# Patient Record
Sex: FEMALE | Race: WHITE | Employment: OTHER | URBAN - METROPOLITAN AREA
[De-identification: names, ages, dates, MRNs, and addresses within clinical notes are randomized per-mention and may not be internally consistent; named-entity substitution may affect disease eponyms.]

---

## 2017-03-05 ENCOUNTER — HOSPITAL ENCOUNTER (INPATIENT)
Age: 67
LOS: 4 days | Discharge: NURSING, REHAB OR LONG-TERM CARE | DRG: 065 | End: 2017-03-09
Attending: EMERGENCY MEDICINE | Admitting: INTERNAL MEDICINE
Payer: MEDICARE

## 2017-03-05 ENCOUNTER — APPOINTMENT (OUTPATIENT)
Dept: GENERAL RADIOLOGY | Age: 67
DRG: 065 | End: 2017-03-05
Payer: MEDICARE

## 2017-03-05 ENCOUNTER — APPOINTMENT (OUTPATIENT)
Dept: CT IMAGING | Age: 67
DRG: 065 | End: 2017-03-05
Payer: MEDICARE

## 2017-03-05 ENCOUNTER — APPOINTMENT (OUTPATIENT)
Dept: MRI IMAGING | Age: 67
DRG: 065 | End: 2017-03-05
Payer: MEDICARE

## 2017-03-05 DIAGNOSIS — R00.0 TACHYCARDIA: ICD-10-CM

## 2017-03-05 DIAGNOSIS — I63.9 ACUTE ISCHEMIC STROKE (HCC): ICD-10-CM

## 2017-03-05 DIAGNOSIS — R73.9 HYPERGLYCEMIA: ICD-10-CM

## 2017-03-05 DIAGNOSIS — I63.9 CEREBROVASCULAR ACCIDENT (CVA), UNSPECIFIED MECHANISM (HCC): Primary | ICD-10-CM

## 2017-03-05 PROBLEM — R29.90 STROKE-LIKE SYMPTOMS: Status: ACTIVE | Noted: 2017-03-05

## 2017-03-05 LAB
BETA-HYDROXYBUTYRATE: 0.85 MMOL/L (ref 0.02–0.27)
CHOLESTEROL/HDL RATIO: 5.5
CHOLESTEROL: 364 MG/DL
CHP ED QC CHECK: NORMAL
GLUCOSE BLD-MCNC: 178 MG/DL (ref 65–105)
GLUCOSE BLD-MCNC: 232 MG/DL
GLUCOSE BLD-MCNC: 232 MG/DL (ref 65–105)
HDLC SERPL-MCNC: 66 MG/DL
INR BLD: 1
LDL CHOLESTEROL: 277 MG/DL (ref 0–130)
PARTIAL THROMBOPLASTIN TIME: 70.8 SEC (ref 21.3–31.3)
PROTHROMBIN TIME: 11.1 SEC (ref 9.4–12.6)
TRIGL SERPL-MCNC: 107 MG/DL
TROPONIN INTERP: NORMAL
TROPONIN T: <0.03 NG/ML
VLDLC SERPL CALC-MCNC: ABNORMAL MG/DL (ref 1–30)

## 2017-03-05 PROCEDURE — 6360000004 HC RX CONTRAST MEDICATION: Performed by: EMERGENCY MEDICINE

## 2017-03-05 PROCEDURE — 84484 ASSAY OF TROPONIN QUANT: CPT

## 2017-03-05 PROCEDURE — 6370000000 HC RX 637 (ALT 250 FOR IP): Performed by: EMERGENCY MEDICINE

## 2017-03-05 PROCEDURE — 70498 CT ANGIOGRAPHY NECK: CPT

## 2017-03-05 PROCEDURE — 6360000002 HC RX W HCPCS: Performed by: EMERGENCY MEDICINE

## 2017-03-05 PROCEDURE — 2580000003 HC RX 258: Performed by: EMERGENCY MEDICINE

## 2017-03-05 PROCEDURE — 6360000004 HC RX CONTRAST MEDICATION: Performed by: INTERNAL MEDICINE

## 2017-03-05 PROCEDURE — 82947 ASSAY GLUCOSE BLOOD QUANT: CPT | Performed by: INTERNAL MEDICINE

## 2017-03-05 PROCEDURE — 80061 LIPID PANEL: CPT

## 2017-03-05 PROCEDURE — 82947 ASSAY GLUCOSE BLOOD QUANT: CPT

## 2017-03-05 PROCEDURE — 71010 XR CHEST PORTABLE: CPT

## 2017-03-05 PROCEDURE — 93005 ELECTROCARDIOGRAM TRACING: CPT

## 2017-03-05 PROCEDURE — 70496 CT ANGIOGRAPHY HEAD: CPT

## 2017-03-05 PROCEDURE — 2500000003 HC RX 250 WO HCPCS: Performed by: STUDENT IN AN ORGANIZED HEALTH CARE EDUCATION/TRAINING PROGRAM

## 2017-03-05 PROCEDURE — A9579 GAD-BASE MR CONTRAST NOS,1ML: HCPCS | Performed by: INTERNAL MEDICINE

## 2017-03-05 PROCEDURE — 85610 PROTHROMBIN TIME: CPT

## 2017-03-05 PROCEDURE — 2580000003 HC RX 258: Performed by: STUDENT IN AN ORGANIZED HEALTH CARE EDUCATION/TRAINING PROGRAM

## 2017-03-05 PROCEDURE — 99245 OFF/OP CONSLTJ NEW/EST HI 55: CPT | Performed by: PSYCHIATRY & NEUROLOGY

## 2017-03-05 PROCEDURE — 70553 MRI BRAIN STEM W/O & W/DYE: CPT

## 2017-03-05 PROCEDURE — 81001 URINALYSIS AUTO W/SCOPE: CPT

## 2017-03-05 PROCEDURE — 2060000000 HC ICU INTERMEDIATE R&B

## 2017-03-05 PROCEDURE — 99285 EMERGENCY DEPT VISIT HI MDM: CPT

## 2017-03-05 PROCEDURE — 87641 MR-STAPH DNA AMP PROBE: CPT

## 2017-03-05 PROCEDURE — 2580000003 HC RX 258: Performed by: INTERNAL MEDICINE

## 2017-03-05 PROCEDURE — 96365 THER/PROPH/DIAG IV INF INIT: CPT

## 2017-03-05 PROCEDURE — 82010 KETONE BODYS QUAN: CPT

## 2017-03-05 PROCEDURE — 6370000000 HC RX 637 (ALT 250 FOR IP): Performed by: STUDENT IN AN ORGANIZED HEALTH CARE EDUCATION/TRAINING PROGRAM

## 2017-03-05 PROCEDURE — 85730 THROMBOPLASTIN TIME PARTIAL: CPT

## 2017-03-05 RX ORDER — ASPIRIN 81 MG/1
81 TABLET ORAL DAILY
Status: DISCONTINUED | OUTPATIENT
Start: 2017-03-06 | End: 2017-03-09 | Stop reason: HOSPADM

## 2017-03-05 RX ORDER — CLOPIDOGREL BISULFATE 75 MG/1
75 TABLET ORAL DAILY
Status: DISCONTINUED | OUTPATIENT
Start: 2017-03-06 | End: 2017-03-09 | Stop reason: HOSPADM

## 2017-03-05 RX ORDER — AMLODIPINE BESYLATE 5 MG/1
5 TABLET ORAL DAILY
Status: DISCONTINUED | OUTPATIENT
Start: 2017-03-05 | End: 2017-03-08

## 2017-03-05 RX ORDER — SODIUM CHLORIDE 0.9 % (FLUSH) 0.9 %
10 SYRINGE (ML) INJECTION 2 TIMES DAILY
Status: DISCONTINUED | OUTPATIENT
Start: 2017-03-06 | End: 2017-03-09 | Stop reason: HOSPADM

## 2017-03-05 RX ORDER — 0.9 % SODIUM CHLORIDE 0.9 %
1000 INTRAVENOUS SOLUTION INTRAVENOUS ONCE
Status: COMPLETED | OUTPATIENT
Start: 2017-03-05 | End: 2017-03-05

## 2017-03-05 RX ORDER — SODIUM CHLORIDE 0.9 % (FLUSH) 0.9 %
10 SYRINGE (ML) INJECTION EVERY 12 HOURS SCHEDULED
Status: CANCELLED | OUTPATIENT
Start: 2017-03-05

## 2017-03-05 RX ORDER — ONDANSETRON 2 MG/ML
4 INJECTION INTRAMUSCULAR; INTRAVENOUS EVERY 6 HOURS PRN
Status: DISCONTINUED | OUTPATIENT
Start: 2017-03-05 | End: 2017-03-09 | Stop reason: HOSPADM

## 2017-03-05 RX ORDER — ATORVASTATIN CALCIUM 40 MG/1
40 TABLET, FILM COATED ORAL NIGHTLY
Status: DISCONTINUED | OUTPATIENT
Start: 2017-03-05 | End: 2017-03-06

## 2017-03-05 RX ORDER — SODIUM CHLORIDE 0.9 % (FLUSH) 0.9 %
10 SYRINGE (ML) INJECTION PRN
Status: DISCONTINUED | OUTPATIENT
Start: 2017-03-05 | End: 2017-03-09 | Stop reason: HOSPADM

## 2017-03-05 RX ORDER — LABETALOL HYDROCHLORIDE 5 MG/ML
10 INJECTION, SOLUTION INTRAVENOUS EVERY 6 HOURS PRN
Status: DISCONTINUED | OUTPATIENT
Start: 2017-03-05 | End: 2017-03-09 | Stop reason: HOSPADM

## 2017-03-05 RX ORDER — SODIUM CHLORIDE 0.9 % (FLUSH) 0.9 %
10 SYRINGE (ML) INJECTION PRN
Status: CANCELLED | OUTPATIENT
Start: 2017-03-05

## 2017-03-05 RX ORDER — CLOPIDOGREL 300 MG/1
300 TABLET, FILM COATED ORAL ONCE
Status: COMPLETED | OUTPATIENT
Start: 2017-03-05 | End: 2017-03-05

## 2017-03-05 RX ORDER — SIMVASTATIN 40 MG
40 TABLET ORAL NIGHTLY
Status: DISCONTINUED | OUTPATIENT
Start: 2017-03-05 | End: 2017-03-05

## 2017-03-05 RX ORDER — SODIUM CHLORIDE 9 MG/ML
INJECTION, SOLUTION INTRAVENOUS CONTINUOUS
Status: DISCONTINUED | OUTPATIENT
Start: 2017-03-05 | End: 2017-03-08

## 2017-03-05 RX ORDER — MORPHINE SULFATE 2 MG/ML
2 INJECTION, SOLUTION INTRAMUSCULAR; INTRAVENOUS
Status: CANCELLED | OUTPATIENT
Start: 2017-03-05

## 2017-03-05 RX ORDER — ASPIRIN 81 MG/1
324 TABLET, CHEWABLE ORAL ONCE
Status: COMPLETED | OUTPATIENT
Start: 2017-03-05 | End: 2017-03-05

## 2017-03-05 RX ORDER — SODIUM CHLORIDE 0.9 % (FLUSH) 0.9 %
10 SYRINGE (ML) INJECTION EVERY 12 HOURS SCHEDULED
Status: DISCONTINUED | OUTPATIENT
Start: 2017-03-05 | End: 2017-03-09 | Stop reason: HOSPADM

## 2017-03-05 RX ORDER — ACETAMINOPHEN 325 MG/1
650 TABLET ORAL EVERY 4 HOURS PRN
Status: DISCONTINUED | OUTPATIENT
Start: 2017-03-05 | End: 2017-03-09 | Stop reason: HOSPADM

## 2017-03-05 RX ORDER — MORPHINE SULFATE 4 MG/ML
4 INJECTION, SOLUTION INTRAMUSCULAR; INTRAVENOUS
Status: CANCELLED | OUTPATIENT
Start: 2017-03-05

## 2017-03-05 RX ORDER — ACETAMINOPHEN 325 MG/1
650 TABLET ORAL EVERY 4 HOURS PRN
Status: CANCELLED | OUTPATIENT
Start: 2017-03-05

## 2017-03-05 RX ORDER — HEPARIN SODIUM 10000 [USP'U]/100ML
15 INJECTION, SOLUTION INTRAVENOUS CONTINUOUS
Status: DISCONTINUED | OUTPATIENT
Start: 2017-03-05 | End: 2017-03-05

## 2017-03-05 RX ADMIN — ASPIRIN 81 MG 324 MG: 81 TABLET ORAL at 23:52

## 2017-03-05 RX ADMIN — Medication 10 ML: at 21:27

## 2017-03-05 RX ADMIN — SODIUM CHLORIDE 1000 ML: 9 INJECTION, SOLUTION INTRAVENOUS at 15:49

## 2017-03-05 RX ADMIN — GADOPENTETATE DIMEGLUMINE 11 ML: 469.01 INJECTION INTRAVENOUS at 23:37

## 2017-03-05 RX ADMIN — IOHEXOL 90 ML: 350 INJECTION, SOLUTION INTRAVENOUS at 15:20

## 2017-03-05 RX ADMIN — AMLODIPINE BESYLATE 5 MG: 5 TABLET ORAL at 23:52

## 2017-03-05 RX ADMIN — LABETALOL HYDROCHLORIDE 10 MG: 5 INJECTION, SOLUTION INTRAVENOUS at 22:27

## 2017-03-05 RX ADMIN — ATORVASTATIN CALCIUM 40 MG: 40 TABLET, FILM COATED ORAL at 23:52

## 2017-03-05 RX ADMIN — HEPARIN SODIUM AND DEXTROSE 15 UNITS/KG/HR: 10000; 5 INJECTION INTRAVENOUS at 15:33

## 2017-03-05 RX ADMIN — SODIUM CHLORIDE: 9 INJECTION, SOLUTION INTRAVENOUS at 21:26

## 2017-03-05 RX ADMIN — CLOPIDOGREL BISULFATE 300 MG: 300 TABLET, FILM COATED ORAL at 23:51

## 2017-03-05 RX ADMIN — Medication 10 ML: at 23:39

## 2017-03-05 ASSESSMENT — ENCOUNTER SYMPTOMS
PHOTOPHOBIA: 0
TROUBLE SWALLOWING: 0
SHORTNESS OF BREATH: 0
RHINORRHEA: 0
ABDOMINAL PAIN: 0
BACK PAIN: 0

## 2017-03-06 PROBLEM — I63.9 ACUTE ISCHEMIC STROKE (HCC): Status: ACTIVE | Noted: 2017-03-06

## 2017-03-06 LAB
-: NORMAL
ABSOLUTE EOS #: 0.1 K/UL (ref 0–0.4)
ABSOLUTE LYMPH #: 1.5 K/UL (ref 1–4.8)
ABSOLUTE MONO #: 0.6 K/UL (ref 0.1–1.2)
AMORPHOUS: NORMAL
BACTERIA: NORMAL
BASOPHILS # BLD: 0 % (ref 0–2)
BASOPHILS ABSOLUTE: 0 K/UL (ref 0–0.2)
BILIRUBIN URINE: NEGATIVE
CASTS UA: NORMAL /LPF (ref 0–8)
CHOLESTEROL/HDL RATIO: 6.4
CHOLESTEROL: 333 MG/DL
COLOR: YELLOW
COMMENT UA: ABNORMAL
CRYSTALS, UA: NORMAL /HPF
DIFFERENTIAL TYPE: ABNORMAL
DIRECT EXAM: NORMAL
EOSINOPHILS RELATIVE PERCENT: 2 % (ref 1–4)
EPITHELIAL CELLS UA: NORMAL /HPF (ref 0–5)
GLUCOSE BLD-MCNC: 199 MG/DL (ref 65–105)
GLUCOSE BLD-MCNC: 218 MG/DL (ref 65–105)
GLUCOSE BLD-MCNC: 266 MG/DL (ref 65–105)
GLUCOSE BLD-MCNC: 315 MG/DL (ref 65–105)
GLUCOSE URINE: ABNORMAL
HCT VFR BLD CALC: 41 % (ref 36–46)
HDLC SERPL-MCNC: 52 MG/DL
HEMOGLOBIN: 14.1 G/DL (ref 12–16)
INR BLD: 1
KETONES, URINE: ABNORMAL
LDL CHOLESTEROL: 235 MG/DL (ref 0–130)
LEUKOCYTE ESTERASE, URINE: ABNORMAL
LV EF: 55 %
LVEF MODALITY: NORMAL
LYMPHOCYTES # BLD: 22 % (ref 24–44)
Lab: NORMAL
MCH RBC QN AUTO: 28.9 PG (ref 26–34)
MCHC RBC AUTO-ENTMCNC: 34.3 G/DL (ref 31–37)
MCV RBC AUTO: 84.4 FL (ref 80–100)
MONOCYTES # BLD: 8 % (ref 2–11)
MUCUS: NORMAL
NITRITE, URINE: NEGATIVE
OTHER OBSERVATIONS UA: NORMAL
PDW BLD-RTO: 13.7 % (ref 12.5–15.4)
PH UA: 5.5 (ref 5–8)
PLATELET # BLD: 218 K/UL (ref 140–450)
PLATELET ESTIMATE: ABNORMAL
PMV BLD AUTO: 8.8 FL (ref 6–12)
PROTEIN UA: NEGATIVE
PROTHROMBIN TIME: 11.1 SEC (ref 9.4–12.6)
RBC # BLD: 4.86 M/UL (ref 4–5.2)
RBC # BLD: ABNORMAL 10*6/UL
RBC UA: NORMAL /HPF (ref 0–4)
RENAL EPITHELIAL, UA: NORMAL /HPF
SEG NEUTROPHILS: 68 % (ref 36–66)
SEGMENTED NEUTROPHILS ABSOLUTE COUNT: 4.7 K/UL (ref 1.8–7.7)
SPECIFIC GRAVITY UA: 1.04 (ref 1–1.03)
SPECIMEN DESCRIPTION: NORMAL
STATUS: NORMAL
TRICHOMONAS: NORMAL
TRIGL SERPL-MCNC: 230 MG/DL
TURBIDITY: CLEAR
URINE HGB: NEGATIVE
UROBILINOGEN, URINE: NORMAL
VLDLC SERPL CALC-MCNC: ABNORMAL MG/DL (ref 1–30)
WBC # BLD: 6.8 K/UL (ref 3.5–11)
WBC # BLD: ABNORMAL 10*3/UL
WBC UA: NORMAL /HPF (ref 0–5)
YEAST: NORMAL

## 2017-03-06 PROCEDURE — G8999 MOTOR SPEECH CURRENT STATUS: HCPCS

## 2017-03-06 PROCEDURE — G8979 MOBILITY GOAL STATUS: HCPCS

## 2017-03-06 PROCEDURE — G8996 SWALLOW CURRENT STATUS: HCPCS

## 2017-03-06 PROCEDURE — 92610 EVALUATE SWALLOWING FUNCTION: CPT

## 2017-03-06 PROCEDURE — 6370000000 HC RX 637 (ALT 250 FOR IP): Performed by: STUDENT IN AN ORGANIZED HEALTH CARE EDUCATION/TRAINING PROGRAM

## 2017-03-06 PROCEDURE — G9186 MOTOR SPEECH GOAL STATUS: HCPCS

## 2017-03-06 PROCEDURE — 97535 SELF CARE MNGMENT TRAINING: CPT | Performed by: OCCUPATIONAL THERAPIST

## 2017-03-06 PROCEDURE — 85610 PROTHROMBIN TIME: CPT

## 2017-03-06 PROCEDURE — 6360000002 HC RX W HCPCS: Performed by: INTERNAL MEDICINE

## 2017-03-06 PROCEDURE — G8988 SELF CARE GOAL STATUS: HCPCS | Performed by: OCCUPATIONAL THERAPIST

## 2017-03-06 PROCEDURE — 93306 TTE W/DOPPLER COMPLETE: CPT

## 2017-03-06 PROCEDURE — 94762 N-INVAS EAR/PLS OXIMTRY CONT: CPT

## 2017-03-06 PROCEDURE — G8997 SWALLOW GOAL STATUS: HCPCS

## 2017-03-06 PROCEDURE — G9158 MOTOR SPEECH D/C STATUS: HCPCS

## 2017-03-06 PROCEDURE — 92523 SPEECH SOUND LANG COMPREHEN: CPT

## 2017-03-06 PROCEDURE — 85025 COMPLETE CBC W/AUTO DIFF WBC: CPT

## 2017-03-06 PROCEDURE — 6370000000 HC RX 637 (ALT 250 FOR IP): Performed by: INTERNAL MEDICINE

## 2017-03-06 PROCEDURE — 99254 IP/OBS CNSLTJ NEW/EST MOD 60: CPT | Performed by: PSYCHIATRY & NEUROLOGY

## 2017-03-06 PROCEDURE — 80061 LIPID PANEL: CPT

## 2017-03-06 PROCEDURE — G8998 SWALLOW D/C STATUS: HCPCS

## 2017-03-06 PROCEDURE — 36415 COLL VENOUS BLD VENIPUNCTURE: CPT

## 2017-03-06 PROCEDURE — 2580000003 HC RX 258: Performed by: INTERNAL MEDICINE

## 2017-03-06 PROCEDURE — 83036 HEMOGLOBIN GLYCOSYLATED A1C: CPT

## 2017-03-06 PROCEDURE — 97530 THERAPEUTIC ACTIVITIES: CPT

## 2017-03-06 PROCEDURE — G8978 MOBILITY CURRENT STATUS: HCPCS

## 2017-03-06 PROCEDURE — 2580000003 HC RX 258: Performed by: EMERGENCY MEDICINE

## 2017-03-06 PROCEDURE — 82947 ASSAY GLUCOSE BLOOD QUANT: CPT

## 2017-03-06 PROCEDURE — 82947 ASSAY GLUCOSE BLOOD QUANT: CPT | Performed by: INTERNAL MEDICINE

## 2017-03-06 PROCEDURE — 97166 OT EVAL MOD COMPLEX 45 MIN: CPT | Performed by: OCCUPATIONAL THERAPIST

## 2017-03-06 PROCEDURE — 99253 IP/OBS CNSLTJ NEW/EST LOW 45: CPT | Performed by: PHYSICAL MEDICINE & REHABILITATION

## 2017-03-06 PROCEDURE — G8987 SELF CARE CURRENT STATUS: HCPCS | Performed by: OCCUPATIONAL THERAPIST

## 2017-03-06 PROCEDURE — 99291 CRITICAL CARE FIRST HOUR: CPT | Performed by: INTERNAL MEDICINE

## 2017-03-06 PROCEDURE — 97162 PT EVAL MOD COMPLEX 30 MIN: CPT

## 2017-03-06 PROCEDURE — 2060000000 HC ICU INTERMEDIATE R&B

## 2017-03-06 RX ORDER — DEXTROSE MONOHYDRATE 50 MG/ML
100 INJECTION, SOLUTION INTRAVENOUS PRN
Status: DISCONTINUED | OUTPATIENT
Start: 2017-03-06 | End: 2017-03-09 | Stop reason: HOSPADM

## 2017-03-06 RX ORDER — INSULIN GLARGINE 100 [IU]/ML
10 INJECTION, SOLUTION SUBCUTANEOUS DAILY
Status: DISCONTINUED | OUTPATIENT
Start: 2017-03-06 | End: 2017-03-07

## 2017-03-06 RX ORDER — ATORVASTATIN CALCIUM 80 MG/1
80 TABLET, FILM COATED ORAL NIGHTLY
Status: DISCONTINUED | OUTPATIENT
Start: 2017-03-06 | End: 2017-03-09 | Stop reason: HOSPADM

## 2017-03-06 RX ORDER — NICOTINE POLACRILEX 4 MG
15 LOZENGE BUCCAL PRN
Status: DISCONTINUED | OUTPATIENT
Start: 2017-03-06 | End: 2017-03-09 | Stop reason: HOSPADM

## 2017-03-06 RX ORDER — DEXTROSE MONOHYDRATE 25 G/50ML
12.5 INJECTION, SOLUTION INTRAVENOUS PRN
Status: DISCONTINUED | OUTPATIENT
Start: 2017-03-06 | End: 2017-03-09 | Stop reason: HOSPADM

## 2017-03-06 RX ORDER — 0.9 % SODIUM CHLORIDE 0.9 %
1000 INTRAVENOUS SOLUTION INTRAVENOUS ONCE
Status: COMPLETED | OUTPATIENT
Start: 2017-03-06 | End: 2017-03-06

## 2017-03-06 RX ADMIN — ATORVASTATIN CALCIUM 80 MG: 80 TABLET, FILM COATED ORAL at 20:52

## 2017-03-06 RX ADMIN — INSULIN LISPRO 12 UNITS: 100 INJECTION, SOLUTION INTRAVENOUS; SUBCUTANEOUS at 17:34

## 2017-03-06 RX ADMIN — AMLODIPINE BESYLATE 5 MG: 5 TABLET ORAL at 09:22

## 2017-03-06 RX ADMIN — INSULIN LISPRO 6 UNITS: 100 INJECTION, SOLUTION INTRAVENOUS; SUBCUTANEOUS at 12:33

## 2017-03-06 RX ADMIN — CLOPIDOGREL 75 MG: 75 TABLET, FILM COATED ORAL at 09:22

## 2017-03-06 RX ADMIN — SODIUM CHLORIDE 1000 ML: 9 INJECTION, SOLUTION INTRAVENOUS at 20:52

## 2017-03-06 RX ADMIN — Medication 10 ML: at 10:01

## 2017-03-06 RX ADMIN — INSULIN LISPRO 6 UNITS: 100 INJECTION, SOLUTION INTRAVENOUS; SUBCUTANEOUS at 09:22

## 2017-03-06 RX ADMIN — ASPIRIN 81 MG: 81 TABLET, COATED ORAL at 09:22

## 2017-03-06 RX ADMIN — Medication 10 UNITS: at 12:32

## 2017-03-06 RX ADMIN — INSULIN LISPRO 3 UNITS: 100 INJECTION, SOLUTION INTRAVENOUS; SUBCUTANEOUS at 20:52

## 2017-03-06 RX ADMIN — INSULIN LISPRO 1 UNITS: 100 INJECTION, SOLUTION INTRAVENOUS; SUBCUTANEOUS at 00:03

## 2017-03-06 RX ADMIN — ENOXAPARIN SODIUM 40 MG: 40 INJECTION SUBCUTANEOUS at 12:32

## 2017-03-07 LAB
ESTIMATED AVERAGE GLUCOSE: 306 MG/DL
GLUCOSE BLD-MCNC: 146 MG/DL (ref 65–105)
GLUCOSE BLD-MCNC: 152 MG/DL (ref 65–105)
GLUCOSE BLD-MCNC: 241 MG/DL (ref 65–105)
GLUCOSE BLD-MCNC: 247 MG/DL (ref 65–105)
GLUCOSE BLD-MCNC: 285 MG/DL (ref 65–105)
HBA1C MFR BLD: 12.3 % (ref 4–6)

## 2017-03-07 PROCEDURE — 2580000003 HC RX 258: Performed by: INTERNAL MEDICINE

## 2017-03-07 PROCEDURE — 6360000002 HC RX W HCPCS: Performed by: INTERNAL MEDICINE

## 2017-03-07 PROCEDURE — 6370000000 HC RX 637 (ALT 250 FOR IP): Performed by: STUDENT IN AN ORGANIZED HEALTH CARE EDUCATION/TRAINING PROGRAM

## 2017-03-07 PROCEDURE — 94762 N-INVAS EAR/PLS OXIMTRY CONT: CPT

## 2017-03-07 PROCEDURE — 97530 THERAPEUTIC ACTIVITIES: CPT

## 2017-03-07 PROCEDURE — 2060000000 HC ICU INTERMEDIATE R&B

## 2017-03-07 PROCEDURE — 97110 THERAPEUTIC EXERCISES: CPT

## 2017-03-07 PROCEDURE — 90670 PCV13 VACCINE IM: CPT

## 2017-03-07 PROCEDURE — 97116 GAIT TRAINING THERAPY: CPT

## 2017-03-07 PROCEDURE — 82947 ASSAY GLUCOSE BLOOD QUANT: CPT

## 2017-03-07 PROCEDURE — 6370000000 HC RX 637 (ALT 250 FOR IP): Performed by: INTERNAL MEDICINE

## 2017-03-07 PROCEDURE — G0009 ADMIN PNEUMOCOCCAL VACCINE: HCPCS

## 2017-03-07 PROCEDURE — 2580000003 HC RX 258: Performed by: EMERGENCY MEDICINE

## 2017-03-07 PROCEDURE — 99231 SBSQ HOSP IP/OBS SF/LOW 25: CPT

## 2017-03-07 PROCEDURE — 6360000002 HC RX W HCPCS

## 2017-03-07 PROCEDURE — 99233 SBSQ HOSP IP/OBS HIGH 50: CPT | Performed by: INTERNAL MEDICINE

## 2017-03-07 RX ORDER — INSULIN GLARGINE 100 [IU]/ML
20 INJECTION, SOLUTION SUBCUTANEOUS DAILY
Status: DISCONTINUED | OUTPATIENT
Start: 2017-03-08 | End: 2017-03-09

## 2017-03-07 RX ADMIN — SODIUM CHLORIDE: 9 INJECTION, SOLUTION INTRAVENOUS at 11:11

## 2017-03-07 RX ADMIN — PNEUMOCOCCAL 13-VALENT CONJUGATE VACCINE 0.5 ML: 2.2; 2.2; 2.2; 2.2; 2.2; 4.4; 2.2; 2.2; 2.2; 2.2; 2.2; 2.2; 2.2 INJECTION, SUSPENSION INTRAMUSCULAR at 12:47

## 2017-03-07 RX ADMIN — ATORVASTATIN CALCIUM 80 MG: 80 TABLET, FILM COATED ORAL at 21:19

## 2017-03-07 RX ADMIN — INSULIN LISPRO 7 UNITS: 100 INJECTION, SOLUTION INTRAVENOUS; SUBCUTANEOUS at 15:37

## 2017-03-07 RX ADMIN — CLOPIDOGREL 75 MG: 75 TABLET, FILM COATED ORAL at 09:08

## 2017-03-07 RX ADMIN — ASPIRIN 81 MG: 81 TABLET, COATED ORAL at 08:58

## 2017-03-07 RX ADMIN — Medication 10 UNITS: at 09:09

## 2017-03-07 RX ADMIN — SODIUM CHLORIDE: 9 INJECTION, SOLUTION INTRAVENOUS at 19:02

## 2017-03-07 RX ADMIN — ENOXAPARIN SODIUM 40 MG: 40 INJECTION SUBCUTANEOUS at 09:09

## 2017-03-07 RX ADMIN — INSULIN LISPRO 7 UNITS: 100 INJECTION, SOLUTION INTRAVENOUS; SUBCUTANEOUS at 19:05

## 2017-03-07 RX ADMIN — INSULIN LISPRO 3 UNITS: 100 INJECTION, SOLUTION INTRAVENOUS; SUBCUTANEOUS at 09:14

## 2017-03-07 RX ADMIN — SODIUM CHLORIDE, PRESERVATIVE FREE 10 ML: 5 INJECTION INTRAVENOUS at 08:59

## 2017-03-08 LAB
GLUCOSE BLD-MCNC: 113 MG/DL (ref 65–105)
GLUCOSE BLD-MCNC: 167 MG/DL (ref 65–105)
GLUCOSE BLD-MCNC: 175 MG/DL (ref 65–105)
GLUCOSE BLD-MCNC: 233 MG/DL (ref 65–105)

## 2017-03-08 PROCEDURE — 97116 GAIT TRAINING THERAPY: CPT

## 2017-03-08 PROCEDURE — 97112 NEUROMUSCULAR REEDUCATION: CPT

## 2017-03-08 PROCEDURE — 97110 THERAPEUTIC EXERCISES: CPT

## 2017-03-08 PROCEDURE — 82947 ASSAY GLUCOSE BLOOD QUANT: CPT

## 2017-03-08 PROCEDURE — 92507 TX SP LANG VOICE COMM INDIV: CPT

## 2017-03-08 PROCEDURE — 99233 SBSQ HOSP IP/OBS HIGH 50: CPT | Performed by: INTERNAL MEDICINE

## 2017-03-08 PROCEDURE — 6370000000 HC RX 637 (ALT 250 FOR IP): Performed by: INTERNAL MEDICINE

## 2017-03-08 PROCEDURE — 94762 N-INVAS EAR/PLS OXIMTRY CONT: CPT

## 2017-03-08 PROCEDURE — 97535 SELF CARE MNGMENT TRAINING: CPT

## 2017-03-08 PROCEDURE — 2580000003 HC RX 258: Performed by: INTERNAL MEDICINE

## 2017-03-08 PROCEDURE — 97530 THERAPEUTIC ACTIVITIES: CPT

## 2017-03-08 PROCEDURE — G0108 DIAB MANAGE TRN  PER INDIV: HCPCS

## 2017-03-08 PROCEDURE — 99231 SBSQ HOSP IP/OBS SF/LOW 25: CPT

## 2017-03-08 PROCEDURE — 6370000000 HC RX 637 (ALT 250 FOR IP): Performed by: STUDENT IN AN ORGANIZED HEALTH CARE EDUCATION/TRAINING PROGRAM

## 2017-03-08 PROCEDURE — 6360000002 HC RX W HCPCS: Performed by: INTERNAL MEDICINE

## 2017-03-08 PROCEDURE — 2060000000 HC ICU INTERMEDIATE R&B

## 2017-03-08 RX ORDER — ATORVASTATIN CALCIUM 80 MG/1
80 TABLET, FILM COATED ORAL NIGHTLY
Qty: 30 TABLET | Refills: 3 | Status: SHIPPED | OUTPATIENT
Start: 2017-03-08 | End: 2017-05-12 | Stop reason: SDUPTHER

## 2017-03-08 RX ORDER — INSULIN GLARGINE 100 [IU]/ML
20 INJECTION, SOLUTION SUBCUTANEOUS DAILY
Qty: 1 VIAL | Refills: 3 | Status: SHIPPED | OUTPATIENT
Start: 2017-03-08 | End: 2017-03-08 | Stop reason: HOSPADM

## 2017-03-08 RX ORDER — AMLODIPINE BESYLATE 10 MG/1
10 TABLET ORAL DAILY
Qty: 30 TABLET | Refills: 3 | Status: SHIPPED | OUTPATIENT
Start: 2017-03-09 | End: 2017-05-12 | Stop reason: SDUPTHER

## 2017-03-08 RX ORDER — BLOOD-GLUCOSE METER
1 KIT MISCELLANEOUS 3 TIMES DAILY
Qty: 1 KIT | Refills: 0 | Status: SHIPPED | OUTPATIENT
Start: 2017-03-08

## 2017-03-08 RX ORDER — BLOOD SUGAR DIAGNOSTIC
1 STRIP MISCELLANEOUS DAILY
Qty: 100 EACH | Refills: 3 | Status: SHIPPED | OUTPATIENT
Start: 2017-03-08 | End: 2017-03-08 | Stop reason: HOSPADM

## 2017-03-08 RX ORDER — CLOPIDOGREL BISULFATE 75 MG/1
75 TABLET ORAL DAILY
Qty: 18 TABLET | Refills: 0 | Status: SHIPPED | OUTPATIENT
Start: 2017-03-08 | End: 2017-04-19 | Stop reason: ALTCHOICE

## 2017-03-08 RX ORDER — AMLODIPINE BESYLATE 10 MG/1
10 TABLET ORAL DAILY
Status: DISCONTINUED | OUTPATIENT
Start: 2017-03-09 | End: 2017-03-09 | Stop reason: HOSPADM

## 2017-03-08 RX ORDER — ASPIRIN 81 MG/1
81 TABLET ORAL DAILY
Qty: 30 TABLET | Refills: 3 | Status: SHIPPED | OUTPATIENT
Start: 2017-03-08

## 2017-03-08 RX ORDER — GLUCOSAMINE HCL/CHONDROITIN SU 500-400 MG
1 CAPSULE ORAL
Qty: 100 EACH | Refills: 3 | Status: SHIPPED | OUTPATIENT
Start: 2017-03-08

## 2017-03-08 RX ADMIN — ENOXAPARIN SODIUM 40 MG: 40 INJECTION SUBCUTANEOUS at 08:00

## 2017-03-08 RX ADMIN — AMLODIPINE BESYLATE 5 MG: 5 TABLET ORAL at 08:00

## 2017-03-08 RX ADMIN — INSULIN LISPRO 7 UNITS: 100 INJECTION, SOLUTION INTRAVENOUS; SUBCUTANEOUS at 11:51

## 2017-03-08 RX ADMIN — INSULIN LISPRO 7 UNITS: 100 INJECTION, SOLUTION INTRAVENOUS; SUBCUTANEOUS at 16:48

## 2017-03-08 RX ADMIN — ASPIRIN 81 MG: 81 TABLET, COATED ORAL at 08:00

## 2017-03-08 RX ADMIN — Medication 20 UNITS: at 08:00

## 2017-03-08 RX ADMIN — ATORVASTATIN CALCIUM 80 MG: 80 TABLET, FILM COATED ORAL at 20:52

## 2017-03-08 RX ADMIN — CLOPIDOGREL 75 MG: 75 TABLET, FILM COATED ORAL at 08:00

## 2017-03-08 RX ADMIN — Medication 10 ML: at 20:55

## 2017-03-08 RX ADMIN — INSULIN LISPRO 7 UNITS: 100 INJECTION, SOLUTION INTRAVENOUS; SUBCUTANEOUS at 07:59

## 2017-03-09 VITALS
SYSTOLIC BLOOD PRESSURE: 144 MMHG | DIASTOLIC BLOOD PRESSURE: 98 MMHG | BODY MASS INDEX: 24.53 KG/M2 | TEMPERATURE: 97.2 F | WEIGHT: 121.69 LBS | HEART RATE: 102 BPM | OXYGEN SATURATION: 96 % | HEIGHT: 59 IN | RESPIRATION RATE: 24 BRPM

## 2017-03-09 LAB
GLUCOSE BLD-MCNC: 171 MG/DL (ref 65–105)
GLUCOSE BLD-MCNC: 211 MG/DL (ref 65–105)

## 2017-03-09 PROCEDURE — 94762 N-INVAS EAR/PLS OXIMTRY CONT: CPT

## 2017-03-09 PROCEDURE — 99233 SBSQ HOSP IP/OBS HIGH 50: CPT | Performed by: INTERNAL MEDICINE

## 2017-03-09 PROCEDURE — 97116 GAIT TRAINING THERAPY: CPT

## 2017-03-09 PROCEDURE — 6370000000 HC RX 637 (ALT 250 FOR IP): Performed by: STUDENT IN AN ORGANIZED HEALTH CARE EDUCATION/TRAINING PROGRAM

## 2017-03-09 PROCEDURE — 97112 NEUROMUSCULAR REEDUCATION: CPT

## 2017-03-09 PROCEDURE — 92507 TX SP LANG VOICE COMM INDIV: CPT

## 2017-03-09 PROCEDURE — 82947 ASSAY GLUCOSE BLOOD QUANT: CPT

## 2017-03-09 PROCEDURE — 2580000003 HC RX 258: Performed by: INTERNAL MEDICINE

## 2017-03-09 PROCEDURE — 6370000000 HC RX 637 (ALT 250 FOR IP): Performed by: INTERNAL MEDICINE

## 2017-03-09 PROCEDURE — 6360000002 HC RX W HCPCS: Performed by: INTERNAL MEDICINE

## 2017-03-09 PROCEDURE — 97110 THERAPEUTIC EXERCISES: CPT

## 2017-03-09 RX ORDER — INSULIN GLARGINE 100 [IU]/ML
15 INJECTION, SOLUTION SUBCUTANEOUS DAILY
Status: DISCONTINUED | OUTPATIENT
Start: 2017-03-09 | End: 2017-03-09 | Stop reason: HOSPADM

## 2017-03-09 RX ADMIN — ASPIRIN 81 MG: 81 TABLET, COATED ORAL at 09:09

## 2017-03-09 RX ADMIN — AMLODIPINE BESYLATE 10 MG: 10 TABLET ORAL at 09:09

## 2017-03-09 RX ADMIN — INSULIN GLARGINE 15 UNITS: 100 INJECTION, SOLUTION SUBCUTANEOUS at 09:25

## 2017-03-09 RX ADMIN — ENOXAPARIN SODIUM 40 MG: 40 INJECTION SUBCUTANEOUS at 09:09

## 2017-03-09 RX ADMIN — CLOPIDOGREL 75 MG: 75 TABLET, FILM COATED ORAL at 09:09

## 2017-03-09 RX ADMIN — METFORMIN HYDROCHLORIDE 500 MG: 500 TABLET ORAL at 09:09

## 2017-03-09 RX ADMIN — INSULIN LISPRO 7 UNITS: 100 INJECTION, SOLUTION INTRAVENOUS; SUBCUTANEOUS at 09:09

## 2017-03-09 RX ADMIN — SODIUM CHLORIDE, PRESERVATIVE FREE 10 ML: 5 INJECTION INTRAVENOUS at 09:00

## 2017-03-20 LAB
EKG ATRIAL RATE: 114 BPM
EKG P AXIS: 59 DEGREES
EKG P-R INTERVAL: 172 MS
EKG Q-T INTERVAL: 328 MS
EKG QRS DURATION: 84 MS
EKG QTC CALCULATION (BAZETT): 452 MS
EKG R AXIS: 12 DEGREES
EKG T AXIS: 43 DEGREES
EKG VENTRICULAR RATE: 114 BPM

## 2017-03-22 ENCOUNTER — HOSPITAL ENCOUNTER (OUTPATIENT)
Age: 67
Setting detail: SPECIMEN
Discharge: HOME | End: 2017-03-22

## 2017-03-22 LAB
ESTIMATED AVERAGE GLUCOSE: 278 MG/DL
HBA1C MFR BLD: 11.3 % (ref 4.8–5.9)

## 2017-03-22 PROCEDURE — P9604 ONE-WAY ALLOW PRORATED TRIP: HCPCS

## 2017-03-22 PROCEDURE — 36415 COLL VENOUS BLD VENIPUNCTURE: CPT

## 2017-03-22 PROCEDURE — 83036 HEMOGLOBIN GLYCOSYLATED A1C: CPT

## 2017-03-24 ENCOUNTER — HOSPITAL ENCOUNTER (OUTPATIENT)
Dept: DIABETES SERVICES | Age: 67
Setting detail: THERAPIES SERIES
Discharge: HOME | End: 2017-03-24
Payer: COMMERCIAL

## 2017-03-24 VITALS — HEIGHT: 59 IN | WEIGHT: 120 LBS | BODY MASS INDEX: 24.19 KG/M2

## 2017-03-24 PROCEDURE — G0108 DIAB MANAGE TRN  PER INDIV: HCPCS | Performed by: COUNSELOR

## 2017-03-24 ASSESSMENT — PATIENT HEALTH QUESTIONNAIRE - PHQ9
8. MOVING OR SPEAKING SO SLOWLY THAT OTHER PEOPLE COULD HAVE NOTICED. OR THE OPPOSITE, BEING SO FIGETY OR RESTLESS THAT YOU HAVE BEEN MOVING AROUND A LOT MORE THAN USUAL: 1
SUM OF ALL RESPONSES TO PHQ QUESTIONS 1-9: 5
6. FEELING BAD ABOUT YOURSELF - OR THAT YOU ARE A FAILURE OR HAVE LET YOURSELF OR YOUR FAMILY DOWN: 0
SUM OF ALL RESPONSES TO PHQ9 QUESTIONS 1 & 2: 0
7. TROUBLE CONCENTRATING ON THINGS, SUCH AS READING THE NEWSPAPER OR WATCHING TELEVISION: 1
3. TROUBLE FALLING OR STAYING ASLEEP: 1
9. THOUGHTS THAT YOU WOULD BE BETTER OFF DEAD, OR OF HURTING YOURSELF: 0
5. POOR APPETITE OR OVEREATING: 1
4. FEELING TIRED OR HAVING LITTLE ENERGY: 1
2. FEELING DOWN, DEPRESSED OR HOPELESS: 0
1. LITTLE INTEREST OR PLEASURE IN DOING THINGS: 0
10. IF YOU CHECKED OFF ANY PROBLEMS, HOW DIFFICULT HAVE THESE PROBLEMS MADE IT FOR YOU TO DO YOUR WORK, TAKE CARE OF THINGS AT HOME, OR GET ALONG WITH OTHER PEOPLE: 1

## 2017-03-30 ENCOUNTER — HOSPITAL ENCOUNTER (OUTPATIENT)
Dept: NUTRITION | Age: 67
Discharge: HOME | End: 2017-03-30
Payer: COMMERCIAL

## 2017-03-30 PROCEDURE — 97802 MEDICAL NUTRITION INDIV IN: CPT

## 2017-04-25 ENCOUNTER — OFFICE VISIT (OUTPATIENT)
Dept: NEUROLOGY | Age: 67
End: 2017-04-25
Payer: COMMERCIAL

## 2017-04-25 VITALS
HEART RATE: 125 BPM | DIASTOLIC BLOOD PRESSURE: 82 MMHG | SYSTOLIC BLOOD PRESSURE: 120 MMHG | BODY MASS INDEX: 21.99 KG/M2 | HEIGHT: 60 IN | WEIGHT: 112 LBS

## 2017-04-25 DIAGNOSIS — I63.9 BRAINSTEM STROKE (HCC): Primary | ICD-10-CM

## 2017-04-25 PROCEDURE — 99214 OFFICE O/P EST MOD 30 MIN: CPT | Performed by: PSYCHIATRY & NEUROLOGY

## 2017-05-16 PROBLEM — E11.9 TYPE 2 DIABETES MELLITUS WITHOUT COMPLICATION, WITH LONG-TERM CURRENT USE OF INSULIN (HCC): Status: ACTIVE | Noted: 2017-05-16

## 2017-05-16 PROBLEM — Z79.4 TYPE 2 DIABETES MELLITUS WITHOUT COMPLICATION, WITH LONG-TERM CURRENT USE OF INSULIN (HCC): Status: ACTIVE | Noted: 2017-05-16

## 2017-05-18 ENCOUNTER — HOSPITAL ENCOUNTER (OUTPATIENT)
Dept: PHYSICAL THERAPY | Age: 67
Setting detail: THERAPIES SERIES
Discharge: HOME | End: 2017-05-18
Payer: COMMERCIAL

## 2017-05-18 ENCOUNTER — HOSPITAL ENCOUNTER (OUTPATIENT)
Dept: SPEECH THERAPY | Age: 67
Setting detail: THERAPIES SERIES
Discharge: HOME | End: 2017-05-18
Payer: COMMERCIAL

## 2017-05-18 PROCEDURE — G8999 MOTOR SPEECH CURRENT STATUS: HCPCS

## 2017-05-18 PROCEDURE — 97163 PT EVAL HIGH COMPLEX 45 MIN: CPT

## 2017-05-18 PROCEDURE — G9186 MOTOR SPEECH GOAL STATUS: HCPCS

## 2017-05-18 PROCEDURE — 97116 GAIT TRAINING THERAPY: CPT

## 2017-05-18 PROCEDURE — 97110 THERAPEUTIC EXERCISES: CPT

## 2017-05-18 PROCEDURE — 97112 NEUROMUSCULAR REEDUCATION: CPT

## 2017-05-18 PROCEDURE — 92523 SPEECH SOUND LANG COMPREHEN: CPT

## 2017-05-19 PROCEDURE — G8978 MOBILITY CURRENT STATUS: HCPCS

## 2017-05-19 PROCEDURE — G8979 MOBILITY GOAL STATUS: HCPCS

## 2017-05-23 ENCOUNTER — HOSPITAL ENCOUNTER (OUTPATIENT)
Dept: SPEECH THERAPY | Age: 67
Setting detail: THERAPIES SERIES
Discharge: HOME | End: 2017-05-23
Payer: COMMERCIAL

## 2017-05-23 ENCOUNTER — HOSPITAL ENCOUNTER (OUTPATIENT)
Dept: PHYSICAL THERAPY | Age: 67
Setting detail: THERAPIES SERIES
Discharge: HOME | End: 2017-05-23
Payer: COMMERCIAL

## 2017-05-23 PROCEDURE — 97112 NEUROMUSCULAR REEDUCATION: CPT

## 2017-05-23 PROCEDURE — 97110 THERAPEUTIC EXERCISES: CPT

## 2017-05-23 PROCEDURE — 92507 TX SP LANG VOICE COMM INDIV: CPT

## 2017-05-26 ENCOUNTER — HOSPITAL ENCOUNTER (OUTPATIENT)
Dept: SPEECH THERAPY | Age: 67
Setting detail: THERAPIES SERIES
Discharge: HOME | End: 2017-05-26
Payer: COMMERCIAL

## 2017-05-26 ENCOUNTER — HOSPITAL ENCOUNTER (OUTPATIENT)
Dept: PHYSICAL THERAPY | Age: 67
Setting detail: THERAPIES SERIES
Discharge: HOME | End: 2017-05-26
Payer: COMMERCIAL

## 2017-05-26 PROCEDURE — 97110 THERAPEUTIC EXERCISES: CPT

## 2017-05-26 PROCEDURE — 97112 NEUROMUSCULAR REEDUCATION: CPT

## 2017-05-26 PROCEDURE — 92507 TX SP LANG VOICE COMM INDIV: CPT

## 2017-05-30 ENCOUNTER — HOSPITAL ENCOUNTER (OUTPATIENT)
Dept: PHYSICAL THERAPY | Age: 67
Setting detail: THERAPIES SERIES
Discharge: HOME | End: 2017-05-30
Payer: COMMERCIAL

## 2017-05-30 ENCOUNTER — HOSPITAL ENCOUNTER (OUTPATIENT)
Dept: SPEECH THERAPY | Age: 67
Setting detail: THERAPIES SERIES
Discharge: HOME | End: 2017-05-30
Payer: COMMERCIAL

## 2017-05-30 PROCEDURE — 92507 TX SP LANG VOICE COMM INDIV: CPT

## 2017-05-30 PROCEDURE — 97112 NEUROMUSCULAR REEDUCATION: CPT

## 2017-05-30 PROCEDURE — 97116 GAIT TRAINING THERAPY: CPT

## 2017-05-30 PROCEDURE — 97110 THERAPEUTIC EXERCISES: CPT

## 2017-06-01 ENCOUNTER — HOSPITAL ENCOUNTER (OUTPATIENT)
Dept: PHYSICAL THERAPY | Age: 67
Setting detail: THERAPIES SERIES
Discharge: HOME | End: 2017-06-01
Payer: COMMERCIAL

## 2017-06-01 ENCOUNTER — HOSPITAL ENCOUNTER (OUTPATIENT)
Dept: SPEECH THERAPY | Age: 67
Setting detail: THERAPIES SERIES
Discharge: HOME | End: 2017-06-01
Payer: COMMERCIAL

## 2017-06-01 PROCEDURE — 97116 GAIT TRAINING THERAPY: CPT

## 2017-06-01 PROCEDURE — 97110 THERAPEUTIC EXERCISES: CPT

## 2017-06-01 PROCEDURE — 97112 NEUROMUSCULAR REEDUCATION: CPT

## 2017-06-01 PROCEDURE — 92507 TX SP LANG VOICE COMM INDIV: CPT

## 2017-06-06 ENCOUNTER — HOSPITAL ENCOUNTER (OUTPATIENT)
Dept: PHYSICAL THERAPY | Age: 67
Setting detail: THERAPIES SERIES
Discharge: HOME | End: 2017-06-06
Payer: COMMERCIAL

## 2017-06-06 ENCOUNTER — HOSPITAL ENCOUNTER (OUTPATIENT)
Dept: SPEECH THERAPY | Age: 67
Setting detail: THERAPIES SERIES
Discharge: HOME | End: 2017-06-06
Payer: COMMERCIAL

## 2017-06-06 PROCEDURE — 97116 GAIT TRAINING THERAPY: CPT

## 2017-06-06 PROCEDURE — 92507 TX SP LANG VOICE COMM INDIV: CPT

## 2017-06-06 PROCEDURE — 97110 THERAPEUTIC EXERCISES: CPT

## 2017-06-08 ENCOUNTER — HOSPITAL ENCOUNTER (OUTPATIENT)
Dept: SPEECH THERAPY | Age: 67
Setting detail: THERAPIES SERIES
Discharge: HOME | End: 2017-06-08
Payer: COMMERCIAL

## 2017-06-08 ENCOUNTER — HOSPITAL ENCOUNTER (OUTPATIENT)
Dept: PHYSICAL THERAPY | Age: 67
Setting detail: THERAPIES SERIES
Discharge: HOME | End: 2017-06-08
Payer: COMMERCIAL

## 2017-06-08 PROCEDURE — 97110 THERAPEUTIC EXERCISES: CPT

## 2017-06-08 PROCEDURE — 92507 TX SP LANG VOICE COMM INDIV: CPT

## 2017-06-08 PROCEDURE — 97116 GAIT TRAINING THERAPY: CPT

## 2017-06-13 ENCOUNTER — HOSPITAL ENCOUNTER (OUTPATIENT)
Dept: PHYSICAL THERAPY | Age: 67
Setting detail: THERAPIES SERIES
Discharge: HOME | End: 2017-06-13
Payer: COMMERCIAL

## 2017-06-13 ENCOUNTER — HOSPITAL ENCOUNTER (OUTPATIENT)
Dept: SPEECH THERAPY | Age: 67
Setting detail: THERAPIES SERIES
Discharge: HOME | End: 2017-06-13
Payer: COMMERCIAL

## 2017-06-13 PROCEDURE — 97116 GAIT TRAINING THERAPY: CPT

## 2017-06-13 PROCEDURE — 97110 THERAPEUTIC EXERCISES: CPT

## 2017-06-13 PROCEDURE — 92507 TX SP LANG VOICE COMM INDIV: CPT

## 2017-06-13 PROCEDURE — 97112 NEUROMUSCULAR REEDUCATION: CPT

## 2017-06-15 ENCOUNTER — HOSPITAL ENCOUNTER (OUTPATIENT)
Dept: PHYSICAL THERAPY | Age: 67
Setting detail: THERAPIES SERIES
Discharge: HOME | End: 2017-06-15
Payer: COMMERCIAL

## 2017-06-15 ENCOUNTER — HOSPITAL ENCOUNTER (OUTPATIENT)
Dept: SPEECH THERAPY | Age: 67
Setting detail: THERAPIES SERIES
Discharge: HOME | End: 2017-06-15
Payer: COMMERCIAL

## 2017-06-15 PROCEDURE — G8978 MOBILITY CURRENT STATUS: HCPCS

## 2017-06-15 PROCEDURE — 97110 THERAPEUTIC EXERCISES: CPT

## 2017-06-15 PROCEDURE — 92507 TX SP LANG VOICE COMM INDIV: CPT

## 2017-06-15 PROCEDURE — G8979 MOBILITY GOAL STATUS: HCPCS

## 2017-06-20 ENCOUNTER — HOSPITAL ENCOUNTER (OUTPATIENT)
Dept: SPEECH THERAPY | Age: 67
Setting detail: THERAPIES SERIES
Discharge: HOME | End: 2017-06-20
Payer: COMMERCIAL

## 2017-06-20 ENCOUNTER — HOSPITAL ENCOUNTER (OUTPATIENT)
Dept: PHYSICAL THERAPY | Age: 67
Setting detail: THERAPIES SERIES
Discharge: HOME | End: 2017-06-20
Payer: COMMERCIAL

## 2017-06-20 PROCEDURE — 97110 THERAPEUTIC EXERCISES: CPT

## 2017-06-20 PROCEDURE — 92507 TX SP LANG VOICE COMM INDIV: CPT

## 2017-06-20 PROCEDURE — 97116 GAIT TRAINING THERAPY: CPT

## 2017-06-22 ENCOUNTER — HOSPITAL ENCOUNTER (OUTPATIENT)
Dept: SPEECH THERAPY | Age: 67
Setting detail: THERAPIES SERIES
Discharge: HOME | End: 2017-06-22
Payer: COMMERCIAL

## 2017-06-22 ENCOUNTER — HOSPITAL ENCOUNTER (OUTPATIENT)
Dept: PHYSICAL THERAPY | Age: 67
Setting detail: THERAPIES SERIES
Discharge: HOME | End: 2017-06-22
Payer: COMMERCIAL

## 2017-06-22 PROCEDURE — 97112 NEUROMUSCULAR REEDUCATION: CPT

## 2017-06-22 PROCEDURE — 97110 THERAPEUTIC EXERCISES: CPT

## 2017-06-22 PROCEDURE — 92507 TX SP LANG VOICE COMM INDIV: CPT

## 2017-06-27 ENCOUNTER — HOSPITAL ENCOUNTER (OUTPATIENT)
Dept: PHYSICAL THERAPY | Age: 67
Setting detail: THERAPIES SERIES
Discharge: HOME | End: 2017-06-27
Payer: COMMERCIAL

## 2017-06-27 ENCOUNTER — HOSPITAL ENCOUNTER (OUTPATIENT)
Dept: SPEECH THERAPY | Age: 67
Setting detail: THERAPIES SERIES
Discharge: HOME | End: 2017-06-27
Payer: COMMERCIAL

## 2017-06-27 PROCEDURE — 92507 TX SP LANG VOICE COMM INDIV: CPT

## 2017-06-27 PROCEDURE — 97110 THERAPEUTIC EXERCISES: CPT

## 2017-06-29 ENCOUNTER — HOSPITAL ENCOUNTER (OUTPATIENT)
Dept: PHYSICAL THERAPY | Age: 67
Setting detail: THERAPIES SERIES
Discharge: HOME | End: 2017-06-29
Payer: COMMERCIAL

## 2017-06-29 ENCOUNTER — HOSPITAL ENCOUNTER (OUTPATIENT)
Dept: SPEECH THERAPY | Age: 67
Setting detail: THERAPIES SERIES
Discharge: HOME | End: 2017-06-29
Payer: COMMERCIAL

## 2017-06-29 PROCEDURE — 92507 TX SP LANG VOICE COMM INDIV: CPT

## 2017-06-29 PROCEDURE — 97112 NEUROMUSCULAR REEDUCATION: CPT

## 2017-06-29 PROCEDURE — 97110 THERAPEUTIC EXERCISES: CPT

## 2017-06-29 PROCEDURE — 97116 GAIT TRAINING THERAPY: CPT

## 2017-07-06 ENCOUNTER — HOSPITAL ENCOUNTER (OUTPATIENT)
Dept: PHYSICAL THERAPY | Age: 67
Setting detail: THERAPIES SERIES
Discharge: HOME | End: 2017-07-06
Payer: COMMERCIAL

## 2017-07-06 ENCOUNTER — HOSPITAL ENCOUNTER (OUTPATIENT)
Dept: SPEECH THERAPY | Age: 67
Setting detail: THERAPIES SERIES
Discharge: HOME | End: 2017-07-06
Payer: COMMERCIAL

## 2017-07-06 PROCEDURE — 92507 TX SP LANG VOICE COMM INDIV: CPT

## 2017-07-06 PROCEDURE — 97110 THERAPEUTIC EXERCISES: CPT

## 2017-07-11 ENCOUNTER — HOSPITAL ENCOUNTER (OUTPATIENT)
Dept: PHYSICAL THERAPY | Age: 67
Setting detail: THERAPIES SERIES
Discharge: HOME | End: 2017-07-11
Payer: COMMERCIAL

## 2017-07-11 ENCOUNTER — HOSPITAL ENCOUNTER (OUTPATIENT)
Dept: SPEECH THERAPY | Age: 67
Setting detail: THERAPIES SERIES
Discharge: HOME | End: 2017-07-11
Payer: COMMERCIAL

## 2017-07-11 PROCEDURE — 97110 THERAPEUTIC EXERCISES: CPT

## 2017-07-11 PROCEDURE — 97112 NEUROMUSCULAR REEDUCATION: CPT

## 2017-07-11 PROCEDURE — 92507 TX SP LANG VOICE COMM INDIV: CPT

## 2017-07-13 ENCOUNTER — HOSPITAL ENCOUNTER (OUTPATIENT)
Dept: PHYSICAL THERAPY | Age: 67
Setting detail: THERAPIES SERIES
Discharge: HOME | End: 2017-07-13
Payer: COMMERCIAL

## 2017-07-13 ENCOUNTER — HOSPITAL ENCOUNTER (OUTPATIENT)
Dept: SPEECH THERAPY | Age: 67
Setting detail: THERAPIES SERIES
Discharge: HOME | End: 2017-07-13
Payer: COMMERCIAL

## 2017-07-13 PROCEDURE — G8999 MOTOR SPEECH CURRENT STATUS: HCPCS

## 2017-07-13 PROCEDURE — 97110 THERAPEUTIC EXERCISES: CPT

## 2017-07-13 PROCEDURE — G9186 MOTOR SPEECH GOAL STATUS: HCPCS

## 2017-07-13 PROCEDURE — 97112 NEUROMUSCULAR REEDUCATION: CPT

## 2017-07-13 PROCEDURE — 92507 TX SP LANG VOICE COMM INDIV: CPT

## 2017-07-18 ENCOUNTER — HOSPITAL ENCOUNTER (OUTPATIENT)
Dept: SPEECH THERAPY | Age: 67
Setting detail: THERAPIES SERIES
Discharge: HOME | End: 2017-07-18
Payer: COMMERCIAL

## 2017-07-18 ENCOUNTER — HOSPITAL ENCOUNTER (OUTPATIENT)
Dept: PHYSICAL THERAPY | Age: 67
Setting detail: THERAPIES SERIES
Discharge: HOME | End: 2017-07-18
Payer: COMMERCIAL

## 2017-07-18 PROCEDURE — 97110 THERAPEUTIC EXERCISES: CPT

## 2017-07-18 PROCEDURE — 92507 TX SP LANG VOICE COMM INDIV: CPT

## 2017-07-18 PROCEDURE — 97112 NEUROMUSCULAR REEDUCATION: CPT

## 2017-07-20 ENCOUNTER — HOSPITAL ENCOUNTER (OUTPATIENT)
Dept: SPEECH THERAPY | Age: 67
Setting detail: THERAPIES SERIES
Discharge: HOME | End: 2017-07-20
Payer: COMMERCIAL

## 2017-07-20 ENCOUNTER — HOSPITAL ENCOUNTER (OUTPATIENT)
Dept: PHYSICAL THERAPY | Age: 67
Setting detail: THERAPIES SERIES
Discharge: HOME | End: 2017-07-20
Payer: COMMERCIAL

## 2017-07-20 PROCEDURE — 97112 NEUROMUSCULAR REEDUCATION: CPT

## 2017-07-20 PROCEDURE — 92507 TX SP LANG VOICE COMM INDIV: CPT

## 2017-07-20 PROCEDURE — 97110 THERAPEUTIC EXERCISES: CPT

## 2017-07-25 ENCOUNTER — HOSPITAL ENCOUNTER (OUTPATIENT)
Dept: PHYSICAL THERAPY | Age: 67
Setting detail: THERAPIES SERIES
Discharge: HOME | End: 2017-07-25
Payer: COMMERCIAL

## 2017-07-25 ENCOUNTER — HOSPITAL ENCOUNTER (OUTPATIENT)
Dept: SPEECH THERAPY | Age: 67
Setting detail: THERAPIES SERIES
Discharge: HOME | End: 2017-07-25
Payer: COMMERCIAL

## 2017-07-25 PROCEDURE — G8979 MOBILITY GOAL STATUS: HCPCS

## 2017-07-25 PROCEDURE — G8978 MOBILITY CURRENT STATUS: HCPCS

## 2017-07-25 PROCEDURE — 92507 TX SP LANG VOICE COMM INDIV: CPT

## 2017-07-25 PROCEDURE — 97110 THERAPEUTIC EXERCISES: CPT

## 2017-08-01 ENCOUNTER — HOSPITAL ENCOUNTER (OUTPATIENT)
Dept: SPEECH THERAPY | Age: 67
Setting detail: THERAPIES SERIES
Discharge: HOME | End: 2017-08-01
Payer: COMMERCIAL

## 2017-08-01 ENCOUNTER — HOSPITAL ENCOUNTER (OUTPATIENT)
Dept: PHYSICAL THERAPY | Age: 67
Setting detail: THERAPIES SERIES
Discharge: HOME | End: 2017-08-01
Payer: COMMERCIAL

## 2017-08-01 PROCEDURE — 92507 TX SP LANG VOICE COMM INDIV: CPT

## 2017-08-01 PROCEDURE — 97112 NEUROMUSCULAR REEDUCATION: CPT

## 2017-08-01 PROCEDURE — 97110 THERAPEUTIC EXERCISES: CPT

## 2017-08-03 ENCOUNTER — HOSPITAL ENCOUNTER (OUTPATIENT)
Dept: PHYSICAL THERAPY | Age: 67
Setting detail: THERAPIES SERIES
Discharge: HOME | End: 2017-08-03
Payer: COMMERCIAL

## 2017-08-03 ENCOUNTER — HOSPITAL ENCOUNTER (OUTPATIENT)
Dept: SPEECH THERAPY | Age: 67
Setting detail: THERAPIES SERIES
Discharge: HOME | End: 2017-08-03
Payer: COMMERCIAL

## 2017-08-03 PROCEDURE — 97112 NEUROMUSCULAR REEDUCATION: CPT

## 2017-08-03 PROCEDURE — 92507 TX SP LANG VOICE COMM INDIV: CPT

## 2017-08-03 PROCEDURE — 97110 THERAPEUTIC EXERCISES: CPT

## 2017-08-08 ENCOUNTER — HOSPITAL ENCOUNTER (OUTPATIENT)
Dept: PHYSICAL THERAPY | Age: 67
Setting detail: THERAPIES SERIES
Discharge: HOME | End: 2017-08-08
Payer: COMMERCIAL

## 2017-08-08 ENCOUNTER — HOSPITAL ENCOUNTER (OUTPATIENT)
Dept: SPEECH THERAPY | Age: 67
Setting detail: THERAPIES SERIES
Discharge: HOME | End: 2017-08-08
Payer: COMMERCIAL

## 2017-08-08 PROCEDURE — 92507 TX SP LANG VOICE COMM INDIV: CPT

## 2017-08-08 PROCEDURE — 97112 NEUROMUSCULAR REEDUCATION: CPT

## 2017-08-08 PROCEDURE — 97110 THERAPEUTIC EXERCISES: CPT

## 2017-08-10 ENCOUNTER — HOSPITAL ENCOUNTER (OUTPATIENT)
Dept: PHYSICAL THERAPY | Age: 67
Setting detail: THERAPIES SERIES
Discharge: HOME | End: 2017-08-10
Payer: COMMERCIAL

## 2017-08-10 ENCOUNTER — HOSPITAL ENCOUNTER (OUTPATIENT)
Dept: SPEECH THERAPY | Age: 67
Setting detail: THERAPIES SERIES
Discharge: HOME | End: 2017-08-10
Payer: COMMERCIAL

## 2017-08-10 PROCEDURE — 97112 NEUROMUSCULAR REEDUCATION: CPT

## 2017-08-10 PROCEDURE — 97110 THERAPEUTIC EXERCISES: CPT

## 2017-08-10 PROCEDURE — 92507 TX SP LANG VOICE COMM INDIV: CPT

## 2017-08-15 ENCOUNTER — HOSPITAL ENCOUNTER (OUTPATIENT)
Dept: PHYSICAL THERAPY | Age: 67
Setting detail: THERAPIES SERIES
Discharge: HOME | End: 2017-08-15
Payer: COMMERCIAL

## 2017-08-15 ENCOUNTER — HOSPITAL ENCOUNTER (OUTPATIENT)
Dept: SPEECH THERAPY | Age: 67
Setting detail: THERAPIES SERIES
Discharge: HOME | End: 2017-08-15
Payer: COMMERCIAL

## 2017-08-15 PROCEDURE — 92507 TX SP LANG VOICE COMM INDIV: CPT

## 2017-08-15 PROCEDURE — 97112 NEUROMUSCULAR REEDUCATION: CPT

## 2017-08-15 PROCEDURE — 97110 THERAPEUTIC EXERCISES: CPT

## 2017-08-17 ENCOUNTER — HOSPITAL ENCOUNTER (OUTPATIENT)
Dept: PHYSICAL THERAPY | Age: 67
Setting detail: THERAPIES SERIES
Discharge: HOME | End: 2017-08-17
Payer: COMMERCIAL

## 2017-08-17 ENCOUNTER — HOSPITAL ENCOUNTER (OUTPATIENT)
Dept: SPEECH THERAPY | Age: 67
Setting detail: THERAPIES SERIES
Discharge: HOME | End: 2017-08-17
Payer: COMMERCIAL

## 2017-08-17 PROCEDURE — 92507 TX SP LANG VOICE COMM INDIV: CPT

## 2017-08-17 PROCEDURE — 97110 THERAPEUTIC EXERCISES: CPT

## 2017-08-17 PROCEDURE — 97112 NEUROMUSCULAR REEDUCATION: CPT

## 2017-08-22 ENCOUNTER — HOSPITAL ENCOUNTER (OUTPATIENT)
Dept: SPEECH THERAPY | Age: 67
Setting detail: THERAPIES SERIES
Discharge: HOME | End: 2017-08-22
Payer: COMMERCIAL

## 2017-08-22 ENCOUNTER — HOSPITAL ENCOUNTER (OUTPATIENT)
Dept: PHYSICAL THERAPY | Age: 67
Setting detail: THERAPIES SERIES
Discharge: HOME | End: 2017-08-22
Payer: COMMERCIAL

## 2017-08-22 PROCEDURE — 97112 NEUROMUSCULAR REEDUCATION: CPT

## 2017-08-22 PROCEDURE — 92507 TX SP LANG VOICE COMM INDIV: CPT

## 2017-08-22 PROCEDURE — 97110 THERAPEUTIC EXERCISES: CPT

## 2017-08-24 ENCOUNTER — HOSPITAL ENCOUNTER (OUTPATIENT)
Dept: SPEECH THERAPY | Age: 67
Setting detail: THERAPIES SERIES
Discharge: HOME | End: 2017-08-24
Payer: COMMERCIAL

## 2017-08-24 ENCOUNTER — HOSPITAL ENCOUNTER (OUTPATIENT)
Dept: PHYSICAL THERAPY | Age: 67
Setting detail: THERAPIES SERIES
Discharge: HOME | End: 2017-08-24
Payer: COMMERCIAL

## 2017-08-24 PROCEDURE — G9186 MOTOR SPEECH GOAL STATUS: HCPCS

## 2017-08-24 PROCEDURE — 97112 NEUROMUSCULAR REEDUCATION: CPT

## 2017-08-24 PROCEDURE — G8999 MOTOR SPEECH CURRENT STATUS: HCPCS

## 2017-08-24 PROCEDURE — 92507 TX SP LANG VOICE COMM INDIV: CPT

## 2017-08-24 PROCEDURE — 97110 THERAPEUTIC EXERCISES: CPT

## 2017-08-29 ENCOUNTER — HOSPITAL ENCOUNTER (OUTPATIENT)
Dept: PHYSICAL THERAPY | Age: 67
Setting detail: THERAPIES SERIES
Discharge: HOME | End: 2017-08-29
Payer: COMMERCIAL

## 2017-08-29 ENCOUNTER — HOSPITAL ENCOUNTER (OUTPATIENT)
Dept: SPEECH THERAPY | Age: 67
Setting detail: THERAPIES SERIES
Discharge: HOME | End: 2017-08-29
Payer: COMMERCIAL

## 2017-08-29 PROCEDURE — 92507 TX SP LANG VOICE COMM INDIV: CPT

## 2017-08-29 PROCEDURE — 97110 THERAPEUTIC EXERCISES: CPT

## 2017-08-29 PROCEDURE — 97112 NEUROMUSCULAR REEDUCATION: CPT

## 2017-08-31 ENCOUNTER — HOSPITAL ENCOUNTER (OUTPATIENT)
Dept: SPEECH THERAPY | Age: 67
Setting detail: THERAPIES SERIES
Discharge: HOME | End: 2017-08-31
Payer: COMMERCIAL

## 2017-08-31 ENCOUNTER — HOSPITAL ENCOUNTER (OUTPATIENT)
Dept: PHYSICAL THERAPY | Age: 67
Setting detail: THERAPIES SERIES
Discharge: HOME | End: 2017-08-31
Payer: COMMERCIAL

## 2017-08-31 PROCEDURE — 92507 TX SP LANG VOICE COMM INDIV: CPT

## 2017-08-31 PROCEDURE — 97112 NEUROMUSCULAR REEDUCATION: CPT

## 2017-08-31 PROCEDURE — 97110 THERAPEUTIC EXERCISES: CPT

## 2017-09-06 ENCOUNTER — HOSPITAL ENCOUNTER (OUTPATIENT)
Dept: PHYSICAL THERAPY | Age: 67
Setting detail: THERAPIES SERIES
Discharge: HOME | End: 2017-09-06
Payer: COMMERCIAL

## 2017-09-06 ENCOUNTER — HOSPITAL ENCOUNTER (OUTPATIENT)
Dept: SPEECH THERAPY | Age: 67
Setting detail: THERAPIES SERIES
Discharge: HOME | End: 2017-09-06
Payer: COMMERCIAL

## 2017-09-06 PROCEDURE — 92507 TX SP LANG VOICE COMM INDIV: CPT

## 2017-09-06 PROCEDURE — 97110 THERAPEUTIC EXERCISES: CPT

## 2017-09-06 PROCEDURE — 97112 NEUROMUSCULAR REEDUCATION: CPT

## 2017-09-11 ENCOUNTER — HOSPITAL ENCOUNTER (OUTPATIENT)
Dept: PHYSICAL THERAPY | Age: 67
Setting detail: THERAPIES SERIES
Discharge: HOME | End: 2017-09-11
Payer: COMMERCIAL

## 2017-09-11 ENCOUNTER — HOSPITAL ENCOUNTER (OUTPATIENT)
Dept: SPEECH THERAPY | Age: 67
Setting detail: THERAPIES SERIES
Discharge: HOME | End: 2017-09-11
Payer: COMMERCIAL

## 2017-09-11 PROCEDURE — 92507 TX SP LANG VOICE COMM INDIV: CPT

## 2017-09-11 PROCEDURE — 97112 NEUROMUSCULAR REEDUCATION: CPT

## 2017-09-11 PROCEDURE — 97110 THERAPEUTIC EXERCISES: CPT

## 2017-09-13 ENCOUNTER — HOSPITAL ENCOUNTER (OUTPATIENT)
Dept: SPEECH THERAPY | Age: 67
Setting detail: THERAPIES SERIES
Discharge: HOME | End: 2017-09-13
Payer: COMMERCIAL

## 2017-09-13 ENCOUNTER — HOSPITAL ENCOUNTER (OUTPATIENT)
Dept: PHYSICAL THERAPY | Age: 67
Setting detail: THERAPIES SERIES
Discharge: HOME | End: 2017-09-13
Payer: COMMERCIAL

## 2017-09-13 DIAGNOSIS — I63.9 LEFT PONTINE CEREBROVASCULAR ACCIDENT (HCC): ICD-10-CM

## 2017-09-13 PROCEDURE — 97110 THERAPEUTIC EXERCISES: CPT

## 2017-09-13 PROCEDURE — 97112 NEUROMUSCULAR REEDUCATION: CPT

## 2017-09-13 PROCEDURE — 92507 TX SP LANG VOICE COMM INDIV: CPT

## 2017-09-18 ENCOUNTER — HOSPITAL ENCOUNTER (OUTPATIENT)
Dept: SPEECH THERAPY | Age: 67
Setting detail: THERAPIES SERIES
Discharge: HOME | End: 2017-09-18
Payer: COMMERCIAL

## 2017-09-18 ENCOUNTER — HOSPITAL ENCOUNTER (OUTPATIENT)
Dept: PHYSICAL THERAPY | Age: 67
Setting detail: THERAPIES SERIES
Discharge: HOME | End: 2017-09-18
Payer: COMMERCIAL

## 2017-09-18 PROCEDURE — 92507 TX SP LANG VOICE COMM INDIV: CPT

## 2017-09-18 PROCEDURE — 97112 NEUROMUSCULAR REEDUCATION: CPT

## 2017-09-18 PROCEDURE — 97110 THERAPEUTIC EXERCISES: CPT

## 2017-09-20 ENCOUNTER — HOSPITAL ENCOUNTER (OUTPATIENT)
Dept: PHYSICAL THERAPY | Age: 67
Setting detail: THERAPIES SERIES
Discharge: HOME | End: 2017-09-20
Payer: COMMERCIAL

## 2017-09-20 ENCOUNTER — HOSPITAL ENCOUNTER (OUTPATIENT)
Dept: SPEECH THERAPY | Age: 67
Setting detail: THERAPIES SERIES
Discharge: HOME | End: 2017-09-20
Payer: COMMERCIAL

## 2017-09-20 PROCEDURE — 92507 TX SP LANG VOICE COMM INDIV: CPT

## 2017-09-20 PROCEDURE — 97112 NEUROMUSCULAR REEDUCATION: CPT

## 2017-09-20 PROCEDURE — 97110 THERAPEUTIC EXERCISES: CPT

## 2017-09-25 ENCOUNTER — HOSPITAL ENCOUNTER (OUTPATIENT)
Dept: PHYSICAL THERAPY | Age: 67
Setting detail: THERAPIES SERIES
Discharge: HOME | End: 2017-09-25
Payer: COMMERCIAL

## 2017-09-25 ENCOUNTER — HOSPITAL ENCOUNTER (OUTPATIENT)
Dept: SPEECH THERAPY | Age: 67
Setting detail: THERAPIES SERIES
Discharge: HOME | End: 2017-09-25
Payer: COMMERCIAL

## 2017-09-25 ENCOUNTER — OFFICE VISIT (OUTPATIENT)
Dept: NEUROLOGY | Age: 67
End: 2017-09-25
Payer: COMMERCIAL

## 2017-09-25 VITALS
SYSTOLIC BLOOD PRESSURE: 117 MMHG | HEART RATE: 109 BPM | HEIGHT: 60 IN | BODY MASS INDEX: 20.34 KG/M2 | DIASTOLIC BLOOD PRESSURE: 82 MMHG | WEIGHT: 103.6 LBS

## 2017-09-25 DIAGNOSIS — I63.9 LEFT PONTINE CEREBROVASCULAR ACCIDENT (HCC): Primary | ICD-10-CM

## 2017-09-25 PROCEDURE — 99215 OFFICE O/P EST HI 40 MIN: CPT | Performed by: PSYCHIATRY & NEUROLOGY

## 2017-09-25 PROCEDURE — 97112 NEUROMUSCULAR REEDUCATION: CPT

## 2017-09-25 PROCEDURE — 92507 TX SP LANG VOICE COMM INDIV: CPT

## 2017-09-25 PROCEDURE — 97110 THERAPEUTIC EXERCISES: CPT

## 2017-09-27 ENCOUNTER — HOSPITAL ENCOUNTER (OUTPATIENT)
Dept: PHYSICAL THERAPY | Age: 67
Setting detail: THERAPIES SERIES
Discharge: HOME | End: 2017-09-27
Payer: COMMERCIAL

## 2017-09-27 ENCOUNTER — HOSPITAL ENCOUNTER (OUTPATIENT)
Dept: SPEECH THERAPY | Age: 67
Setting detail: THERAPIES SERIES
Discharge: HOME | End: 2017-09-27
Payer: COMMERCIAL

## 2017-09-27 PROCEDURE — 92507 TX SP LANG VOICE COMM INDIV: CPT

## 2017-09-27 PROCEDURE — 97110 THERAPEUTIC EXERCISES: CPT

## 2017-09-27 PROCEDURE — 97112 NEUROMUSCULAR REEDUCATION: CPT

## 2017-10-02 ENCOUNTER — HOSPITAL ENCOUNTER (OUTPATIENT)
Dept: SPEECH THERAPY | Age: 67
Setting detail: THERAPIES SERIES
Discharge: HOME | End: 2017-10-02
Payer: COMMERCIAL

## 2017-10-02 ENCOUNTER — HOSPITAL ENCOUNTER (OUTPATIENT)
Dept: PHYSICAL THERAPY | Age: 67
Setting detail: THERAPIES SERIES
Discharge: HOME | End: 2017-10-02
Payer: COMMERCIAL

## 2017-10-02 PROCEDURE — 92507 TX SP LANG VOICE COMM INDIV: CPT

## 2017-10-02 PROCEDURE — 97110 THERAPEUTIC EXERCISES: CPT

## 2017-10-02 PROCEDURE — 97112 NEUROMUSCULAR REEDUCATION: CPT

## 2017-10-02 NOTE — PROGRESS NOTES
progress []Met  [x]Partially met  []Not met       EDUCATION/HOME EXERCISE PROGRAM (HEP)  New Education/HEP provided to patient/family/caregiver:    []Yes:     [x]No (Continued review of prior education)   If yes Education Provided:     Method of Education:     [x]Discussion     []Demonstration    [] Written     []Other  Evaluation of Patients Response to Education:         [x]Patient and or caregiver verbalized understanding  []Patient and or Caregiver Demonstrated without assistance   []Patient and or Caregiver Demonstrated with assistance  []Needs additional instruction to demonstrate understanding of education    ASSESSMENT  Patient tolerated todays treatment session:    [x] Good   []  Fair   []  Poor  Limitations/difficulties with treatment session due to:   []Pain     []Fatigue     []Other medical complications     []Other    Comments:    PLAN  [x]Continue with [facility] of [facility]  [facility]  []IHold per patient request  [] Change Treatment plan:  [] Insurance hold  __ Other     TIME   Time Treatment session was INITIATED 1115   Time Treatment session was STOPPED 1145    30     Charges: 1  Electronically signed by:    Alissa [address] Shaina Heart              Date:10/2/2017

## 2017-10-02 NOTE — PROGRESS NOTES
clarification.     Post Treatment Pain:  0/10      Plan  Times per week: 2x/wk  Plan weeks: 6 weeks       Goals  (Total # of Visits to Date: 45)   Short Term Goals - Time Frame for Short term goals: 3 weeks     Short term goal 1: Pt will be educated on her POC - met                                        []Met   []Partially met  []Not met   Short term goal 2: pt will be able to sit unsupported for 1 min without any loss of balance -Met, 1min 15 sec before LOB--progressing   []Met   []Partially met  []Not met      []Met   []Partially met  []Not met      []Met   []Partially met  []Not met     Long Term Goals - Time Frame for Long term goals : 6 weeks  Long term goal 1: Pt will be safe and independent with her HEP []Met  []Partially met  []Not met   Long term goal 2: Pt will be able to ambulate 15 feet with decreased ataxia and scissoring with Min A +1  []Met  []Partially met  []Not met   Long term goal 3: Pt will be able to sit unsupported for 5 minutes and be able to weight shift independently without any LOB -Part met 5 min but min wt shift  []Met  []Partially met  []Not met   Long term goal 4: Pt will be able to stand with bilateral UE and weight shift independently for >3mins []Met  []Partially met  []Not met   Long term goal 5: Pt will improve eye hand coordination and will be able to grab 10 comes on first attempt  []Met  []Partially met  []Not met       Minutes Tracking:  Time In: 1240  Time Out: [address] [facility]  Minutes: [address] PTA 5864 Date: 10/2/2017

## 2017-10-04 ENCOUNTER — HOSPITAL ENCOUNTER (OUTPATIENT)
Dept: PHYSICAL THERAPY | Age: 67
Setting detail: THERAPIES SERIES
Discharge: HOME | End: 2017-10-04
Payer: COMMERCIAL

## 2017-10-04 ENCOUNTER — HOSPITAL ENCOUNTER (OUTPATIENT)
Dept: SPEECH THERAPY | Age: 67
Setting detail: THERAPIES SERIES
Discharge: HOME | End: 2017-10-04
Payer: COMMERCIAL

## 2017-10-04 PROCEDURE — 97112 NEUROMUSCULAR REEDUCATION: CPT

## 2017-10-04 PROCEDURE — 97110 THERAPEUTIC EXERCISES: CPT

## 2017-10-04 PROCEDURE — 92507 TX SP LANG VOICE COMM INDIV: CPT

## 2017-10-04 PROCEDURE — 97116 GAIT TRAINING THERAPY: CPT

## 2017-10-04 NOTE — PROGRESS NOTES
week: 2x/wk  Plan weeks: 6 weeks       Goals  (Total # of Visits to Date: 44)   Short Term Goals - Time Frame for Short term goals: 3 weeks     Short term goal 1: Pt will be educated on her POC - met                                        []Met   []Partially met  []Not met   Short term goal 2: pt will be able to sit unsupported for 1 min without any loss of balance -Met== Sat over 2 min. []Met   []Partially met  []Not met      []Met   []Partially met  []Not met      []Met   []Partially met  []Not met     Long Term Goals - Time Frame for Long term goals : 6 weeks  Long term goal 1: Pt will be safe and independent with her HEP []Met  []Partially met  []Not met   Long term goal 2: Pt will be able to ambulate 15 feet with decreased ataxia and scissoring with Min A +1  []Met  []Partially met  []Not met   Long term goal 3: Pt will be able to sit unsupported for 5 minutes and be able to weight shift independently without any LOB -Part met 5 min but min wt shift  []Met  []Partially met  []Not met   Long term goal 4: Pt will be able to stand with bilateral UE and weight shift independently for >3mins--progressing stood for 2 min.  []Met  []Partially met  []Not met   Long term goal 5: Pt will improve eye hand coordination and will be able to grab 10 comes on first attempt  []Met  []Partially met  []Not met       Minutes Tracking:  Time In: 1223  Time Out: [address]  Minutes: 4321 [facility] PTA 7632 Date: 10/4/2017

## 2017-10-04 NOTE — PROGRESS NOTES
Phone: [address]    Fax: [phone]                                 Outpatient Speech Therapy                               DAILY TREATMENT NOTE    Date: 10/4/2017  Patients Name:  Shilpa Watkins  YOB: 1950 (74 y.o.)  Gender:  female  MRN:  [id number]  [facility] #: [id number]  Referring Taran Swift          Total # of Visits to Date: 40           Current Authorization  Comments: share cap with PT     PAIN  [x]No     []Yes      Pain Rating (0-10 pain scale): 0  Location:  N/A  Pain Description:  NA    SUBJECTIVE  Patient presents to clinic with      SHORT TERM GOALS/ TREATMENT SESSION:  Subjective report:           No new concerns       Goal 1: Pt will coordinate breath control and phonation of prolonged vowel sound for 8 seconds x10     5.48 secs, 6.18, 3.95, 4.85, 5.48 secs  5.63 secs, 5.06 secs, 7.68, 12.34 secs, 4.18, 5.5 secs    Improved onset with exhalation this date, intermittently strained []Met  [x]Partially met  []Not met   Goal 2: Pt will point to target words on AAC 10/10 attempts given 1 prompt. 10/10    [x]Met  []Partially met  []Not met   Goal 3: Pt will articulatory movements when prompted by therapist x10       CV with bilabials: x40 with max cues   CVC with model and max cues: x10   []Met  [x]Partially met  []Not met   Goal 4: Pt will complete OME to increase oral airflow x10 Blowing folded piece of paper for sustained air: x10  Across table x3 (indicating larger breath) []Met  [x]Partially met  []Not met   Goal 5: Pt will utilize AAC spontaneously 2x during a session in order to share information/express needs. Not addressed this date     []Met  [x]Partially met  []Not met     LONG TERM GOALS/ TREATMENT SESSION:  Goal 1: Pt will improve ability to communicate basic wants/needs.  In progress []Met  [x]Partially met  []Not met     EDUCATION/HOME EXERCISE PROGRAM (HEP)  New Education/HEP provided to patient/family/caregiver:    []Yes:     [x]No (Continued review of prior education)   If yes Education Provided:   Method of Education:     [x]Discussion     []Demonstration    [] Written     []Other  Evaluation of Patients Response to Education:         [x]Patient and or caregiver verbalized understanding  []Patient and or Caregiver Demonstrated without assistance   []Patient and or Caregiver Demonstrated with assistance  []Needs additional instruction to demonstrate understanding of education    ASSESSMENT  Patient tolerated todays treatment session:    [x] Good   []  Fair   []  Poor  Limitations/difficulties with treatment session due to:   []Pain     []Fatigue     []Other medical complications     []Other    Comments:    PLAN  [x]Continue with current plan of care  [facility]  []IHold per patient request  [] Change Treatment plan:  [] Insurance hold  __ Other     TIME   Time Treatment session was INITIATED 1115   Time Treatment session was STOPPED 1145    30     Charges: 1  Electronically signed by:    Nirav Busby  [address] [address] [facility], [place]              Date:10/4/2017

## 2017-10-09 ENCOUNTER — HOSPITAL ENCOUNTER (OUTPATIENT)
Dept: PHYSICAL THERAPY | Age: 67
Setting detail: THERAPIES SERIES
Discharge: HOME | End: 2017-10-09
Payer: COMMERCIAL

## 2017-10-09 ENCOUNTER — HOSPITAL ENCOUNTER (OUTPATIENT)
Dept: SPEECH THERAPY | Age: 67
Setting detail: THERAPIES SERIES
Discharge: HOME | End: 2017-10-09
Payer: COMMERCIAL

## 2017-10-09 PROCEDURE — 92507 TX SP LANG VOICE COMM INDIV: CPT

## 2017-10-09 PROCEDURE — 97112 NEUROMUSCULAR REEDUCATION: CPT

## 2017-10-09 PROCEDURE — G8979 MOBILITY GOAL STATUS: HCPCS

## 2017-10-09 PROCEDURE — 97110 THERAPEUTIC EXERCISES: CPT

## 2017-10-09 PROCEDURE — G8978 MOBILITY CURRENT STATUS: HCPCS

## 2017-10-09 NOTE — PROGRESS NOTES
Phone: [address]    Fax: [phone]                                 Outpatient Speech Therapy                               DAILY TREATMENT NOTE    Date: 10/9/2017  Patients Name:  Wei Benavidez  YOB: 1950 (74 y.o.)  Gender:  female  MRN:  [id number]  [facility] #: [id number]  Referring Artie Ruth          Total # of Visits to Date: 45           Current Authorization  Comments: share cap with PT     PAIN  []No     []Yes      Pain Rating (0-10 pain scale): 0  Location:  N/A  Pain Description:  NA    SUBJECTIVE  Patient presents to clinic with      SHORT TERM GOALS/ TREATMENT SESSION:  Subjective report:           No new concerns    Goal 1: Pt will coordinate breath control and phonation of prolonged vowel sound for 8 seconds x10     3.54 secs, 6.46 secs, 4.48 secs, 4.31 secs, 5.03 secs, 5.82 secs, 4.78 secs, 7.1 secs,     4.56 secs, 4.06 secs, 4.45 secs, 4.03 secs, 7.11 secs,    []Met  [x]Partially met  []Not met   Goal 2: Pt will point to target words on AAC 10/10 attempts given 1 prompt. 10/10     [x]Met  []Partially met  []Not met   Goal 3: Pt will articulatory movements when prompted by therapist x10       CV with a model x12     CVC with a model: push, move, more, put, bread, yell, cash, stop, top, up []Met  [x]Partially met  []Not met   Goal 4: Pt will complete OME to increase oral airflow x10 Blowing folded piece of paper x12  x2 made it across table  []Met  []Partially met  []Not met   Goal 5: Pt will utilize AAC spontaneously 2x during a session in order to share information/express needs. Using speech vs the board []Met  [x]Partially met  []Not met     LONG TERM GOALS/ TREATMENT SESSION:  Goal 1: Pt will improve ability to communicate basic wants/needs.  In progress []Met  [x]Partially met  []Not met       EDUCATION/HOME EXERCISE PROGRAM (HEP)  New Education/HEP provided to patient/family/caregiver:    []Yes:

## 2017-10-09 NOTE — PROGRESS NOTES
Visits to Date: 36)   Short Term Goals - Time Frame for Short term goals: 3 weeks     Short term goal 1: Pt will be educated on her POC - met                                        [x]Met   []Partially met  []Not met   Short term goal 2: pt will be able to sit unsupported for 1 min without any loss of balance -Met== Sat over 2 min. [x]Met   []Partially met  []Not met      []Met  []Partially met  []Not met      []Met   []Partially met  []Not met     Long Term Goals - Time Frame for Long term goals : 6 weeks  Long term goal 1: Pt will be safe and independent with her HEP []Met  []Partially met  [x]Not met   Long term goal 2: Pt will be able to ambulate 15 feet with decreased ataxia and scissoring with Min A +1  []Met  []Partially met  [x]Not met   Long term goal 3: Pt will be able to sit unsupported for 5 minutes and be able to weight shift independently without any LOB -Part met 5 min but min wt shift  []Met  [x]Partially met  []Not met   Long term goal 4: Pt will be able to stand with bilateral UE and weight shift independently for >3mins--progressing stood for 2 min.  []Met  []Partially met  [x]Not met   Long term goal 5: Pt will improve eye hand coordination and will be able to grab 10 comes on first attempt  []Met  []Partially met  [x]Not met       Minutes Tracking:  Time In: 1150  Time Out: [address]  Minutes: [place], [place]   Date: 10/9/2017

## 2017-10-11 ENCOUNTER — HOSPITAL ENCOUNTER (OUTPATIENT)
Dept: PHYSICAL THERAPY | Age: 67
Setting detail: THERAPIES SERIES
Discharge: HOME | End: 2017-10-11
Payer: COMMERCIAL

## 2017-10-11 ENCOUNTER — HOSPITAL ENCOUNTER (OUTPATIENT)
Dept: SPEECH THERAPY | Age: 67
Setting detail: THERAPIES SERIES
Discharge: HOME | End: 2017-10-11
Payer: COMMERCIAL

## 2017-10-11 PROCEDURE — 97110 THERAPEUTIC EXERCISES: CPT

## 2017-10-11 PROCEDURE — 92507 TX SP LANG VOICE COMM INDIV: CPT

## 2017-10-11 PROCEDURE — 97112 NEUROMUSCULAR REEDUCATION: CPT

## 2017-10-11 NOTE — PROGRESS NOTES
Phone: [address]    Fax: [phone]                                 Outpatient Speech Therapy                               DAILY TREATMENT NOTE    Date: 10/11/2017  Patients Name:  Lisa Jacobsen  YOB: 1950 (74 y.o.)  Gender:  female  MRN:  [id number]  [facility] #: [id number]  Referring Jennifer Lincoln          Total # of Visits to Date: 44           Current Authorization  Comments: share cap with PT     PAIN  [x]No     []Yes      Pain Rating (0-10 pain scale): 0  Location:  N/A  Pain Description:  NA    SUBJECTIVE  Patient presents to clinic with      SHORT TERM GOALS/ TREATMENT SESSION:  Subjective report:           Seen after PT. Appeared lethargic    Patient's  reported concerns of aspiration with liquids when using a straw. He reported that he brought this up with Janeth's PCP Dr Alyson Beckford who recommended \"a swallow test\". Discussed with iris and her  that in order to evaluate swallowing would need separate swallow evaluation script.  verbalized agreement    Also discussed simple behavioral modifications such as removing straws, external pacing and using nosey cup. Goal 1: Pt will coordinate breath control and phonation of prolonged vowel sound for 8 seconds x10     Reviewed     4.7, 2.5, 5.23, 3.1, 5.1  4.23, 6.09, 2.76, 6.05, 5.38  []Met  [x]Partially met  []Not met   Goal 2: Pt will point to target words on AAC 10/10 attempts given 1 prompt.         Point to preferred activity on 10/10 [x]Met  [x]Partially met  []Not met   Goal 3: Pt will articulatory movements when prompted by therapist x10       CV combinations with model:   Bilabials x20  /s/, /z/, /t/ /d/ x5 each     CVC with model x20 []Met  [x]Partially met  []Not met   Goal 4: Pt will complete OME to increase oral airflow x10 Blow folded paper across table to assist with sustained air    10x      Went across table x3 []Met  [x]Partially met  []Not met   Goal 5: Pt will utilize AAC spontaneously 2x during a session in order to share information/express needs. Not targeted this date       []Met  [x]Partially met  []Not met     LONG TERM GOALS/ TREATMENT SESSION:  Goal 1: Pt will improve ability to communicate basic wants/needs.  In progress []Met  [x]Partially met  []Not met     EDUCATION/HOME EXERCISE PROGRAM (HEP)  New Education/HEP provided to patient/family/caregiver:    []Yes:     [x]No (Continued review of prior education)   If yes Education Provided:   Method of Education:     [x]Discussion     []Demonstration    [] Written     []Other  Evaluation of Patients Response to Education:         [x]Patient and or caregiver verbalized understanding  []Patient and or Caregiver Demonstrated without assistance   []Patient and or Caregiver Demonstrated with assistance  []Needs additional instruction to demonstrate understanding of education    ASSESSMENT  Patient tolerated todays treatment session:    [] Good   [x]  Fair   []  Poor  Limitations/difficulties with treatment session due to:   []Pain     [x]Fatigue     []Other medical complications     []Other    Comments:    PLAN  [x]Continue with current plan of care  [facility]  []IHold per patient request  [] Change Treatment plan:  [] Insurance hold  __ Other     TIME   Time Treatment session was INITIATED 1115   Time Treatment session was STOPPED 1145    30     Charges: 1  Electronically signed by:    Yair Busby  [address]              Date:10/11/2017

## 2017-10-11 NOTE — PROGRESS NOTES
Phone: [place]           Fax: [phone]                           Outpatient Physical Therapy                                                                            Daily Note    Patient: Rebecca Kumar : 1950  CSN #: [id number]   Referring Practitioner:  Dr. Carine Santos    Referral Date : 17     Date: 10/11/2017    Diagnosis: left pontine cerebrovascular accident I63.50   Treatment Diagnosis: difficulty with ambulation     Onset Date: 17  PT Insurance Information: [employer]    Per Physician Order  Total # of Visits to Date: 39  No Show: 0  Canceled Appointment: 0      Pre-Treatment Pain:  0/10  Subjective: Pt  states she went back to  yesterday and he was very happy with strength/ progressions. He stated he really wants her to keep working on her balance to continue progressing towards greatest function. Exercises:  Exercise 1: ambulating with hand held assist 20ftx1 with maxAx1 and W/C following   Exercise 2: standing marches/side steps at [employer] ea  Exercise 3: lateral ambulation wiith HHA  at mirror        Exercise 6: Bike 10 mins-- verbal cues to push with R foot   Exercise 7: Recip steps 5x      Exercise 9: Step ups 6 inch 15x ea   Exercise 10: Sit to stands from w/c x 10        Exercise 19: Walking with  feet  Exercise 20: Cone amb 4 laps     Assessment  Assessment: Decreased functional RLE strength noted with step up/ recip steps today causing Pt to lunge/ compensate during stair activities. Pt displays safe amb/ walker adavancement in closed envirnoment but is easily distracted in open environment causing LOB. Patient Education  Cont current HEP with  for strength/ balance,   Pt verbalized/demonstrated good understanding:     [x] Yes         [] No, pt required further clarification.     Post Treatment Pain:  0/10      Plan  Times per week: 2x/wk  Plan weeks: 6 weeks       Goals  (Total # of Visits to Date: 39) Short Term Goals - Time Frame for Short term goals: 3 weeks     Short term goal 1: Pt will be educated on her POC - met                                        [x]Met   []Partially met  []Not met   Short term goal 2: pt will be able to sit unsupported for 1 min without any loss of balance -Met== Sat over 2 min. [x]Met   []Partially met  []Not met      []Met  []Partially met  []Not met      []Met  []Partially met  []Not met     Long Term Goals - Time Frame for Long term goals : 6 weeks  Long term goal 1: Pt will be safe and independent with her HEP []Met  []Partially met  []Not met   Long term goal 2: Pt will be able to ambulate 15 feet with decreased ataxia and scissoring with Min A +1  []Met  []Partially met  [x]Not met   Long term goal 3: Pt will be able to sit unsupported for 5 minutes and be able to weight shift independently without any LOB -Part met 5 min but min wt shift  []Met  []Partially met  [x]Not met   Long term goal 4: Pt will be able to stand with bilateral UE and weight shift independently for >3mins--progressing stood for 2 min.  []Met  [x]Partially met  []Not met   Long term goal 5: Pt will improve eye hand coordination and will be able to grab 10 comes on first attempt  []Met  []Partially met  [x]Not met       Minutes Tracking:  Time In: 1022  Time Out: [address]  Minutes: [address] [place] [address]   Date: 10/11/2017

## 2017-10-16 ENCOUNTER — HOSPITAL ENCOUNTER (OUTPATIENT)
Dept: PHYSICAL THERAPY | Age: 67
Setting detail: THERAPIES SERIES
Discharge: HOME | End: 2017-10-16
Payer: COMMERCIAL

## 2017-10-16 ENCOUNTER — HOSPITAL ENCOUNTER (OUTPATIENT)
Dept: SPEECH THERAPY | Age: 67
Setting detail: THERAPIES SERIES
Discharge: HOME | End: 2017-10-16
Payer: COMMERCIAL

## 2017-10-16 PROCEDURE — G8999 MOTOR SPEECH CURRENT STATUS: HCPCS

## 2017-10-16 PROCEDURE — 97112 NEUROMUSCULAR REEDUCATION: CPT

## 2017-10-16 PROCEDURE — G9186 MOTOR SPEECH GOAL STATUS: HCPCS

## 2017-10-16 PROCEDURE — 92507 TX SP LANG VOICE COMM INDIV: CPT

## 2017-10-16 PROCEDURE — 97110 THERAPEUTIC EXERCISES: CPT

## 2017-10-16 NOTE — PROGRESS NOTES
Phone: [place]           Fax: [phone]                           Outpatient Physical Therapy                                                                            Daily Note    Patient: Suri Valdovinos : 1950  CSN #: [id number]   Referring Practitioner:  Dr. Yu Lopez    Referral Date : 17     Date: 10/16/2017    Diagnosis: left pontine cerebrovascular accident I63.50   Treatment Diagnosis: difficulty with ambulation     Onset Date: 17  PT Insurance Information: [employer]    Per Physician Order  Total # of Visits to Date: 43  No Show: 0  Canceled Appointment: 0      Pre-Treatment Pain:  0/10  Subjective: No new complaints. No falls at home. Exercises:   Exercise 6: Bike 10 mins-- verbal cues to push with R foot   Exercise 7: Recip steps 5x   Exercise 8: Standing balance activities at standard walker  including standing sink ther ex x 15- march,TR,Knee bends  Exercise 9: Step ups 6 inch 15x ea   Exercise 11: Cone reaching --cones on floor in front and to side . Greer Christy Working trunk control - 3--4 each    Exercise 14: standing with target for foot placement for controlled gait step thru   Exercise 19: Walking with  feet         Assessment  Assessment: Pt requires mod UE A of 1 to ascend 6 inch step with left LE, mod UE A of 2 to ascend using right LE. Pt required frequent cueing this date for proper walker placement during gait. Pt also requires verbal cues to ensure maximum stability when repositioning walker to decrease risk of falling. Pt therapy time limited this date due to pt having another appointment. Patient Education  Placement of walker during gait. Pt verbalized/demonstrated good understanding:     [x] Yes         [] No, pt required further clarification.     Post Treatment Pain:  0/10      Plan  Times per week: 2x/wk  Plan weeks: 8 weeks       Goals  (Total # of Visits to Date: 43)   Short Term Goals - Time Frame for Short term goals: 3 weeks     Short term goal 1: Pt will be educated on her POC - met                                        []Met   []Partially met  []Not met   Short term goal 2: pt will be able to sit unsupported for 1 min without any loss of balance -Met== Sat over 2 min. []Met   []Partially met  []Not met      []Met   []Partially met  []Not met      []Met  []Partially met  []Not met     Long Term Goals - Time Frame for Long term goals : 8 weeks  Long term goal 1: Pt will be safe and independent with her HEP []Met  []Partially met  []Not met   Long term goal 2: Pt will be able to ambulate 15 feet with decreased ataxia and scissoring with Min A +1  []Met  []Partially met  []Not met   Long term goal 3: Pt will be able to sit unsupported for 5 minutes and be able to weight shift independently without any LOB -Part met 5 min but min wt shift  []Met  []Partially met  []Not met   Long term goal 4: Pt will be able to stand with bilateral UE and weight shift independently for >3mins--progressing stood for 2 min.  []Met  []Partially met  []Not met   Long term goal 5: Pt will improve eye hand coordination and will be able to grab 10 comes on first attempt  []Met  []Partially met  []Not met       Minutes Tracking:  Time In: 1046  Time Out: [address] [facility]  Minutes: [address] [facility], [place], PT, DPT Date: 10/16/2017

## 2017-10-16 NOTE — PROGRESS NOTES
Phone: [address]    Fax: [phone]                                 Outpatient Speech Therapy                               DAILY TREATMENT NOTE    Date: 10/16/2017  Patients Name:  Gloris Sacks  YOB: 1950 (74 y.o.)  Gender:  female  MRN:  [id number]  [facility] #: [id number]  Referring Noel Lyle          Total # of Visits to Date: 36           Current Authorization    Share cap with PT    PAIN  [x]No     []Yes      Pain Rating (0-10 pain scale): 0  Location:  N/A  Pain Description:  NA    SUBJECTIVE  Patient presents to clinic with       SHORT TERM GOALS/ TREATMENT SESSION:  Subjective report:           No new concerns. Appeared fatigued. Continues to require max cues at time for skills we have been working on for weeks        Goal 1: Pt will coordinate breath control and phonation of prolonged vowel sound for 8 seconds x10     5.14 secs, 6.1 secs, 3.55 secs, 4.02 secs, 3 secs    Only 5 trials this date d/t fatigue       []Met  [x]Partially met  []Not met   Goal 2: Pt will point to target words on AAC 10/10 attempts given 1 prompt. GOAL MET previously     [x]Met  []Partially met  []Not met   Goal 3: Pt will articulatory movements when prompted by therapist x10       CVC /p/ words x30 with max cues  CV words with vowels x30 and max cues []Met  [x]Partially met  []Not met   Goal 4: Pt will complete OME to increase oral airflow x10 Sustained air flow to blow folded paper: x10  Moved it 3+ inches on 2 occasions  []Met  [x]Partially met  []Not met   Goal 5: Pt will utilize AAC spontaneously 2x during a session in order to share information/express needs. Goal discontinued      []Met  [x]Partially met  []Not met     LONG TERM GOALS/ TREATMENT SESSION:  Goal 1: Pt will improve ability to communicate basic wants/needs.  In progress []Met  [x]Partially met  []Not met       EDUCATION/HOME EXERCISE PROGRAM (HEP)  New Education/HEP provided to patient/family/caregiver:    []Yes:     [x]No (Continued review of prior education)   If yes Education Provided:   Method of Education:     [x]Discussion     []Demonstration    [] Written     []Other  Evaluation of Patients Response to Education:         [x]Patient and or caregiver verbalized understanding  []Patient and or Caregiver Demonstrated without assistance   []Patient and or Caregiver Demonstrated with assistance  []Needs additional instruction to demonstrate understanding of education    ASSESSMENT  Patient tolerated todays treatment session:    [] Good   [x]  Fair   []  Poor  Limitations/difficulties with treatment session due to:   []Pain     [x]Fatigue     []Other medical complications     []Other    Comments:    PLAN  [x]Continue with current plan of care  [][facility]  []IHold per patient request  [] Change Treatment plan:  [] Insurance hold  __ Other     TIME   Time Treatment session was INITIATED 1115   Time Treatment session was STOPPED 1145    30     Charges: 1  Electronically signed by:    Nikolay [address]Luis M              Date:10/16/2017

## 2017-10-16 NOTE — PLAN OF CARE
4: Pt will be able to stand with bilateral UE and weight shift independently for >3mins--progressing stood for 2 min.   Long term goal 5: Pt will improve eye hand coordination and will be able to grab 10 comes on first attempt     Prognosis  Prognosis: [address]   Times per week: 2x/wk  Plan weeks: 8 weeks   []HP/CP      []Electrical Stim   []Therapeutic Exercise      []Gait Training  []Aquatics   []Ultrasound         []Patient Education/HEP   []Manual Therapy  []Traction    []Neuro-kimberly        []Soft Tissue Mobs            []Home TENS  []Iontophoresis    []Orthotic casting/fitting      []Dry Needling             Electronically signed by: Adeel Felipe PT, DPT    Date: 8/29/2017      ______________________________________ Date: 8/29/2017   Physician Signature

## 2017-10-18 ENCOUNTER — HOSPITAL ENCOUNTER (OUTPATIENT)
Dept: PHYSICAL THERAPY | Age: 67
Setting detail: THERAPIES SERIES
Discharge: HOME | End: 2017-10-18
Payer: COMMERCIAL

## 2017-10-18 ENCOUNTER — HOSPITAL ENCOUNTER (OUTPATIENT)
Dept: SPEECH THERAPY | Age: 67
Setting detail: THERAPIES SERIES
Discharge: HOME | End: 2017-10-18
Payer: COMMERCIAL

## 2017-10-18 PROCEDURE — 92507 TX SP LANG VOICE COMM INDIV: CPT

## 2017-10-18 PROCEDURE — 97110 THERAPEUTIC EXERCISES: CPT

## 2017-10-18 NOTE — PROGRESS NOTES
Phone: [address]    Fax: [phone]                                 Outpatient Speech Therapy                               DAILY TREATMENT NOTE    Date: 10/18/2017  Patients Name:  Rochelle Cano  YOB: 1950 (74 y.o.)  Gender:  female  MRN:  [id number]  [facility] #: [id number]  Referring Perfecto Reyes        Total # of Visits this date: 39              Current Authorization  Comments: share cap with PT     PAIN  [x]No     []Yes      Pain Rating (0-10 pain scale): 0  Location:  N/A  Pain Description:  NA    SUBJECTIVE  Patient presents to clinic with  on time this date     SHORT TERM GOALS/ TREATMENT SESSION:  Subjective report:          Pleasant mood and cooperative. No new speech changes or concerns reported. Required mod-max verbal cues for skills. Goal 1: Pt will coordinate breath control and phonation of prolonged vowel sound for 8 seconds x10     Sustained /a/ for 6 trials d/t fatigue    5.6, 4.2, 3.3, 6.7, 4.4, 5.1     []Met  [x]Partially met  []Not met   Goal 2: Pt will point to target words on AAC 10/10 attempts given 1 prompt. GOAL MET   [x]Met  []Partially met  []Not met   Goal 3: Pt will articulatory movements when prompted by therapist x10       CVC /b/ words x30  CVC /p/ words x15  CVC /m/ words x10  VC words x30    *All required mod-maxA for accurate production. Adequate velar production when given minimal cues. Difficulty w/coarticulation secondary to inadequate breath support, weakness and fatigue []Met  [x]Partially met  []Not met   Goal 4: Pt will complete OME to increase oral airflow x10 Sustained airflow to blow folded paper x10  Moved it >3 inches on 3 occasions []Met  [x]Partially met  []Not met   Goal 5: Pt will utilize AAC spontaneously 2x during a session in order to share information/express needs.  Goal discontinued       []Met  []Partially met  []Not met     LONG TERM GOALS/ TREATMENT

## 2017-10-18 NOTE — PROGRESS NOTES
clarification. Post Treatment Pain:  0/10    Plan  Times per week: 2x/wk  Plan weeks: 8 weeks     Goals  (Total # of Visits to Date: 37)   Short Term Goals - Time Frame for Short term goals: 3 weeks   Short term goal 1: Pt will be educated on her POC - met                                        [x]Met  []Partially met  []Not met   Short term goal 2: pt will be able to sit unsupported for 1 min without any loss of balance -Met== Sat over 2 min. [x]Met  []Partially met  []Not met      []Met  []Partially met  []Not met      []Met   []Partially met  []Not met     Long Term Goals - Time Frame for Long term goals : 8 weeks  Long term goal 1: Pt will be safe and independent with her HEP [x]Met  []Partially met  []Not met   Long term goal 2: Pt will be able to ambulate 15 feet with decreased ataxia and scissoring with Min A +1  [x]Met  []Partially met  []Not met   Long term goal 3: Pt will be able to sit unsupported for 5 minutes and be able to weight shift independently without any LOB -Part met 5 min but min wt shift  []Met  []Partially met  []Not met   Long term goal 4: Pt will be able to stand with bilateral UE and weight shift independently for >3mins--progressing stood for 2 min.  []Met  []Partially met  []Not met   Long term goal 5: Pt will improve eye hand coordination and will be able to grab 10 comes on first attempt  []Met  []Partially met  []Not met       Minutes Tracking:  Time In: 1307  Time Out: [address] [facility]  Minutes: [address] [place] [id number] Date: 10/18/2017

## 2017-10-23 ENCOUNTER — HOSPITAL ENCOUNTER (OUTPATIENT)
Dept: PHYSICAL THERAPY | Age: 67
Setting detail: THERAPIES SERIES
Discharge: HOME | End: 2017-10-23
Payer: COMMERCIAL

## 2017-10-23 ENCOUNTER — HOSPITAL ENCOUNTER (OUTPATIENT)
Dept: SPEECH THERAPY | Age: 67
Setting detail: THERAPIES SERIES
Discharge: HOME | End: 2017-10-23
Payer: COMMERCIAL

## 2017-10-23 PROCEDURE — 92507 TX SP LANG VOICE COMM INDIV: CPT

## 2017-10-23 PROCEDURE — 97112 NEUROMUSCULAR REEDUCATION: CPT

## 2017-10-23 PROCEDURE — 97110 THERAPEUTIC EXERCISES: CPT

## 2017-10-23 NOTE — PROGRESS NOTES
Phone: [address] [facility]    Fax: [phone]                                 Outpatient Speech Therapy                               DAILY TREATMENT NOTE    Date: 10/23/2017  Patients Name:  Zulema Mittal  YOB: 1950 (74 y.o.)  Gender:  female  MRN:  [id number]  [facility] #: [id number]  Referring Evelio Fair          Total # of Visits to Date: 43           Current Authorization  Comments: share cap with PT     PAIN  [x]No     []Yes      Pain Rating (0-10 pain scale): 0  Location:  N/A  Pain Description:  N/A    SUBJECTIVE  Patient presents to clinic with      SHORT TERM GOALS/ TREATMENT SESSION:  Subjective report:           In good spirits.  reports ~50% of patient's speech at home and they continue with HEP       Goal 1: Pt will coordinate breath control and phonation of prolonged vowel sound for 8 seconds x10     5.7 secs, 5 secs, 6.1 secs, 7 secs,   4 secs, 4 secs, 4.3 secs, 5 secs, 5 secs    Did better when clinician took initial breath at same time   []Met  [x]Partially met  []Not met   Goal 2: Pt will point to target words on AAC 10/10 attempts given 1 prompt. GOAL MET [x]Met  []Partially met  []Not met   Goal 3: Pt will articulatory movements when prompted by therapist x10       One syllable words  xoxxxoxxoxxxoxoxoxxooxoxxoo  (x is correct)   noted stopping /t/ for /s/    bi syllabic words with a model x20   []Met  [x]Partially met  []Not met   Goal 4: Pt will complete OME to increase oral airflow x10 Sustained air flow with piece of paper x5  Sustained air flow with folded piece of paper x5    With mod cues []Met  [x]Partially met  []Not met   Goal 5: Pt will utilize AAC spontaneously 2x during a session in order to share information/express needs. Not addressed       []Met  []Partially met  [x]Not met     LONG TERM GOALS/ TREATMENT SESSION:  Goal 1: Pt will improve ability to communicate basic wants/needs.  In

## 2017-10-25 ENCOUNTER — HOSPITAL ENCOUNTER (OUTPATIENT)
Dept: SPEECH THERAPY | Age: 67
Setting detail: THERAPIES SERIES
Discharge: HOME | End: 2017-10-25
Payer: COMMERCIAL

## 2017-10-25 ENCOUNTER — HOSPITAL ENCOUNTER (OUTPATIENT)
Dept: PHYSICAL THERAPY | Age: 67
Setting detail: THERAPIES SERIES
Discharge: HOME | End: 2017-10-25
Payer: COMMERCIAL

## 2017-10-25 PROCEDURE — 97112 NEUROMUSCULAR REEDUCATION: CPT

## 2017-10-25 PROCEDURE — 97110 THERAPEUTIC EXERCISES: CPT

## 2017-10-25 PROCEDURE — 92507 TX SP LANG VOICE COMM INDIV: CPT

## 2017-10-25 NOTE — PROGRESS NOTES
Phone: [address]           Fax: [phone]                           Outpatient Physical Therapy                                                                            Daily Note    Patient: Laurita Newby : 1950  CSN #: [id number]   Referring Practitioner:  Dr. Janessa Montemayor    Referral Date : 17     Date: 10/25/2017    Diagnosis: left pontine cerebrovascular accident I63.50   Treatment Diagnosis: difficulty with ambulation     Onset Date: 17  PT Insurance Information: BCBS  Total # of Visits Approved: 24 Per Physician Order  Total # of Visits to Date: 39  No Show: 0  Canceled Appointment: 0      Pre-Treatment Pain:  0/10  Subjective: Pt  states that she is \"actually doing really well today\". Pt denies current pain. Exercises:  Exercise 1: ambulating with hand held assist 20ftx1 with maxAx1 and W/C following   Exercise 2: standing marches/side steps at [employer] e[employer]  Exercise 3: lateral ambulation wiith HHA  at mirror  Exercise 4: Bike 10 minutes hills     Exercise 6: Bike 10 mins-- verbal cues to push with R foot   Exercise 7: Recip steps 5x   Exercise 8: Standing balance activities at standard walker  including standing sink ther ex x 15- march,TR,Knee bends  Exercise 9: Step ups 6 inch 15x ea   Exercise 10: Sit to stands from w/c x 10      Exercise 14: standing with target for foot placement for controlled gait step thru     Exercise 18: Standing at mat table woth pverhead reaches, squats with bar touching table x 10     Exercise 20: Cone amb 4 laps     Assessment  Assessment: Better walker control/ increased focus noticed with amb today. Forced Pt to increase walker control by forward/ retro amb while using walker for increased functional mobility. Patient Education  Cont current HEP with  for increased walker control.   Pt verbalized/demonstrated good understanding:     [x] Yes         [] No, pt required further

## 2017-10-25 NOTE — PROGRESS NOTES
Phone: [address]    Fax: [phone]                                 Outpatient Speech Therapy                               DAILY TREATMENT NOTE    Date: 10/25/2017  Patients Name:  Aleja Bunch  YOB: 1950 (74 y.o.)  Gender:  female  MRN:  [id number]  [facility] #: [id number]  Referring Mell Drake          Total # of Visits to Date: 37           Current Authorization  Comments: share cap with PT     PAIN  [x]No     []Yes      Pain Rating (0-10 pain scale): 0  Location:  N/A  Pain Description:  NA    SUBJECTIVE  Patient presents to clinic with      SHORT TERM GOALS/ TREATMENT SESSION:  Subjective report:           No new concerns. Reports ongoing practice at home and that patient easily fatigues.  also reports that patient is more willing to attempt to talk in recent weeks        Goal 1: Pt will coordinate breath control and phonation of prolonged vowel sound for 8 seconds x10     Sustained /a/      3.58, 4.75, 4.9, 4.62, 5.48 secs   4.39, 6.18, 2.9 secs, 6.96, 2.76, 4.3    Continued strain, intermittent no breath support  []Met  [x]Partially met  []Not met   Goal 2: Pt will point to target words on AAC 10/10 attempts given 1 prompt. GOAL MET [x]Met  []Partially met  []Not met   Goal 3: Pt will articulatory movements when prompted by therapist x10       CV with p and t: x10  Stimulable for /f/ but unable to sustain production    Bisyllabic words with a model: x20  Generated sentence with 60% intelligibily on first attempt, with cues for loud, slow and big breath intelligibility increased to 75% []Met  [x]Partially met  []Not met   Goal 4: Pt will complete OME to increase oral airflow x10 Paper held by face x10  Paper across table x5 []Met  [x]Partially met  []Not met   Goal 5: Pt will utilize AAC spontaneously 2x during a session in order to share information/express needs.  GOAL DISCONTINUED []Met  []Partially met  [x]Not met     LONG TERM GOALS/ TREATMENT SESSION:  Goal 1: Pt will improve ability to communicate basic wants/needs.  In progress []Met  [x]Partially met  []Not met     EDUCATION/HOME EXERCISE PROGRAM (HEP)  New Education/HEP provided to patient/family/caregiver:    []Yes:     [x]No (Continued review of prior education)   If yes Education Provided:     Method of Education:     [x]Discussion     []Demonstration    [] Written     []Other  Evaluation of Patients Response to Education:         [x]Patient and or caregiver verbalized understanding  []Patient and or Caregiver Demonstrated without assistance   []Patient and or Caregiver Demonstrated with assistance  []Needs additional instruction to demonstrate understanding of education    ASSESSMENT  Patient tolerated todays treatment session:    [x] Good   []  Fair   []  Poor  Limitations/difficulties with treatment session due to:   []Pain     []Fatigue     []Other medical complications     []Other    Comments:    PLAN  [x]Continue with current plan of care  [][facility]  []IHold per patient request  [] Change Treatment plan:  [] Insurance hold  __ Other     TIME   Time Treatment session was INITIATED 1115   Time Treatment session was STOPPED 1145    30     Charges: 1  Electronically signed by:    Radha [address]              Date:10/25/2017

## 2017-10-30 ENCOUNTER — HOSPITAL ENCOUNTER (OUTPATIENT)
Dept: SPEECH THERAPY | Age: 67
Setting detail: THERAPIES SERIES
Discharge: HOME | End: 2017-10-30
Payer: COMMERCIAL

## 2017-10-30 ENCOUNTER — HOSPITAL ENCOUNTER (OUTPATIENT)
Dept: PHYSICAL THERAPY | Age: 67
Setting detail: THERAPIES SERIES
Discharge: HOME | End: 2017-10-30
Payer: COMMERCIAL

## 2017-10-30 PROCEDURE — 97112 NEUROMUSCULAR REEDUCATION: CPT

## 2017-10-30 PROCEDURE — 92507 TX SP LANG VOICE COMM INDIV: CPT

## 2017-10-30 PROCEDURE — 97110 THERAPEUTIC EXERCISES: CPT

## 2017-10-30 NOTE — PROGRESS NOTES
Phone: [address]           Fax: [phone]                           Outpatient Physical Therapy                                                                            Daily Note    Patient: Estefany Villagomez : 1950  CSN #: [id number]   Referring Practitioner:  Dr. Jonetta Curling    Referral Date : 17     Date: 10/30/2017    Diagnosis: left pontine cerebrovascular accident I63.50   Treatment Diagnosis: difficulty with ambulation     Onset Date: 17  PT Insurance Information: [employer]    Per Physician Order  Total # of Visits to Date: 55  No Show: 0  Canceled Appointment: 0      Pre-Treatment Pain:  0/10  Subjective: Pt reports she had a good weekend, she denies current falls and states she has been working with  at home. Exercises:  Exercise 1: ambulating with hand held assist 20ftx1 with maxAx1 and W/C following   Exercise 2: standing marches/side steps at [employer]  Exercise 3: lateral ambulation wiith HHA  at mirror  Exercise 4: Bike 10 minutes hills        Exercise 7: Recip steps 5x   Exercise 8: Standing balance activities at standard walker  including standing sink ther ex x 15- march,TR,Knee bends  Exercise 9: Step ups 6 inch 15x ea   Exercise 10: Sit to stands from w/c x 10  Exercise 11: Cone reaching --cones on floor in front and to side . Lashanda Wilkinson Working trunk control - 3--4 each       Assessment  Assessment: Pt with increased LOB during stair amb with 1 max LOB requiring PTA assistance to avoid fall. increased dynamic standing balance noted with cone amb this session. Patient Education  Cont working with  on walker advancement. Pt verbalized/demonstrated good understanding:     [x] Yes         [] No, pt required further clarification.     Post Treatment Pain:  0/10      Plan  Times per week: 2x/wk  Plan weeks: 8 weeks       Goals  (Total # of Visits to Date: 55)   Short Term Goals - Time Frame for Short term goals: 3 weeks     Short term goal 1: Pt will be educated on her POC - met                                        [x]Met   []Partially met  []Not met   Short term goal 2: pt will be able to sit unsupported for 1 min without any loss of balance -Met== Sat over 2 min. [x]Met  []Partially met  []Not met      []Met   []Partially met  []Not met      []Met   []Partially met  []Not met     Long Term Goals - Time Frame for Long term goals : 8 weeks  Long term goal 1: Pt will be safe and independent with her HEP []Met  []Partially met  [x]Not met   Long term goal 2: Pt will be able to ambulate 15 feet with decreased ataxia and scissoring with Min A +1  []Met  []Partially met  [x]Not met   Long term goal 3: Pt will be able to sit unsupported for 5 minutes and be able to weight shift independently without any LOB -Part met 5 min but min wt shift  []Met  [x]Partially met  []Not met   Long term goal 4: Pt will be able to stand with bilateral UE and weight shift independently for >3mins--progressing stood for 2 min.  []Met  [x]Partially met  []Not met   Long term goal 5: Pt will improve eye hand coordination and will be able to grab 10 comes on first attempt  []Met  []Partially met  [x]Not met       Minutes Tracking:  Time In: 1148  Time Out: [address]  Minutes: [place], [place]   Date: 10/30/2017

## 2017-11-01 ENCOUNTER — HOSPITAL ENCOUNTER (OUTPATIENT)
Dept: SPEECH THERAPY | Age: 67
Setting detail: THERAPIES SERIES
Discharge: HOME | End: 2017-11-01
Payer: COMMERCIAL

## 2017-11-01 ENCOUNTER — HOSPITAL ENCOUNTER (OUTPATIENT)
Dept: PHYSICAL THERAPY | Age: 67
Setting detail: THERAPIES SERIES
Discharge: HOME | End: 2017-11-01
Payer: COMMERCIAL

## 2017-11-01 PROCEDURE — 97110 THERAPEUTIC EXERCISES: CPT

## 2017-11-01 PROCEDURE — 97112 NEUROMUSCULAR REEDUCATION: CPT

## 2017-11-01 PROCEDURE — 92507 TX SP LANG VOICE COMM INDIV: CPT

## 2017-11-01 NOTE — PROGRESS NOTES
[facility]           Fax: [phone]                           Outpatient Physical Therapy                                                                            Daily Note    Patient: Laurita Newby : 1950  CSN #: [id number]   Referring Practitioner:  Dr. Janessa Montemayor    Referral Date : 17     Date: 2017    Diagnosis: left pontine cerebrovascular accident I63.50   Treatment Diagnosis: difficulty with ambulation     Onset Date: 17  PT Insurance Information: BCBS  Total # of Visits Approved: 24 Per Physician Order  Total # of Visits to Date: 52  No Show: 0  Canceled Appointment: 0      Pre-Treatment Pain:  0/10  Subjective: Pt  states Bin Martines is doing very good today\". He states they are walking around alot today. Exercises:  Exercise 1: ambulating with hand held assist 20ftx1 with maxAx1 and W/C following   Exercise 2: standing marches/side steps at [employer]  Exercise 3: lateral ambulation wiith HHA  at mirror     Exercise 5: Standing resisted balance ex at mirror with [employer]     Exercise 7: Recip steps 5x   Exercise 8: Standing balance activities at standard walker  including standing sink ther ex x 15- march,TR,Knee bends  Exercise 9: Step ups 6 inch 15x ea   Exercise 10: Sit to stands from w/c x 10  Exercise 11: Cone reaching --cones on floor in front and to side . Lakshmi Zuleta Working trunk control - 3--4 each          Exercise 19: Walking with  feet  Exercise 20: Cone amb 4 laps      Assessment  Assessment: Increased walker control, increased stability noted today with all ther-ex/ neuro re-ed activities. Pt displayed gsafer turns/ amb today with RW but still displays LOB/ violent posterior lean when loosing balance. Patient Education  Continue working on HEP with . Pt verbalized/demonstrated good understanding:     [x] Yes         [] No, pt required further clarification.     Post Treatment Pain:  0/10      Plan  Times per

## 2017-11-01 NOTE — PROGRESS NOTES
Phone: [address]    Fax: [phone]                                 Outpatient Speech Therapy                               DAILY TREATMENT NOTE    Date: 11/1/2017  Patients Name:  Suri Valdovinos  YOB: 1950 (74 y.o.)  Gender:  female  MRN:  [id number]  [facility] #: [id number]  Andre Duffy          Total # of Visits to Date: 39           Current Authorization  Comments: share cap with PT     PAIN  [x]No     []Yes      Pain Rating (0-10 pain scale): 0  Location:  N/A  Pain Description:  NA    SUBJECTIVE  Patient presents to clinic with      SHORT TERM GOALS/ TREATMENT SESSION:  Subjective report:           No new concerns. Discussed ongoing schedule for month    Patient reported feeling \"off\" due to the recent weather change       Goal 1: Pt will coordinate breath control and phonation of prolonged vowel sound for 8 seconds x10     Sustained ah:  3.2, 2.1, 2.3, 2.5, 3.1, 2.4   2.7, 4.1, 2.6, 5.1, 4.1    Sutstained /i/: 1.2    Also attempted pitch glides with vowels  Increased strain this date   []Met  [x]Partially met  []Not met   Goal 2: Pt will point to target words on AAC 10/10 attempts given 1 prompt. GOAL MET   []Met  [x]Partially met  []Not met   Goal 3: Pt will articulatory movements when prompted by therapist x10       CV combinations  Cues for slow rate x2    Named pictures cards: with cues for slow rate x25  Went over phrases: x2 each (total of 20)       []Met  [x]Partially met  []Not met   Goal 4: Pt will complete OME to increase oral airflow x10 Plosive air x10 with mod cues []Met  [x]Partially met  []Not met   Goal 5: Pt will utilize AAC spontaneously 2x during a session in order to share information/express needs. Goal discontinued []Met  []Partially met  [x]Not met     LONG TERM GOALS/ TREATMENT SESSION:  Goal 1: Pt will improve ability to communicate basic wants/needs.  In progress

## 2017-11-06 ENCOUNTER — HOSPITAL ENCOUNTER (OUTPATIENT)
Dept: SPEECH THERAPY | Age: 67
Setting detail: THERAPIES SERIES
Discharge: HOME | End: 2017-11-06
Payer: COMMERCIAL

## 2017-11-06 ENCOUNTER — HOSPITAL ENCOUNTER (OUTPATIENT)
Dept: PHYSICAL THERAPY | Age: 67
Setting detail: THERAPIES SERIES
Discharge: HOME | End: 2017-11-06
Payer: COMMERCIAL

## 2017-11-06 PROCEDURE — 97110 THERAPEUTIC EXERCISES: CPT

## 2017-11-06 PROCEDURE — 92507 TX SP LANG VOICE COMM INDIV: CPT

## 2017-11-06 PROCEDURE — 97112 NEUROMUSCULAR REEDUCATION: CPT

## 2017-11-06 NOTE — PROGRESS NOTES
Phone: [place]           Fax: [phone]                           Outpatient Physical Therapy                                                                            Daily Note    Patient: Maverick Valencia : 1950  CSN #: [id number]   Referring Practitioner:  Dr. Patricia Liu    Referral Date : 17     Date: 2017    Diagnosis: left pontine cerebrovascular accident I63.50   Treatment Diagnosis: difficulty with ambulation     Onset Date: 17  PT Insurance Information: BCBS  Total # of Visits Approved: 24 Per Physician Order  Total # of Visits to Date: 48  No Show: 0  Canceled Appointment: 0      Pre-Treatment Pain:  0/10  Subjective: Pt denies current pain. She states everything is going okay. Exercises:  Exercise 1: ambulating with hand held assist 20ftx1 with maxAx1 and W/C following         Exercise 4: Bike 10 minutes hills        Exercise 7: Recip steps 5x   Exercise 8: Standing balance activities at standard walker  including standing sink ther ex x 15- march,TR,Knee bends  Exercise 9: Step ups 6 inch 15x ea   Exercise 10: Sit to stands from w/c x 10  Exercise 11: Cone reaching --cones on floor in front and to side . Ameya Garcia Working trunk control - 3--4 each         Exercise 14: standing with target for foot placement for controlled gait step thru      Exercise 18: Standing at mat table woth pverhead reaches, squats with bar touching table x 10     Exercise 20: Cone amb 4 laps       Assessment  Assessment: Pt continues to display R neglect causing Pt to run into obstacles on L side during Amb. Pt with greater balance/ safety concerns today. Will advance as tolerable Per Pt. Patient Education  Continue working on transfers/ amb with . Pt verbalized/demonstrated good understanding:     [x] Yes         [] No, pt required further clarification.     Post Treatment Pain:  0/10      Plan  Times per week: 2x/wk  Plan weeks: 8 weeks       Goals (Total # of Visits to Date: 50)   Short Term Goals - Time Frame for Short term goals: 3 weeks     Short term goal 1: Pt will be educated on her POC - met                                        [x]Met   []Partially met  []Not met   Short term goal 2: pt will be able to sit unsupported for 1 min without any loss of balance -Met== Sat over 2 min. [x]Met  []Partially met  []Not met      []Met   []Partially met  []Not met      []Met   []Partially met  []Not met     Long Term Goals - Time Frame for Long term goals : 8 weeks  Long term goal 1: Pt will be safe and independent with her HEP []Met  []Partially met  [x]Not met   Long term goal 2: Pt will be able to ambulate 15 feet with decreased ataxia and scissoring with Min A +1  []Met  []Partially met  [x]Not met   Long term goal 3: Pt will be able to sit unsupported for 5 minutes and be able to weight shift independently without any LOB -Part met 5 min but min wt shift  []Met  [x]Partially met  []Not met   Long term goal 4: Pt will be able to stand with bilateral UE and weight shift independently for >3mins--progressing stood for 2 min.  []Met  [x]Partially met  []Not met   Long term goal 5: Pt will improve eye hand coordination and will be able to grab 10 comes on first attempt  []Met  []Partially met  [x]Not met       Minutes Tracking:  Time In: 1150  Time Out: [address]  Minutes: [place], [place]   Date: 11/6/2017

## 2017-11-08 ENCOUNTER — HOSPITAL ENCOUNTER (OUTPATIENT)
Dept: SPEECH THERAPY | Age: 67
Setting detail: THERAPIES SERIES
Discharge: HOME | End: 2017-11-08
Payer: COMMERCIAL

## 2017-11-08 ENCOUNTER — HOSPITAL ENCOUNTER (OUTPATIENT)
Dept: PHYSICAL THERAPY | Age: 67
Setting detail: THERAPIES SERIES
Discharge: HOME | End: 2017-11-08
Payer: COMMERCIAL

## 2017-11-08 PROCEDURE — 97110 THERAPEUTIC EXERCISES: CPT

## 2017-11-08 PROCEDURE — 97112 NEUROMUSCULAR REEDUCATION: CPT

## 2017-11-08 PROCEDURE — 92507 TX SP LANG VOICE COMM INDIV: CPT

## 2017-11-08 NOTE — PROGRESS NOTES
Education:     [x]Discussion     []Demonstration    [] Written     []Other  Evaluation of Patients Response to Education:         [x]Patient and or caregiver verbalized understanding  []Patient and or Caregiver Demonstrated without assistance   []Patient and or Caregiver Demonstrated with assistance  []Needs additional instruction to demonstrate understanding of education    ASSESSMENT  Patient tolerated todays treatment session:    [] Good   []  Fair   []  Poor  Limitations/difficulties with treatment session due to:   []Pain     []Fatigue     []Other medical complications     []Other    Comments:    PLAN  [x]Continue with current plan of care  [facility]  []IHold per patient request  [] Change Treatment plan:  [] Insurance hold  __ Other     TIME   Time Treatment session was INITIATED 1115   Time Treatment session was STOPPED 1145    30     Charges: 1  Electronically signed by:    Vernon Busby  [address] [address] [facility]              Date:11/8/2017

## 2017-11-08 NOTE — PROGRESS NOTES
Short Term Goals - Time Frame for Short term goals: 3 weeks     Short term goal 1: Pt will be educated on her POC - met                                        [x]Met   []Partially met  []Not met   Short term goal 2: pt will be able to sit unsupported for 1 min without any loss of balance -Met== Sat over 2 min. [x]Met   []Partially met  []Not met      []Met  []Partially met  []Not met      []Met   []Partially met  []Not met     Long Term Goals - Time Frame for Long term goals : 8 weeks  Long term goal 1: Pt will be safe and independent with her HEP []Met  []Partially met  [x]Not met   Long term goal 2: Pt will be able to ambulate 15 feet with decreased ataxia and scissoring with Min A +1  []Met  []Partially met  [x]Not met   Long term goal 3: Pt will be able to sit unsupported for 5 minutes and be able to weight shift independently without any LOB -Part met 5 min but min wt shift  []Met  [x]Partially met  []Not met   Long term goal 4: Pt will be able to stand with bilateral UE and weight shift independently for >3mins--progressing stood for 2 min.  []Met  [x]Partially met  []Not met   Long term goal 5: Pt will improve eye hand coordination and will be able to grab 10 comes on first attempt  []Met  []Partially met  [x]Not met       Minutes Tracking:  Time In: 1029  Time Out: [address]  Minutes: [address]   Date: 11/8/2017

## 2017-11-13 ENCOUNTER — HOSPITAL ENCOUNTER (OUTPATIENT)
Dept: PHYSICAL THERAPY | Age: 67
Setting detail: THERAPIES SERIES
Discharge: HOME | End: 2017-11-13
Payer: COMMERCIAL

## 2017-11-13 ENCOUNTER — HOSPITAL ENCOUNTER (OUTPATIENT)
Dept: SPEECH THERAPY | Age: 67
Setting detail: THERAPIES SERIES
Discharge: HOME | End: 2017-11-13
Payer: COMMERCIAL

## 2017-11-13 PROCEDURE — 97116 GAIT TRAINING THERAPY: CPT

## 2017-11-13 PROCEDURE — 92507 TX SP LANG VOICE COMM INDIV: CPT

## 2017-11-13 PROCEDURE — 97112 NEUROMUSCULAR REEDUCATION: CPT

## 2017-11-13 PROCEDURE — G9186 MOTOR SPEECH GOAL STATUS: HCPCS

## 2017-11-13 PROCEDURE — G8999 MOTOR SPEECH CURRENT STATUS: HCPCS

## 2017-11-13 PROCEDURE — 97110 THERAPEUTIC EXERCISES: CPT

## 2017-11-13 NOTE — PROGRESS NOTES
Physical Therapy  Phone: [place]           Fax: [phone]                           Outpatient Physical Therapy                                                                            Daily Note    Patient: Lakisha Najera : 1950  CSN #: [id number]   Referring Practitioner:  Dr. Tod Flores    Referral Date : 17     Date: 2017    Diagnosis: left pontine cerebrovascular accident I63.50   Treatment Diagnosis: difficulty with ambulation     Onset Date: 17  PT Insurance Information: BCBS  Total # of Visits Approved: 24 Per Physician Order  Total # of Visits to Date: 50  No Show: 0  Canceled Appointment: 0      Pre-Treatment Pain:  0/10  Subjective: Pt denies any pain. Pt is distracted easily this AM, seems tired.      Exercises:  Exercise 2: standing marches/side steps at [employer]  x15 ea  Exercise 3: lateral ambulation with WW  at [facility]  Exercise 4: Bike 10 minutes hills Lv 2.0  Exercise 6: Bike 10 mins-- verbal cues to push with R foot   Exercise 7: Recip steps 5x   Exercise 8: Standing balance activities at rolling walker  including standing sink ther ex x 15- march,TR,Knee bends  Exercise 9: Step ups 6 inch 15x ea   Exercise 10: Sit to stands from w/c x 10-VCs needed for hand placement   Exercise 14: standing with target for foot placement for controlled gait step thru and keeping RW close to body and on the floor with backing up towards chair target-maxA today   Exercise 19: Walking with  feet mod-maxA with max VCs and maxA with RW to avoid steering into walls and other objects  Exercise 20: Cone weave amb 2 laps of 3 cones-max VCs           Assessment  Body structures, Functions, Activity limitations: Decreased functional mobility , Decreased high-level IADLs, Decreased ADL status, Decreased ROM, Decreased strength, Decreased balance, Decreased safe awareness, Decreased coordination  Assessment: Pt more distracted in clinic today with difficulty keeping focused on the task at hand. She needs max verbal and tactile cues to complete most activities as above. Pt  states it may be because her appointment was earlier than normal, so maybe wasn't fully ready. Patient Education  Focus on activity being performed, RW safety, hand placement safety during transfers. Pt verbalized/demonstrated good understanding:     [] Yes         [x] No, pt required further clarification. Post Treatment Pain:  0/10      Plan   Continue progressing dynamic gait and balance as able. Goals  (Total # of Visits to Date: 48)   Short Term Goals - Time Frame for Short term goals: 3 weeks     Short term goal 1: Pt will be educated on her POC - met                                        []Met   []Partially met  []Not met      []Met   []Partially met  []Not met      []Met   []Partially met  []Not met      []Met   []Partially met  []Not met     Long Term Goals - Time Frame for Long term goals : 8 weeks  Long term goal 1: Pt will be safe and independent with her HEP []Met  []Partially met  []Not met   Long term goal 2: Pt will be able to ambulate 15 feet with decreased ataxia and scissoring with Min A +1  []Met  []Partially met  []Not met   Long term goal 3: Pt will be able to sit unsupported for 5 minutes and be able to weight shift independently without any LOB -Part met 5 min but min wt shift  []Met  []Partially met  []Not met   Long term goal 4: Pt will be able to stand with bilateral UE and weight shift independently for >3mins--progressing stood for 2 min.  []Met  []Partially met  []Not met   Long term goal 5: Pt will improve eye hand coordination and will be able to grab 10 comes on first attempt  []Met  []Partially met  []Not met       Minutes Tracking:  Time In: [address]  Time Out: [address] [facility]  Minutes: Marycarmen PTA Date: 11/13/2017

## 2017-11-13 NOTE — PROGRESS NOTES
Phone: [address]    Fax: [phone]                                 Outpatient Speech Therapy                               DAILY TREATMENT NOTE    Date: 11/13/2017  Patients Name:  Jayce Mcmullen  YOB: 1950 (74 y.o.)  Gender:  female  MRN:  [id number]  [facility] #: [id number]  Referring Shanta Zimmer          Total # of Visits to Date: 50           Current Authorization  Comments: share cap with PT     PAIN  [x]No     []Yes      Pain Rating (0-10 pain scale):   Location:  N/A  Pain Description:  NA    SUBJECTIVE  Patient presents to clinic with      SHORT TERM GOALS/ TREATMENT SESSION:  Subjective report:           Seen after PT.  reports was \"sluggish\" at PT appointment. Goal 1: Pt will coordinate breath control and phonation of prolonged vowel sound for 8 seconds x10     5.38, 4.99, 3.66, 7.05, 6.38,   4.97, 6.1, 2.1, 3.7, 2.7,     Continued intermittent strain     []Met  [x]Partially met  []Not met   Goal 2: Pt will point to target words on AAC 10/10 attempts given 1 prompt.         GOAL MET    [x]Met  []Partially met  []Not met   Goal 3: Pt will articulatory movements when prompted by therapist x10       CV combinations independently x8, intelligibility 60-70%, continues to benefit from cues for increased loudness     []Met  [x]Partially met  []Not met   Goal 4: Pt will complete OME to increase oral airflow x10 Structured air flow with \"sh\" sound x5 with max cues  Continued stopping of fricative sounds []Met  [x]Partially met  []Not met     EDUCATION/HOME EXERCISE PROGRAM (HEP)  New Education/HEP provided to patient/family/caregiver:    []Yes:     [x]No (Continued review of prior education)   If yes Education Provided:     Method of Education:     [x]Discussion     []Demonstration    [] Written     []Other  Evaluation of Patients Response to Education:         [x]Patient and or caregiver verbalized understanding  []Patient and or Caregiver Demonstrated without assistance   []Patient and or Caregiver Demonstrated with assistance  []Needs additional instruction to demonstrate understanding of education    ASSESSMENT  Patient tolerated todays treatment session:    [] Good   [x]  Fair   []  Poor  Limitations/difficulties with treatment session due to:   []Pain     []Fatigue     []Other medical complications     []Other    Comments: Continued intermittent progress   PLAN  [x][facility] with [facility] plan of care  [facility]  []IHold per patient request  [] Change Treatment plan:  [] Insurance hold  __ Other     TIME   Time Treatment session was INITIATED 1115   Time Treatment session was STOPPED 1145    30     Charges: 1  Electronically signed by:    Alissa [address] CCC-SLP              Date:11/13/2017

## 2017-11-15 ENCOUNTER — HOSPITAL ENCOUNTER (OUTPATIENT)
Dept: SPEECH THERAPY | Age: 67
Setting detail: THERAPIES SERIES
Discharge: HOME | End: 2017-11-15
Payer: COMMERCIAL

## 2017-11-15 ENCOUNTER — HOSPITAL ENCOUNTER (OUTPATIENT)
Dept: PHYSICAL THERAPY | Age: 67
Setting detail: THERAPIES SERIES
Discharge: HOME | End: 2017-11-15
Payer: COMMERCIAL

## 2017-11-15 PROCEDURE — 97112 NEUROMUSCULAR REEDUCATION: CPT

## 2017-11-15 PROCEDURE — 92507 TX SP LANG VOICE COMM INDIV: CPT

## 2017-11-15 PROCEDURE — 97110 THERAPEUTIC EXERCISES: CPT

## 2017-11-15 NOTE — PROGRESS NOTES
Phone: [address]    Fax: [phone]                                 Outpatient Speech Therapy                               DAILY TREATMENT NOTE    Date: 11/15/2017  Patients Name:  Sujatha Burton  YOB: 1950 (74 y.o.)  Gender:  female  MRN:  [id number]  [facility] #: [id number]  Referring Dex Nix    Total # of Visits to Date: 52     Current Authorization  Comments: share cap with PT     PAIN  [x]No     []Yes      Pain Rating (0-10 pain scale): 0  Location:  N/A  Pain Description:  NA    SUBJECTIVE  Patient presents to clinic with      SHORT TERM GOALS/ TREATMENT SESSION:  Subjective report:           No new concerns        Goal 1: Pt will coordinate breath control and phonation of prolonged vowel sound for 8 seconds x10     Increased loudness this date across all trials     Max phonation time was 6.2 []Met  [x]Partially met  []Not met   Goal 2: Pt will point to target words on AAC 10/10 attempts given 1 prompt.         GOAL MET PREVIOUSLY  [x]Met  []Partially met  []Not met   Goal 3: Pt will articulatory movements when prompted by therapist x10       CV combinations x10  Sentence completions x10   Generated sentences for verbal sequencing x10   []Met  [x]Partially met  []Not met   Goal 4: Pt will complete OME to increase oral airflow x10 x10 with moderate cues  []Met  [x]Partially met  []Not met       EDUCATION/HOME EXERCISE PROGRAM (HEP)  New Education/HEP provided to patient/family/caregiver:    []Yes:     [x]No (Continued review of prior education)   If yes Education Provided:     Method of Education:     [x]Discussion     []Demonstration    [] Written     []Other  Evaluation of Patients Response to Education:         [x]Patient and or caregiver verbalized understanding  []Patient and or Caregiver Demonstrated without assistance   []Patient and or Caregiver Demonstrated with assistance  []Needs additional instruction to

## 2017-11-20 ENCOUNTER — HOSPITAL ENCOUNTER (OUTPATIENT)
Dept: SPEECH THERAPY | Age: 67
Setting detail: THERAPIES SERIES
Discharge: HOME | End: 2017-11-20
Payer: COMMERCIAL

## 2017-11-20 ENCOUNTER — HOSPITAL ENCOUNTER (OUTPATIENT)
Dept: PHYSICAL THERAPY | Age: 67
Setting detail: THERAPIES SERIES
Discharge: HOME | End: 2017-11-20
Payer: COMMERCIAL

## 2017-11-20 PROCEDURE — 97112 NEUROMUSCULAR REEDUCATION: CPT

## 2017-11-20 PROCEDURE — 97116 GAIT TRAINING THERAPY: CPT

## 2017-11-20 PROCEDURE — 97110 THERAPEUTIC EXERCISES: CPT

## 2017-11-20 PROCEDURE — 92507 TX SP LANG VOICE COMM INDIV: CPT

## 2017-11-20 PROCEDURE — G8999 MOTOR SPEECH CURRENT STATUS: HCPCS

## 2017-11-20 PROCEDURE — G9186 MOTOR SPEECH GOAL STATUS: HCPCS

## 2017-11-20 NOTE — PROGRESS NOTES
Phone: [address]    Fax: [phone]                                 Outpatient Speech Therapy                               DAILY TREATMENT NOTE    Date: 11/20/2017  Patients Name:  Latisha Ahumada  YOB: 1950 (74 y.o.)  Gender:  female  MRN:  [id number]  [facility] #: [id number]  Referring Kori [place]          Total # of Visits to Date: 48           Current Authorization  Comments: share cap with PT     PAIN  [x]No     []Yes      Pain Rating (0-10 pain scale): 0  Location:  N/A  Pain Description:  NA    SUBJECTIVE  Patient presents to clinic with      SHORT TERM GOALS/ TREATMENT SESSION:  Subjective report:           No new concerns, reports multisyllabic words and colors at home     Goal 1: Pt will coordinate breath control and phonation of prolonged vowel sound for 8 seconds x10     X10, continues with strain    []Met  [x]Partially met  []Not met   Goal 2: Pt will point to target words on AAC 10/10 attempts given 1 prompt.         GOAL MET     []Met  [x]Partially met  []Not met   Goal 3: Pt will articulatory movements when prompted by therapist x10       multisyllabic words x20    Completed divergent naming task x20 with 3 words each and minimal cues      []Met  [x]Partially met  []Not met   Goal 4: Pt will complete OME to increase oral airflow x10 Used straw to sustain air flow to blow the ball around  []Met  [x]Partially met  []Not met         EDUCATION/HOME EXERCISE PROGRAM (HEP)  New Education/HEP provided to patient/family/caregiver:    []Yes:     [x]No (Continued review of prior education)   If yes Education Provided:     Method of Education:     [x]Discussion     []Demonstration    [] Written     []Other  Evaluation of Patients Response to Education:         [x]Patient and or caregiver verbalized understanding  []Patient and or Caregiver Demonstrated without assistance   []Patient and or Caregiver Demonstrated with assistance  []Needs additional instruction to demonstrate understanding of education    ASSESSMENT  Patient tolerated todays treatment session:    [x] Good   []  Fair   []  Poor  Limitations/difficulties with treatment session due to:   []Pain     []Fatigue     []Other medical complications     []Other    Comments:    PLAN  [x]Continue with current plan of care  [facility]  []IHold per patient request  [] Change Treatment plan:  [] Insurance hold  __ Other     TIME   Time Treatment session was INITIATED 1115   Time Treatment session was STOPPED 1145    30     Charges: 1  Electronically signed by:    Amy [address] [id number] [facility], [place]              Date:11/20/2017

## 2017-11-20 NOTE — PROGRESS NOTES
Physical Therapy  Phone: [place]           Fax: [phone]                           Outpatient Physical Therapy                                                                            Daily Note    Patient: Aleja Bunch : 1950  CSN #: [id number]   Referring Practitioner:  Dr. Sarah Pagan    Referral Date : 17     Date: 2017    Diagnosis: left pontine cerebrovascular accident I63.50   Treatment Diagnosis: difficulty with ambulation     Onset Date: 17  PT Insurance Information: BCBS  Total # of Visits Approved: 24 Per Physician Order  Total # of Visits to Date: 46  No Show: 0  Canceled Appointment: 0      Pre-Treatment Pain:  0/10  Subjective: Pt denies new complaints. Exercises:  Exercise 1: ambulating with hand held assist 20ftx1 with maxAx1 and W/C following   Exercise 3: lateral ambulation with WW  at [facility] x3 laps  Exercise 4: Bike 10 minutes hills Lv 2.5   Exercise 8: Standing balance activities at rolling walker  including standing sink ther ex x 15- march,TR, hip flexion  Exercise 9: Step ups 6 inch 15x ea   Exercise 10: Sit to stands from w/c x 10-VCs for RLE push off  Exercise 11: Cone reaching --cones on floor in front and to side . Annalise Light Working trunk control - 3--4 each    Exercise 19: Walking with RW [id number], x50ft, 2x25ft, in dept. Continues to need mod-maxA for safety with RW. Exercise 20: Cone weave amb 2 laps of 3 cones-mod-max VCs         Assessment  Body structures, Functions, Activity limitations: Decreased functional mobility , Decreased high-level IADLs, Decreased ADL status, Decreased ROM, Decreased strength, Decreased balance, Decreased safe awareness, Decreased coordination  Assessment: improved standing at  balance and lateral stepping at [facility] balance with CGA/Alessio needed. However, with dynamic gait with RW, pt continues to need mod-maxA for safety to steer RW and to [place] square focus on task.  Pt continues to keep RW either too far away or too close to body, occasionally crosses feet, and lifts walked off ground when turning. Patient Education  Pt and  on continued lack of safety with use of RW to perform independently. Pt verbalized/demonstrated good understanding:     [x] Yes         [] No, pt required further clarification. Post Treatment Pain:  0/10      Plan   Continue working on dynamic gait and balance for increased safety awareness. Goals  (Total # of Visits to Date: 46)   Short Term Goals - Time Frame for Short term goals: 3 weeks     Short term goal 1: Pt will be educated on her POC - met                                        []Met   []Partially met  []Not met   Short term goal 2: pt will be able to sit unsupported for 1 min without any loss of balance -Met== Sat over 2 min. []Met   []Partially met  []Not met      []Met   []Partially met  []Not met      []Met   []Partially met  []Not met     Long Term Goals - Time Frame for Long term goals : 8 weeks  Long term goal 1: Pt will be safe and independent with her HEP []Met  []Partially met  []Not met   Long term goal 2: Pt will be able to ambulate 15 feet with decreased ataxia and scissoring with Min A +1  []Met  []Partially met  []Not met   Long term goal 3: Pt will be able to sit unsupported for 5 minutes and be able to weight shift independently without any LOB -Part met 5 min but min wt shift  []Met  []Partially met  []Not met   Long term goal 4: Pt will be able to stand with bilateral UE and weight shift independently for >3mins--progressing stood for 2 min.  []Met  []Partially met  []Not met   Long term goal 5: Pt will improve eye hand coordination and will be able to grab 10 comes on first attempt  []Met  []Partially met  []Not met       Minutes Tracking:  Time In: [address]  Time Out: [employer]  Minutes: [address] PTA Date: 11/20/2017

## 2017-11-22 ENCOUNTER — HOSPITAL ENCOUNTER (OUTPATIENT)
Dept: SPEECH THERAPY | Age: 67
Setting detail: THERAPIES SERIES
Discharge: HOME | End: 2017-11-22
Payer: COMMERCIAL

## 2017-11-22 ENCOUNTER — HOSPITAL ENCOUNTER (OUTPATIENT)
Dept: PHYSICAL THERAPY | Age: 67
Setting detail: THERAPIES SERIES
Discharge: HOME | End: 2017-11-22
Payer: COMMERCIAL

## 2017-11-22 PROCEDURE — 97110 THERAPEUTIC EXERCISES: CPT

## 2017-11-22 PROCEDURE — 92507 TX SP LANG VOICE COMM INDIV: CPT

## 2017-11-22 PROCEDURE — 97112 NEUROMUSCULAR REEDUCATION: CPT

## 2017-11-22 NOTE — PROGRESS NOTES
Phone: [place]           Fax: [phone]                           Outpatient Physical Therapy                                                                            Daily Note    Patient: Jeny Luis : 1950  CSN #: [id number]   Referring Practitioner:  Dr. Rob Tolliver    Referral Date : 17     Date: 2017    Diagnosis: left pontine cerebrovascular accident I63.50   Treatment Diagnosis: difficulty with ambulation     Onset Date: 17  PT Insurance Information: BCBS  Total # of Visits Approved: 24 Per Physician Order  Total # of Visits to Date: 48  No Show: 0  Canceled Appointment: 0      Pre-Treatment Pain:  0/10  Subjective: Pt reports things are okay at home. When asked about HEP she stated its \"good\" and denies falls. Exercises:  Exercise 1: ambulating with hand held assist 20ftx1 with maxAx1 and W/C following   Exercise 2: standing marches/side steps at [employer]  x15 ea  Exercise 3: lateral ambulation with WW  at [facility] x3 laps  Exercise 4: Bike 10 minutes hills Lv 2.5        Exercise 7: Recip steps 5x   Exercise 8: Standing balance activities at rolling walker  including standing sink ther ex x 15- march,TR, hip flexion  Exercise 9: Step ups 6 inch 15x ea      Exercise 11: Cone reaching --cones on floor in front and to side . Bridget Jefferson Working trunk control - 3--4 each   Exercise 12: Forward reaching and side to side for trunk rotation in standing for increased balance and trunk strength x 10 each  with ball            Exercise 20: Cone weave amb 2 laps of 3 cones-mod-max VCs      Assessment  Assessment: Increased LOB noted today with Pedro/ walking program.  Pt displayed less ability of amb with walker and greater L veering with amb. Will advance next session. Patient Education  Cont working with  on transfers/ walker amb. Pt verbalized/demonstrated good understanding:     [x] Yes         [] No, pt required further clarification.     Post

## 2017-11-22 NOTE — PROGRESS NOTES
Phone: [address]    Fax: [phone]                                 Outpatient Speech Therapy                               DAILY TREATMENT NOTE    Date: 11/22/2017  Patients Name:  Laurita Newby  YOB: 1950 (74 y.o.)  Gender:  female  MRN:  [id number]  [facility] #: [id number]  Referring Nas Vaughn          Total # of Visits to Date: 46           Current Authorization  Comments: share cap with PT     PAIN  [x]No     []Yes      Pain Rating (0-10 pain scale): 0  Location:  N/A  Pain Description:  N/A    SUBJECTIVE  Patient presents to clinic with  and grandsons     SHORT TERM GOALS/ TREATMENT SESSION:  Subjective report:           No new concerns. Reported practicing sustained airflow with a Nerf pellet      Appeared fatigued and reported \"staying up late\". Benefited from ongoing cues for volume throughout session, more than in previous    Goal 1: Pt will coordinate breath control and phonation of prolonged vowel sound for 8 seconds x10     x10 with notable improved loudness, only 1 time with strained vocal quality     Sustained on 1 occasion 9 seconds     []Met  [x]Partially met  []Not met   Goal 2: Pt will point to target words on AAC 10/10 attempts given 1 prompt.         GOAL MET PREVIOUSLY    [x]Met  []Partially met  []Not met   Goal 3: Pt will articulatory movements when prompted by therapist x10       Generated sentences x10 with intelligibility of 60%  Figurative language x5 with mod cues  Divergent naming 3 items per category x8 with mod cues     []Met  [x]Partially met  []Not met   Goal 4: Pt will complete OME to increase oral airflow x10 DNT []Met  [x]Partially met  []Not met     EDUCATION/HOME EXERCISE PROGRAM (HEP)  New Education/HEP provided to patient/family/caregiver:    []Yes:     [x]No (Continued review of prior education)   If yes Education Provided:     Method of Education:     [x]Discussion []Demonstration    [] Written     []Other  Evaluation of Patients Response to Education:         [x]Patient and or caregiver verbalized understanding  []Patient and or Caregiver Demonstrated without assistance   []Patient and or Caregiver Demonstrated with assistance  []Needs additional instruction to demonstrate understanding of education    ASSESSMENT  Patient tolerated todays treatment session:    [x] Good   []  Fair   []  Poor  Limitations/difficulties with treatment session due to:   []Pain     []Fatigue     []Other medical complications     []Other    Comments:    PLAN  [x]Continue with current plan of care  [facility]  []IHold per patient request  [] Change Treatment plan:  [] Insurance hold  __ Other     TIME   Time Treatment session was INITIATED 1115   Time Treatment session was STOPPED 1145    30     Charges: 1  Electronically signed by:    Nadine [address] Robin Hutchison              Date:11/22/2017

## 2017-11-24 NOTE — PLAN OF CARE
Good   [] Fair   [] Poor    G-Codes  [] Swallowing        [] Motor Speech       [] Spoken Language Comprehension                   [] Spoken Language Expression     [] Attention        [] Memory    [] Voice  [] Other SLP Functional Limitations    Current:  [] [facility]: 0 percent limited   [] CI: 1% but < 20% impaired  [] CJ: 20% but < 40% impaired  [] CK: 40% but < 60% impaired  [] CL: 60% but < 80% impaired  [x] CM: 80% but < 100% impaired  [] CN: 100% impaired    Projected   CH: 0 percent limited   [] CI: 1% but < 20% impaired  [x] CJ: 20% but < 40% impaired  [] CK: 40% but < 60% impaired  [] CL: 60% but < 80% impaired  [] CM: 80% but < 100% impaired  [] CN: 100% impaired    Electronically signed by:  Chrissie [address] Hui Choi      Date:11/24/2017    Regulatory Requirements  I have reviewed this plan of care and certify a need for medically necessary rehabilitation services.     Physician Signature:_____________________________________     Date:11/24/2017  Please sign and fax to [phone]

## 2017-11-27 ENCOUNTER — HOSPITAL ENCOUNTER (OUTPATIENT)
Dept: SPEECH THERAPY | Age: 67
Setting detail: THERAPIES SERIES
Discharge: HOME | End: 2017-11-27
Payer: COMMERCIAL

## 2017-11-27 ENCOUNTER — APPOINTMENT (OUTPATIENT)
Dept: PHYSICAL THERAPY | Age: 67
End: 2017-11-27
Payer: COMMERCIAL

## 2017-11-27 ENCOUNTER — HOSPITAL ENCOUNTER (OUTPATIENT)
Dept: PHYSICAL THERAPY | Age: 67
Setting detail: THERAPIES SERIES
Discharge: HOME | End: 2017-11-27
Payer: COMMERCIAL

## 2017-11-27 PROCEDURE — 97110 THERAPEUTIC EXERCISES: CPT

## 2017-11-27 PROCEDURE — 97112 NEUROMUSCULAR REEDUCATION: CPT

## 2017-11-27 PROCEDURE — 92507 TX SP LANG VOICE COMM INDIV: CPT

## 2017-11-27 NOTE — PROGRESS NOTES
Phone: Nev[place]           Fax: [phone]                           Outpatient Physical Therapy                                                                            Daily Note    Patient: Mel Hernandez : 1950  CSN #: [id number]   Referring Practitioner:  Dr. Stephania Person    Referral Date : 17     Date: 2017    Diagnosis: left pontine cerebrovascular accident I63.50   Treatment Diagnosis: difficulty with ambulation     Onset Date: 17  PT Insurance Information: [address]    Per Physician Order  Total # of Visits to Date: 47  No Show: 0  Canceled Appointment: 0      Pre-Treatment Pain:  0/10  Subjective: Pt  states they have been walking around house using walker/ really focusing on hand placement with transfers unless she has to use restroom where they will walk using his armd/t being quicker. Pt states she is doing good with no current pain. Exercises:  Exercise 1: ambulating with hand held assist 20ftx1 with maxAx1 and W/C following   Exercise 2: standing marches/side steps at [employer]  x15 ea     Exercise 4: Bike 10 minutes hills Lv 2.5     Exercise 6: Cone kicks with RW to focus on multi-direction amb. Exercise 7: Recip steps 5x         Exercise 10: Sit to stands from w/c x 10-VCs for RLE push off        Exercise 13: sideways amb/ forward retro amb with max PTA support. Exercise 14: standing with target for foot placement for controlled gait step thru and keeping RW close to body and on the floor with backing up towards chair target-maxA today      Exercise 18: Standing at mat table woth pverhead reaches, squats with bar touching table x 10     Exercise 20: Cone weave amb 2 laps of 3 cones-mod-max VCs      Assessment  Assessment: Pt with 70% overall compliance with safety with hand placement during transfers. Pt did well with amb today with greater LOB noted with fatigue. Patient Education  Cont current HEP.    Pt verbalized/demonstrated good understanding:     [x] Yes         [] No, pt required further clarification. Post Treatment Pain:  0/10      Plan  Times per week: 2x/wk  Plan weeks: 8 weeks       Goals  (Total # of Visits to Date: 47)   Short Term Goals - Time Frame for Short term goals: 3 weeks     Short term goal 1: Pt will be educated on her POC - met                                        [x]Met   []Partially met  []Not met   Short term goal 2: pt will be able to sit unsupported for 1 min without any loss of balance -Met== Sat over 2 min. [x]Met   []Partially met  []Not met      []Met   []Partially met  []Not met      []Met   []Partially met  []Not met     Long Term Goals - Time Frame for Long term goals : 8 weeks  Long term goal 1: Pt will be safe and independent with her HEP []Met  []Partially met  [x]Not met   Long term goal 2: Pt will be able to ambulate 15 feet with decreased ataxia and scissoring with Min A +1  []Met  []Partially met  [x]Not met   Long term goal 3: Pt will be able to sit unsupported for 5 minutes and be able to weight shift independently without any LOB -Part met 5 min but min wt shift  []Met  [x]Partially met  []Not met   Long term goal 4: Pt will be able to stand with bilateral UE and weight shift independently for >3mins--progressing stood for 2 min.  []Met  [x]Partially met  []Not met   Long term goal 5: Pt will improve eye hand coordination and will be able to grab 10 comes on first attempt  []Met  []Partially met  [x]Not met       Minutes Tracking:  Time In: 1146  Time Out: Joseph Hein  Minutes: [place], [place]   Date: 11/27/2017

## 2017-11-27 NOTE — PROGRESS NOTES
Phone: [address]    Fax: [phone]                                 Outpatient Speech Therapy                               DAILY TREATMENT NOTE    Date: 11/27/2017  Patients Name:  Marcus Wilburn  YOB: 1950 (74 y.o.)  Gender:  female  MRN:  [id number]  [facility] #: [id number]  Referring Kerline Cook          Total # of Visits to Date: 46           Current Authorization  Comments: share cap with PT     PAIN  [x]No     []Yes      Pain Rating (0-10 pain scale): 0  Location:  N/A  Pain Description:  NA    SUBJECTIVE  Patient presents to clinic with      SHORT TERM GOALS/ TREATMENT SESSION:  Subjective report:           Reports feeling congested, which impacted intelligibility throughout        Goal 1: Pt will coordinate breath control and phonation of prolonged vowel sound for 8 seconds x10     x10 independently, approximately 3-4 seconds each with hypernasality quality appreciated throughout      []Met  [x]Partially met  []Not met   Goal 2: Pt will produce words for phrase or sentence completions with 60% accuracy       Phrase completions 90% intelligibility  Sequencing 3-step home 70% intelligibility  Sentence completions 80% intelligiblity   Same/different: 65%    Throughout noted diminished loudness unless cued   []Met  [x]Partially met  []Not met   Goal 3: Pt will articulatory movements when prompted by therapist x10       /t/ and /d/ x10 [x]Met  []Partially met  []Not met   Goal 4: Pt will complete OME to increase oral airflow x10 DNT []Met  [x]Partially met  []Not met       EDUCATION/HOME EXERCISE PROGRAM (HEP)  New Education/HEP provided to patient/family/caregiver:    []Yes:     [x]No (Continued review of prior education)   If yes Education Provided:     Method of Education:     [x]Discussion     []Demonstration    [] Written     []Other  Evaluation of Patients Response to Education:         [x]Patient and or caregiver

## 2017-11-29 ENCOUNTER — HOSPITAL ENCOUNTER (OUTPATIENT)
Dept: PHYSICAL THERAPY | Age: 67
Setting detail: THERAPIES SERIES
Discharge: HOME | End: 2017-11-29
Payer: COMMERCIAL

## 2017-11-29 ENCOUNTER — HOSPITAL ENCOUNTER (OUTPATIENT)
Dept: SPEECH THERAPY | Age: 67
Setting detail: THERAPIES SERIES
Discharge: HOME | End: 2017-11-29
Payer: COMMERCIAL

## 2017-11-29 PROCEDURE — 97110 THERAPEUTIC EXERCISES: CPT

## 2017-11-29 PROCEDURE — 92507 TX SP LANG VOICE COMM INDIV: CPT

## 2017-11-29 PROCEDURE — 97112 NEUROMUSCULAR REEDUCATION: CPT

## 2017-11-29 NOTE — PROGRESS NOTES
Phone: [place]           Fax: [phone]                           Outpatient Physical Therapy                                                                            Daily Note    Patient: Aleja Bunch : 1950  CSN #: [id number]   Referring Practitioner:  Dr. Sarah Pagan    Referral Date : 17     Date: 2017    Diagnosis: left pontine cerebrovascular accident I63.50   Treatment Diagnosis: difficulty with ambulation     Onset Date: 17  PT Insurance Information: [employer]  Total # of Visits Approved: 24 Per Physician Order  Total # of Visits to Date: 54  No Show: 0  Canceled Appointment: 0      Pre-Treatment Pain: 0/10  Subjective: Pt rpeorts she is doing good. She has been fighting a sinus infection so has been a little more tired. Exercises:  Exercise 1: ambulating with hand held assist 20ftx1 with maxAx1 and W/C following         Exercise 4: Bike 10 minutes hills Lv 2.5        Exercise 7: Recip steps 5x   Exercise 8: Standing balance activities at rolling walker  including standing sink ther ex x 15- march,TR, hip flexion  Exercise 9: Step ups 6 inch 15x ea   Exercise 10: Sit to stands from w/c x 10-VCs for RLE push off         Exercise 17: Cone amb with L and R turning laps 4 times ea way with max v/c for walker advancement and safety. Assessment  Assessment: Used max v/c and max tactile cues for proper walker advancement, proper step and stride length. Pt with increased awareness with max cueing but easily distracted in open environment. Patient Education  Cont working on walker advancement with . Pt verbalized/demonstrated good understanding:     [x] Yes         [] No, pt required further clarification.     Post Treatment Pain:  0/10      Plan  Times per week: 2x/wk  Plan weeks: 8 weeks       Goals  (Total # of Visits to Date: 54)   Short Term Goals - Time Frame for Short term goals: 3 weeks     Short term goal 1: Pt will be educated on her POC - met                                        [x]Met   []Partially met  []Not met   Short term goal 2: pt will be able to sit unsupported for 1 min without any loss of balance -Met== Sat over 2 min. [x]Met   []Partially met  []Not met      []Met  []Partially met  []Not met      []Met   []Partially met  []Not met     Long Term Goals - Time Frame for Long term goals : 8 weeks  Long term goal 1: Pt will be safe and independent with her HEP []Met  []Partially met  [x]Not met   Long term goal 2: Pt will be able to ambulate 15 feet with decreased ataxia and scissoring with Min A +1  []Met  []Partially met  [x]Not met   Long term goal 3: Pt will be able to sit unsupported for 5 minutes and be able to weight shift independently without any LOB -Part met 5 min but min wt shift  []Met  [x]Partially met  []Not met   Long term goal 4: Pt will be able to stand with bilateral UE and weight shift independently for >3mins--progressing stood for 2 min.  []Met  [x]Partially met  []Not met   Long term goal 5: Pt will improve eye hand coordination and will be able to grab 10 comes on first attempt  []Met  []Partially met  [x]Not met       Minutes Tracking:  Time In: 1145  Time Out: [address] [facility] [place]  Minutes: [address]   Date: 11/29/2017

## 2017-12-04 ENCOUNTER — HOSPITAL ENCOUNTER (OUTPATIENT)
Dept: SPEECH THERAPY | Age: 67
Setting detail: THERAPIES SERIES
Discharge: HOME | End: 2017-12-04
Payer: COMMERCIAL

## 2017-12-04 ENCOUNTER — HOSPITAL ENCOUNTER (OUTPATIENT)
Dept: PHYSICAL THERAPY | Age: 67
Setting detail: THERAPIES SERIES
Discharge: HOME | End: 2017-12-04
Payer: COMMERCIAL

## 2017-12-04 PROCEDURE — 92507 TX SP LANG VOICE COMM INDIV: CPT

## 2017-12-04 PROCEDURE — 97110 THERAPEUTIC EXERCISES: CPT

## 2017-12-04 PROCEDURE — 97112 NEUROMUSCULAR REEDUCATION: CPT

## 2017-12-04 NOTE — PROGRESS NOTES
[] Written     []Other  Evaluation of Patients Response to Education:         [x]Patient and or caregiver verbalized understanding  []Patient and or Caregiver Demonstrated without assistance   []Patient and or Caregiver Demonstrated with assistance  []Needs additional instruction to demonstrate understanding of education    ASSESSMENT  Patient tolerated todays treatment session:    [x] Good   []  Fair   []  Poor  Limitations/difficulties with treatment session due to:   []Pain     []Fatigue     []Other medical complications     []Other    Comments:    PLAN  [x]Continue with current plan of care  [facility]  []I[facility] per patient request  [] Change Treatment plan:  [] Insurance hold  __ Other     TIME   Time Treatment session was INITIATED 1115   Time Treatment session was STOPPED 1200    45     Charges: 1  Electronically signed by:    [address]              Date:12/4/2017

## 2017-12-04 NOTE — PROGRESS NOTES
weeks       Goals  (Total # of Visits to Date: 64)   Short Term Goals - Time Frame for Short term goals: 3 weeks     Short term goal 1: Pt will be educated on her POC - met                                        [x]Met   []Partially met  []Not met   Short term goal 2: pt will be able to sit unsupported for 1 min without any loss of balance -Met== Sat over 2 min. [x]Met  []Partially met  []Not met      []Met   []Partially met  []Not met      []Met   []Partially met  []Not met     Long Term Goals - Time Frame for Long term goals : 8 weeks  Long term goal 1: Pt will be safe and independent with her HEP []Met  []Partially met  [x]Not met   Long term goal 2: Pt will be able to ambulate 15 feet with decreased ataxia and scissoring with Min A +1  []Met  []Partially met  [x]Not met   Long term goal 3: Pt will be able to sit unsupported for 5 minutes and be able to weight shift independently without any LOB -Part met 5 min but min wt shift  []Met  [x]Partially met  []Not met   Long term goal 4: Pt will be able to stand with bilateral UE and weight shift independently for >3mins--progressing stood for 2 min.  []Met  [x]Partially met  []Not met   Long term goal 5: Pt will improve eye hand coordination and will be able to grab 10 comes on first attempt  []Met  []Partially met  [x]Not met       Minutes Tracking:  Time In: 1029  Time Out: [place]  Minutes: [place] , [place]   Date: 12/4/2017

## 2017-12-06 ENCOUNTER — HOSPITAL ENCOUNTER (OUTPATIENT)
Dept: PHYSICAL THERAPY | Age: 67
Setting detail: THERAPIES SERIES
Discharge: HOME | End: 2017-12-06
Payer: COMMERCIAL

## 2017-12-06 PROCEDURE — 97110 THERAPEUTIC EXERCISES: CPT

## 2017-12-06 PROCEDURE — 97112 NEUROMUSCULAR REEDUCATION: CPT

## 2017-12-11 ENCOUNTER — HOSPITAL ENCOUNTER (OUTPATIENT)
Dept: PHYSICAL THERAPY | Age: 67
Setting detail: THERAPIES SERIES
Discharge: HOME | End: 2017-12-11
Payer: COMMERCIAL

## 2017-12-11 ENCOUNTER — HOSPITAL ENCOUNTER (OUTPATIENT)
Dept: SPEECH THERAPY | Age: 67
Setting detail: THERAPIES SERIES
Discharge: HOME | End: 2017-12-11
Payer: COMMERCIAL

## 2017-12-11 PROCEDURE — 97110 THERAPEUTIC EXERCISES: CPT

## 2017-12-11 PROCEDURE — 97112 NEUROMUSCULAR REEDUCATION: CPT

## 2017-12-11 PROCEDURE — 92507 TX SP LANG VOICE COMM INDIV: CPT

## 2017-12-11 NOTE — PROGRESS NOTES
working on amb with RW with . Pt verbalized/demonstrated good understanding:     [x] Yes         [] No, pt required further clarification. Post Treatment Pain:  0/10      Plan  Times per week: 2x/wk  Plan weeks: 8 weeks       Goals  (Total # of Visits to Date: 62)   Short Term Goals - Time Frame for Short term goals: 3 weeks     Short term goal 1: Pt will be educated on her POC - met                                        [x]Met   []Partially met  []Not met   Short term goal 2: pt will be able to sit unsupported for 1 min without any loss of balance -Met== Sat over 2 min. [x]Met   []Partially met  []Not met      []Met  []Partially met  []Not met      []Met   []Partially met  []Not met     Long Term Goals - Time Frame for Long term goals : 8 weeks  Long term goal 1: Pt will be safe and independent with her HEP []Met  []Partially met  [x]Not met   Long term goal 2: Pt will be able to ambulate 15 feet with decreased ataxia and scissoring with Min A +1 -progressing []Met  [x]Partially met  []Not met   Long term goal 3: Pt will be able to sit unsupported for 5 minutes and be able to weight shift independently without any LOB -Part met 5 min but min wt shift  []Met  [x]Partially met  []Not met   Long term goal 4: Pt will be able to stand with bilateral UE and weight shift independently for >3mins--progressing stood for 2 min.  []Met  [x]Partially met  []Not met   Long term goal 5: Pt will improve eye hand coordination and will be able to grab 10 comes on first attempt -Progressing []Met  [x]Partially met  []Not met       Minutes Tracking:  Time In: 1033  Time Out: [address]  Minutes: [place], [place]   Date: 12/11/2017

## 2017-12-11 NOTE — PROGRESS NOTES
[address]: [address]    Fax: [phone]                                 Outpatient Speech Therapy                               DAILY TREATMENT NOTE    Date: 12/11/2017  Patients Name:  Aleja Bunch  YOB: 1950 (74 y.o.)  Gender:  female  MRN:  [id number]  [facility] #: [id number]  Referring Mell Drake          Total # of Visits to Date: 54           Current Authorization  Comments: share cap with PT     PAIN  [x]No     []Yes      Pain Rating (0-10 pain scale): 0  Location:  N/A  Pain Description:  NA    SUBJECTIVE  Patient presents to clinic with      SHORT TERM GOALS/ TREATMENT SESSION:  Subjective report:           Seen after PT, physical therapist reports waxing/waning performance in session as the session progressed    Patient reported \"being tired\"        Goal 1: Pt will coordinate breath control and phonation of prolonged vowel sound for 8 seconds x10     x10    Intermittent strain throughout, improved with verbal cues     []Met  [x]Partially met  []Not met   Goal 2: Pt will produce words for phrase or sentence completions with 60% accuracy       70% with max cues for volume and pacing   []Met  [x]Partially met  []Not met   Goal 3: Pt will articulatory movements when prompted by therapist x10       Generated novel sentences x10  []Met  [x]Partially met  []Not met   Goal 4: Pt will complete OME to increase oral airflow x10 DNT []Met  [x]Partially met  []Not met       EDUCATION/HOME EXERCISE PROGRAM (HEP)  New Education/HEP provided to patient/family/caregiver:    []Yes:     [x]No (Continued review of prior education)   If yes Education Provided:     Method of Education:     [x]Discussion     []Demonstration    [] Written     []Other  Evaluation of Patients Response to Education:         [x]Patient and or caregiver verbalized understanding  []Patient and or Caregiver Demonstrated without assistance   []Patient and or Caregiver Demonstrated with assistance  []Needs additional instruction to demonstrate understanding of education    ASSESSMENT  Patient tolerated todays treatment session:    [x] Good   []  Fair   []  Poor  Limitations/difficulties with treatment session due to:   []Pain     []Fatigue     []Other medical complications     []Other    Comments:    PLAN  [x]Continue with current plan of care  [][facility]  []IHold per patient request  [] Change Treatment plan:  [] Insurance hold  __ Other     TIME   Time Treatment session was INITIATED 1115   Time Treatment session was STOPPED 1200    45     Charges: 1  Electronically signed by:    [address] [facility]              Date:12/11/2017

## 2017-12-13 ENCOUNTER — HOSPITAL ENCOUNTER (OUTPATIENT)
Dept: PHYSICAL THERAPY | Age: 67
Setting detail: THERAPIES SERIES
Discharge: HOME | End: 2017-12-13
Payer: COMMERCIAL

## 2017-12-13 PROCEDURE — 97110 THERAPEUTIC EXERCISES: CPT

## 2017-12-13 PROCEDURE — 97112 NEUROMUSCULAR REEDUCATION: CPT

## 2017-12-13 NOTE — PROGRESS NOTES
on safety/ hand placement at home. Pt verbalized/demonstrated good understanding:     [x] Yes         [] No, pt required further clarification. Post Treatment Pain:  0/10      Plan  Times per week: 2x/wk  Plan weeks: 8 weeks       Goals  (Total # of Visits to Date: 61)   Short Term Goals - Time Frame for Short term goals: 3 weeks     Short term goal 1: Pt will be educated on her POC - met                                        [x]Met   []Partially met  []Not met   Short term goal 2: pt will be able to sit unsupported for 1 min without any loss of balance -Met== Sat over 2 min. [x]Met  []Partially met  []Not met      []Met   []Partially met  []Not met      []Met   []Partially met  []Not met     Long Term Goals - Time Frame for Long term goals : 8 weeks  Long term goal 1: Pt will be safe and independent with her HEP []Met  []Partially met  [x]Not met   Long term goal 2: Pt will be able to ambulate 15 feet with decreased ataxia and scissoring with Min A +1 -progressing []Met  [x]Partially met  []Not met   Long term goal 3: Pt will be able to sit unsupported for 5 minutes and be able to weight shift independently without any LOB -Part met 5 min but min wt shift  []Met  [x]Partially met  []Not met   Long term goal 4: Pt will be able to stand with bilateral UE and weight shift independently for >3mins--progressing stood for 2 min.  []Met  [x]Partially met  []Not met   Long term goal 5: Pt will improve eye hand coordination and will be able to grab 10 comes on first attempt -Progressing []Met  [x]Partially met  []Not met       Minutes Tracking:  Time In: 5512  Time Out: [address]  Minutes: [address] [place] [address]    Date: 12/13/2017

## 2017-12-18 ENCOUNTER — HOSPITAL ENCOUNTER (OUTPATIENT)
Dept: PHYSICAL THERAPY | Age: 67
Setting detail: THERAPIES SERIES
Discharge: HOME | End: 2017-12-18
Payer: COMMERCIAL

## 2017-12-18 ENCOUNTER — HOSPITAL ENCOUNTER (OUTPATIENT)
Dept: SPEECH THERAPY | Age: 67
Setting detail: THERAPIES SERIES
Discharge: HOME | End: 2017-12-18
Payer: COMMERCIAL

## 2017-12-18 PROCEDURE — 97112 NEUROMUSCULAR REEDUCATION: CPT

## 2017-12-18 PROCEDURE — 92507 TX SP LANG VOICE COMM INDIV: CPT

## 2017-12-18 PROCEDURE — 97110 THERAPEUTIC EXERCISES: CPT

## 2017-12-18 NOTE — PROGRESS NOTES
will be educated on her POC - met                                        [x]Met   []Partially met  []Not met   Short term goal 2: pt will be able to sit unsupported for 1 min without any loss of balance -Met== Sat over 2 min. [x]Met  []Partially met  []Not met      []Met   []Partially met  []Not met      []Met   []Partially met  []Not met     Long Term Goals - Time Frame for Long term goals : 8 weeks  Long term goal 1: Pt will be safe and independent with her HEP []Met  []Partially met  [x]Not met   Long term goal 2: Pt will be able to ambulate 15 feet with decreased ataxia and scissoring with Min A +1 -progressing []Met  [x]Partially met  []Not met   Long term goal 3: Pt will be able to sit unsupported for 5 minutes and be able to weight shift independently without any LOB -Part met 5 min but min wt shift  []Met  [x]Partially met  []Not met   Long term goal 4: Pt will be able to stand with bilateral UE and weight shift independently for >3mins--progressing stood for 2 min.  []Met  [x]Partially met  []Not met   Long term goal 5: Pt will improve eye hand coordination and will be able to grab 10 comes on first attempt -Progressing []Met  [x]Partially met  []Not met       Minutes Tracking:  Time In: 1031  Time Out: [address]  Minutes: [place], [place]   Date: 12/18/2017

## 2017-12-18 NOTE — PROGRESS NOTES
Phone: [address]    Fax: [phone]                                 Outpatient Speech Therapy                               DAILY TREATMENT NOTE    Date: 12/18/2017  Patients Name:  Rebecca Kumar  YOB: 1950 (74 y.o.)  Gender:  female  MRN:  [id number]  [facility] #: [id number]  Referring Meg Wright          Total # of Visits to Date: 64           Current Authorization  Comments: share cap with PT     PAIN  [x]No     []Yes      Pain Rating (0-10 pain scale): 0  Location:  N/A  Pain Description:  NA    SUBJECTIVE  Patient presents to clinic with      SHORT TERM GOALS/ TREATMENT SESSION:  Subjective report:           Fatigued but participated    Ongoing need for cues for slow, loud continues despite this being the focus of sessions for weeks. Goal 1: Pt will coordinate breath control and phonation of prolonged vowel sound for 8 seconds x10     x10 with max cues   None longer than 2.75  []Met  [x]Partially met  []Not met   Goal 2: Pt will produce words for phrase or sentence completions with 60% accuracy       70% accuracy  Intelligibility varied throughout 50-60% with max cues    Ongoing cues for slow and loud  []Met  [x]Partially met  []Not met   Goal 3: Pt will articulatory movements when prompted by therapist x10       x10 with mod cues   []Met  [x]Partially met  []Not met   Goal 4: Pt will complete OME to increase oral airflow x10 Patient and  purchased the [employer] Reviewed with patient. Attempted inhale/exhale at level 1 with 1-2 count.  Patient attempted x5, unable to exhale on 2 count []Met  [x]Partially met  []Not met     EDUCATION/HOME EXERCISE PROGRAM (HEP)  New Education/HEP provided to patient/family/caregiver:    []Yes:     [x]No (Continued review of prior education)   If yes Education Provided:    Method of Education:     [x]Discussion     []Demonstration    [] Written     []Other  Evaluation of Patients Response to Education:         [x]Patient and or caregiver verbalized understanding  []Patient and or Caregiver Demonstrated without assistance   []Patient and or Caregiver Demonstrated with assistance  []Needs additional instruction to demonstrate understanding of education    ASSESSMENT  Patient tolerated todays treatment session:    [x] Good   []  Fair   []  Poor  Limitations/difficulties with treatment session due to:   []Pain     []Fatigue     []Other medical complications     []Other    Comments:    PLAN  [x]Continue with current plan of care  [][facility]  []IHold per patient request  [] Change Treatment plan:  [] Insurance hold  __ Other     TIME   Time Treatment session was INITIATED 1115   Time Treatment session was STOPPED 1145    45     Charges: 1  Electronically signed by:    Kennth Hatchet [facility] [address] [id number] [facility]              Date:12/18/2017

## 2017-12-20 ENCOUNTER — HOSPITAL ENCOUNTER (OUTPATIENT)
Dept: PHYSICAL THERAPY | Age: 67
Setting detail: THERAPIES SERIES
Discharge: HOME | End: 2017-12-20
Payer: COMMERCIAL

## 2017-12-20 PROCEDURE — 97112 NEUROMUSCULAR REEDUCATION: CPT

## 2017-12-20 PROCEDURE — 97110 THERAPEUTIC EXERCISES: CPT

## 2017-12-27 ENCOUNTER — HOSPITAL ENCOUNTER (OUTPATIENT)
Dept: PHYSICAL THERAPY | Age: 67
Setting detail: THERAPIES SERIES
Discharge: HOME | End: 2017-12-27
Payer: COMMERCIAL

## 2017-12-27 ENCOUNTER — HOSPITAL ENCOUNTER (OUTPATIENT)
Dept: SPEECH THERAPY | Age: 67
Setting detail: THERAPIES SERIES
Discharge: HOME | End: 2017-12-27
Payer: COMMERCIAL

## 2017-12-27 PROCEDURE — G8979 MOBILITY GOAL STATUS: HCPCS

## 2017-12-27 PROCEDURE — G8980 MOBILITY D/C STATUS: HCPCS

## 2017-12-27 PROCEDURE — 97110 THERAPEUTIC EXERCISES: CPT

## 2017-12-27 PROCEDURE — 97112 NEUROMUSCULAR REEDUCATION: CPT

## 2017-12-27 PROCEDURE — 92507 TX SP LANG VOICE COMM INDIV: CPT

## 2017-12-27 NOTE — DISCHARGE SUMMARY
40-59% [] 60-79%    [] 80-99% [] 100%  G Code Functional Impairment determined by:  [x] Clinical Judgment   [] Outcome Measure:       Goals  Short term goals  Time Frame for Short term goals: 3 weeks  Short term goal 1: Pt will be educated on her POC - met  Short term goal 2: pt will be able to sit unsupported for 1 min without any loss of balance - Met    Long term goals  Time Frame for Long term goals : 8 weeks  Long term goal 1: Pt will be safe and independent with her HEP - met  Long term goal 2: Pt will be able to ambulate 15 feet with decreased ataxia and scissoring with Min A +1 - met/progressing  Long term goal 3: Pt will be able to sit unsupported for 5 minutes and be able to weight shift independently without any LOB - Progressing   Long term goal 4: Pt will be able to stand with bilateral UE and weight shift independently for >3mins - progressing.   Long term goal 5: Pt will improve eye hand coordination and will be able to grab 10 comes on first attempt - Progressing      Reason for Discharge  [] Goals Achieved                       [] Poor Follow Through/Attendance                  [x] Optimal Function Achieved     [] Patient Discharged Self    [] Hospitalization                         [] Physician discharge      Thank you for this referral      Ivet Anguiano, PT, DPT                    Date: 12/27/2017

## 2018-01-03 ENCOUNTER — HOSPITAL ENCOUNTER (OUTPATIENT)
Dept: SPEECH THERAPY | Age: 68
Setting detail: THERAPIES SERIES
Discharge: HOME | End: 2018-01-03
Payer: COMMERCIAL

## 2018-01-03 PROCEDURE — 92526 ORAL FUNCTION THERAPY: CPT

## 2018-01-10 ENCOUNTER — HOSPITAL ENCOUNTER (OUTPATIENT)
Dept: SPEECH THERAPY | Age: 68
Setting detail: THERAPIES SERIES
Discharge: HOME | End: 2018-01-10
Payer: COMMERCIAL

## 2018-01-10 PROCEDURE — 92507 TX SP LANG VOICE COMM INDIV: CPT

## 2018-01-10 NOTE — PROGRESS NOTES
Phone: [address]    Fax: [phone]                                 Outpatient Speech Therapy                               DAILY TREATMENT NOTE    Date: 1/10/2018  Patients Name:  Kirsty Kapadia  YOB: 1950 (74 y.o.)  Gender:  female  MRN:  [id number]  [facility] #: [id number]  Referring physician:Byron Castaneda 92  SLP Insurance Information: BCBS       Total # of Visits to Date: 61           Current Authorization  Comments: BMN       PAIN  [x]No     []Yes      Pain Rating (0-10 pain scale): 0  Location:  N/A  Pain Description:  N/A    SUBJECTIVE  Patient presents to clinic with      SHORT TERM GOALS/ TREATMENT SESSION:  Subjective report:          No new concerns     Goal 1: Pt will coordinate breath control and phonation of prolonged vowel sound for 8 seconds x10     x10 but largely <5 seconds  []Met  []Partially met  [x]Not met   Goal 2: Pt will produce words for phrase or sentence completions with 60% accuracy       80% accuracy with min cues  []Met  [x]Partially met  []Not met   Goal 3: Pt will produce 3-4 syllable words with less than 2 verbal cues on 8/10 opportunities        First attempt: 6/10 (required maximal 5+ cues)  Remaining attempts: 9/10 with >2 cues     []Met  [x]Partially met  []Not met   Goal 4: Pt will utlize pacing and breath support to produce 4-5 word sentences with 70% intelligibilty  \"warmed up\" with bisyllabic words: 80% intelligibility with mod-max cues, initially with fast rate and reduced volume      []Met  []Partially met  []Not met       EDUCATION/HOME EXERCISE PROGRAM (HEP)  New Education/HEP provided to patient/family/caregiver:    []Yes:     [x]No (Continued review of prior education)   If yes Education Provided:     Method of Education:     [x]Discussion     []Demonstration    [] Written     []Other  Evaluation of Patients Response to Education:         [x]Patient and or caregiver verbalized

## 2018-01-17 ENCOUNTER — HOSPITAL ENCOUNTER (OUTPATIENT)
Dept: SPEECH THERAPY | Age: 68
Setting detail: THERAPIES SERIES
Discharge: HOME | End: 2018-01-17
Payer: COMMERCIAL

## 2018-01-17 PROCEDURE — 92507 TX SP LANG VOICE COMM INDIV: CPT

## 2018-01-17 NOTE — PROGRESS NOTES
Phone: [place]    Fax: [phone]                                 Outpatient Speech Therapy                               DAILY TREATMENT NOTE    Date: 1/17/2018  Patients Name:  Ledy Acosta  YOB: 1950 (74 y.o.)  Gender:  female  MRN:  [id number]  [facility] #: [id number]  Referring physician:Byron Castaneda 92  SLP Insurance Information: BCBS       Total # of Visits to Date: 61           Current Authorization  Comments: BMN     PAIN  [x]No     []Yes      Pain Rating (0-10 pain scale): 0  Location:  N/A  Pain Description:  N/A    SUBJECTIVE  Patient presents to clinic with      SHORT TERM GOALS/ TREATMENT SESSION:  Subjective report:           No new concerns,  reports she's \"talking more and more\"    Goal 1: Pt will coordinate breath control and phonation of prolonged vowel sound for 8 seconds x10     DNT   []Met  [x]Partially met  []Not met   Goal 2: Pt will produce words for phrase or sentence completions with 60% accuracy       xooooooooxxoooxooo    80% []Met  [x]Partially met  []Not met   Goal 3: Pt will produce 3-4 syllable words with less than 2 verbal cues on 8/10 opportunities        oooooooxooo 9/10 (3 syllables)    xxoxoooxxx (4+ syllable words)    4/10 []Met  [x]Partially met  []Not met   Goal 4: Pt will utilize pacing and breath support to produce 4-5 word sentences with 70% intelligibilty  70%    In open ended questions: 65-70% []Met  [x]Partially met  []Not met         EDUCATION/HOME EXERCISE PROGRAM (HEP)  New Education/HEP provided to patient/family/caregiver:    []Yes:     [x]No (Continued review of prior education)   If yes Education Provided:     Method of Education:     [x]Discussion     []Demonstration    [] Written     []Other  Evaluation of Patients Response to Education:         [x]Patient and or caregiver verbalized understanding  []Patient and or Caregiver Demonstrated without assistance   []Patient and or Caregiver Demonstrated with assistance  []Needs additional instruction to demonstrate understanding of education    ASSESSMENT  Patient tolerated todays treatment session:    [x] Good   []  Fair   []  Poor  Limitations/difficulties with treatment session due to:   []Pain     []Fatigue     []Other medical complications     []Other    Comments:    PLAN  [x]Continue with current plan of care  [facility]  []IHold per patient request  [] Change Treatment plan:  [] Insurance hold  __ Other     TIME   Time Treatment session was INITIATED 1115   Time Treatment session was STOPPED 1200    45     Charges: 1  Electronically signed by:    Aurelio [address] [id number] [facility]              Date:1/17/2018

## 2018-01-24 ENCOUNTER — HOSPITAL ENCOUNTER (OUTPATIENT)
Dept: SPEECH THERAPY | Age: 68
Setting detail: THERAPIES SERIES
Discharge: HOME | End: 2018-01-24
Payer: COMMERCIAL

## 2018-01-24 PROCEDURE — 92507 TX SP LANG VOICE COMM INDIV: CPT

## 2018-01-24 NOTE — PROGRESS NOTES
Phone: [address]    Fax: [phone]                                 Outpatient Speech Therapy                               DAILY TREATMENT NOTE    Date: 1/24/2018  Patients Name:  Eron Bagley  YOB: 1950 (74 y.o.)  Gender:  female  MRN:  [id number]  [facility] #: [id number]  Referring physician:Guille Castaneda Insurance Information: BCBS       Total # of Visits to Date: 64           Current Authorization  Comments: BMN     PAIN  [x]No     []Yes      Pain Rating (0-10 pain scale): 0  Location: N/A  Pain Description: N/A    SUBJECTIVE  Patient presents to clinic with     SHORT TERM GOALS/ TREATMENT SESSION:  Subjective report:           pt reports no new concerns this date    Goal 1: Pt will coordinate breath control and phonation of prolonged vowel sound for 8 seconds x10     ooooooxoooo  9/10       []Met  [x]Partially met  []Not met   Goal 2: Pt will produce words for phrase or sentence completions with 60% accuracy       ooooooooooooox 13/14     [x]Met  []Partially met  []Not met   Goal 3: Pt will produce 3-4 syllable words with less than 2 verbal cues on 8/10 opportunities        xoooxooooo 2 syllable    ooxoxooooox 3-4 syllable    Pt self corrected x5    []Met  [x]Partially met  []Not met   Goal 4: Pt will utilize pacing and breath support to produce 4-5 word sentences with 70% intelligibilty  ooooxooxoxoox     []Met  [x]Partially met  []Not met          LONG TERM GOALS/ TREATMENT SESSION:  Goal 1: Pt will improve ability to communicate basic wants/needs.  In progress []Met  []Partially met  []Not met            EDUCATION/HOME EXERCISE PROGRAM (HEP)  New Education/HEP provided to patient/family/caregiver:    [x]Yes:     []No (Continued review of prior education)   If yes Education Provided: breath support activity    Method of Education:     [x]Discussion     []Demonstration    [] Written     []Other  Evaluation of Patients

## 2018-01-31 ENCOUNTER — HOSPITAL ENCOUNTER (OUTPATIENT)
Dept: SPEECH THERAPY | Age: 68
Setting detail: THERAPIES SERIES
Discharge: HOME | End: 2018-01-31
Payer: COMMERCIAL

## 2018-01-31 PROCEDURE — 92507 TX SP LANG VOICE COMM INDIV: CPT

## 2018-01-31 NOTE — PROGRESS NOTES
Phone: [address]    Fax: [phone]                                 Outpatient Speech Therapy                               DAILY TREATMENT NOTE    Date: 1/31/2018  Patients Name:  Donalynn Closs  YOB: 1950 (79 y.o.)  Gender:  female  MRN:  [id number]  [facility] #: [id number]  Referring physician:Patricia Castaneda       INSURANCE  SLP Insurance Information: BCBS       Total # of Visits to Date: 58           Current Authorization  Comments: BMN     PAIN  [x]No     []Yes      Pain Rating (0-10 pain scale):0  Location:  N/A  Pain Description:  NA    SUBJECTIVE  Patient presents to clinic with       SHORT TERM GOALS/ TREATMENT SESSION:  Subjective report:           Reports practicing segmented sentences at home once a day.  Ongoing use of breather    Goal 1: Pt will coordinate breath control and phonation of prolonged vowel sound for 8 seconds x10     x10 with mod cues, average 4 seconds      []Met  [x]Partially met  []Not met   Goal 2: Pt will produce words for phrase or sentence completions with 60% accuracy       90%     []Met  [x]Partially met  []Not met   Goal 3: Pt will produce 3-4 syllable words with less than 2 verbal cues on 8/10 opportunities        13/20 with less than 2 verbal cues    []Met  [x]Partially met  []Not met   Goal 4: Pt will utilize pacing and breath support to produce 4-5 word sentences with 70% intelligibilty  <50% benefited from maximal cues for loudness and pacing this date     Increased to 70% intelligibility at sentence level with max cues []Met  [x]Partially met  []Not met       EDUCATION/HOME EXERCISE PROGRAM (HEP)  New Education/HEP provided to patient/family/caregiver:    []Yes:     [x]No (Continued review of prior education)   If yes Education Provided:     Method of Education:     [x]Discussion     []Demonstration    [] Written     []Other  Evaluation of Patients Response to Education:         [x]Patient and or caregiver

## 2018-02-06 PROBLEM — I63.9 ACUTE ISCHEMIC STROKE (HCC): Status: RESOLVED | Noted: 2017-03-06 | Resolved: 2018-02-06

## 2018-02-06 PROBLEM — R29.90 STROKE-LIKE SYMPTOMS: Status: RESOLVED | Noted: 2017-03-05 | Resolved: 2018-02-06

## 2018-02-07 ENCOUNTER — HOSPITAL ENCOUNTER (OUTPATIENT)
Dept: SPEECH THERAPY | Age: 68
Setting detail: THERAPIES SERIES
Discharge: HOME | End: 2018-02-07
Payer: COMMERCIAL

## 2018-02-07 PROCEDURE — 92507 TX SP LANG VOICE COMM INDIV: CPT

## 2018-02-07 NOTE — PROGRESS NOTES
to demonstrate understanding of education    ASSESSMENT  Patient tolerated todays treatment session:    [x] Good   []  Fair   []  Poor  Limitations/difficulties with treatment session due to:   []Pain     []Fatigue     []Other medical complications     []Other    Comments:    PLAN  [x]Continue with current plan of care  [][facility]  []IHold per patient request  [] Change Treatment plan:  [] Insurance hold  __ Other     TIME   Time Treatment session was INITIATED 1115   Time Treatment session was STOPPED 1200    45     Charges: 1  Electronically signed by:    [address] [id number] [facility] [place]              Date:2/7/2018

## 2018-02-09 PROBLEM — Z12.11 COLON CANCER SCREENING: Status: ACTIVE | Noted: 2018-02-09

## 2018-02-14 ENCOUNTER — HOSPITAL ENCOUNTER (OUTPATIENT)
Dept: SPEECH THERAPY | Age: 68
Setting detail: THERAPIES SERIES
Discharge: HOME | End: 2018-02-14
Payer: COMMERCIAL

## 2018-02-14 PROCEDURE — 92507 TX SP LANG VOICE COMM INDIV: CPT

## 2018-02-14 NOTE — PROGRESS NOTES
assistance  []Needs additional instruction to demonstrate understanding of education    ASSESSMENT  Patient tolerated todays treatment session:    [x] Good   []  Fair   []  Poor  Limitations/difficulties with treatment session due to:   []Pain     []Fatigue     []Other medical complications     []Other    Comments:    PLAN  [x][facility] with [facility]  [facility]  []IHold per patient request  [] Change Treatment plan:  [] Insurance hold  __ Other     TIME   Time Treatment session was INITIATED 1115   Time Treatment session was STOPPED 1200    45     Charges: 1  Electronically signed by:    [address] [address]              Date:2/14/2018

## 2018-02-21 ENCOUNTER — HOSPITAL ENCOUNTER (OUTPATIENT)
Age: 68
Setting detail: SPECIMEN
Discharge: HOME | End: 2018-02-21
Payer: COMMERCIAL

## 2018-02-21 ENCOUNTER — HOSPITAL ENCOUNTER (OUTPATIENT)
Dept: OCCUPATIONAL THERAPY | Age: 68
Setting detail: THERAPIES SERIES
Discharge: HOME | End: 2018-02-21
Payer: COMMERCIAL

## 2018-02-21 ENCOUNTER — HOSPITAL ENCOUNTER (OUTPATIENT)
Dept: SPEECH THERAPY | Age: 68
Setting detail: THERAPIES SERIES
Discharge: HOME | End: 2018-02-21
Payer: COMMERCIAL

## 2018-02-21 PROCEDURE — G8984 CARRY CURRENT STATUS: HCPCS

## 2018-02-21 PROCEDURE — 97110 THERAPEUTIC EXERCISES: CPT

## 2018-02-21 PROCEDURE — 92507 TX SP LANG VOICE COMM INDIV: CPT

## 2018-02-21 PROCEDURE — 97167 OT EVAL HIGH COMPLEX 60 MIN: CPT

## 2018-02-21 PROCEDURE — 88305 TISSUE EXAM BY PATHOLOGIST: CPT

## 2018-02-21 PROCEDURE — 97112 NEUROMUSCULAR REEDUCATION: CPT

## 2018-02-21 PROCEDURE — G8985 CARRY GOAL STATUS: HCPCS

## 2018-02-21 NOTE — PROGRESS NOTES
Phone: [facility]           Fax: [phone]                           OutpatientOccupational Therapy                                                                            Initial Evaluation      Name:  Ashley Samuels  Date: 2/21/2018  Referring Physician: Kurt Gu MD  Medical Diagnosis/ICD-10#s:  Diagnosis: Left Pontine cerebrovascular accident, I63.50  Rehab Diagnosis/ICD-10#s: Lack of coordination, R27.8  Insurance: OT Insurance Information: BCBS  Total # of Visits to Date: 1  Next  Appointment: 6 months PCP, Neurologist in March  Chief Complaint: decreased coordination  CSN #: [id number]     Onset Date: 03/05/17    Mechanism of Injury: CVA     Precautions:   [x]None  [] Fall Risk  []WB Status  [] Pacemaker   []Other:            Involved Extremity:      [x] Left [x] Right  Dominant: [] Left []Right    Work Status:   [] Normal [] Restricted [] Off D/T Injury/Condition [x] Retired [] Unemployed [] Disabled []Other:  Critical Job/Daily Tasks:     Subjective: Patient presents today with  almost 1 year post CVA. Patient underwent rehab post CVA, including home health services and outpatient PT and SLP. Patient presents this date with OT outpatient eval and treat. Chief compliant with decreased strength and coordination B hands/UE.        Pain: Intensity:  0 /10 Location:     Pain Type: [] Constant [] Intermittent   [  ] with pain meds at rest   [] With movement/Resistive activity [] With Sedentary activity      Objective:  9 hole peg:   R: 2'0\"  L: TBD (attempted, patient became discouraged, stopped testing)    /PINCH STRENGTH RIGHT LEFT    25# 16#   2 pt pinch 2# 1#   3 pt pinch 2# 1#   Key/lateral pinch 2# 1#        G Codes:  [] Mobility:    [] Current [] Goal [] Discharge  [x] Carry: [x] Current [x] Goal [] Discharge  [] Body Position:  [] Current [] Goal [] Discharge  [] Self Care:    [] Current [] Goal [] Discharge  [] Other:    [] Current [] Goal [] Discharge    Functional Impairment Current: Functional Impairment Goal:   [] 0%(CH)    [] 0%(CH)  [] 1-19%(CI)     [] 1-19%(CI)  [] 20-39%(CJ)    [x] 20-39%(CJ)  [x] 40-59%(CK)    [] 40-59%(CK)   [] 60-79%(CL)     [] 60-79%(CL)    [] 80-99%(CM)    [] 80-99%(CM)    [] 100%(CN)    [] 100%(CN)  [] NA  [] NA          G Code Functional Impairment determined by:  [] Clinical Judgment   [x] Outcome Measure: DASH: 57.8%    Patient and caregiver Goals: improve functional use and strength of hands, improve handwriting    Problems:     [] Pain   [] Adherent Scar    [x] ROM  [] Edema  [x] Strength  [] Open Wound  [x] Function  [x] Coordination               [] Sensation           [] Falls: History/Risk of        Subjective report:     Time frame for ST weeks     STG 1: Patient/caregiver to demonstrate independence with HEP. ST: Patient to improve R  to 30#, 2 pt, 3 pt and lateral to 4#, L  to 21#, 2 pt, 3 pt and lateral to 3#. Time frame for LT weeks    LTG 1: Patient to demonstrate <40% impairment throughout daily tasks, as measured by the DASH. LTG 2: Patient to improve [facility] and dexterity B hands, as measured by completing 9 hole peg test R hand <1.5 minutes and L hand <2.5 minutes. LTG 3: Patient to improve upper limb coordination BUE, as demonstrated by ability to tap target with one finger with 75% accuracy 5/5 trials. LTG 4: Patient will write name with fair legibility 5/5 trials. ADDITIONAL COMMENTS      Treatment Potential: []Good [x]Fair []Poor    Comments/Assessment: Patient presents almost 1 year post CVA with B UE weakness and ataxic movements. Patient with decreased finger and UE control and coordination, decreased  and digit strength. Patient able to grasp items, however, not able to pick them up without demonstrating ataxic movements. Patient also with decreased hand/eye coordination. Patients spouse would also like to see patient write her name.  OT to

## 2018-02-22 NOTE — PROGRESS NOTES
PT/OT INITIAL EVALUATION REPORT   Diana Certain Date: 2018     Insurance Company:___________  Patient Name: Rony Hatfield                                          Patient ID #: __________________   Date of Birth / Age:  y.o.     Date Of Injury: 19     Date Of Surgery: N/A  ICD-10 Code(s):Left Pontine cerebrovascular accident, I56.55       Referring Physician ID #:_______________   Therapy Office: [address] [place] [address]           Discipline: []PT / [x]OT       Objective:  9 hole peg:   R: 2'0\"  L: TBD (attempted, patient became discouraged, stopped testing)     /PINCH STRENGTH RIGHT LEFT    25# 16#   2 pt pinch 2# 1#   3 pt pinch 2# 1#   Key/lateral pinch 2# 1#      Patient and caregiver Goals: improve functional use and strength of hands, improve handwriting      Problems:                                [] Pain                            [] Adherent Scar               [x] ROM               [] Edema  [x] Strength                     [] Open Wound  [x] Function                     [x] Coordination               [] Sensation                                       [x] Falls: History/Risk of                                           Subjective report:      Time frame for ST weeks      STG 1: Patient/caregiver to demonstrate independence with HEP.      ST: Patient to improve R  to 30#, 2 pt, 3 pt and lateral to 4#, L  to 21#, 2 pt, 3 pt and lateral to 3#.       Time frame for LT weeks     LTG 1: Patient to demonstrate <40% impairment throughout daily tasks, as measured by the DASH.     LTG 2: Patient to improve [address] and dexterity B hands, as measured by completing 9 hole peg test R hand <1.5 minutes and L hand <2.5 minutes.       LTG 3: Patient to improve upper limb coordination BUE, as demonstrated by ability to tap target with one finger with 75% accuracy 5/5 trials.      LTG 4: Patient will write name with fair legibility 5/5 trials.     ADDITIONAL COMMENTS

## 2018-02-23 LAB — DERMATOLOGY PATHOLOGY REPORT: NORMAL

## 2018-02-28 ENCOUNTER — HOSPITAL ENCOUNTER (OUTPATIENT)
Dept: SPEECH THERAPY | Age: 68
Setting detail: THERAPIES SERIES
Discharge: HOME | End: 2018-02-28
Payer: COMMERCIAL

## 2018-02-28 ENCOUNTER — HOSPITAL ENCOUNTER (OUTPATIENT)
Dept: OCCUPATIONAL THERAPY | Age: 68
Setting detail: THERAPIES SERIES
Discharge: HOME | End: 2018-02-28
Payer: COMMERCIAL

## 2018-02-28 PROCEDURE — 92507 TX SP LANG VOICE COMM INDIV: CPT

## 2018-02-28 PROCEDURE — 97112 NEUROMUSCULAR REEDUCATION: CPT

## 2018-02-28 PROCEDURE — G0283 ELEC STIM OTHER THAN WOUND: HCPCS

## 2018-03-01 ENCOUNTER — HOSPITAL ENCOUNTER (OUTPATIENT)
Dept: WOMENS IMAGING | Age: 68
Discharge: HOME | End: 2018-03-03
Payer: COMMERCIAL

## 2018-03-01 DIAGNOSIS — Z12.31 VISIT FOR SCREENING MAMMOGRAM: ICD-10-CM

## 2018-03-01 PROCEDURE — 77067 SCR MAMMO BI INCL CAD: CPT

## 2018-03-05 ENCOUNTER — HOSPITAL ENCOUNTER (OUTPATIENT)
Dept: OCCUPATIONAL THERAPY | Age: 68
Setting detail: THERAPIES SERIES
Discharge: HOME | End: 2018-03-05
Payer: COMMERCIAL

## 2018-03-05 PROCEDURE — 97112 NEUROMUSCULAR REEDUCATION: CPT

## 2018-03-05 PROCEDURE — G0283 ELEC STIM OTHER THAN WOUND: HCPCS

## 2018-03-05 NOTE — PROGRESS NOTES
Insurance hold  __ Other     TIME   Time Treatment session was INITIATED 1130   Time Treatment session was STOPPED 1215    45         Electronically signed by DEEPIKA Hernandez 3/5/2018 11:43 AM

## 2018-03-07 ENCOUNTER — HOSPITAL ENCOUNTER (OUTPATIENT)
Dept: SPEECH THERAPY | Age: 68
Setting detail: THERAPIES SERIES
Discharge: HOME | End: 2018-03-07
Payer: COMMERCIAL

## 2018-03-07 ENCOUNTER — HOSPITAL ENCOUNTER (OUTPATIENT)
Dept: OCCUPATIONAL THERAPY | Age: 68
Setting detail: THERAPIES SERIES
Discharge: HOME | End: 2018-03-07
Payer: COMMERCIAL

## 2018-03-07 PROCEDURE — 97032 APPL MODALITY 1+ESTIM EA 15: CPT

## 2018-03-07 PROCEDURE — 97112 NEUROMUSCULAR REEDUCATION: CPT

## 2018-03-07 PROCEDURE — 92507 TX SP LANG VOICE COMM INDIV: CPT

## 2018-03-07 NOTE — PROGRESS NOTES
Phone: [address]    Fax: [phone]                                 Outpatient Speech Therapy                               DAILY TREATMENT NOTE    Date: 3/7/2018  Patients Name:  Jose Nuñez  YOB: 1950 (79 y.o.)  Gender:  female  MRN:  [id number]  [facility] #: [id number]  Referring physician:Nolan Castaneda       INSURANCE  SLP Insurance Information: BCBS       Total # of Visits to Date: 79           Current Authorization  Comments: BMN     PAIN  [x]No     []Yes      Pain Rating (0-10 pain scale): 0  Location: N/A  Pain Description: NA    SUBJECTIVE  Patient presents to clinic with       SHORT TERM GOALS/ TREATMENT SESSION:  Subjective report:           Pt demonstrated difficulty slowing rate of speech, controlling breath support, and enunciating words. Required maximal cueing to use strategies to assist with increased intelligibility. Limited carryover was noted for these strategies this date. Goal 1: Pt will produce polysyllabic words with 70% intelligibility and less than 4 verbal cues across 3 consecutive sessions     50% accuracy  Difficulty with enunciating words and using a loud speaking voice []Met  [x]Partially met  []Not met   Goal 2: Pt will answer open ended questions with 70% intelligibility and less than 3 verbal cues for use compensatory strategies across 3 consecutive sessions        4/5 questions with minimal difficulty       []Met  [x]Partially met  []Not met   Goal 3: Pt will utilize pacing and breath support to produce 4-5 word sentences with 70% intelligibilty        Facts/Questions with Cranium game with 40% accuracy  Difficulty breaking longer sentences apart to remain intelligible   []Met  [x]Partially met  []Not met     LONG TERM GOALS/ TREATMENT SESSION:  Goal 1: Pt will improve ability to communicate basic wants/needs.  In progress []Met  [x]Partially met  []Not met       EDUCATION/HOME EXERCISE PROGRAM (HEP)  New

## 2018-03-12 ENCOUNTER — HOSPITAL ENCOUNTER (OUTPATIENT)
Dept: OCCUPATIONAL THERAPY | Age: 68
Setting detail: THERAPIES SERIES
Discharge: HOME | End: 2018-03-12
Payer: COMMERCIAL

## 2018-03-12 PROCEDURE — 97112 NEUROMUSCULAR REEDUCATION: CPT

## 2018-03-12 PROCEDURE — G0283 ELEC STIM OTHER THAN WOUND: HCPCS

## 2018-03-12 NOTE — PROGRESS NOTES
Phone: [place]    Fax: [phone]                       Outpatient Occupational Therapy                 DAILY TREATMENT NOTE    Date: 3/12/2018  Patients Name:  Kenisha Latif  YOB: 1950 (79 y.o.)  Gender:  female  MRN:  [id number]  [facility] #: [id number]  Diagnosis:  Left Pontine cerebrovascular accident, I63.50  Referring Provider: Hesham Aguilar MD    INSURANCE  OT Insurance Information: BCBS       Total # of Visits to Date: 5       PAIN  [x]No     []Yes      Location:   Pain Rating (0-10 pain scale):   Pain Description:     SUBJECTIVE    No issues reported. Patient states that she is getting better at playing ball with her grandkids! Flow Sheet  Exercise Weight/Level Reps/Time Comments    Joint compression x x10 c 5-10 sec hold B elbow, wrist, digits. ROM          [place] Dexterity task         FM and UE coordination x   Writing with dry erase on board ABC's and name. Utilized rubberband to stabilize marker. When given tactile input/hand held assist, patient wrote legibly. Unable to read letters without [facility] assist.    Putty orange 5' Pinch to improve strength and B coordination   Isolated finger taps in extension x x10 Required tactile input from OT to hold fingers not completing the isolation exercise                                                                                                                                                     Modality Flow Sheet:  START STOP Tx Modality     20 minutes Electrical Stim: B wrist/digit flexors/extensors to normalize movement patterns. Yemeni x10 min each, 2 second ramp, 10/10 cycle time at patients max tolerance for intensity.  Patient tolerated well.           Ultrasound: ___ W/cm2 x ___ mins  Duty factor: __100%  __50%  __20% __10%  Head size:   MHz: __1mHz __2 mHz  __3mHz  Location:       Hot Pack:       Paraffin:       Cold Pack:      GOALS/ TREATMENT SESSION:      Subjective report:       Time Frame for with current plan of care  [][facility]  []IHold per patient request  [] Change Treatment plan:  [] Insurance hold  __ Other     TIME   Time Treatment session was INITIATED 1017   Time Treatment session was STOPPED 1102    45         Electronically signed by DEEPIKA Paulino 3/12/2018 10:22 AM

## 2018-03-14 ENCOUNTER — APPOINTMENT (OUTPATIENT)
Dept: OCCUPATIONAL THERAPY | Age: 68
End: 2018-03-14
Payer: COMMERCIAL

## 2018-03-14 ENCOUNTER — HOSPITAL ENCOUNTER (OUTPATIENT)
Dept: SPEECH THERAPY | Age: 68
Setting detail: THERAPIES SERIES
Discharge: HOME | End: 2018-03-14
Payer: COMMERCIAL

## 2018-03-14 ENCOUNTER — HOSPITAL ENCOUNTER (OUTPATIENT)
Dept: OCCUPATIONAL THERAPY | Age: 68
Setting detail: THERAPIES SERIES
Discharge: HOME | End: 2018-03-14
Payer: COMMERCIAL

## 2018-03-14 PROCEDURE — 92507 TX SP LANG VOICE COMM INDIV: CPT

## 2018-03-14 PROCEDURE — 97112 NEUROMUSCULAR REEDUCATION: CPT

## 2018-03-14 PROCEDURE — G0283 ELEC STIM OTHER THAN WOUND: HCPCS

## 2018-03-14 NOTE — PROGRESS NOTES
Phone: [place]    Fax: [phone]                       Outpatient Occupational Therapy                 DAILY TREATMENT NOTE    Date: 3/14/2018  Patients Name:  Linsey Painter  YOB: 1950 (79 y.o.)  Gender:  female  MRN:  [id number]  [facility] #: [id number]  Diagnosis:  Left Pontine cerebrovascular accident, I63.50  Referring Provider: Isauro Brink MD    INSURANCE  OT Insurance Information: BCBS       Total # of Visits to Date: 6       PAIN  [x]No     []Yes      Location:   Pain Rating (0-10 pain scale):   Pain Description:     SUBJECTIVE    states that he got a digi flex and wrist weights for patient at home to improve strength and decrease ataxic movements. Both  and patient noticed improvement with OT. Flow Sheet  Exercise Weight/Level Reps/Time Comments    Joint compression x x10 c 5-10 sec hold B elbow, wrist, digits. ROM          [place] Dexterity task  x   Move 2\" discs down x1 row with B hands x 10. Increased accuracy. Patient then instructed to move + manipulate (flip) discs. Patient with non-fluid movements, cueing to use fingers. Max difficulty  noted  L hand, moderate R hand. Increased accuracy and movements with R hand noted. FM and UE coordination x   Use of large tweezers R hand to pinch and place items. Moderate difficulty noted. Placement of marbles into specific destination board. Moderate difficulty  Noted with item retrieval.     Point c index finger to numbered cards when given verbal prompts. Increased accuracy noted. Noted increased use of index isolation to complete task.     Putty orange 5' Pinch to improve strength and B coordination   Isolated finger taps in extension x x10 Required tactile input from lateral hand to hold fingers not completing the isolation exercise -- improvement noted

## 2018-03-14 NOTE — PROGRESS NOTES
Phone: [address]    Fax: [phone]                                 Outpatient Speech Therapy                               DAILY TREATMENT NOTE    Date: 3/14/2018  Patients Name:  Linsey Painter  YOB: 1950 (79 y.o.)  Gender:  female  MRN:  [id number]  [facility] #: [id number]  Referring physician:Rafy Castaneda       INSURANCE  SLP Insurance Information: BCBS       Total # of Visits to Date: 76           Current Authorization  Comments: BMN     PAIN  [x]No     []Yes      Pain Rating (0-10 pain scale): 0  Location:  N/A  Pain Description:  NA    SUBJECTIVE  Patient presents to clinic with      SHORT TERM GOALS/ TREATMENT SESSION:  Subjective report:           No new concerns.         Goal 1: Pt will produce polysyllabic words with 70% intelligibility and less than 4 verbal cues across 3 consecutive sessions     80% with 4 verbal cues  [x]Met  []Partially met  []Not met   Goal 2: Pt will answer open ended questions with 70% intelligibility and less than 3 verbal cues for use compensatory strategies across 3 consecutive sessions        70% with +5 cues for loudness and slow rate []Met  [x]Partially met  []Not met   Goal 3: Pt will utilize pacing and breath support to produce 4-5 word sentences with 70% intelligibilty        Read book titles: 60% indp, benefited from mod-max cues for volume, continues to rush words together despite cues and review of strategies     []Met  [x]Partially met  []Not met     EDUCATION/HOME EXERCISE PROGRAM (HEP)  New Education/HEP provided to patient/family/caregiver:    []Yes:     [x]No (Continued review of prior education)   If yes Education Provided:     Method of Education:     [x]Discussion     []Demonstration    [] Written     []Other  Evaluation of Patients Response to Education:         [x]Patient and or caregiver verbalized understanding  []Patient and or Caregiver Demonstrated without assistance   []Patient and or

## 2018-03-19 ENCOUNTER — HOSPITAL ENCOUNTER (OUTPATIENT)
Dept: OCCUPATIONAL THERAPY | Age: 68
Setting detail: THERAPIES SERIES
Discharge: HOME | End: 2018-03-19
Payer: COMMERCIAL

## 2018-03-19 PROCEDURE — 97112 NEUROMUSCULAR REEDUCATION: CPT

## 2018-03-19 PROCEDURE — G0283 ELEC STIM OTHER THAN WOUND: HCPCS

## 2018-03-19 PROCEDURE — 97110 THERAPEUTIC EXERCISES: CPT

## 2018-03-19 NOTE — PROGRESS NOTES
Phone: [place]    Fax: [phone]                       Outpatient Occupational Therapy                 DAILY TREATMENT NOTE    Date: 3/19/2018  Patients Name:  Darshan Robertson  YOB: 1950 (79 y.o.)  Gender:  female  MRN:  [id number]  [facility] #: [id number]  Diagnosis:  Left [place] cerebrovascular accident, I63.50  Referring Provider: Ila Chacon MD     INSURANCE  OT Insurance Information: BCBS       Total # of Visits to Date: 7       PAIN  [x]No     []Yes      Location:   Pain Rating (0-10 pain scale):   Pain Description:     SUBJECTIVE    states that feels she has improved a lot since starting OT. Flow Sheet  Exercise Weight/Level Reps/Time Comments    Joint compression x x10 c 5-10 sec hold B elbow, wrist, digits. ROM          [place] Dexterity task       FM and UE coordination x   9 hole peg test   Putty orange 8' Pinch,  and twist   Isolated finger taps in extension                                                                                                                                                        Modality Flow Sheet:  START STOP Tx Modality     20 minutes Electrical Stim: B wrist/digit flexors/extensors to normalize movement patterns. Salvadorean x10 min each, 2 second ramp, 10/10 cycle time at patients max tolerance for intensity. Patient tolerated well.           Ultrasound: ___ W/cm2 x ___ mins  Duty factor: __100%  __50%  __20% __10%  Head size:   MHz: __1mHz __2 mHz  __3mHz  Location:       Hot Pack:       Paraffin:       Cold Pack:      GOALS/ TREATMENT SESSION:      Subjective report:       Time Frame for Long term goals : 6 weeks       Long term goal 1: Patient to demonstrate <40% impairment throughout daily tasks, as measured by the DASH.  Continue []Met  [x]Partially met  []Not met   Long term goal 2: Patient to improve [facility] and dexterity B hands, as measured by completing 9 hole peg test R hand <1.5 minutes and L hand <2.5

## 2018-03-21 ENCOUNTER — HOSPITAL ENCOUNTER (OUTPATIENT)
Dept: SPEECH THERAPY | Age: 68
Setting detail: THERAPIES SERIES
Discharge: HOME | End: 2018-03-21
Payer: COMMERCIAL

## 2018-03-21 ENCOUNTER — HOSPITAL ENCOUNTER (OUTPATIENT)
Dept: OCCUPATIONAL THERAPY | Age: 68
Setting detail: THERAPIES SERIES
Discharge: HOME | End: 2018-03-21
Payer: COMMERCIAL

## 2018-03-21 PROCEDURE — 92507 TX SP LANG VOICE COMM INDIV: CPT

## 2018-03-21 PROCEDURE — 97112 NEUROMUSCULAR REEDUCATION: CPT

## 2018-03-21 PROCEDURE — G0283 ELEC STIM OTHER THAN WOUND: HCPCS

## 2018-03-21 NOTE — PROGRESS NOTES
Phone: [address]    Fax: [phone]                                 Outpatient Speech Therapy                               DAILY TREATMENT NOTE    Date: 3/21/2018  Patients Name:  Kenisha Latif  YOB: 1950 (79 y.o.)  Gender:  female  MRN:  [id number]  [facility] #: [id number]  Referring physician:Duran Castaneda       INSURANCE  SLP Insurance Information: BCBS       Total # of Visits to Date: 71           Current Authorization  Comments: BMN     PAIN  [x]No     []Yes      Pain Rating (0-10 pain scale): 0  Location:  N/A  Pain Description:  NA    SUBJECTIVE  Patient presents to clinic with  who did not remain in room for session     SHORT TERM GOALS/ TREATMENT SESSION:  Subjective report:           No new complaints. Seen after OT, mildly fatigued as compared to usual sessions    Discussed transitioning to every other week in April.      Continues to benefit from frequent verbal cueing for increased volume and pacing across tasks    Goal 1: Pt will produce polysyllabic words with 70% intelligibility and less than 4 verbal cues across 3 consecutive sessions     Identifying pictures: 80%  Generating sentence: 70%         []Met  [x]Partially met  []Not met   Goal 2: Pt will answer open ended questions with 70% intelligibility and less than 3 verbal cues for use compensatory strategies across 3 consecutive sessions        60% indp, increasing to 70% with cues for 2nd attempt with ongoing loudness and pacing []Met  [x]Partially met  []Not met   Goal 3: Pt will utilize pacing and breath support to produce 4-5 word sentences with 70% intelligibilty        Reading book titles with max cues: 70% in known context []Met  [x]Partially met  []Not met     EDUCATION/HOME EXERCISE PROGRAM (HEP)  New Education/HEP provided to patient/family/caregiver:    []Yes:     [x]No (Continued review of prior education)   If yes Education Provided:      Method of Education:

## 2018-03-26 ENCOUNTER — HOSPITAL ENCOUNTER (OUTPATIENT)
Dept: OCCUPATIONAL THERAPY | Age: 68
Setting detail: THERAPIES SERIES
Discharge: HOME | End: 2018-03-26
Payer: COMMERCIAL

## 2018-03-26 PROCEDURE — 97112 NEUROMUSCULAR REEDUCATION: CPT

## 2018-03-26 PROCEDURE — G0283 ELEC STIM OTHER THAN WOUND: HCPCS

## 2018-03-26 PROCEDURE — 97110 THERAPEUTIC EXERCISES: CPT

## 2018-03-26 NOTE — PROGRESS NOTES
term goal 1: Patient to demonstrate <40% impairment throughout daily tasks, as measured by the DASH. Continue []Met  [x]Partially met  []Not met   Long term goal 2: Patient to improve [facility] and dexterity B hands, as measured by completing 9 hole peg test R hand <1.5 minutes and L hand <2.5 minutes. Continue, Met L       []Met  [x]Partially met  []Not met   Long term goal 3: Patient to improve upper limb coordination BUE, as demonstrated by ability to tap target with one finger with 75% accuracy 5/5 trials. Continue []Met  [x]Partially met  []Not met   Long term goal 4: Patient will write name with fair legibility 5/5 trials. Continue  []Met  [x]Partially met  []Not met   Time Frame for Short term goals: 4 weeks       Short term goal 1: Patient/caregiver to demonstrate independence with HEP. Met [x]Met  []Partially met  []Not met   Short term goal 2: Patient to improve R  to 30#, 2 pt, 3 pt and lateral to 4#, L  to 21#, 2 pt, 3 pt and lateral to 3#.   Continue  Met R Lateral []Met  [x]Partially met  []Not met   ADDITIONAL COMMENTS       /PINCH STRENGTH RIGHT LEFT    28# 14#   2 pt pinch 2# 1.5#   3 pt pinch 2# 1#   Key/lateral pinch 4# 2#           EDUCATION  New Education provided to patient/family/caregiver:    []Yes:     [x]No (Continued review of prior education)   If yes Education Provided:     Method of Education:     [x]Discussion     []Demonstration    [] Written     []Other  Evaluation of Patients Response to Education:         [x]Patient and or caregiver verbalized understanding  []Patient and or Caregiver Demonstrated without assistance   []Patient and or Caregiver Demonstrated with assistance  []Needs additional instruction to demonstrate understanding of education    ASSESSMENT  Patient tolerated todays treatment session:    [x] Good   []  Fair   []  Poor  Limitations/difficulties with treatment session due to:   []Pain     []Fatigue     []Other medical complications     []Other  Goal

## 2018-03-27 ENCOUNTER — OFFICE VISIT (OUTPATIENT)
Dept: NEUROLOGY | Age: 68
End: 2018-03-27
Payer: COMMERCIAL

## 2018-03-27 VITALS
HEIGHT: 60 IN | WEIGHT: 108 LBS | SYSTOLIC BLOOD PRESSURE: 110 MMHG | DIASTOLIC BLOOD PRESSURE: 86 MMHG | HEART RATE: 103 BPM | BODY MASS INDEX: 21.2 KG/M2

## 2018-03-27 DIAGNOSIS — I69.393 ATAXIA FOLLOWING CEREBRAL INFARCTION: ICD-10-CM

## 2018-03-27 DIAGNOSIS — I63.9 LEFT PONTINE CEREBROVASCULAR ACCIDENT (HCC): Primary | ICD-10-CM

## 2018-03-27 PROCEDURE — 99214 OFFICE O/P EST MOD 30 MIN: CPT | Performed by: NURSE PRACTITIONER

## 2018-03-27 NOTE — PROGRESS NOTES
Position: Sitting)   Pulse 103   Ht 5' (1.524 m)   Wt 108 lb (49 kg) Comment: stated, pt in wheelchair  BMI 21.09 kg/m²                                             . General Appearance:  Alert, cooperative, no signs of distress, appears stated age   Head:  Normocephalic, no signs of trauma   Eyes:  Conjunctiva/corneas clear;  eyelids intact   Ears:  Normal external ear and canals   Nose: Nares normal, mucosa normal, no drainage    Throat: Lips and tongue normal; teeth normal;  gums normal   Neck: Supple, intact flexion, extension and rotation;   trachea midline;  no adenopathy;   thyroid: not enlarged;   no carotid pulse abnormality   Back:   Symmetric, no curvature, ROM adequate   Lungs:   Respirations unlabored   Heart:  Regular rate and rhythm           Extremities: Extremities normal, no cyanosis, no edema   Pulses: Symmetric over head and neck   Skin: Skin color, texture normal, no rashes, no lesions                                             NEUROLOGIC EXAMINATION    Neurologic Exam    MENTAL STATUS: Alert, reasonably intact cognitive skills but compromised by dysarthric speech, normal language, no hallucination or delusion   CRANIAL NERVES: II     -      Visual fields intact to confrontation  III,IV,VI -  EOMs with OD strabismus, no afferent defect, no                      ANGUS, no ptosis  V     -     Normal facial sensation  VII    -     Normal facial symmetry with occ. invol. mvts.   VIII   -     Intact hearing  IX,X -     Symmetrical palate  XI    -     Symmetrical shoulder shrug  XII   -     Midline tongue, no atrophy    MOTOR FUNCTION: Normal bulk, resistive tone, normal power;  constant choreiform movements of arms and legs, no tremor   SENSORY FUNCTION: Reports normal touch, normal pin, normal vibration   CEREBELLAR FUNCTION: Impaired fine motor control over upper limbs with obvious outflow tremor

## 2018-03-28 ENCOUNTER — HOSPITAL ENCOUNTER (OUTPATIENT)
Dept: OCCUPATIONAL THERAPY | Age: 68
Setting detail: THERAPIES SERIES
Discharge: HOME | End: 2018-03-28
Payer: COMMERCIAL

## 2018-03-28 ENCOUNTER — HOSPITAL ENCOUNTER (OUTPATIENT)
Dept: SPEECH THERAPY | Age: 68
Setting detail: THERAPIES SERIES
Discharge: HOME | End: 2018-03-28
Payer: COMMERCIAL

## 2018-03-28 PROCEDURE — 97112 NEUROMUSCULAR REEDUCATION: CPT

## 2018-03-28 PROCEDURE — G0283 ELEC STIM OTHER THAN WOUND: HCPCS

## 2018-03-28 PROCEDURE — 92507 TX SP LANG VOICE COMM INDIV: CPT

## 2018-03-28 PROCEDURE — G8985 CARRY GOAL STATUS: HCPCS

## 2018-03-28 PROCEDURE — G8984 CARRY CURRENT STATUS: HCPCS

## 2018-04-02 ENCOUNTER — HOSPITAL ENCOUNTER (OUTPATIENT)
Dept: OCCUPATIONAL THERAPY | Age: 68
Setting detail: THERAPIES SERIES
Discharge: HOME | End: 2018-04-02
Payer: COMMERCIAL

## 2018-04-02 PROCEDURE — G0283 ELEC STIM OTHER THAN WOUND: HCPCS

## 2018-04-02 PROCEDURE — 97112 NEUROMUSCULAR REEDUCATION: CPT

## 2018-04-04 ENCOUNTER — HOSPITAL ENCOUNTER (OUTPATIENT)
Dept: OCCUPATIONAL THERAPY | Age: 68
Setting detail: THERAPIES SERIES
Discharge: HOME | End: 2018-04-04
Payer: COMMERCIAL

## 2018-04-04 ENCOUNTER — HOSPITAL ENCOUNTER (OUTPATIENT)
Dept: SPEECH THERAPY | Age: 68
Setting detail: THERAPIES SERIES
Discharge: HOME | End: 2018-04-04
Payer: COMMERCIAL

## 2018-04-04 PROCEDURE — G0283 ELEC STIM OTHER THAN WOUND: HCPCS

## 2018-04-04 PROCEDURE — 97112 NEUROMUSCULAR REEDUCATION: CPT

## 2018-04-04 PROCEDURE — 92507 TX SP LANG VOICE COMM INDIV: CPT

## 2018-04-09 ENCOUNTER — HOSPITAL ENCOUNTER (OUTPATIENT)
Dept: OCCUPATIONAL THERAPY | Age: 68
Setting detail: THERAPIES SERIES
Discharge: HOME | End: 2018-04-09
Payer: COMMERCIAL

## 2018-04-09 PROCEDURE — G0283 ELEC STIM OTHER THAN WOUND: HCPCS

## 2018-04-09 PROCEDURE — 97112 NEUROMUSCULAR REEDUCATION: CPT

## 2018-04-11 ENCOUNTER — HOSPITAL ENCOUNTER (OUTPATIENT)
Dept: OCCUPATIONAL THERAPY | Age: 68
Setting detail: THERAPIES SERIES
Discharge: HOME | End: 2018-04-11
Payer: COMMERCIAL

## 2018-04-11 PROBLEM — Z12.11 COLON CANCER SCREENING: Status: RESOLVED | Noted: 2018-02-09 | Resolved: 2018-04-11

## 2018-04-11 PROCEDURE — G0283 ELEC STIM OTHER THAN WOUND: HCPCS

## 2018-04-11 PROCEDURE — 97112 NEUROMUSCULAR REEDUCATION: CPT

## 2018-04-16 ENCOUNTER — HOSPITAL ENCOUNTER (OUTPATIENT)
Dept: OCCUPATIONAL THERAPY | Age: 68
Setting detail: THERAPIES SERIES
Discharge: HOME | End: 2018-04-16
Payer: COMMERCIAL

## 2018-04-16 PROCEDURE — 97112 NEUROMUSCULAR REEDUCATION: CPT

## 2018-04-16 PROCEDURE — G0283 ELEC STIM OTHER THAN WOUND: HCPCS

## 2018-04-18 ENCOUNTER — HOSPITAL ENCOUNTER (OUTPATIENT)
Dept: OCCUPATIONAL THERAPY | Age: 68
Setting detail: THERAPIES SERIES
Discharge: HOME | End: 2018-04-18
Payer: COMMERCIAL

## 2018-04-18 ENCOUNTER — HOSPITAL ENCOUNTER (OUTPATIENT)
Dept: SPEECH THERAPY | Age: 68
Setting detail: THERAPIES SERIES
Discharge: HOME | End: 2018-04-18
Payer: COMMERCIAL

## 2018-04-18 PROCEDURE — 92507 TX SP LANG VOICE COMM INDIV: CPT

## 2018-04-18 PROCEDURE — G0283 ELEC STIM OTHER THAN WOUND: HCPCS

## 2018-04-18 PROCEDURE — 97112 NEUROMUSCULAR REEDUCATION: CPT

## 2018-04-23 ENCOUNTER — HOSPITAL ENCOUNTER (OUTPATIENT)
Dept: OCCUPATIONAL THERAPY | Age: 68
Setting detail: THERAPIES SERIES
Discharge: HOME | End: 2018-04-23
Payer: COMMERCIAL

## 2018-04-23 PROCEDURE — 97110 THERAPEUTIC EXERCISES: CPT

## 2018-04-23 PROCEDURE — 97112 NEUROMUSCULAR REEDUCATION: CPT

## 2018-04-23 PROCEDURE — G0283 ELEC STIM OTHER THAN WOUND: HCPCS

## 2018-04-25 ENCOUNTER — HOSPITAL ENCOUNTER (OUTPATIENT)
Dept: OCCUPATIONAL THERAPY | Age: 68
Setting detail: THERAPIES SERIES
Discharge: HOME | End: 2018-04-25
Payer: COMMERCIAL

## 2018-04-25 PROCEDURE — 97112 NEUROMUSCULAR REEDUCATION: CPT

## 2018-04-25 PROCEDURE — G0283 ELEC STIM OTHER THAN WOUND: HCPCS

## 2018-04-30 ENCOUNTER — HOSPITAL ENCOUNTER (OUTPATIENT)
Dept: OCCUPATIONAL THERAPY | Age: 68
Setting detail: THERAPIES SERIES
Discharge: HOME | End: 2018-04-30
Payer: COMMERCIAL

## 2018-04-30 PROCEDURE — 97112 NEUROMUSCULAR REEDUCATION: CPT

## 2018-04-30 PROCEDURE — G0283 ELEC STIM OTHER THAN WOUND: HCPCS

## 2018-05-02 ENCOUNTER — HOSPITAL ENCOUNTER (OUTPATIENT)
Dept: SPEECH THERAPY | Age: 68
Setting detail: THERAPIES SERIES
Discharge: HOME | End: 2018-05-02
Payer: COMMERCIAL

## 2018-05-02 ENCOUNTER — HOSPITAL ENCOUNTER (OUTPATIENT)
Dept: OCCUPATIONAL THERAPY | Age: 68
Setting detail: THERAPIES SERIES
Discharge: HOME | End: 2018-05-02
Payer: COMMERCIAL

## 2018-05-02 PROCEDURE — 97112 NEUROMUSCULAR REEDUCATION: CPT

## 2018-05-02 PROCEDURE — G8984 CARRY CURRENT STATUS: HCPCS

## 2018-05-02 PROCEDURE — 92507 TX SP LANG VOICE COMM INDIV: CPT

## 2018-05-02 PROCEDURE — G8985 CARRY GOAL STATUS: HCPCS

## 2018-05-02 PROCEDURE — G0283 ELEC STIM OTHER THAN WOUND: HCPCS

## 2018-05-07 ENCOUNTER — HOSPITAL ENCOUNTER (OUTPATIENT)
Dept: OCCUPATIONAL THERAPY | Age: 68
Setting detail: THERAPIES SERIES
Discharge: HOME | End: 2018-05-07
Payer: COMMERCIAL

## 2018-05-07 PROCEDURE — 97110 THERAPEUTIC EXERCISES: CPT

## 2018-05-07 PROCEDURE — 97112 NEUROMUSCULAR REEDUCATION: CPT

## 2018-05-07 PROCEDURE — G0283 ELEC STIM OTHER THAN WOUND: HCPCS

## 2018-05-09 ENCOUNTER — HOSPITAL ENCOUNTER (OUTPATIENT)
Dept: OCCUPATIONAL THERAPY | Age: 68
Setting detail: THERAPIES SERIES
Discharge: HOME | End: 2018-05-09
Payer: COMMERCIAL

## 2018-05-09 PROCEDURE — 97112 NEUROMUSCULAR REEDUCATION: CPT

## 2018-05-09 PROCEDURE — G0283 ELEC STIM OTHER THAN WOUND: HCPCS

## 2018-05-14 ENCOUNTER — HOSPITAL ENCOUNTER (OUTPATIENT)
Dept: OCCUPATIONAL THERAPY | Age: 68
Setting detail: THERAPIES SERIES
Discharge: HOME | End: 2018-05-14
Payer: COMMERCIAL

## 2018-05-14 PROCEDURE — G0283 ELEC STIM OTHER THAN WOUND: HCPCS

## 2018-05-14 PROCEDURE — 97110 THERAPEUTIC EXERCISES: CPT

## 2018-05-14 PROCEDURE — 97112 NEUROMUSCULAR REEDUCATION: CPT

## 2018-05-16 ENCOUNTER — HOSPITAL ENCOUNTER (OUTPATIENT)
Dept: OCCUPATIONAL THERAPY | Age: 68
Setting detail: THERAPIES SERIES
Discharge: HOME | End: 2018-05-16
Payer: COMMERCIAL

## 2018-05-16 ENCOUNTER — HOSPITAL ENCOUNTER (OUTPATIENT)
Dept: SPEECH THERAPY | Age: 68
Setting detail: THERAPIES SERIES
Discharge: HOME | End: 2018-05-16
Payer: COMMERCIAL

## 2018-05-16 PROCEDURE — 97112 NEUROMUSCULAR REEDUCATION: CPT

## 2018-05-16 PROCEDURE — G0283 ELEC STIM OTHER THAN WOUND: HCPCS

## 2018-05-16 PROCEDURE — 92507 TX SP LANG VOICE COMM INDIV: CPT

## 2018-05-21 ENCOUNTER — HOSPITAL ENCOUNTER (OUTPATIENT)
Dept: OCCUPATIONAL THERAPY | Age: 68
Setting detail: THERAPIES SERIES
Discharge: HOME | End: 2018-05-21
Payer: COMMERCIAL

## 2018-05-21 PROCEDURE — G0283 ELEC STIM OTHER THAN WOUND: HCPCS

## 2018-05-21 PROCEDURE — 97112 NEUROMUSCULAR REEDUCATION: CPT

## 2018-05-23 ENCOUNTER — HOSPITAL ENCOUNTER (OUTPATIENT)
Dept: OCCUPATIONAL THERAPY | Age: 68
Setting detail: THERAPIES SERIES
Discharge: HOME | End: 2018-05-23
Payer: COMMERCIAL

## 2018-05-23 PROCEDURE — G0283 ELEC STIM OTHER THAN WOUND: HCPCS

## 2018-05-23 PROCEDURE — 97112 NEUROMUSCULAR REEDUCATION: CPT

## 2018-05-25 ENCOUNTER — HOSPITAL ENCOUNTER (OUTPATIENT)
Dept: OCCUPATIONAL THERAPY | Age: 68
Setting detail: THERAPIES SERIES
Discharge: HOME | End: 2018-05-25
Payer: COMMERCIAL

## 2018-05-25 PROCEDURE — 97112 NEUROMUSCULAR REEDUCATION: CPT

## 2018-05-25 PROCEDURE — 97530 THERAPEUTIC ACTIVITIES: CPT

## 2018-05-25 PROCEDURE — G0283 ELEC STIM OTHER THAN WOUND: HCPCS

## 2018-05-28 ENCOUNTER — APPOINTMENT (OUTPATIENT)
Dept: OCCUPATIONAL THERAPY | Age: 68
End: 2018-05-28
Payer: COMMERCIAL

## 2018-05-30 ENCOUNTER — HOSPITAL ENCOUNTER (OUTPATIENT)
Dept: OCCUPATIONAL THERAPY | Age: 68
Setting detail: THERAPIES SERIES
Discharge: HOME | End: 2018-05-30
Payer: COMMERCIAL

## 2018-05-30 ENCOUNTER — HOSPITAL ENCOUNTER (OUTPATIENT)
Dept: SPEECH THERAPY | Age: 68
Setting detail: THERAPIES SERIES
Discharge: HOME | End: 2018-05-30
Payer: COMMERCIAL

## 2018-05-30 PROCEDURE — 97112 NEUROMUSCULAR REEDUCATION: CPT

## 2018-05-30 PROCEDURE — 92507 TX SP LANG VOICE COMM INDIV: CPT

## 2018-05-30 PROCEDURE — G0283 ELEC STIM OTHER THAN WOUND: HCPCS

## 2018-06-01 ENCOUNTER — HOSPITAL ENCOUNTER (OUTPATIENT)
Dept: OCCUPATIONAL THERAPY | Age: 68
Setting detail: THERAPIES SERIES
Discharge: HOME | End: 2018-06-01
Payer: COMMERCIAL

## 2018-06-01 PROCEDURE — G0283 ELEC STIM OTHER THAN WOUND: HCPCS

## 2018-06-01 PROCEDURE — 97112 NEUROMUSCULAR REEDUCATION: CPT

## 2018-06-04 ENCOUNTER — HOSPITAL ENCOUNTER (OUTPATIENT)
Dept: OCCUPATIONAL THERAPY | Age: 68
Setting detail: THERAPIES SERIES
Discharge: HOME | End: 2018-06-04
Payer: COMMERCIAL

## 2018-06-04 PROCEDURE — G0283 ELEC STIM OTHER THAN WOUND: HCPCS

## 2018-06-04 PROCEDURE — 97112 NEUROMUSCULAR REEDUCATION: CPT

## 2018-06-04 PROCEDURE — 97110 THERAPEUTIC EXERCISES: CPT

## 2018-06-08 ENCOUNTER — HOSPITAL ENCOUNTER (OUTPATIENT)
Dept: OCCUPATIONAL THERAPY | Age: 68
Setting detail: THERAPIES SERIES
Discharge: HOME | End: 2018-06-08
Payer: COMMERCIAL

## 2018-06-08 PROCEDURE — G8985 CARRY GOAL STATUS: HCPCS

## 2018-06-08 PROCEDURE — G0283 ELEC STIM OTHER THAN WOUND: HCPCS

## 2018-06-08 PROCEDURE — 97112 NEUROMUSCULAR REEDUCATION: CPT

## 2018-06-08 PROCEDURE — G8984 CARRY CURRENT STATUS: HCPCS

## 2018-06-11 ENCOUNTER — HOSPITAL ENCOUNTER (OUTPATIENT)
Dept: SPEECH THERAPY | Age: 68
Setting detail: THERAPIES SERIES
Discharge: HOME | End: 2018-06-11
Payer: COMMERCIAL

## 2018-06-11 ENCOUNTER — HOSPITAL ENCOUNTER (OUTPATIENT)
Dept: OCCUPATIONAL THERAPY | Age: 68
Setting detail: THERAPIES SERIES
Discharge: HOME | End: 2018-06-11
Payer: COMMERCIAL

## 2018-06-11 PROCEDURE — G0283 ELEC STIM OTHER THAN WOUND: HCPCS

## 2018-06-11 PROCEDURE — 97112 NEUROMUSCULAR REEDUCATION: CPT

## 2018-06-11 PROCEDURE — 97110 THERAPEUTIC EXERCISES: CPT

## 2018-06-11 PROCEDURE — 92507 TX SP LANG VOICE COMM INDIV: CPT

## 2018-06-15 ENCOUNTER — HOSPITAL ENCOUNTER (OUTPATIENT)
Dept: OCCUPATIONAL THERAPY | Age: 68
Setting detail: THERAPIES SERIES
Discharge: HOME | End: 2018-06-15
Payer: COMMERCIAL

## 2018-06-15 PROCEDURE — 97112 NEUROMUSCULAR REEDUCATION: CPT

## 2018-06-15 PROCEDURE — G0283 ELEC STIM OTHER THAN WOUND: HCPCS

## 2018-06-15 PROCEDURE — 97110 THERAPEUTIC EXERCISES: CPT

## 2018-06-18 ENCOUNTER — HOSPITAL ENCOUNTER (OUTPATIENT)
Dept: OCCUPATIONAL THERAPY | Age: 68
Setting detail: THERAPIES SERIES
Discharge: HOME | End: 2018-06-18
Payer: COMMERCIAL

## 2018-06-18 PROCEDURE — G0283 ELEC STIM OTHER THAN WOUND: HCPCS

## 2018-06-18 PROCEDURE — 97112 NEUROMUSCULAR REEDUCATION: CPT

## 2018-06-22 ENCOUNTER — HOSPITAL ENCOUNTER (OUTPATIENT)
Dept: OCCUPATIONAL THERAPY | Age: 68
Setting detail: THERAPIES SERIES
Discharge: HOME | End: 2018-06-22
Payer: COMMERCIAL

## 2018-06-22 PROCEDURE — 97110 THERAPEUTIC EXERCISES: CPT

## 2018-06-22 PROCEDURE — G0283 ELEC STIM OTHER THAN WOUND: HCPCS

## 2018-06-22 PROCEDURE — 97112 NEUROMUSCULAR REEDUCATION: CPT

## 2018-06-25 ENCOUNTER — HOSPITAL ENCOUNTER (OUTPATIENT)
Dept: OCCUPATIONAL THERAPY | Age: 68
Setting detail: THERAPIES SERIES
Discharge: HOME | End: 2018-06-25
Payer: COMMERCIAL

## 2018-06-25 ENCOUNTER — HOSPITAL ENCOUNTER (OUTPATIENT)
Dept: SPEECH THERAPY | Age: 68
Setting detail: THERAPIES SERIES
Discharge: HOME | End: 2018-06-25
Payer: COMMERCIAL

## 2018-06-25 PROCEDURE — G0283 ELEC STIM OTHER THAN WOUND: HCPCS

## 2018-06-25 PROCEDURE — 97112 NEUROMUSCULAR REEDUCATION: CPT

## 2018-06-25 PROCEDURE — 92507 TX SP LANG VOICE COMM INDIV: CPT

## 2018-06-29 ENCOUNTER — HOSPITAL ENCOUNTER (OUTPATIENT)
Dept: OCCUPATIONAL THERAPY | Age: 68
Setting detail: THERAPIES SERIES
Discharge: HOME | End: 2018-06-29
Payer: COMMERCIAL

## 2018-06-29 PROCEDURE — G8985 CARRY GOAL STATUS: HCPCS

## 2018-06-29 PROCEDURE — G0283 ELEC STIM OTHER THAN WOUND: HCPCS

## 2018-06-29 PROCEDURE — G8984 CARRY CURRENT STATUS: HCPCS

## 2018-06-29 PROCEDURE — 97112 NEUROMUSCULAR REEDUCATION: CPT

## 2018-06-29 PROCEDURE — G8986 CARRY D/C STATUS: HCPCS

## 2018-07-09 ENCOUNTER — HOSPITAL ENCOUNTER (OUTPATIENT)
Dept: SPEECH THERAPY | Age: 68
Setting detail: THERAPIES SERIES
Discharge: HOME | End: 2018-07-09
Payer: COMMERCIAL

## 2018-07-09 PROCEDURE — 92507 TX SP LANG VOICE COMM INDIV: CPT

## 2018-07-09 NOTE — PROGRESS NOTES
Phone: [address]    Fax: [phone]                                 Outpatient Speech Therapy                               DAILY TREATMENT NOTE    Date: 7/9/2018  Patients Name:  Saima Robles  YOB: 1950 (79 y.o.)  Gender:  female  MRN:  [id number]  [facility] #: [id number]  Referring physician:Bryan Castaneda       INSURANCE  SLP Insurance Information: BCBS       Total # of Visits to Date: 78           Current Authorization  Comments: BMN     PAIN  [x]No     []Yes      Pain Rating (0-10 pain scale): 0  Location: N/A  Pain Description:  NA    SUBJECTIVE  Patient presents to clinic with      SHORT TERM GOALS/ TREATMENT SESSION:  Subjective report:          No new concerns. Pt's  states he notices that pt tends to increase rate of speech in the home but responds well to cueing. Goal 1: Pt will produce polysyllabic words with 70% intelligibility and less than 4 verbal cues across 3 consecutive sessions     70% in isolation with 2 verbal cues    Goal Met    Noted difficulty with polysyllabic words during conversation as pt often does not enunciate them as clearly when in isolation         [x]Met  []Partially met  []Not met   Goal 2: Pt will answer open ended questions with 70% intelligibility and less than 3 verbal cues for use compensatory strategies across 3 consecutive sessions        Approximately 50% intelligibility this date during conversation. Difficulty with breath support noted this date resulting in slurred speech. Required moderate cueing during conversation for appropriate breathes/spacing throughout   []Met  [x]Partially met  []Not met   Goal 3: Pt will utilize pacing and breath support to produce 4-5 word sentences with 70% intelligibilty        Difficulty with breath support and appropriate spacing this date.   Responded well to a model []Met  [x]Partially met  []Not met     LONG TERM GOALS/ TREATMENT SESSION:  Goal 1: Pt

## 2018-07-23 ENCOUNTER — HOSPITAL ENCOUNTER (OUTPATIENT)
Dept: SPEECH THERAPY | Age: 68
Setting detail: THERAPIES SERIES
Discharge: HOME | End: 2018-07-23
Payer: COMMERCIAL

## 2018-07-23 PROCEDURE — 92507 TX SP LANG VOICE COMM INDIV: CPT

## 2018-07-23 PROCEDURE — G9186 MOTOR SPEECH GOAL STATUS: HCPCS

## 2018-07-23 PROCEDURE — G8999 MOTOR SPEECH CURRENT STATUS: HCPCS

## 2018-07-23 NOTE — PROGRESS NOTES
Phone: [address]    Fax: [phone]                                 Outpatient Speech Therapy                               DAILY TREATMENT NOTE    Date: 7/23/2018  Patients Name:  Royal Miranda  YOB: 1950 (79 y.o.)  Gender:  female  MRN:  [id number]  [facility] #: [id number]  Referring physician:Trudy Castaneda       INSURANCE  SLP Insurance Information: BCBS       Total # of Visits to Date: [de-identified]           Current Authorization  Comments: BMN     PAIN  [x]No     []Yes      Pain Rating (0-10 pain scale): 0  Location:  N/A  Pain Description:  NA    SUBJECTIVE  Patient presents to clinic with      SHORT TERM GOALS/ TREATMENT SESSION:  Subjective report:           no new concerns. Pt feels she is doing better at utilizing a slower rate when speaking with others. Goal 1: Pt will produce polysyllabic words with 70% intelligibility and less than 4 verbal cues across 3 consecutive sessions     Independently:60% increasing to 75% accuracy when provided min cues    Responded well to models for words which were more difficult for pt this date. [x]Met  []Partially met  []Not met   Goal 2: Pt will answer open ended questions with 70% intelligibility and less than 3 verbal cues for use compensatory strategies across 3 consecutive sessions        50% intelligible for open-ended questions. Required cueing to take a breath and increase volume while speaking this date. []Met  [x]Partially met  []Not met   Goal 3: Pt will utilize pacing and breath support to produce 4-5 word sentences with 70% intelligibilty         Approximately 50% intelligible with 4-5 word sentences. Difficulty with independent pacing and breath support    Improved intelligibility with models. []Met  [x]Partially met  []Not met     LONG TERM GOALS/ TREATMENT SESSION:  Goal 1: Pt will improve ability to communicate basic wants/needs.  In progress []Met  [x]Partially met  []Not met EDUCATION/HOME EXERCISE PROGRAM (HEP)  New Education/HEP provided to patient/family/caregiver:    []Yes:     [x]No (Continued review of prior education)   If yes Education Provided:     Method of Education:     [x]Discussion     []Demonstration    [] Written     []Other  Evaluation of Patients Response to Education:         [x]Patient and or caregiver verbalized understanding  []Patient and or Caregiver Demonstrated without assistance   []Patient and or Caregiver Demonstrated with assistance  []Needs additional instruction to demonstrate understanding of education    ASSESSMENT  Patient tolerated todays treatment session:    [x] Good   []  Fair   []  Poor  Limitations/difficulties with treatment session due to:   []Pain     []Fatigue     []Other medical complications     []Other    Comments:    PLAN  [x]Continue with current plan of care  [][facility]  []IHold per patient request  [] Change Treatment plan:  [] Insurance hold  __ Other     TIME   Time Treatment session was INITIATED 1400   Time Treatment session was STOPPED 026 848 14 90    45     Charges: 1  Electronically signed by:    Krzysztof Rodríguez M.A., CF-SLP             Date:7/23/2018

## 2018-08-06 ENCOUNTER — HOSPITAL ENCOUNTER (OUTPATIENT)
Dept: SPEECH THERAPY | Age: 68
Setting detail: THERAPIES SERIES
Discharge: HOME | End: 2018-08-06
Payer: COMMERCIAL

## 2018-08-06 PROCEDURE — 92507 TX SP LANG VOICE COMM INDIV: CPT

## 2018-08-06 NOTE — PROGRESS NOTES
Provided:     Method of Education:     [x]Discussion     []Demonstration    [] Written     []Other  Evaluation of Patients Response to Education:         [x]Patient and or caregiver verbalized understanding  []Patient and or Caregiver Demonstrated without assistance   []Patient and or Caregiver Demonstrated with assistance  []Needs additional instruction to demonstrate understanding of education    ASSESSMENT  Patient tolerated todays treatment session:    [x] Good   []  Fair   []  Poor  Limitations/difficulties with treatment session due to:   []Pain     []Fatigue     []Other medical complications     []Other    Comments:    PLAN  [x]Continue with current plan of care  [][facility]  []IHold per patient request  [] Change Treatment plan:  [] Insurance hold  __ Other     TIME   Time Treatment session was INITIATED 1400   Time Treatment session was STOPPED 1445    45     Charges: 1  Electronically signed by:    ARMAAN Cat M.A.-SUSAN             Date:8/6/2018

## 2018-08-20 ENCOUNTER — HOSPITAL ENCOUNTER (OUTPATIENT)
Dept: SPEECH THERAPY | Age: 68
Setting detail: THERAPIES SERIES
Discharge: HOME | End: 2018-08-20
Payer: COMMERCIAL

## 2018-08-20 PROCEDURE — 92507 TX SP LANG VOICE COMM INDIV: CPT

## 2018-08-20 NOTE — PROGRESS NOTES
Phone: [address]    Fax: [phone]                                 Outpatient Speech Therapy                               DAILY TREATMENT NOTE    Date: 8/20/2018  Patients Name:  Henry Purcell  YOB: 1950 (79 y.o.)  Gender:  female  MRN:  [id number]  [facility] #: [id number]  Referring physician:Darshan Castaneda       INSURANCE  SLP Insurance Information: BCBS       Total # of Visits to Date: 80           Current Authorization  Comments: BMN     PAIN  [x]No     []Yes      Pain Rating (0-10 pain scale): 0  Location:  N/A  Pain Description:  NA    SUBJECTIVE  Patient presents to clinic with her . SHORT TERM GOALS/ TREATMENT SESSION:  Subjective report:             Patient pleasant and cooperative throughout tx. No new concerns reported by pt or . Due to schedule conflict, appointments will be changed to Tuesdays at 11:30    Goal 1: Patient will produce polysyllabic words with 70% intelligibility at phrase level with less than 4 verbal cues      Independently 60% accuracy increasing to 75% accuracy with models given by the SLP. []Met  [x]Partially met  []Not met   Goal 2: Patient will answer open ended questions with 70% intelligibility and less than 3 verbal cues for use compensatory strategies across 3 consecutive sessions        [id number]    Patient required frequent reminders to use pauses and good breath support during conversation. Patient frequently speaking in the middle of exhalation and not at the start of exhalation. []Met  [x]Partially met  []Not met   Goal 3: Patient will utilize pacing and breath support to produce 6-7 word sentences with 70% intelligibility and less than 2 cues         6-8 word sentences [id number]    Pt benefited from drawing lines in sentences to jarrell where she should take a breath.       []Met  [x]Partially met  []Not met     LONG TERM GOALS/ TREATMENT SESSION:  Goal 1: Pt will improve ability to communicate basic wants/needs.  In Progress []Met  []Partially met  []Not met       EDUCATION/HOME EXERCISE PROGRAM (HEP)  New Education/HEP provided to patient/family/caregiver:    [x]Yes:     []No (Continued review of prior education)   If yes Education Provided: ST educated pt's and her  about importance of using good breath support and talking at the start of exhalation and not the middle     Method of Education:     [x]Discussion     [x]Demonstration    [] Written     []Other  Evaluation of Patients Response to Education:         [x]Patient and or caregiver verbalized understanding  []Patient and or Caregiver Demonstrated without assistance   []Patient and or Caregiver Demonstrated with assistance  []Needs additional instruction to demonstrate understanding of education    ASSESSMENT  Patient tolerated todays treatment session:    [x] Good   []  Fair   []  Poor  Limitations/difficulties with treatment session due to:   []Pain     []Fatigue     []Other medical complications     []Other    Comments:    PLAN  [x]Continue with current plan of care  [][facility]  []IHold per patient request  [] Change Treatment plan:  [] Insurance hold  __ Other     TIME   Time Treatment session was INITIATED 1400   Time Treatment session was STOPPED 12    1     Charges: 1  Electronically signed by:    Cassi CROOK-SLP              Date:8/20/2018

## 2018-09-03 ENCOUNTER — HOSPITAL ENCOUNTER (OUTPATIENT)
Dept: SPEECH THERAPY | Age: 68
Setting detail: THERAPIES SERIES
Discharge: HOME | End: 2018-09-03
Payer: COMMERCIAL

## 2018-09-04 ENCOUNTER — APPOINTMENT (OUTPATIENT)
Dept: SPEECH THERAPY | Age: 68
End: 2018-09-04
Payer: COMMERCIAL

## 2018-09-04 ENCOUNTER — HOSPITAL ENCOUNTER (OUTPATIENT)
Dept: SPEECH THERAPY | Age: 68
Setting detail: THERAPIES SERIES
Discharge: HOME | End: 2018-09-04
Payer: COMMERCIAL

## 2018-09-04 PROCEDURE — 92507 TX SP LANG VOICE COMM INDIV: CPT

## 2018-09-04 NOTE — PROGRESS NOTES
Phone: [address]    Fax: [phone]                                 Outpatient Speech Therapy                               DAILY TREATMENT NOTE    Date: 9/4/2018  Patients Name:  Karis Brenner  YOB: 1950 (79 y.o.)  Gender:  female  MRN:  [id number]  [facility] #: [id number]  Referring physician:Isi Castaneda       INSURANCE  SLP Insurance Information: BCBS       Total # of Visits to Date: 80           Current Authorization  Comments: BMN     PAIN  [x]No     []Yes      Pain Rating (0-10 pain scale):0   Location:  N/A  Pain Description:  NA    SUBJECTIVE  Patient presents to clinic with Her . SHORT TERM GOALS/ TREATMENT SESSION:  Subjective report:          Patient pleasant and cooperative throughout treatment session. Patient accompanied by  who reports no new changes at home. Goal 1: Patient will produce polysyllabic words with 70% intelligibility at phrase level with less than 4 verbal cues     When ST did not know words:  [id number]  When ST knew words:  [id number]        []Met  [x]Partially met  []Not met   Goal 2: Patient will answer open ended questions with 70% intelligibility and less than 3 verbal cues for use compensatory strategies across 3 consecutive sessions        Patient answered open-ended questions with 60% intelligibility and less than 3 verbal cues for compensatory strategies []Met  [x]Partially met  []Not met   Goal 3: Patient will utilize pacing and breath support to produce 6-7 word sentences with 70% intelligibility and less than 2 cues       Patient utilized breath support to produce 6-7 word sentences with 58% accuracy with less than 2 verbal cues from clinician. Patient completed exercise using varying inflection during sentences (i.e. Upward inflection for questions) with 70% accuracy given minimal verbal cues from clinician.    []Met  [x]Partially met  []Not met LONG TERM GOALS/ TREATMENT SESSION:  Goal 1: Pt will improve ability to communicate basic wants/needs. In progress []Met  [x]Partially met  []Not met       EDUCATION/HOME EXERCISE PROGRAM (HEP)  New Education/HEP provided to patient/family/caregiver:    [x]Yes:     []No (Continued review of prior education)   If yes Education Provided: Instruction in use of breathing at top of exhalation pattern.      Method of Education:     [x]Discussion     [x]Demonstration    [] Written     []Other  Evaluation of Patients Response to Education:         [x]Patient and or caregiver verbalized understanding  []Patient and or Caregiver Demonstrated without assistance   []Patient and or Caregiver Demonstrated with assistance  []Needs additional instruction to demonstrate understanding of education    ASSESSMENT  Patient tolerated todays treatment session:    [x] Good   []  Fair   []  Poor  Limitations/difficulties with treatment session due to:   []Pain     []Fatigue     []Other medical complications     []Other    Comments:    PLAN  [x]Continue with current plan of care  [][facility]  []IHold per patient request  [] Change Treatment plan:  [] Insurance hold  __ Other     TIME   Time Treatment session was INITIATED 1130   Time Treatment session was STOPPED 1215    45     Charges: 1  Electronically signed by:    Eyad Lyle M.S. CF-SLP              Date:9/4/2018

## 2018-09-17 ENCOUNTER — APPOINTMENT (OUTPATIENT)
Dept: SPEECH THERAPY | Age: 68
End: 2018-09-17
Payer: COMMERCIAL

## 2018-09-18 ENCOUNTER — HOSPITAL ENCOUNTER (OUTPATIENT)
Dept: SPEECH THERAPY | Age: 68
Setting detail: THERAPIES SERIES
Discharge: HOME | End: 2018-09-18
Payer: COMMERCIAL

## 2018-09-18 PROCEDURE — 92507 TX SP LANG VOICE COMM INDIV: CPT

## 2018-10-01 ENCOUNTER — APPOINTMENT (OUTPATIENT)
Dept: SPEECH THERAPY | Age: 68
End: 2018-10-01
Payer: COMMERCIAL

## 2018-10-02 ENCOUNTER — HOSPITAL ENCOUNTER (OUTPATIENT)
Dept: SPEECH THERAPY | Age: 68
Setting detail: THERAPIES SERIES
Discharge: HOME | End: 2018-10-02
Payer: COMMERCIAL

## 2018-10-02 PROCEDURE — 92507 TX SP LANG VOICE COMM INDIV: CPT

## 2018-10-10 NOTE — PROGRESS NOTES
GOALS:       Short-term Goal(s): Current Progress   Goal 1: Patient will produce minimal pair words with intelligible productions for speech therapist and/or other listener to discriminate between with 70% accuracy given no more than 4 verbal cues. []Met  []Partially met  [x]Not met   Goal 2:   Patient will utilize pacing and breath support to produce 6-7 word sentences with 70% intelligibility and less than 2 cues   []Met  []Partially met  [x]Not met           Timeframe for Long-term Goals: 3 months       Long-term Goal(s): Current Progress Current Progress   Goal 1: Pt will improve ability to communicate basic wants/needs. In progress []Met  [x]Partially met  []Not met   Rehab Potential  [] Excellent  [] Good   [x] Fair   [] Poor      G-Codes  [] Swallowing        [x] Motor Speech       [] Spoken Language Comprehension                   [] Spoken Language Expression     [] Attention        [] Memory    [] Voice  [] Other SLP Functional Limitations    Current:  [] CH: 0 percent limited   [] CI: 1% but < 20% impaired  [] CJ: 20% but < 40% impaired  [] CK: 40% but < 60% impaired  [x] CL: 60% but < 80% impaired  [] CM: 80% but < 100% impaired  [] CN: 100% impaired    Projected   CH: 0 percent limited   [] CI: 1% but < 20% impaired  [] CJ: 20% but < 40% impaired  [x] CK: 40% but < 60% impaired  [] CL: 60% but < 80% impaired  [] CM: 80% but < 100% impaired  [] CN: 100% impaired    Electronically signed by:    Neil Cabello M.S. CF-SLP      Date:10/10/2018    Patient is making good progress towards goals; however is still demonstrating decreased intelligibility at the conversational level. Will continue to focus on compensatory strategies for adequate breath support and improved articulation. Regulatory Requirements  I have reviewed this plan of care and certify a need for medically necessary rehabilitation services.     Physician Signature:_____________________________________     Date:10/10/2018  Please sign and

## 2018-10-15 ENCOUNTER — APPOINTMENT (OUTPATIENT)
Dept: SPEECH THERAPY | Age: 68
End: 2018-10-15
Payer: COMMERCIAL

## 2018-10-16 ENCOUNTER — HOSPITAL ENCOUNTER (OUTPATIENT)
Dept: SPEECH THERAPY | Age: 68
Setting detail: THERAPIES SERIES
Discharge: HOME | End: 2018-10-16
Payer: COMMERCIAL

## 2018-10-16 PROCEDURE — 92507 TX SP LANG VOICE COMM INDIV: CPT

## 2018-10-16 NOTE — PROGRESS NOTES
[]Yes:     [x]No (Continued review of prior education)   If yes Education Provided:     Method of Education:     [x]Discussion     []Demonstration    [] Written     []Other  Evaluation of Patients Response to Education:         [x]Patient and or caregiver verbalized understanding  []Patient and or Caregiver Demonstrated without assistance   []Patient and or Caregiver Demonstrated with assistance  []Needs additional instruction to demonstrate understanding of education    ASSESSMENT  Patient tolerated todays treatment session:    [x] Good   []  Fair   []  Poor  Limitations/difficulties with treatment session due to:   []Pain     []Fatigue     []Other medical complications     []Other    Comments:    PLAN  [x]Continue with current plan of care  [][facility]  []IHold per patient request  [] Change Treatment plan:  [] Insurance hold  __ Other     TIME   Time Treatment session was INITIATED 1130   Time Treatment session was STOPPED 1215    45     Charges: 1  Electronically signed by:    William CROOK-SLP              Date:10/16/2018

## 2018-10-29 ENCOUNTER — APPOINTMENT (OUTPATIENT)
Dept: SPEECH THERAPY | Age: 68
End: 2018-10-29
Payer: COMMERCIAL

## 2018-10-30 ENCOUNTER — HOSPITAL ENCOUNTER (OUTPATIENT)
Dept: SPEECH THERAPY | Age: 68
Setting detail: THERAPIES SERIES
Discharge: HOME | End: 2018-10-30
Payer: COMMERCIAL

## 2018-10-30 PROCEDURE — 92507 TX SP LANG VOICE COMM INDIV: CPT

## 2018-11-12 ENCOUNTER — APPOINTMENT (OUTPATIENT)
Dept: SPEECH THERAPY | Age: 68
End: 2018-11-12
Payer: COMMERCIAL

## 2018-11-13 ENCOUNTER — HOSPITAL ENCOUNTER (OUTPATIENT)
Dept: SPEECH THERAPY | Age: 68
Setting detail: THERAPIES SERIES
Discharge: HOME | End: 2018-11-13
Payer: COMMERCIAL

## 2018-11-13 PROCEDURE — 92507 TX SP LANG VOICE COMM INDIV: CPT

## 2018-11-26 ENCOUNTER — APPOINTMENT (OUTPATIENT)
Dept: SPEECH THERAPY | Age: 68
End: 2018-11-26
Payer: COMMERCIAL

## 2018-11-27 ENCOUNTER — HOSPITAL ENCOUNTER (OUTPATIENT)
Dept: SPEECH THERAPY | Age: 68
Setting detail: THERAPIES SERIES
Discharge: HOME | End: 2018-11-27
Payer: COMMERCIAL

## 2018-11-27 PROCEDURE — 92507 TX SP LANG VOICE COMM INDIV: CPT

## 2018-12-10 ENCOUNTER — APPOINTMENT (OUTPATIENT)
Dept: SPEECH THERAPY | Age: 68
End: 2018-12-10
Payer: COMMERCIAL

## 2018-12-11 ENCOUNTER — HOSPITAL ENCOUNTER (OUTPATIENT)
Dept: SPEECH THERAPY | Age: 68
Setting detail: THERAPIES SERIES
Discharge: HOME | End: 2018-12-11
Payer: COMMERCIAL

## 2018-12-11 PROCEDURE — G9186 MOTOR SPEECH GOAL STATUS: HCPCS

## 2018-12-11 PROCEDURE — 92507 TX SP LANG VOICE COMM INDIV: CPT

## 2018-12-11 PROCEDURE — G8999 MOTOR SPEECH CURRENT STATUS: HCPCS

## 2018-12-11 NOTE — PLAN OF CARE
than 3 verbal cues. []Met  []Partially met  [x]Not met   Goal 2: Patient will demonstrate 60% intelligibility in unknown contexts at the sentence level with no more than 5 verbal cues to utilize strategies. []Met  []Partially met  [x]Not met   Goal 3: Patient will utilize pacing and breath support to produce 7-8 word sentences with 65% intelligibility and less than 2 cues []Met  []Partially met  [x]Not met       Timeframe for Long-term Goals: 3 months       Long-term Goal(s): Current Progress   Goal 1: Patient will demonstrate 70% intelligibility to unfamiliar listeners in conversation. []Met  []Partially met  [x]Not met   Rehab Potential  [] Excellent  [] Good   [x] Fair   [] Poor      G-Codes  [] Swallowing        [x] Motor Speech       [] Spoken Language Comprehension                   [] Spoken Language Expression     [] Attention        [] Memory    [] Voice  [] Other SLP Functional Limitations    Current:  [] CH: 0 percent limited   [] CI: 1% but < 20% impaired  [] CJ: 20% but < 40% impaired  [] CK: 40% but < 60% impaired  [x] CL: 60% but < 80% impaired  [] CM: 80% but < 100% impaired  [] CN: 100% impaired    Projected   CH: 0 percent limited   [] CI: 1% but < 20% impaired  [] CJ: 20% but < 40% impaired  [x] CK: 40% but < 60% impaired  [] CL: 60% but < 80% impaired  [] CM: 80% but < 100% impaired  [] CN: 100% impaired    Electronically signed by:    Willian Penny M.S. CF-SLP      Date:12/11/2018    Regulatory Requirements  I have reviewed this plan of care and certify a need for medically necessary rehabilitation services.     Physician Signature:_____________________________________     Date:12/11/2018  Please sign and fax to [phone]

## 2018-12-18 ENCOUNTER — HOSPITAL ENCOUNTER (OUTPATIENT)
Dept: SPEECH THERAPY | Age: 68
Setting detail: THERAPIES SERIES
Discharge: HOME | End: 2018-12-18
Payer: COMMERCIAL

## 2018-12-18 PROCEDURE — 92507 TX SP LANG VOICE COMM INDIV: CPT

## 2018-12-24 ENCOUNTER — APPOINTMENT (OUTPATIENT)
Dept: SPEECH THERAPY | Age: 68
End: 2018-12-24
Payer: COMMERCIAL

## 2018-12-25 ENCOUNTER — APPOINTMENT (OUTPATIENT)
Dept: SPEECH THERAPY | Age: 68
End: 2018-12-25
Payer: COMMERCIAL

## 2019-01-07 ENCOUNTER — APPOINTMENT (OUTPATIENT)
Dept: SPEECH THERAPY | Age: 69
End: 2019-01-07
Payer: COMMERCIAL

## 2019-01-08 ENCOUNTER — HOSPITAL ENCOUNTER (OUTPATIENT)
Dept: SPEECH THERAPY | Age: 69
Setting detail: THERAPIES SERIES
Discharge: HOME | End: 2019-01-08
Payer: COMMERCIAL

## 2019-01-08 PROCEDURE — 92507 TX SP LANG VOICE COMM INDIV: CPT

## 2019-01-21 ENCOUNTER — APPOINTMENT (OUTPATIENT)
Dept: SPEECH THERAPY | Age: 69
End: 2019-01-21
Payer: COMMERCIAL

## 2019-01-22 ENCOUNTER — HOSPITAL ENCOUNTER (OUTPATIENT)
Dept: SPEECH THERAPY | Age: 69
Setting detail: THERAPIES SERIES
Discharge: HOME | End: 2019-01-22
Payer: COMMERCIAL

## 2019-01-22 PROCEDURE — 92507 TX SP LANG VOICE COMM INDIV: CPT

## 2019-02-04 ENCOUNTER — APPOINTMENT (OUTPATIENT)
Dept: SPEECH THERAPY | Age: 69
End: 2019-02-04
Payer: COMMERCIAL

## 2019-02-05 ENCOUNTER — HOSPITAL ENCOUNTER (OUTPATIENT)
Dept: SPEECH THERAPY | Age: 69
Setting detail: THERAPIES SERIES
Discharge: HOME | End: 2019-02-05
Payer: COMMERCIAL

## 2019-02-05 PROCEDURE — 92507 TX SP LANG VOICE COMM INDIV: CPT

## 2019-02-18 ENCOUNTER — APPOINTMENT (OUTPATIENT)
Dept: SPEECH THERAPY | Age: 69
End: 2019-02-18
Payer: COMMERCIAL

## 2019-02-19 ENCOUNTER — HOSPITAL ENCOUNTER (OUTPATIENT)
Dept: SPEECH THERAPY | Age: 69
Setting detail: THERAPIES SERIES
Discharge: HOME | End: 2019-02-19
Payer: COMMERCIAL

## 2019-02-19 PROCEDURE — 92507 TX SP LANG VOICE COMM INDIV: CPT

## 2019-03-04 ENCOUNTER — APPOINTMENT (OUTPATIENT)
Dept: SPEECH THERAPY | Age: 69
End: 2019-03-04
Payer: COMMERCIAL

## 2019-03-05 ENCOUNTER — HOSPITAL ENCOUNTER (OUTPATIENT)
Dept: SPEECH THERAPY | Age: 69
Setting detail: THERAPIES SERIES
Discharge: HOME | End: 2019-03-05
Payer: COMMERCIAL

## 2019-03-05 PROCEDURE — 92507 TX SP LANG VOICE COMM INDIV: CPT

## 2019-03-18 ENCOUNTER — APPOINTMENT (OUTPATIENT)
Dept: SPEECH THERAPY | Age: 69
End: 2019-03-18
Payer: COMMERCIAL

## 2019-03-19 ENCOUNTER — HOSPITAL ENCOUNTER (OUTPATIENT)
Dept: SPEECH THERAPY | Age: 69
Setting detail: THERAPIES SERIES
Discharge: HOME | End: 2019-03-19
Payer: COMMERCIAL

## 2019-03-19 PROCEDURE — 92507 TX SP LANG VOICE COMM INDIV: CPT

## 2019-03-27 ENCOUNTER — OFFICE VISIT (OUTPATIENT)
Dept: NEUROLOGY | Age: 69
End: 2019-03-27
Payer: COMMERCIAL

## 2019-03-27 VITALS
SYSTOLIC BLOOD PRESSURE: 108 MMHG | HEIGHT: 60 IN | DIASTOLIC BLOOD PRESSURE: 77 MMHG | WEIGHT: 108 LBS | BODY MASS INDEX: 21.2 KG/M2 | HEART RATE: 101 BPM

## 2019-03-27 DIAGNOSIS — I69.393 ATAXIA FOLLOWING CEREBRAL INFARCTION: ICD-10-CM

## 2019-03-27 DIAGNOSIS — R47.1 DYSARTHRIA: ICD-10-CM

## 2019-03-27 DIAGNOSIS — I63.9 LEFT PONTINE CEREBROVASCULAR ACCIDENT (HCC): Primary | ICD-10-CM

## 2019-03-27 PROCEDURE — 99214 OFFICE O/P EST MOD 30 MIN: CPT | Performed by: NURSE PRACTITIONER

## 2019-04-01 ENCOUNTER — APPOINTMENT (OUTPATIENT)
Dept: SPEECH THERAPY | Age: 69
End: 2019-04-01
Payer: COMMERCIAL

## 2019-04-02 ENCOUNTER — HOSPITAL ENCOUNTER (OUTPATIENT)
Dept: SPEECH THERAPY | Age: 69
Setting detail: THERAPIES SERIES
Discharge: HOME | End: 2019-04-02
Payer: COMMERCIAL

## 2019-04-02 PROCEDURE — 92507 TX SP LANG VOICE COMM INDIV: CPT

## 2019-04-02 NOTE — PROGRESS NOTES
Phone: [address]    Fax: [phone]                                 Outpatient Speech Therapy                               DAILY TREATMENT NOTE    Date: 4/2/2019  Patients Name:  Behzad French  YOB: 1950 (81 y.o.)  Gender:  female  MRN:  [id number]  [facility] #: [id number]  Referring physician:Claire Castaneda       INSURANCE  SLP Insurance Information: BCBS       Total # of Visits to Date: 80   No Show: 0   Canceled Appointment: 0   Current Authorization  Comments: 7/BMN     PAIN  [x]No     []Yes      Pain Rating (0-10 pain scale): 0  Location:  N/A  Pain Description:  NA    SUBJECTIVE  Patient presents to clinic with her . SHORT TERM GOALS/ TREATMENT SESSION:  Subjective report:           Patient pleasant and cooperative throughout therapy session. Patient and  report no new concerns at this time. Patient's  reports that he has a hard time understanding /s/ and \"sh\" sounds produced by clinician. Continued skilled therapy for dysarthria warranted to improve functional communication. Goal 1: Patient will complete breath support exercises with respiratory  or throug breathing exercises 15x each with adequate breath support and no more than 3 verbal cues. Patient completed breath support exercises during breathing exercises 15x each with adequate breath support. 4 verbal cues required to hold breath after inhaling. Patient continued to demonstrate difficulty with this. []Met  [x]Partially met  []Not met   Goal 2: Goal 2: Patient will answer open ended questions with 70% intelligibility and less than 3 verbal cues for use compensatory strategies across 3 consecutive sessions        Patient engaged in discussion with clinician with open-ended questions and demonstrated 75% intelligibility. Patient able to utilize strategies with 2 verbal cues for increased breath support and to utilize a slow rate of speech. []Met  [x]Partially met  []Not met   Goal 3: Patient will utilize pacing and breath support to produce 7-8 word sentences with 65% intelligibility and less than 2 cues       Describing objects to clinician:   Known objects:90% (9/10)  Unkown objects:9/10 []Met  [x]Partially met  []Not met     LONG TERM GOALS/ TREATMENT SESSION:  Goal 1: Patient will demonstrate 70% intelligibility to unfamiliar listeners in conversation Goal Progressing - See STGs []Met  [x]Partially met  []Not met       EDUCATION/HOME EXERCISE PROGRAM (HEP)  New Education/HEP provided to patient/family/caregiver:    []Yes:     [x]No (Continued review of prior education)   If yes Education Provided:   Method of Education:     [x]Discussion     []Demonstration    [] Written     []Other  Evaluation of Patients Response to Education:         [x]Patient and or caregiver verbalized understanding  []Patient and or Caregiver Demonstrated without assistance   []Patient and or Caregiver Demonstrated with assistance  []Needs additional instruction to demonstrate understanding of education    ASSESSMENT  Patient tolerated todays treatment session:    [x] Good   []  Fair   []  Poor  Limitations/difficulties with treatment session due to:   []Pain     []Fatigue     []Other medical complications     []Other    Comments:    PLAN  [x]Continue with current plan of care  [][facility]  []IHold per patient request  [] Change Treatment plan:  [] Insurance hold  __ Other     TIME   Time Treatment session was INITIATED 1130   Time Treatment session was STOPPED 1215    45     Charges: 1  Electronically signed by:    Main Shetty M.S. CF-SLP              Date:4/2/2019

## 2019-04-15 ENCOUNTER — APPOINTMENT (OUTPATIENT)
Dept: SPEECH THERAPY | Age: 69
End: 2019-04-15
Payer: COMMERCIAL

## 2019-04-16 ENCOUNTER — HOSPITAL ENCOUNTER (OUTPATIENT)
Dept: SPEECH THERAPY | Age: 69
Setting detail: THERAPIES SERIES
Discharge: HOME | End: 2019-04-16
Payer: COMMERCIAL

## 2019-04-16 PROCEDURE — 92507 TX SP LANG VOICE COMM INDIV: CPT

## 2019-04-16 NOTE — PROGRESS NOTES
Goal 3: Patient will utilize pacing and breath support to produce 7-8 word sentences with 65% intelligibility and less than 2 cues       Describing objects to clinician:   Known objects: 8/10   Unkown objects: 8/10    []Met  [x]Partially met  []Not met     LONG TERM GOALS/ TREATMENT SESSION:  Goal 1: Patient will demonstrate 70% intelligibility to unfamiliar listeners in conversation Goal Progressing- see STGs []Met  [x]Partially met  []Not met       EDUCATION/HOME EXERCISE PROGRAM (HEP)  New Education/HEP provided to patient/family/caregiver:    []Yes:     [x]No (Continued review of prior education)   If yes Education Provided:     Method of Education:     [x]Discussion     []Demonstration    [] Written     []Other  Evaluation of Patients Response to Education:         []Patient and or caregiver verbalized understanding  []Patient and or Caregiver Demonstrated without assistance   []Patient and or Caregiver Demonstrated with assistance  []Needs additional instruction to demonstrate understanding of education    ASSESSMENT  Patient tolerated todays treatment session:    [x] Good   []  Fair   []  Poor  Limitations/difficulties with treatment session due to:   []Pain     []Fatigue     []Other medical complications     []Other    Comments:    PLAN  [x]Continue with current plan of care  [][facility]  []IHold per patient request  [] Change Treatment plan:  [] Insurance hold  __ Other     TIME   Time Treatment session was INITIATED 1130   Time Treatment session was STOPPED 1215    45     Charges: 1  Electronically signed by:    Jeet Santos M.S. CF-SLP              Date:4/16/2019

## 2019-04-29 ENCOUNTER — APPOINTMENT (OUTPATIENT)
Dept: SPEECH THERAPY | Age: 69
End: 2019-04-29
Payer: COMMERCIAL

## 2019-04-30 ENCOUNTER — HOSPITAL ENCOUNTER (OUTPATIENT)
Dept: SPEECH THERAPY | Age: 69
Setting detail: THERAPIES SERIES
Discharge: HOME | End: 2019-04-30
Payer: COMMERCIAL

## 2019-04-30 PROCEDURE — 92507 TX SP LANG VOICE COMM INDIV: CPT

## 2019-04-30 NOTE — PROGRESS NOTES
Phone: [address]    Fax: [phone]                                 Outpatient Speech Therapy                               DAILY TREATMENT NOTE    Date: 4/30/2019  Patients Name:  Catalina Valiente  YOB: 1950 (81 y.o.)  Gender:  female  MRN:  [id number]  [facility] #: [id number]  Referring physician:Jr Castaneda       INSURANCE  SLP Insurance Information: BCBS       Total # of Visits to Date: 100   No Show: 0   Canceled Appointment: 0   Current Authorization  Comments: 9/BMN     PAIN  [x]No     []Yes      Pain Rating (0-10 pain scale): \0  Location: \ N/A  Pain Description: \ NA    SUBJECTIVE  Patient presents to clinic with her . SHORT TERM GOALS/ TREATMENT SESSION:  Subjective report:          Patient pleasant and cooperative throughout therapy session. No new changes reported at this time. Continue therapy to improve patient's expressive communication abilities. Goal 1: Patient will complete breath support exercises with respiratory  or throug breathing exercises 15x each with adequate breath support and no more than 3 verbal cues. Patient completed breath support exercises during breathing exercises 15x each with adequate breath support. 3 verbal cues required to hold breath after inhaling. Improved breath support this date.      [x]Met  []Partially met  []Not met   Goal 2: Goal 2: Patient will answer open ended questions with 70% intelligibility and less than 3 verbal cues for use compensatory strategies across 3 consecutive sessions        Patient engaged in discussion with clinician with open-ended questions and demonstrated 75% intelligibility. Patient able to utilize strategies with 2 verbal cues for increased breath support and to utilize a slow rate of speech. Educate on importance of over exaggeration of speech.      []Met  []Partially met  []Not met   Goal 3: Patient will utilize pacing and breath support to produce 7-8 word sentences with 65% intelligibility and less than 2 cues       Describing objects to clinician:   Known objects: 9/10   Mod cues to slow down and chunk words into approiate   Unkown objects: 9/10    Much improvement with loudness level, but still reduced at beginning of sentences. Utilized jokes to improve intelligibility and practice with breath support/ phrasing. []Met  [x]Partially met  []Not met     LONG TERM GOALS/ TREATMENT SESSION:    Patient will demonstrate 70% intelligibility to unfamiliar listeners in conversation Goal progressing.  See STG data   []Met  [x]Partially met  []Not met       EDUCATION/HOME EXERCISE PROGRAM (HEP)  New Education/HEP provided to patient/family/caregiver:    []Yes:     [x]No (Continued review of prior education)   If yes Education Provided:     Method of Education:     [x]Discussion     []Demonstration    [] Written     []Other  Evaluation of Patients Response to Education:         []Patient and or caregiver verbalized understanding  []Patient and or Caregiver Demonstrated without assistance   []Patient and or Caregiver Demonstrated with assistance  []Needs additional instruction to demonstrate understanding of education    ASSESSMENT  Patient tolerated todays treatment session:    [x] Good   []  Fair   []  Poor  Limitations/difficulties with treatment session due to:   []Pain     []Fatigue     []Other medical complications     []Other    Comments:    PLAN  [x]Continue with current plan of care  [facility]  []IHold per patient request  [] Change Treatment plan:  [] Insurance hold  __ Other     TIME   Time Treatment session was INITIATED 1130   Time Treatment session was STOPPED 1215    45     Charges: 1  Electronically signed by:    Olivier Hollingsworth M.S. CF-SLP              Date:4/30/2019

## 2019-05-13 ENCOUNTER — APPOINTMENT (OUTPATIENT)
Dept: SPEECH THERAPY | Age: 69
End: 2019-05-13
Payer: COMMERCIAL

## 2019-05-14 ENCOUNTER — HOSPITAL ENCOUNTER (OUTPATIENT)
Dept: SPEECH THERAPY | Age: 69
Setting detail: THERAPIES SERIES
Discharge: HOME | End: 2019-05-14
Payer: COMMERCIAL

## 2019-05-14 PROCEDURE — 92507 TX SP LANG VOICE COMM INDIV: CPT

## 2019-05-14 NOTE — PROGRESS NOTES
Phone: [address]    Fax: [phone]                                 Outpatient Speech Therapy                               DAILY TREATMENT NOTE    Date: 5/14/2019  Patients Name:  Duane Acosta  YOB: 1950 (81 y.o.)  Gender:  female  MRN:  [id number]  [facility] #: [id number]  Referring physician:Marilou Castaneda       INSURANCE  SLP Insurance Information: BCBS       Total # of Visits to Date: 101   No Show: 0   Canceled Appointment: 0   Current Authorization  Comments: 10/BMN     PAIN  [x]No     []Yes      Pain Rating (0-10 pain scale): 0  Location:  N/A  Pain Description:  NA    SUBJECTIVE  Patient presents to clinic with her  Beverly Carbajal. SHORT TERM GOALS/ TREATMENT SESSION:  Subjective report:           Patient pleasant and cooperative throughout therapy session. No new changes reported at this time. Continue therapy to improve patient's expressive communication abilities. Patient's  reports oldest grandchild can now understand more of what patient says. Goal 1: Patient will complete breath support exercises with respiratory  or throug breathing exercises 15x each with adequate breath support and no more than 3 verbal cues. Patient completed breath support exercises during breathing exercises 15x each with adequate breath support. 3 verbal cues required to hold breath after inhaling. Improved breath support this date     []Met  [x]Partially met  []Not met   Goal 2: Goal 2: Patient will answer open ended questions with 70% intelligibility and less than 3 verbal cues for use compensatory strategies across 3 consecutive sessions        Answered with 65% intelligibility.   []Met  [x]Partially met  []Not met   Goal 3: Patient will utilize pacing and breath support to produce 7-8 word sentences with 65% intelligibility and less than 2 cues       Known context sentences describing objects : [id number]  [place]

## 2019-05-27 ENCOUNTER — APPOINTMENT (OUTPATIENT)
Dept: SPEECH THERAPY | Age: 69
End: 2019-05-27
Payer: COMMERCIAL

## 2019-05-28 ENCOUNTER — HOSPITAL ENCOUNTER (OUTPATIENT)
Dept: SPEECH THERAPY | Age: 69
Setting detail: THERAPIES SERIES
Discharge: HOME | End: 2019-05-28
Payer: COMMERCIAL

## 2019-05-28 PROCEDURE — 92507 TX SP LANG VOICE COMM INDIV: CPT

## 2019-05-28 NOTE — PROGRESS NOTES
[address]    Fax: [phone]                                 Outpatient Speech Therapy                               DAILY TREATMENT NOTE    Date: 5/28/2019  Patients Name:  Annabel Hilario  YOB: 1950 (81 y.o.)  Gender:  female  MRN:  [id number]  [facility] #: [id number]  Referring physician:Prabhu Castaneda       INSURANCE  SLP Insurance Information: BCBS       Total # of Visits to Date: 102   No Show: 0   Canceled Appointment: 0   Current Authorization  Comments: 11/BMN     PAIN  [x]No     []Yes      Pain Rating (0-10 pain scale): 0  Location:  N/A  Pain Description:  NA    SUBJECTIVE  Patient presents to clinic with her      SHORT TERM GOALS/ TREATMENT SESSION:  Subjective report:          Patient pleasant and cooperative throughout therapy. No new changes; however, patient's  reports \"sh\" sounds are more difficult for patient as the patient produces them as \"s\"    Goal 1: Patient will complete breath support exercises with respiratory  or throug breathing exercises 15x each with adequate breath support and no more than 3 verbal cues. Patient completed breath support exercises during breathing exercises 15x each with adequate breath support. 3 verbal cues required to hold breath after inhaling. Improved breath support this date [x]Met  []Partially met  []Not met   Goal 2: Goal 2: Patient will answer open ended questions with 70% intelligibility and less than 3 verbal cues for use compensatory strategies across 3 consecutive sessions        Discussed [employer] Weekend events. Patient intelligible in 70% of opportunities with 5 verbal cues.     []Met  [x]Partially met  []Not met   Goal 3: Patient will utilize pacing and breath support to produce 7-8 word sentences with 65% intelligibility and less than 2 cues       Describe objects with clear speech (objects unkown)  [id number]    Produced /s/ blends in words with 60% accuracy. Increased difficulty with /sp/ blends. Produced /sh/ in words with 55% accuracy increasing to 65% with cues for puckered lips. Discussed articulation strategies to improve productions. []Met  [x]Partially met  []Not met     LONG TERM GOALS/ TREATMENT SESSION:  Goal 1: Patient will demonstrate 70% intelligibility to unfamiliar listeners in conversation Goal progressing.  See STG data   []Met  []Partially met  []Not met       EDUCATION/HOME EXERCISE PROGRAM (HEP)  New Education/HEP provided to patient/family/caregiver:    []Yes:     [x]No (Continued review of prior education)   If yes Education Provided:   Method of Education:     [x]Discussion     []Demonstration    [] Written     []Other  Evaluation of Patients Response to Education:         [x]Patient and or caregiver verbalized understanding  []Patient and or Caregiver Demonstrated without assistance   []Patient and or Caregiver Demonstrated with assistance  []Needs additional instruction to demonstrate understanding of education    ASSESSMENT  Patient tolerated todays treatment session:    [x] Good   []  Fair   []  Poor  Limitations/difficulties with treatment session due to:   []Pain     []Fatigue     []Other medical complications     []Other    Comments:    PLAN  [x]Continue with current plan of care  [][facility]  []IHold per patient request  [] Change Treatment plan:  [] Insurance hold  __ Other     TIME   Time Treatment session was INITIATED 1130   Time Treatment session was STOPPED 1215    45     Charges: 1  Electronically signed by:    Tawny CROOK-SLP              Date:5/28/2019

## 2019-05-31 NOTE — PLAN OF CARE
[]Met  [x]Partially met  []Not met     Rehab Potential  [] Excellent  [x] Good   [x] Fair   [] Poor      Patient is making progress towards goals; however, has not met them as stated. Patient's family has noted improvement in speech and have been able to understand her better. Electronically signed by:    Kashmir Wilson M.S. CF-SLP    Date:4/10/19    Regulatory Requirements  I have reviewed this plan of care and certify a need for medically necessary rehabilitation services.     Physician Signature:_____________________________________     Date:4/10/19  Please sign and fax to [phone]

## 2019-06-13 ENCOUNTER — HOSPITAL ENCOUNTER (OUTPATIENT)
Dept: SPEECH THERAPY | Age: 69
Setting detail: THERAPIES SERIES
Discharge: HOME | End: 2019-06-13
Payer: COMMERCIAL

## 2019-06-13 PROCEDURE — 92507 TX SP LANG VOICE COMM INDIV: CPT

## 2019-06-13 NOTE — PROGRESS NOTES
Phone: [address]    Fax: [phone]                                 Outpatient Speech Therapy                               DAILY TREATMENT NOTE    Date: 6/13/2019  Patients Name:  Farhat Villegas  YOB: 1950 (81 y.o.)  Gender:  female  MRN:  [id number]  [facility] #: [id number]  Referring physician:Rome Castaneda       INSURANCE  SLP Insurance Information: BCBS       Total # of Visits to Date: 80   No Show: 0   Canceled Appointment: 0   Current Authorization  Comments: 12/BMN     PAIN  [x]No     []Yes      Pain Rating (0-10 pain scale): 0  Location:  N/A  Pain Description:  NA    SUBJECTIVE  Patient presents to clinic with her . SHORT TERM GOALS/ TREATMENT SESSION:  Subjective report:          Patient pleasant and cooperative.  reports that patient continues to have trouble to vocal loudness at the start of sentences with him, but seems to get louder for other people. Goal 1: Patient will complete breath support exercises with respiratory  or throug breathing exercises 15x each with adequate breath support and no more than 3 verbal cues. Patient completed breath support exercises during breathing exercises 15x each with adequate breath support. 3 verbal cues required to hold breath after inhaling. Improved breath support this date []Met  [x]Partially met  []Not met   Goal 2: Goal 2: Patient will answer open ended questions with 70% intelligibility and less than 3 verbal cues for use compensatory strategies across 3 consecutive sessions        Utilized voice recorder for patient to rate her intelligibility. Patient required moderate cues to utilize over exaggerated speech. Patient reports noting improvement    Clear speech in 70% of opportunities with 5 verbal cues.   []Met  [x]Partially met  []Not met   Goal 3: Patient will utilize pacing and breath support to produce 7-8 word sentences with 65% intelligibility and less than 2 cues       Describing events: with 2 verbal cues for slowed speech and increased breath support. []Met  [x]Partially met  []Not met     LONG TERM GOALS/ TREATMENT SESSION:  Goal 1: Patient will demonstrate 70% intelligibility to unfamiliar listeners in conversation Goal progressing.  See STG data    []Met  [x]Partially met  []Not met       EDUCATION/HOME EXERCISE PROGRAM (HEP)  New Education/HEP provided to patient/family/caregiver:    []Yes:     [x]No (Continued review of prior education)   If yes Education Provided:     Method of Education:     [x]Discussion     []Demonstration    [] Written     []Other  Evaluation of Patients Response to Education:         []Patient and or caregiver verbalized understanding  []Patient and or Caregiver Demonstrated without assistance   []Patient and or Caregiver Demonstrated with assistance  []Needs additional instruction to demonstrate understanding of education    ASSESSMENT  Patient tolerated todays treatment session:    [x] Good   []  Fair   []  Poor  Limitations/difficulties with treatment session due to:   []Pain     []Fatigue     []Other medical complications     []Other    Comments:    PLAN  [x]Continue with current plan of care  [][facility]  [facility] patient request  [] Change Treatment plan:  [] Insurance hold  __ Other     TIME   Time Treatment session was INITIATED 1130   Time Treatment session was STOPPED 1215    45     Charges: 1  Electronically signed by:    Herlinda Frankel M.S., CCC-SLP              Date:6/13/2019

## 2019-06-26 NOTE — PLAN OF CARE
intelligibility to unfamiliar listeners in conversation   []Met  [x]Partially met  []Not met     Rehab Potential  [] Excellent  [x] Good   [x] Fair   [] Poor    Plan: Patient is making progress with all goals, but continues to demonstrate decreased intelligibility, poor breath support, and dysarthria. Patient has noted that people are better able to understand her. Electronically signed by:    Vinay Scott M.S. [address] [place] [address]      Date:6/7/2019    Regulatory Requirements  I have reviewed this plan of care and certify a need for medically necessary rehabilitation services.     Physician Signature:_____________________________________     Date:6/7/2019  Please sign and fax to [phone]

## 2019-06-27 ENCOUNTER — HOSPITAL ENCOUNTER (OUTPATIENT)
Dept: SPEECH THERAPY | Age: 69
Setting detail: THERAPIES SERIES
Discharge: HOME | End: 2019-06-27
Payer: COMMERCIAL

## 2019-06-27 PROCEDURE — 92507 TX SP LANG VOICE COMM INDIV: CPT

## 2019-06-27 NOTE — PROGRESS NOTES
utilizing loud voice this date. []Met  [x]Partially met  []Not met     LONG TERM GOALS/ TREATMENT SESSION:  Goal 1: Patient will demonstrate 70% intelligibility to unfamiliar listeners in conversation Goal progressing.  See STG data     []Met  []Partially met  []Not met       EDUCATION/HOME EXERCISE PROGRAM (HEP)  New Education/HEP provided to patient/family/caregiver:    []Yes:     [x]No (Continued review of prior education)   If yes Education Provided:     Method of Education:     [x]Discussion     []Demonstration    [] Written     []Other  Evaluation of Patients Response to Education:         [x]Patient and or caregiver verbalized understanding  []Patient and or Caregiver Demonstrated without assistance   []Patient and or Caregiver Demonstrated with assistance  []Needs additional instruction to demonstrate understanding of education    ASSESSMENT  Patient tolerated todays treatment session:    [x] Good   []  Fair   []  Poor  Limitations/difficulties with treatment session due to:   []Pain     []Fatigue     []Other medical complications     []Other    Comments:    PLAN  [x]Continue with current plan of care  [][facility]  []IHold per patient request  [] Change Treatment plan:  [] Insurance hold  __ Other     TIME   Time Treatment session was INITIATED 1130   Time Treatment session was STOPPED 1215    45     Charges: 1  Electronically signed by:    Denisse Davis M.S. [id number] [place] [address]              Date:6/27/2019

## 2019-07-11 ENCOUNTER — HOSPITAL ENCOUNTER (OUTPATIENT)
Dept: SPEECH THERAPY | Age: 69
Setting detail: THERAPIES SERIES
Discharge: HOME | End: 2019-07-11
Payer: MEDICARE

## 2019-07-11 PROCEDURE — 92507 TX SP LANG VOICE COMM INDIV: CPT

## 2019-07-25 ENCOUNTER — HOSPITAL ENCOUNTER (OUTPATIENT)
Dept: SPEECH THERAPY | Age: 69
Setting detail: THERAPIES SERIES
Discharge: HOME | End: 2019-07-25
Payer: MEDICARE

## 2019-07-25 PROCEDURE — 92507 TX SP LANG VOICE COMM INDIV: CPT

## 2019-08-08 ENCOUNTER — HOSPITAL ENCOUNTER (OUTPATIENT)
Dept: SPEECH THERAPY | Age: 69
Setting detail: THERAPIES SERIES
Discharge: HOME | End: 2019-08-08
Payer: MEDICARE

## 2019-08-08 PROCEDURE — 92507 TX SP LANG VOICE COMM INDIV: CPT

## 2019-08-22 ENCOUNTER — HOSPITAL ENCOUNTER (OUTPATIENT)
Dept: SPEECH THERAPY | Age: 69
Setting detail: THERAPIES SERIES
Discharge: HOME | End: 2019-08-22
Payer: MEDICARE

## 2019-08-22 PROCEDURE — 92507 TX SP LANG VOICE COMM INDIV: CPT

## 2019-08-22 NOTE — PROGRESS NOTES
pacing and breath support during a paragraph reading with 70% intelligibility. Minimal cues for pacing/overexaggerating. [x]Met  []Partially met  []Not met     LONG TERM GOALS/ TREATMENT SESSION:  Goal 1: Patient will demonstrate 70% intelligibility to unfamiliar listeners in conversation Patient engaged in conversation with an unknown listener. Listener reports 75% intelligibility in known contexts.  [x]Met  []Partially met  []Not met       EDUCATION/HOME EXERCISE PROGRAM (HEP)  New Education/HEP provided to patient/family/caregiver:    []Yes:     [x]No (Continued review of prior education)   If yes Education Provided:     Method of Education:     [x]Discussion     []Demonstration    [] Written     []Other  Evaluation of Patients Response to Education:         [x]Patient and or caregiver verbalized understanding  []Patient and or Caregiver Demonstrated without assistance   []Patient and or Caregiver Demonstrated with assistance  []Needs additional instruction to demonstrate understanding of education    ASSESSMENT  Patient tolerated todays treatment session:    [x] Good   []  Fair   []  Poor  Limitations/difficulties with treatment session due to:   []Pain     []Fatigue     []Other medical complications     []Other    Comments:    PLAN  [x]Continue with current plan of care  [][facility]  []IHold per patient request  [] Change Treatment plan:  [] Insurance hold  __ Other     TIME   Time Treatment session was INITIATED 1130   Time Treatment session was STOPPED 1215    45     Charges: 1  Electronically signed by:    Prabhu Sawyer M.S. [id number] [facility]              Date:8/22/2019

## 2020-11-17 ENCOUNTER — HOSPITAL ENCOUNTER (OUTPATIENT)
Dept: WOMENS IMAGING | Age: 70
Discharge: HOME | End: 2020-11-19
Payer: MEDICARE

## 2020-11-17 PROCEDURE — 77067 SCR MAMMO BI INCL CAD: CPT

## 2021-11-04 ENCOUNTER — HOSPITAL ENCOUNTER (INPATIENT)
Age: 71
LOS: 7 days | Discharge: HOME HEALTH | DRG: 179 | End: 2021-11-11
Attending: EMERGENCY MEDICINE | Admitting: INTERNAL MEDICINE
Payer: MEDICARE

## 2021-11-04 ENCOUNTER — APPOINTMENT (OUTPATIENT)
Dept: CT IMAGING | Age: 71
DRG: 179 | End: 2021-11-04
Payer: MEDICARE

## 2021-11-04 ENCOUNTER — APPOINTMENT (OUTPATIENT)
Dept: GENERAL RADIOLOGY | Age: 71
DRG: 179 | End: 2021-11-04
Payer: MEDICARE

## 2021-11-04 DIAGNOSIS — R53.1 GENERAL WEAKNESS: Primary | ICD-10-CM

## 2021-11-04 DIAGNOSIS — U07.1 COVID-19 VIRUS INFECTION: ICD-10-CM

## 2021-11-04 LAB
-: ABNORMAL
ABSOLUTE EOS #: <0.03 K/UL (ref 0–0.44)
ABSOLUTE IMMATURE GRANULOCYTE: <0.03 K/UL (ref 0–0.3)
ABSOLUTE LYMPH #: 0.78 K/UL (ref 1.1–3.7)
ABSOLUTE MONO #: 0.47 K/UL (ref 0.1–1.2)
ALBUMIN SERPL-MCNC: 3.8 G/DL (ref 3.5–5.2)
ALBUMIN/GLOBULIN RATIO: 1.4 (ref 1–2.5)
ALP BLD-CCNC: 85 U/L (ref 35–104)
ALT SERPL-CCNC: 26 U/L (ref 5–33)
AMORPHOUS: ABNORMAL
ANION GAP SERPL CALCULATED.3IONS-SCNC: 13 MMOL/L (ref 9–17)
AST SERPL-CCNC: 38 U/L
BACTERIA: ABNORMAL
BASOPHILS # BLD: 0 % (ref 0–2)
BASOPHILS ABSOLUTE: <0.03 K/UL (ref 0–0.2)
BILIRUB SERPL-MCNC: 1.6 MG/DL (ref 0.3–1.2)
BILIRUBIN URINE: NEGATIVE
BNP INTERPRETATION: NORMAL
BUN BLDV-MCNC: 14 MG/DL (ref 8–23)
BUN/CREAT BLD: 27 (ref 9–20)
CALCIUM SERPL-MCNC: 9 MG/DL (ref 8.6–10.4)
CASTS UA: ABNORMAL /LPF
CHLORIDE BLD-SCNC: 100 MMOL/L (ref 98–107)
CO2: 25 MMOL/L (ref 20–31)
COLOR: YELLOW
COMMENT UA: ABNORMAL
CREAT SERPL-MCNC: 0.51 MG/DL (ref 0.5–0.9)
CRYSTALS, UA: ABNORMAL /HPF
DIFFERENTIAL TYPE: ABNORMAL
EKG ATRIAL RATE: 104 BPM
EKG P AXIS: 66 DEGREES
EKG P-R INTERVAL: 152 MS
EKG Q-T INTERVAL: 324 MS
EKG QRS DURATION: 84 MS
EKG QTC CALCULATION (BAZETT): 426 MS
EKG R AXIS: 43 DEGREES
EKG T AXIS: 72 DEGREES
EKG VENTRICULAR RATE: 104 BPM
EOSINOPHILS RELATIVE PERCENT: 0 % (ref 1–4)
EPITHELIAL CELLS UA: ABNORMAL /HPF (ref 0–25)
GFR AFRICAN AMERICAN: >60 ML/MIN
GFR NON-AFRICAN AMERICAN: >60 ML/MIN
GFR SERPL CREATININE-BSD FRML MDRD: ABNORMAL ML/MIN/{1.73_M2}
GFR SERPL CREATININE-BSD FRML MDRD: ABNORMAL ML/MIN/{1.73_M2}
GLUCOSE BLD-MCNC: 115 MG/DL (ref 74–100)
GLUCOSE BLD-MCNC: 164 MG/DL (ref 70–99)
GLUCOSE URINE: NEGATIVE
HCT VFR BLD CALC: 39.4 % (ref 36.3–47.1)
HEMOGLOBIN: 12.6 G/DL (ref 11.9–15.1)
IMMATURE GRANULOCYTES: 0 %
INR BLD: 1
KETONES, URINE: ABNORMAL
LEUKOCYTE ESTERASE, URINE: NEGATIVE
LIPASE: 26 U/L (ref 13–60)
LYMPHOCYTES # BLD: 9 % (ref 24–43)
MCH RBC QN AUTO: 28 PG (ref 25.2–33.5)
MCHC RBC AUTO-ENTMCNC: 32 G/DL (ref 28.4–34.8)
MCV RBC AUTO: 87.6 FL (ref 82.6–102.9)
MONOCYTES # BLD: 5 % (ref 3–12)
MUCUS: ABNORMAL
NITRITE, URINE: NEGATIVE
NRBC AUTOMATED: 0 PER 100 WBC
OTHER OBSERVATIONS UA: ABNORMAL
PDW BLD-RTO: 13.8 % (ref 11.8–14.4)
PH UA: 6 (ref 5–9)
PLATELET # BLD: 188 K/UL (ref 138–453)
PLATELET ESTIMATE: ABNORMAL
PMV BLD AUTO: 10.3 FL (ref 8.1–13.5)
POTASSIUM SERPL-SCNC: 3.6 MMOL/L (ref 3.7–5.3)
PRO-BNP: 51 PG/ML
PROTEIN UA: NEGATIVE
PROTHROMBIN TIME: 13.1 SEC (ref 11.5–14.2)
RBC # BLD: 4.5 M/UL (ref 3.95–5.11)
RBC # BLD: ABNORMAL 10*6/UL
RBC UA: ABNORMAL /HPF (ref 0–2)
RENAL EPITHELIAL, UA: ABNORMAL /HPF
SARS-COV-2, RAPID: DETECTED
SEG NEUTROPHILS: 86 % (ref 36–65)
SEGMENTED NEUTROPHILS ABSOLUTE COUNT: 7.4 K/UL (ref 1.5–8.1)
SODIUM BLD-SCNC: 138 MMOL/L (ref 135–144)
SPECIFIC GRAVITY UA: 1.02 (ref 1.01–1.02)
SPECIMEN DESCRIPTION: ABNORMAL
TOTAL PROTEIN: 6.6 G/DL (ref 6.4–8.3)
TRICHOMONAS: ABNORMAL
TROPONIN INTERP: NORMAL
TROPONIN T: NORMAL NG/ML
TROPONIN, HIGH SENSITIVITY: 9 NG/L (ref 0–14)
TURBIDITY: CLEAR
URINE HGB: NEGATIVE
UROBILINOGEN, URINE: NORMAL
WBC # BLD: 8.7 K/UL (ref 3.5–11.3)
WBC # BLD: ABNORMAL 10*3/UL
WBC UA: ABNORMAL /HPF (ref 0–5)
YEAST: ABNORMAL

## 2021-11-04 PROCEDURE — 83880 ASSAY OF NATRIURETIC PEPTIDE: CPT

## 2021-11-04 PROCEDURE — 2580000003 HC RX 258: Performed by: PHYSICIAN ASSISTANT

## 2021-11-04 PROCEDURE — 83690 ASSAY OF LIPASE: CPT

## 2021-11-04 PROCEDURE — 70450 CT HEAD/BRAIN W/O DYE: CPT

## 2021-11-04 PROCEDURE — 87635 SARS-COV-2 COVID-19 AMP PRB: CPT

## 2021-11-04 PROCEDURE — 71045 X-RAY EXAM CHEST 1 VIEW: CPT

## 2021-11-04 PROCEDURE — 1200000000 HC SEMI PRIVATE

## 2021-11-04 PROCEDURE — 81001 URINALYSIS AUTO W/SCOPE: CPT

## 2021-11-04 PROCEDURE — 6360000002 HC RX W HCPCS: Performed by: INTERNAL MEDICINE

## 2021-11-04 PROCEDURE — 85025 COMPLETE CBC W/AUTO DIFF WBC: CPT

## 2021-11-04 PROCEDURE — 2580000003 HC RX 258: Performed by: INTERNAL MEDICINE

## 2021-11-04 PROCEDURE — 85610 PROTHROMBIN TIME: CPT

## 2021-11-04 PROCEDURE — 93010 ELECTROCARDIOGRAM REPORT: CPT | Performed by: INTERNAL MEDICINE

## 2021-11-04 PROCEDURE — 6370000000 HC RX 637 (ALT 250 FOR IP): Performed by: INTERNAL MEDICINE

## 2021-11-04 PROCEDURE — 93005 ELECTROCARDIOGRAM TRACING: CPT | Performed by: PHYSICIAN ASSISTANT

## 2021-11-04 PROCEDURE — 84484 ASSAY OF TROPONIN QUANT: CPT

## 2021-11-04 PROCEDURE — 82947 ASSAY GLUCOSE BLOOD QUANT: CPT

## 2021-11-04 PROCEDURE — 99283 EMERGENCY DEPT VISIT LOW MDM: CPT

## 2021-11-04 PROCEDURE — 36415 COLL VENOUS BLD VENIPUNCTURE: CPT

## 2021-11-04 PROCEDURE — P9612 CATHETERIZE FOR URINE SPEC: HCPCS

## 2021-11-04 PROCEDURE — 80053 COMPREHEN METABOLIC PANEL: CPT

## 2021-11-04 PROCEDURE — 94761 N-INVAS EAR/PLS OXIMETRY MLT: CPT

## 2021-11-04 RX ORDER — ONDANSETRON 2 MG/ML
4 INJECTION INTRAMUSCULAR; INTRAVENOUS EVERY 6 HOURS PRN
Status: DISCONTINUED | OUTPATIENT
Start: 2021-11-04 | End: 2021-11-11 | Stop reason: HOSPADM

## 2021-11-04 RX ORDER — SODIUM CHLORIDE 9 MG/ML
INJECTION, SOLUTION INTRAVENOUS CONTINUOUS
Status: DISCONTINUED | OUTPATIENT
Start: 2021-11-04 | End: 2021-11-05

## 2021-11-04 RX ORDER — DEXAMETHASONE 4 MG/1
6 TABLET ORAL DAILY
Status: DISCONTINUED | OUTPATIENT
Start: 2021-11-04 | End: 2021-11-11 | Stop reason: HOSPADM

## 2021-11-04 RX ORDER — POLYETHYLENE GLYCOL 3350 17 G/17G
17 POWDER, FOR SOLUTION ORAL DAILY PRN
Status: DISCONTINUED | OUTPATIENT
Start: 2021-11-04 | End: 2021-11-11 | Stop reason: HOSPADM

## 2021-11-04 RX ORDER — ASPIRIN 81 MG/1
81 TABLET ORAL DAILY
Status: DISCONTINUED | OUTPATIENT
Start: 2021-11-04 | End: 2021-11-11 | Stop reason: HOSPADM

## 2021-11-04 RX ORDER — SODIUM CHLORIDE 9 MG/ML
1000 INJECTION, SOLUTION INTRAVENOUS CONTINUOUS
Status: DISCONTINUED | OUTPATIENT
Start: 2021-11-04 | End: 2021-11-05

## 2021-11-04 RX ORDER — SODIUM CHLORIDE 9 MG/ML
25 INJECTION, SOLUTION INTRAVENOUS PRN
Status: DISCONTINUED | OUTPATIENT
Start: 2021-11-04 | End: 2021-11-11 | Stop reason: HOSPADM

## 2021-11-04 RX ORDER — METFORMIN HYDROCHLORIDE 500 MG/1
1000 TABLET, EXTENDED RELEASE ORAL
Status: DISCONTINUED | OUTPATIENT
Start: 2021-11-05 | End: 2021-11-11 | Stop reason: HOSPADM

## 2021-11-04 RX ORDER — SODIUM CHLORIDE 0.9 % (FLUSH) 0.9 %
10 SYRINGE (ML) INJECTION PRN
Status: DISCONTINUED | OUTPATIENT
Start: 2021-11-04 | End: 2021-11-11 | Stop reason: HOSPADM

## 2021-11-04 RX ORDER — SODIUM CHLORIDE 0.9 % (FLUSH) 0.9 %
10 SYRINGE (ML) INJECTION EVERY 12 HOURS SCHEDULED
Status: DISCONTINUED | OUTPATIENT
Start: 2021-11-04 | End: 2021-11-11 | Stop reason: HOSPADM

## 2021-11-04 RX ORDER — ACETAMINOPHEN 325 MG/1
650 TABLET ORAL EVERY 6 HOURS PRN
Status: DISCONTINUED | OUTPATIENT
Start: 2021-11-04 | End: 2021-11-11 | Stop reason: HOSPADM

## 2021-11-04 RX ORDER — M-VIT,TX,IRON,MINS/CALC/FOLIC 27MG-0.4MG
1 TABLET ORAL DAILY
Status: DISCONTINUED | OUTPATIENT
Start: 2021-11-04 | End: 2021-11-11 | Stop reason: HOSPADM

## 2021-11-04 RX ORDER — ACETAMINOPHEN 650 MG/1
650 SUPPOSITORY RECTAL EVERY 6 HOURS PRN
Status: DISCONTINUED | OUTPATIENT
Start: 2021-11-04 | End: 2021-11-11 | Stop reason: HOSPADM

## 2021-11-04 RX ORDER — ONDANSETRON 4 MG/1
4 TABLET, ORALLY DISINTEGRATING ORAL EVERY 8 HOURS PRN
Status: DISCONTINUED | OUTPATIENT
Start: 2021-11-04 | End: 2021-11-11 | Stop reason: HOSPADM

## 2021-11-04 RX ORDER — GUAIFENESIN/DEXTROMETHORPHAN 100-10MG/5
5 SYRUP ORAL EVERY 4 HOURS PRN
Status: DISCONTINUED | OUTPATIENT
Start: 2021-11-04 | End: 2021-11-11 | Stop reason: HOSPADM

## 2021-11-04 RX ORDER — ATORVASTATIN CALCIUM 40 MG/1
80 TABLET, FILM COATED ORAL DAILY
Status: DISCONTINUED | OUTPATIENT
Start: 2021-11-04 | End: 2021-11-11 | Stop reason: HOSPADM

## 2021-11-04 RX ORDER — AMLODIPINE BESYLATE 10 MG/1
10 TABLET ORAL EVERY OTHER DAY
Status: DISCONTINUED | OUTPATIENT
Start: 2021-11-04 | End: 2021-11-11 | Stop reason: HOSPADM

## 2021-11-04 RX ADMIN — MULTIPLE VITAMINS W/ MINERALS TAB 1 TABLET: TAB at 21:48

## 2021-11-04 RX ADMIN — ATORVASTATIN CALCIUM 80 MG: 40 TABLET, FILM COATED ORAL at 21:48

## 2021-11-04 RX ADMIN — DEXAMETHASONE 6 MG: 4 TABLET ORAL at 21:48

## 2021-11-04 RX ADMIN — SODIUM CHLORIDE: 9 INJECTION, SOLUTION INTRAVENOUS at 21:51

## 2021-11-04 RX ADMIN — SODIUM CHLORIDE 1000 ML: 9 INJECTION, SOLUTION INTRAVENOUS at 15:09

## 2021-11-04 RX ADMIN — ASPIRIN 81 MG: 81 TABLET, COATED ORAL at 21:48

## 2021-11-04 RX ADMIN — ENOXAPARIN SODIUM 30 MG: 30 INJECTION SUBCUTANEOUS at 21:48

## 2021-11-04 RX ADMIN — AMLODIPINE BESYLATE 10 MG: 10 TABLET ORAL at 21:48

## 2021-11-04 ASSESSMENT — PAIN SCALES - GENERAL: PAINLEVEL_OUTOF10: 0

## 2021-11-04 ASSESSMENT — ENCOUNTER SYMPTOMS
ABDOMINAL PAIN: 0
DIFFICULTY BREATHING: 0
VOMITING: 0
VISUAL CHANGE: 0
NAUSEA: 0

## 2021-11-04 NOTE — ED PROVIDER NOTES
[facility] ED  eMERGENCY dEPARTMENT eNCOUnter      Pt Name: Uriel Valero  MRN: [id number]  [place] 1950  Date of evaluation: 11/4/21      CHIEF COMPLAINT       Chief Complaint   Patient presents with    Cerebrovascular Accident     brought by --last known well was Monday evening         HISTORY OF PRESENT ILLNESS    Uriel Valero is a 73 y.o. female who presents complaining of weakness inability to care for herself started on Saturday which came down some cough and cold symptoms. She was planning to go to her [employer] football game on Sunday but did not feel well. Had increased aches and pains on Monday, she was also exposed to Covid by her . She presents today for evaluation for her increased weakness and inability to care for herself  The history is provided by the patient. Altered Mental Status  Presenting symptoms: behavior changes    Presenting symptoms comment:  She has had some cold and cough symptoms started Saturday evening per the daughter-in-law. She has had a left CVA in the past.  She has had some increased weakness most notably Monday evening in Tuesday morning. She presents today for increased weakness  was unable to get her up and about as usual  Severity:  Moderate  Most recent episode:  More than 2 days ago  Episode history:  Single  Duration:  4 days  Timing:  Intermittent  Progression:  Waxing and waning  Chronicity:  New  Context: recent illness    Associated symptoms: bladder incontinence    Associated symptoms: no abdominal pain, no difficulty breathing, no fever, no hallucinations, no headaches, no nausea, no palpitations, no rash, no seizures, no slurred speech, no visual change, no vomiting and no weakness        REVIEW OF SYSTEMS       Review of Systems   Constitutional: Negative for fever. Cardiovascular: Negative for palpitations. Gastrointestinal: Negative for abdominal pain, nausea and vomiting.    Genitourinary: Positive for bladder incontinence. Skin: Negative for rash. Neurological: Negative for seizures, weakness and headaches. Psychiatric/Behavioral: Negative for hallucinations. All other systems reviewed and are negative. PAST MEDICAL HISTORY     Past Medical History:   Diagnosis Date    Hyperlipidemia     Hypertension     Left pontine cerebrovascular accident ([facility] 2017    Stroke [facility]) 2017    Type 2 diabetes mellitus without complication, without long-term current use of insulin ([facility] [address]        SURGICAL HISTORY       Past Surgical History:   Procedure Laterality Date    CATARACT REMOVAL Right 2020       CURRENT MEDICATIONS       Previous Medications    ALCOHOL SWABS 70 % PADS    1 Units by Does not apply route 3 times daily (with meals)    AMLODIPINE (NORVASC) 10 MG TABLET    Take 1 tablet by mouth every other day    ASPIRIN 81 MG EC TABLET    Take 1 tablet by mouth daily    ATORVASTATIN (LIPITOR) 80 MG TABLET    Take 1 tablet by mouth daily    GLUCOSE BLOOD VI TEST STRIPS (ASCENSIA AUTODISC VI;ONE TOUCH ULTRA TEST VI) STRIP    1 each by In Vitro route daily As needed. GLUCOSE MONITORING KIT (FREESTYLE) MONITORING KIT    1 kit by Does not apply route 3 times daily    HANDICAP PLACARD MISC    by Does not apply route Dx: stroke    Duration: 5 years    METFORMIN (GLUCOPHAGE-XR) 500 MG EXTENDED RELEASE TABLET    Take 2 tablets by mouth daily (with breakfast)    MULTIPLE VITAMINS-MINERALS (THERAPEUTIC MULTIVITAMIN-MINERALS) TABLET    Take 1 tablet by mouth daily    SOFT TOUCH LANCETS MISC    1 Units by Does not apply route 3 times daily (with meals)    UNABLE TO FIND    Manual wheelchair    Diagnosis: Stroke       ALLERGIES     has No Known Allergies. FAMILY HISTORY     She indicated that the status of her mother is unknown. She indicated that the status of her father is unknown. SOCIAL HISTORY      reports that she has never smoked.  She has never used smokeless tobacco. She reports that she does not drink alcohol and does not use drugs. PHYSICAL EXAM     INITIAL VITALS: /79   Pulse 106   Temp 99.1 °F (37.3 °C) (Tympanic)   Resp 12   Wt 111 lb (50.3 kg)   SpO2 95%   BMI 21.68 kg/m²      Physical Exam  Vitals and nursing note reviewed. Constitutional:       Appearance: She is well-developed. HENT:      Head: Normocephalic and atraumatic. Eyes:      Pupils: Pupils are equal, round, and reactive to light. Cardiovascular:      Rate and Rhythm: Normal rate and regular rhythm. Heart sounds: Normal heart sounds. No murmur heard. Pulmonary:      Effort: Pulmonary effort is normal.      Breath sounds: Normal breath sounds. Abdominal:      General: Bowel sounds are normal.      Palpations: Abdomen is soft. Tenderness: There is no abdominal tenderness. Musculoskeletal:         General: Normal range of motion. Cervical back: Normal range of motion. Skin:     General: Skin is warm and dry. Neurological:      Mental Status: She is alert and oriented to person, place, and time. MEDICAL DECISION MAKING:     Patient with increased weakness history of a stroke. This started on Saturday. Here in the emergency department we did check urine we did check labs and CT of the head chest x-ray was performed. No acute findings labs unremarkable. She is here with daughter-in-law who states that the patient is just unable to care for herself and usually has her  that helps her with just about everything. Unfortunately the  is ill with the Covid virus and is having great difficulty caring for the patient. I did speak with Dr. David Scott he agreed to admit the patient. I discussed this with the patient as well as the daughter-in-law and they are agreeable with this plan. Patient remains alert, oriented, she is pleasant talkative and interactive. She is stable.   No changes on repeat examination    DIAGNOSTIC RESULTS     EKG: All EKG's are interpreted by the Emergency Department Physician who either signs or Co-signs this chart in the absence of acardiologist.      RADIOLOGY:Allplain film, CT, MRI, and formal ultrasound images (except ED bedside ultrasound) are read by the radiologist and the images and interpretations are directly viewed by the emergency physician. LABS:All lab results were reviewed by myself, and all abnormals are listed below.   Labs Reviewed   COVID-19, RAPID - Abnormal; Notable for the following components:       Result Value    SARS-CoV-2, Rapid DETECTED (*)     All other components within normal limits   CBC WITH AUTO DIFFERENTIAL - Abnormal; Notable for the following components:    Seg Neutrophils 86 (*)     Lymphocytes 9 (*)     Eosinophils % 0 (*)     Absolute Lymph # 0.78 (*)     All other components within normal limits   COMPREHENSIVE METABOLIC PANEL W/ REFLEX TO MG FOR LOW K - Abnormal; Notable for the following components:    Glucose 164 (*)     Bun/Cre Ratio 27 (*)     Potassium 3.6 (*)     AST 38 (*)     Total Bilirubin 1.60 (*)     All other components within normal limits   URINE RT REFLEX TO CULTURE - Abnormal; Notable for the following components:    Ketones, Urine 1+ (*)     All other components within normal limits   MICROSCOPIC URINALYSIS - Abnormal; Notable for the following components:    Crystals, UA 0 TO 2 CALCIUM OXALATE (*)     Bacteria, UA 3+ (*)     Mucus, UA TRACE (*)     All other components within normal limits   LIPASE   TROPONIN   BRAIN NATRIURETIC PEPTIDE   PROTIME-INR         EMERGENCY DEPARTMENT COURSE:   Vitals:    Vitals:    11/04/21 1402 11/04/21 1500 11/04/21 1515 11/04/21 1530   BP: 115/74 126/76 115/82 122/79   Pulse: 110 106 103 106   Resp: 18 20 22 12   Temp: 99.1 °F (37.3 °C)      TempSrc: Tympanic      SpO2: 96% 95% 96% 95%   Weight: 111 lb (50.3 kg)          The patient was given the following medications while in the emergency department:  Orders Placed This Encounter   Medications    0.9 % sodium chloride infusion       -------------------------      CRITICAL CARE:       CONSULTS:  None    PROCEDURES:  Procedures    FINAL IMPRESSION      1. General weakness          DISPOSITION/PLAN   DISPOSITION Decision To Admit 11/04/2021 07:32:25 PM      PATIENT REFERREDTO:  No follow-up provider specified.     DISCHARGEMEDICATIONS:  New Prescriptions    No medications on file       (Please note that portions of this note were completed with a voice recognition program.  Efforts were made to edit thedictations but occasionally words are mis-transcribed.)    JENS Velasquez PA-C  11/04/21 1942

## 2021-11-04 NOTE — ED NOTES
Kyler Haider [facility] made aware of pt condition and acuity     Brittanie Torres RN  11/04/21 [id number]

## 2021-11-05 PROBLEM — U07.1 COVID-19 VIRUS INFECTION: Status: ACTIVE | Noted: 2021-11-05

## 2021-11-05 LAB
ABSOLUTE EOS #: 0 K/UL (ref 0–0.44)
ABSOLUTE IMMATURE GRANULOCYTE: 0 K/UL (ref 0–0.3)
ABSOLUTE LYMPH #: 0.47 K/UL (ref 1.1–3.7)
ABSOLUTE MONO #: 0.05 K/UL (ref 0.1–1.2)
ANION GAP SERPL CALCULATED.3IONS-SCNC: 16 MMOL/L (ref 9–17)
BASOPHILS # BLD: 1 % (ref 0–2)
BASOPHILS ABSOLUTE: 0.05 K/UL (ref 0–0.2)
BUN BLDV-MCNC: 14 MG/DL (ref 8–23)
BUN/CREAT BLD: 39 (ref 9–20)
CALCIUM SERPL-MCNC: 9 MG/DL (ref 8.6–10.4)
CHLORIDE BLD-SCNC: 105 MMOL/L (ref 98–107)
CO2: 20 MMOL/L (ref 20–31)
CREAT SERPL-MCNC: 0.36 MG/DL (ref 0.5–0.9)
D-DIMER QUANTITATIVE: 0.48 MG/L FEU (ref 0–0.59)
DIFFERENTIAL TYPE: ABNORMAL
EOSINOPHILS RELATIVE PERCENT: 0 % (ref 1–4)
GFR AFRICAN AMERICAN: >60 ML/MIN
GFR NON-AFRICAN AMERICAN: >60 ML/MIN
GFR SERPL CREATININE-BSD FRML MDRD: ABNORMAL ML/MIN/{1.73_M2}
GFR SERPL CREATININE-BSD FRML MDRD: ABNORMAL ML/MIN/{1.73_M2}
GLUCOSE BLD-MCNC: 171 MG/DL (ref 70–99)
GLUCOSE BLD-MCNC: 279 MG/DL (ref 74–100)
HCT VFR BLD CALC: 39.5 % (ref 36.3–47.1)
HEMOGLOBIN: 12.6 G/DL (ref 11.9–15.1)
IMMATURE GRANULOCYTES: 0 %
LYMPHOCYTES # BLD: 9 % (ref 24–43)
MCH RBC QN AUTO: 27.8 PG (ref 25.2–33.5)
MCHC RBC AUTO-ENTMCNC: 31.9 G/DL (ref 28.4–34.8)
MCV RBC AUTO: 87.2 FL (ref 82.6–102.9)
MONOCYTES # BLD: 1 % (ref 3–12)
MORPHOLOGY: NORMAL
NRBC AUTOMATED: 0 PER 100 WBC
PDW BLD-RTO: 13.7 % (ref 11.8–14.4)
PLATELET # BLD: 193 K/UL (ref 138–453)
PLATELET ESTIMATE: ABNORMAL
PMV BLD AUTO: 10.5 FL (ref 8.1–13.5)
POTASSIUM SERPL-SCNC: 4 MMOL/L (ref 3.7–5.3)
RBC # BLD: 4.53 M/UL (ref 3.95–5.11)
RBC # BLD: ABNORMAL 10*6/UL
SEG NEUTROPHILS: 89 % (ref 36–65)
SEGMENTED NEUTROPHILS ABSOLUTE COUNT: 4.63 K/UL (ref 1.5–8.1)
SODIUM BLD-SCNC: 141 MMOL/L (ref 135–144)
WBC # BLD: 5.2 K/UL (ref 3.5–11.3)
WBC # BLD: ABNORMAL 10*3/UL

## 2021-11-05 PROCEDURE — 85379 FIBRIN DEGRADATION QUANT: CPT

## 2021-11-05 PROCEDURE — 82947 ASSAY GLUCOSE BLOOD QUANT: CPT

## 2021-11-05 PROCEDURE — 80048 BASIC METABOLIC PNL TOTAL CA: CPT

## 2021-11-05 PROCEDURE — 1200000000 HC SEMI PRIVATE

## 2021-11-05 PROCEDURE — 97162 PT EVAL MOD COMPLEX 30 MIN: CPT

## 2021-11-05 PROCEDURE — 2580000003 HC RX 258: Performed by: INTERNAL MEDICINE

## 2021-11-05 PROCEDURE — 85025 COMPLETE CBC W/AUTO DIFF WBC: CPT

## 2021-11-05 PROCEDURE — 6370000000 HC RX 637 (ALT 250 FOR IP): Performed by: INTERNAL MEDICINE

## 2021-11-05 PROCEDURE — 94761 N-INVAS EAR/PLS OXIMETRY MLT: CPT

## 2021-11-05 PROCEDURE — 6360000002 HC RX W HCPCS: Performed by: INTERNAL MEDICINE

## 2021-11-05 PROCEDURE — 97535 SELF CARE MNGMENT TRAINING: CPT

## 2021-11-05 PROCEDURE — 97166 OT EVAL MOD COMPLEX 45 MIN: CPT

## 2021-11-05 PROCEDURE — 36415 COLL VENOUS BLD VENIPUNCTURE: CPT

## 2021-11-05 RX ORDER — ZINC SULFATE 50(220)MG
100 CAPSULE ORAL DAILY
Status: DISCONTINUED | OUTPATIENT
Start: 2021-11-05 | End: 2021-11-11 | Stop reason: HOSPADM

## 2021-11-05 RX ORDER — VITAMIN B COMPLEX
20000 TABLET ORAL WEEKLY
Status: DISCONTINUED | OUTPATIENT
Start: 2021-11-05 | End: 2021-11-11 | Stop reason: HOSPADM

## 2021-11-05 RX ORDER — ASCORBIC ACID 500 MG
1000 TABLET ORAL 2 TIMES DAILY
Status: DISCONTINUED | OUTPATIENT
Start: 2021-11-05 | End: 2021-11-11 | Stop reason: HOSPADM

## 2021-11-05 RX ADMIN — METFORMIN HYDROCHLORIDE 1000 MG: 500 TABLET, EXTENDED RELEASE ORAL at 08:20

## 2021-11-05 RX ADMIN — DEXAMETHASONE 6 MG: 4 TABLET ORAL at 08:19

## 2021-11-05 RX ADMIN — ASPIRIN 81 MG: 81 TABLET, COATED ORAL at 08:20

## 2021-11-05 RX ADMIN — SODIUM CHLORIDE, PRESERVATIVE FREE 10 ML: 5 INJECTION INTRAVENOUS at 21:48

## 2021-11-05 RX ADMIN — MULTIPLE VITAMINS W/ MINERALS TAB 1 TABLET: TAB at 08:21

## 2021-11-05 RX ADMIN — Medication 20000 UNITS: at 18:02

## 2021-11-05 RX ADMIN — ENOXAPARIN SODIUM 30 MG: 30 INJECTION SUBCUTANEOUS at 08:20

## 2021-11-05 RX ADMIN — ENOXAPARIN SODIUM 30 MG: 30 INJECTION SUBCUTANEOUS at 20:37

## 2021-11-05 RX ADMIN — ATORVASTATIN CALCIUM 80 MG: 40 TABLET, FILM COATED ORAL at 08:19

## 2021-11-05 RX ADMIN — OXYCODONE HYDROCHLORIDE AND ACETAMINOPHEN 1000 MG: 500 TABLET ORAL at 20:37

## 2021-11-05 RX ADMIN — ZINC SULFATE 220 MG (50 MG) CAPSULE 100 MG: CAPSULE at 18:02

## 2021-11-05 ASSESSMENT — PAIN SCALES - GENERAL
PAINLEVEL_OUTOF10: 0

## 2021-11-05 NOTE — PROGRESS NOTES
Patient resting comfortably in bed. Brief is dry at this time. Patient denies needing to go to the bathroom. Call light, bedside table and personal belongings within reach. Bed alarm is on. Will continue to monitor.

## 2021-11-05 NOTE — ACP (ADVANCE CARE PLANNING)
Advance Care Planning     Advance Care Planning Activator (Inpatient)  Conversation Note      Date of ACP Conversation: 11/5/2021     Conversation Conducted with: Patient with Decision Making Capacity    ACP Activator: Karon Javed 47 Decision Maker:     Current Designated Health Care Decision Maker:     Primary Decision MakeCarie Pfeiffer - [phone]    Secondary Decision Maker: Abbie Wellington - [phone]    Today we documented decision makers patient states is consistent with documents she has at home    Care Preferences    Ventilation: \"If you were in your present state of health and suddenly became very ill and were unable to breathe on your own, what would your preference be about the use of a ventilator (breathing machine) if it were available to you? \"      Would the patient desire the use of ventilator (breathing machine)?: no    \"If your health worsens and it becomes clear that your chance of recovery is unlikely, what would your preference be about the use of a ventilator (breathing machine) if it were available to you? \"     Would the patient desire the use of ventilator (breathing machine)?: No      Resuscitation  \"CPR works best to restart the heart when there is a sudden event, like a heart attack, in someone who is otherwise healthy. Unfortunately, CPR does not typically restart the heart for people who have serious health conditions or who are very sick. \"    \"In the event your heart stopped as a result of an underlying serious health condition, would you want attempts to be made to restart your heart (answer \"yes\" for attempt to resuscitate) or would you prefer a natural death (answer \"no\" for do not attempt to resuscitate)? \" no       [x] Yes   [] No   Educated Patient / Maryanne Mosley regarding differences between Advance Directives and portable DNR orders.     Length of ACP Conversation in minutes:      Conversation Outcomes:  [x] ACP discussion completed  [] Existing advance directive reviewed with patient; no changes to patient's previously recorded wishes  [] New Advance Directive completed  [x] Portable Do Not Rescitate prepared for Provider review and signature  [] POLST/POST/MOLST/MOST prepared for Provider review and signature      Follow-up plan:    [] Schedule follow-up conversation to continue planning  [] Referred individual to Provider for additional questions/concerns   [] Advised patient/agent/surrogate to review completed ACP document and update if needed with changes in condition, patient preferences or care setting    [x] This note routed to one or more involved healthcare providers

## 2021-11-05 NOTE — PROGRESS NOTES
Called Dr. Courtney [facility] for admission orders at this time.

## 2021-11-05 NOTE — CONSULTS
[address] [place] [address] Department       Clinical Pharmacy Consult  Regen COV            Sapna Posada is a 73 y.o. whose chart and medications have been reviewed. Patient is currently on room air. Must have age > 15 yo and weight >40 kg    High risk condition:  Age >71, Diabetes    Reason for medication review: COVID POS    Requesting Physician: Dr. Ruffin Innocent     Recommendations:     1.  Regen Cov 600 mg/600 mg IV once    [employer], Pharm D, 11/5/2021, 10:16 AM

## 2021-11-05 NOTE — PROGRESS NOTES
Vital signs and assessment completed. Assessment unchanged from previous shift assessment. Patient's brief changed at this time for small incontinence in brief. Patient got comfortable in bed. Patient denies any other needs at this time. Call light, bedside table and personal belongings within reach. Will continue to monitor.

## 2021-11-05 NOTE — PROGRESS NOTES
Patient's brief changed at this time. Large saturated. Patient repositioned and moved up in bed and got comfortable. Water pitcher refilled. Patient denies any other needs at this time. Call light, bedside table and personal belongings within reach. Will continue to monitor.

## 2021-11-05 NOTE — PROGRESS NOTES
Nightly medications given. Patient questioned on how she takes pills, and patient states normal with water. Patient was able to take pills whole with water. Writer assisted patient with pills by putting a few pills at a time in her mouth for her. Patient swallowed pills with no difficulty. Patient denies needing to go to the bathroom, brief remains dry at this time. Call light, bedside table and personal belongings within reach. Bed alarm is on. Will continue to monitor.

## 2021-11-05 NOTE — PROGRESS NOTES
Discussed  Discharge plans with the patient. Patient is a 73year old female here with Covid and weakness. She is alert , oriented , pleasant , and cooperative during our conversation. Patient is  and lives at home with her . She uses a walker , manual wheelchair , and shower chair at home. The  does all of the cooking and the house keeping. Patient requires assistance with her ADL's. The  manages her medications and provides the transportation. Her PCP is Dr. Hortencia Marin MD. She has medical insurance that helps with medication costs. The discharge plan is home with no services at this time. She has advance directives but they are not on file. LSW to monitor and assist with any needs or concerns as they arise.     STEFAN Hansen

## 2021-11-05 NOTE — CONSULTS
Spoke with Darlyn Ballard about advanced directives. States that she has a living will and [address] at home with her son and  listed. Discussed CPR and intubation preferences. Darlyn Ballard is adamant that she does not want intubation or CPR to be attempted. Discussed code status options. Requests a DNR-CCA at this time. Discussed with primary nurse. Macey signs DNR form and form faxed to Dr Claribel Vazquez office for his review. Denies further needs at this time.      [address] Nurse Coordinator  11/5/2021 11:30 AM

## 2021-11-05 NOTE — PROGRESS NOTES
Comprehensive Nutrition Assessment    Type and Reason for Visit:  Initial    Nutrition Recommendations/Plan:  Alter diet to home diet and add supplement    Nutrition Assessment:  Predicted suboptimal nutrient intakes r/t altered respiratory status, AEB covid. Reviewed dietary information from home and have applied it to current diet. Because of potential decreases in PO, have added supplement as well. Patient did not answer phone upon call to room. Malnutrition Assessment:  Malnutrition Status: At risk for malnutrition (Comment)    Context:  Acute Illness     Findings of the 6 clinical characteristics of malnutrition:  Energy Intake:  Unable to assess  Weight Loss:  No significant weight loss     Body Fat Loss:  Unable to assess     Muscle Mass Loss:  Unable to assess    Fluid Accumulation:  No significant fluid accumulation     Strength:  Not Performed    Estimated Daily Nutrient Needs:  Energy (kcal):  3683-0631 (23-27); Weight Used for Energy Requirements:  Current     Protein (g):  65-75 (1.3-1.5); Weight Used for Protein Requirements:  Current        Fluid (ml/day):  1400; Method Used for Fluid Requirements:  1 ml/kcal      Nutrition Related Findings:  KIRK in isolation      Wounds:  None       Current Nutrition Therapies:    ADULT DIET; Dysphagia - Minced and Moist; 3 carb choices (45 gm/meal)  ADULT ORAL NUTRITION SUPPLEMENT; Breakfast, Lunch, Dinner; Diabetic Oral Supplement    Anthropometric Measures:  · Height: 4' 8\" (142.2 cm)  · Current Body Weight: 110 lb (49.9 kg)   · Admission Body Weight: 111 lb (50.3 kg)    · Usual Body Weight: 107 lb (48.5 kg) (april. Kiki Karst Zarina Garcia 104-112# range over last 4 years)     · Ideal Body Weight: 80 lbs; % Ideal Body Weight 137.5 %   · BMI: 24.7  · Adjusted Body Weight:  ; No Adjustment   · BMI Categories: Normal Weight (BMI 18.5-24. 9)       Nutrition Diagnosis:   · Predicted inadequate energy intake related to impaired respiratory function as evidenced by other (comment) (covid)    Lab Results   Component Value Date     11/05/2021    K 4.0 11/05/2021     11/05/2021    CO2 20 11/05/2021    BUN 14 11/05/2021    CREATININE 0.36 (L) 11/05/2021    GLUCOSE 171 (H) 11/05/2021    CALCIUM 9.0 11/05/2021    PROT 6.6 11/04/2021    LABALBU 3.8 11/04/2021    BILITOT 1.60 (H) 11/04/2021    ALKPHOS 85 11/04/2021    AST 38 (H) 11/04/2021    ALT 26 11/04/2021    LABGLOM >60 11/05/2021    GFRAA >60 11/05/2021     Lab Results   Component Value Date    LABA1C 7.3 (H) 09/28/2021     Lab Results   Component Value Date     03/22/2017     No results found for: VITD25    Nutrition Interventions:   Food and/or Nutrient Delivery:  Continue Current Diet, Continue Oral Nutrition Supplement  Nutrition Education/Counseling:  No recommendation at this time   Coordination of Nutrition Care:  Continue to monitor while inpatient    Goals:  PO >75% meals and supplements       Nutrition Monitoring and Evaluation:   Behavioral-Environmental Outcomes:  None Identified   Food/Nutrient Intake Outcomes:  Food and Nutrient Intake, Supplement Intake  Physical Signs/Symptoms Outcomes:  Chewing or Swallowing, Biochemical Data, Weight     Discharge Planning:     Too soon to determine     Electronically signed by Su Jarrett RD, XENIA on 11/5/21 at 7:10 AM EDT    Contact: [phone]

## 2021-11-05 NOTE — PROGRESS NOTES
Physical Therapy    Facility/Department: [facility] AT THE [facility] MED SURG  Initial Assessment    NAME: Satira Peabody  : 1950  MRN: [id number]    Date of Service: 2021    Discharge Recommendations:  Continue to assess pending progress, Home with Home health PT, Home with assist PRN        Assessment   Assessment: Patient is 73year old female with dx of general weakness who presents with decreased functional mobility and endurance and decreased B LE strength and decreased safety and balance during transfers and ambulation and would benefit from physical therapy to address all concerns and safely return to [facility]. Treatment Diagnosis: Difficulty walking  Prognosis: Good  Decision Making: Medium Complexity  REQUIRES PT FOLLOW UP: Yes  Activity Tolerance  Activity Tolerance: Patient Tolerated treatment well;Patient limited by endurance       Patient Diagnosis(es): The encounter diagnosis was General weakness. has a past medical history of Hyperlipidemia, Hypertension, Left pontine cerebrovascular accident [facility]), Stroke ([address] and Type 2 diabetes mellitus without complication, without long-term current use of insulin ([facility] has a past surgical history that includes Cataract removal (Right, ).     Restrictions  Restrictions/Precautions  Restrictions/Precautions: General Precautions, Fall Risk, Isolation  Vision/Hearing  Vision: Impaired  Vision Exceptions: Wears glasses at all times  Hearing: Within functional limits     Subjective  General  Chart Reviewed: Yes  Patient assessed for rehabilitation services?: Yes  Response To Previous Treatment: Not applicable  Family / Caregiver Present: No  Referring Practitioner: Dr. Amilcar No MD  Referral Date : 21  Diagnosis: General weakness, R53.1  Follows Commands: Within Functional Limits  Subjective  Subjective: Pt denies pain and agrees to PT eval.  Pain Screening  Patient Currently in Pain: Denies          Orientation  Orientation  Overall Orientation Status: Within Functional Limits  Social/Functional History  Social/Functional History  Lives With: Spouse  Type of Home: House  Home Layout: Two level, Able to Live on Main level with bedroom/bathroom  Home Access: Level entry  Bathroom Shower/Tub: Walk-in shower  Bathroom Toilet: Handicap height  Bathroom Equipment: Built-in shower seat, Shower chair  Home Equipment: 4 wheeled walker, [employer]  ADL Assistance: Needs assistance  Homemaking Assistance: Needs assistance  Ambulation Assistance: Independent  Transfer Assistance: Independent  Active : No  Additional Comments:  assists patient at home with daily ADLs- specifically LB dressing shoes/socks and showering. Patient with hx of Brain Stem CVA. Cognition        Objective          AROM RLE (degrees)  RLE AROM: WFL  AROM LLE (degrees)  LLE AROM : WFL  Strength RLE  Comment: 4-/5 grossly  Strength LLE  Comment: 4-/5 grossly        Bed mobility  Comment: Pt up in chair upon arrival.  Transfers  Sit to Stand: Minimal Assistance  Stand to sit: Moderate Assistance;Minimal Assistance  Comment: Min/modA for sit/stand transfers from recliner with poor control noted eccentrically. Ambulation  Ambulation?: Yes  Ambulation 1  Device: Rolling Walker  Assistance: Contact guard assistance;Minimal assistance  Distance: Pt amb 5ft with FWW and MinAx1 to maintain balance. Balance  Sitting - Static: Good  Sitting - Dynamic: Fair;+  Standing - Static: Fair;-  Standing - Dynamic: Fair;-        Plan   Plan  Times per week: 7  Times per day: Twice a day (daily on weekends)  Current Treatment Recommendations: Strengthening, Neuromuscular Re-education, Home Exercise Program, ROM, Safety Education & Training, Balance Training, Endurance Training, Patient/Caregiver Education & Training, Functional Mobility Training, Transfer Training, Gait Training  Safety Devices  Type of devices:  All fall risk precautions in place, Call light within reach, Chair alarm in place, Gait belt, Patient at risk for falls, Left in chair, Nurse notified    Goals  Short term goals  Time Frame for Short term goals: 20 days  Short term goal 1: Patient to complete sit/stand and stand pivot transfers with SUP and no LOB to decrease fall risk. Short term goal 2: Patient to ambulate 150ft with FWW and CGA with no LOB to improve functional endurance. Short term goal 3: Patient to tolerate 20-30 min of ther ex/act to improve functional strength. Short term goal 4: Patient to have F+ static standing balance to decrease fall risk.        Therapy Time   Individual Concurrent Group Co-treatment   Time In 1406         Time Out 1422         Minutes 16         Timed Code Treatment Minutes: Genaro March 92, PT, DPT

## 2021-11-05 NOTE — PROGRESS NOTES
Occupational Therapy   Occupational Therapy Initial Assessment  Date: 2021   Patient Name: Armando Monsalve  MRN: [id number]     : 1950    Date of Service: 2021    Discharge Recommendations:  Continue to assess pending progress       Assessment   Performance deficits / Impairments: Decreased functional mobility ; Decreased ADL status; Decreased endurance;Decreased strength  Assessment: Patient is a 74 y/o female admitted to [facility] with dx of COVID-19. She has a hx of CVA, requires some assistance with showering and LB ADLs from  at home. She presents with deconditioning and requires increased assistance for safety with ADLs. Patient to benefit from OT services in order to address these deficits. Treatment Diagnosis: M62.81-Generalized Weakness  Prognosis: Good  Decision Making: Medium Complexity  OT Education: OT Role;Plan of Care  Barriers to Learning: none  REQUIRES OT FOLLOW UP: Yes  Activity Tolerance  Activity Tolerance: Patient Tolerated treatment well;Treatment limited secondary to medical complications (free text)  Activity Tolerance: Noted SPO2 dropped to 79% 1x during session, able to recover back to 99% on room air within 2 minutes. Safety Devices  Safety Devices in place: Yes  Type of devices: Call light within reach; Chair alarm in place; Left in chair           Patient Diagnosis(es): The encounter diagnosis was General weakness. has a past medical history of Hyperlipidemia, Hypertension, Left pontine cerebrovascular accident [facility]), Stroke ([facility] and Type 2 diabetes mellitus without complication, without long-term current use of insulin ([facility] has a past surgical history that includes Cataract removal (Right, ).     Treatment Diagnosis: M62.81-Generalized Weakness      Restrictions  Restrictions/Precautions  Restrictions/Precautions: General Precautions, Fall Risk, Isolation    Subjective   General  Chart Reviewed: Yes  Patient assessed for rehabilitation services?: Yes  Family / Caregiver Present: No  Referring Practitioner: Dr. Arlette Pulido  Diagnosis: COVID-19  Subjective  Subjective: Patient reports she is feeling pretty good this date, denies pain at this time; agreeable to OT evaluation. Vital Signs  Pulse: 108  BP: 112/62  BP Location: Right upper arm  MAP (mmHg): 76  Social/Functional History  Social/Functional History  Lives With: Spouse  Type of Home: House  Home Layout: Two level, Able to Live on Main level with bedroom/bathroom  Home Access: Level entry  Bathroom Shower/Tub: Walk-in shower  Bathroom Toilet: Handicap height  Bathroom Equipment: Built-in shower seat, Shower chair  Home Equipment: 4 wheeled walker, Leon Binet  ADL Assistance: Needs assistance  Homemaking Assistance: Needs assistance  Active : No  Additional Comments:  assists patient at home with daily ADLs- specifically LB dressing shoes/socks and showering. Patient with hx of Brain Stem CVA. Objective   Vision: Impaired  Vision Exceptions: Wears glasses at all times  Hearing: Within functional limits    Orientation  Overall Orientation Status: Within Functional Limits     Balance  Sitting Balance: Contact guard assistance  Standing Balance: Minimal assistance  Functional Mobility  Functional - Mobility Device: 4-Wheeled Walker  Activity: Other  Assist Level: Minimal assistance  Functional Mobility Comments: Min A X1 for safety to transition to bedside chair this date. Patient when out of midline becomes unsteady. Following functional mobility task patient SPO2 noted to drop to 79%- within 2 minutes given VC for deep breathing patient back up to 99% on room air  ADL  Feeding: Minimal assistance;Setup (may benefit from adapted silverware)  Grooming: Setup;Stand by assistance  UE Bathing: Minimal assistance  LE Bathing: Moderate assistance  UE Dressing: Minimal assistance  LE Dressing: Dependent/Total  Toileting:  Moderate assistance        Bed mobility  Supine to Sit: Moderate assistance  Comment: Mod A to assist upperbody during bed mobility. Noted SPO2 to drop to 88% when sitting, with 1 VC and deep breathing patient able to recover to 99% in 1 minute- no o2 on at this time                          LUE AROM (degrees)  LUE AROM : WFL  Left Hand AROM (degrees)  Left Hand AROM: WFL  RUE AROM (degrees)  RUE AROM : WFL  Right Hand AROM (degrees)  Right Hand AROM: WFL  LUE Strength  Gross LUE Strength: WFL  L Hand General: 4-/5  RUE Strength  Gross RUE Strength: WFL  R Hand General: 4-/5                   Plan   Plan  Times per week: 7  Times per day: Daily  Current Treatment Recommendations: Self-Care / ADL, Safety Education & Training, Endurance Training, Functional Mobility Training, Strengthening      AM-PAC Score        AM-[facility] Inpatient Daily Activity Raw Score: 15 (11/05/21 1141)  AM-PAC Inpatient ADL T-Scale Score : 34.69 (11/05/21 1141)  ADL Inpatient CMS 0-100% Score: 56.46 (11/05/21 1141)  ADL Inpatient CMS G-Code Modifier : CK (11/05/21 1141)    Goals  Short term goals  Time Frame for Short term goals: 20 visits  Short term goal 1: Patient to peform 10 minutes ther-ex/ther-act to increase strength and endurance with ADLs maintaining SPO2 at or above 90%. Short term goal 2: Patient to complete UB bathing/dressing with SBA. Short term goal 3: Patient to complete functional mobility/dynamic standing activities for 3 minutes to increase endurance for ADLs maintaining SPO2 at or above 90%.        Therapy Time   Individual Concurrent Group Co-treatment   Time In 1022         Time Out 1053         Minutes [facility] 280, OTR/L

## 2021-11-06 LAB
ABSOLUTE EOS #: <0.03 K/UL (ref 0–0.44)
ABSOLUTE IMMATURE GRANULOCYTE: 0.07 K/UL (ref 0–0.3)
ABSOLUTE LYMPH #: 0.94 K/UL (ref 1.1–3.7)
ABSOLUTE MONO #: 0.72 K/UL (ref 0.1–1.2)
ANION GAP SERPL CALCULATED.3IONS-SCNC: 13 MMOL/L (ref 9–17)
BASOPHILS # BLD: 0 % (ref 0–2)
BASOPHILS ABSOLUTE: <0.03 K/UL (ref 0–0.2)
BUN BLDV-MCNC: 12 MG/DL (ref 8–23)
BUN/CREAT BLD: 33 (ref 9–20)
CALCIUM SERPL-MCNC: 9.3 MG/DL (ref 8.6–10.4)
CHLORIDE BLD-SCNC: 103 MMOL/L (ref 98–107)
CO2: 22 MMOL/L (ref 20–31)
CREAT SERPL-MCNC: 0.36 MG/DL (ref 0.5–0.9)
D-DIMER QUANTITATIVE: 0.38 MG/L FEU (ref 0–0.59)
DIFFERENTIAL TYPE: ABNORMAL
EOSINOPHILS RELATIVE PERCENT: 0 % (ref 1–4)
GFR AFRICAN AMERICAN: >60 ML/MIN
GFR NON-AFRICAN AMERICAN: >60 ML/MIN
GFR SERPL CREATININE-BSD FRML MDRD: ABNORMAL ML/MIN/{1.73_M2}
GFR SERPL CREATININE-BSD FRML MDRD: ABNORMAL ML/MIN/{1.73_M2}
GLUCOSE BLD-MCNC: 161 MG/DL (ref 70–99)
HCT VFR BLD CALC: 37.1 % (ref 36.3–47.1)
HEMOGLOBIN: 12.2 G/DL (ref 11.9–15.1)
IMMATURE GRANULOCYTES: 1 %
LYMPHOCYTES # BLD: 7 % (ref 24–43)
MAGNESIUM: 1.7 MG/DL (ref 1.6–2.6)
MCH RBC QN AUTO: 27.5 PG (ref 25.2–33.5)
MCHC RBC AUTO-ENTMCNC: 32.9 G/DL (ref 28.4–34.8)
MCV RBC AUTO: 83.7 FL (ref 82.6–102.9)
MONOCYTES # BLD: 5 % (ref 3–12)
NRBC AUTOMATED: 0 PER 100 WBC
PDW BLD-RTO: 13.5 % (ref 11.8–14.4)
PLATELET # BLD: 260 K/UL (ref 138–453)
PLATELET ESTIMATE: ABNORMAL
PMV BLD AUTO: 10.1 FL (ref 8.1–13.5)
POTASSIUM SERPL-SCNC: 3.3 MMOL/L (ref 3.7–5.3)
RBC # BLD: 4.43 M/UL (ref 3.95–5.11)
RBC # BLD: ABNORMAL 10*6/UL
SEG NEUTROPHILS: 87 % (ref 36–65)
SEGMENTED NEUTROPHILS ABSOLUTE COUNT: 11.83 K/UL (ref 1.5–8.1)
SODIUM BLD-SCNC: 138 MMOL/L (ref 135–144)
WBC # BLD: 13.6 K/UL (ref 3.5–11.3)
WBC # BLD: ABNORMAL 10*3/UL

## 2021-11-06 PROCEDURE — 83735 ASSAY OF MAGNESIUM: CPT

## 2021-11-06 PROCEDURE — 97110 THERAPEUTIC EXERCISES: CPT

## 2021-11-06 PROCEDURE — 6370000000 HC RX 637 (ALT 250 FOR IP): Performed by: INTERNAL MEDICINE

## 2021-11-06 PROCEDURE — 6360000002 HC RX W HCPCS: Performed by: INTERNAL MEDICINE

## 2021-11-06 PROCEDURE — 85025 COMPLETE CBC W/AUTO DIFF WBC: CPT

## 2021-11-06 PROCEDURE — 85379 FIBRIN DEGRADATION QUANT: CPT

## 2021-11-06 PROCEDURE — 36415 COLL VENOUS BLD VENIPUNCTURE: CPT

## 2021-11-06 PROCEDURE — 80048 BASIC METABOLIC PNL TOTAL CA: CPT

## 2021-11-06 PROCEDURE — 94761 N-INVAS EAR/PLS OXIMETRY MLT: CPT

## 2021-11-06 PROCEDURE — 2580000003 HC RX 258: Performed by: INTERNAL MEDICINE

## 2021-11-06 PROCEDURE — 1200000000 HC SEMI PRIVATE

## 2021-11-06 RX ADMIN — ENOXAPARIN SODIUM 30 MG: 30 INJECTION SUBCUTANEOUS at 21:45

## 2021-11-06 RX ADMIN — ASPIRIN 81 MG: 81 TABLET, COATED ORAL at 08:40

## 2021-11-06 RX ADMIN — OXYCODONE HYDROCHLORIDE AND ACETAMINOPHEN 1000 MG: 500 TABLET ORAL at 21:45

## 2021-11-06 RX ADMIN — MULTIPLE VITAMINS W/ MINERALS TAB 1 TABLET: TAB at 08:40

## 2021-11-06 RX ADMIN — METFORMIN HYDROCHLORIDE 1000 MG: 500 TABLET, EXTENDED RELEASE ORAL at 08:40

## 2021-11-06 RX ADMIN — OXYCODONE HYDROCHLORIDE AND ACETAMINOPHEN 1000 MG: 500 TABLET ORAL at 08:40

## 2021-11-06 RX ADMIN — ZINC SULFATE 220 MG (50 MG) CAPSULE 100 MG: CAPSULE at 08:40

## 2021-11-06 RX ADMIN — SODIUM CHLORIDE, PRESERVATIVE FREE 10 ML: 5 INJECTION INTRAVENOUS at 08:40

## 2021-11-06 RX ADMIN — DEXAMETHASONE 6 MG: 4 TABLET ORAL at 08:40

## 2021-11-06 RX ADMIN — SODIUM CHLORIDE, PRESERVATIVE FREE 10 ML: 5 INJECTION INTRAVENOUS at 21:47

## 2021-11-06 RX ADMIN — ENOXAPARIN SODIUM 30 MG: 30 INJECTION SUBCUTANEOUS at 08:40

## 2021-11-06 RX ADMIN — ATORVASTATIN CALCIUM 80 MG: 40 TABLET, FILM COATED ORAL at 08:40

## 2021-11-06 RX ADMIN — AMLODIPINE BESYLATE 10 MG: 10 TABLET ORAL at 08:40

## 2021-11-06 ASSESSMENT — PAIN SCALES - GENERAL
PAINLEVEL_OUTOF10: 0

## 2021-11-06 NOTE — PROGRESS NOTES
Physical Therapy  Facility/Department: [facility] AT THE [facility] MED SURG  Daily Treatment Note  NAME: Dorie Maynard  : 1950  MRN: [id number]    Date of Service: 2021    Discharge Recommendations:  Continue to assess pending progress, Home with Home health PT, Home with assist PRN        Assessment   Treatment Diagnosis: Difficulty walking  Prognosis: Good  Activity Tolerance  Activity Tolerance: Patient Tolerated treatment well; Patient limited by endurance; Patient limited by cognitive status     Patient Diagnosis(es): The encounter diagnosis was General weakness. has a past medical history of Hyperlipidemia, Hypertension, Left pontine cerebrovascular accident [facility]), Stroke ([facility] and Type 2 diabetes mellitus without complication, without long-term current use of insulin ([facility] has a past surgical history that includes Cataract removal (Right, ). Restrictions  Restrictions/Precautions  Restrictions/Precautions: General Precautions, Fall Risk, Isolation  Subjective   General  Chart Reviewed: Yes  Response To Previous Treatment: Not applicable  Family / Caregiver Present: No  Referring Practitioner: Dr. Radha Chavez MD  Subjective  Subjective: Pt agrees to PT at this time but is very sleepy/not alert. Orientation  Orientation  Overall Orientation Status: Within Functional Limits  Cognition      Objective   Bed mobility  Rolling to Left: Supervision  Rolling to Right: Supervision  Comment: Vc's for technique. Transfers  Comment: Not attempted this date d/t patient unable to follow vc's safely.   Ambulation  Ambulation?: No     Balance  Sitting - Static: Good  Sitting - Dynamic: Fair; +  Exercises  Quad Sets: 15x  Heelslides: 15x  Gluteal Sets: 15x  Hip Flexion: 15x  Hip Abduction: 15x  Knee Short Arc Quad: 15x  Ankle Pumps: 15x  Comments: Supine B LE ther ex completed                     Goals  Short term goals  Time Frame for Short term goals: 20 days  Short term goal 1: Patient to complete sit/stand and stand pivot transfers with SUP and no LOB to decrease fall risk. Short term goal 2: Patient to ambulate 150ft with FWW and CGA with no LOB to improve functional endurance. Short term goal 3: Patient to tolerate 20-30 min of ther ex/act to improve functional strength. Short term goal 4: Patient to have F+ static standing balance to decrease fall risk. Plan    Plan  Times per week: 7  Times per day: Twice a day (daily on weekends)  Current Treatment Recommendations: Strengthening, Neuromuscular Re-education, Home Exercise Program, ROM, Safety Education & Training, Balance Training, Endurance Training, Patient/Caregiver Education & Training, Functional Mobility Training, Transfer Training, Gait Training  Safety Devices  Type of devices:  All fall risk precautions in place, Bed alarm in place, Call light within reach, Gait belt, Patient at risk for falls, Left in bed, Nurse notified     Therapy Time   Individual Concurrent Group Co-treatment   Time In 1136         Time Out 1146         Minutes 10         Timed Code Treatment Minutes: [address], PT, DPT

## 2021-11-06 NOTE — PROGRESS NOTES
Patient got up to bed  2 assist contact guard. Patient tolerated well. Patient changed and given new brief, bed pad, blanket, and gown. Patient tolerated well. She was repositioned for comfort in bed, denies further needs at this time. Will continue to monitor and assess.

## 2021-11-06 NOTE — PROGRESS NOTES
Patient's telemetry started having a lot of artifact so writer assessed patient and found her sideways in her bed. Patient had stool from her brief on her hands and had wiped it on her gown and bed pad. Writer changed patient and gave her new bed pad, diaper, and gown. Patient tolerated well. Patient was unwilling/unable to lay still in the bed. Writer had to ask her multiple times not to put her hands in her dirty brief. Once changed, patient was given a new oxygen sensor for the ear. Patient was repositioned for comfort but as soon as writer left the room, patient's monitor started alarming with artifact and writer watched patient roll around in the bed appearing to be trying to get comfortable. Writer went in and asked again if she needed help moving in the bed but patient declined writer's offer. 2L of oxygen remains on pt. Will continue to monitor and assess.

## 2021-11-06 NOTE — PROGRESS NOTES
[facility] SPECIALTY [facility]  OCCUPATIONAL THERAPY  No Visit Note    [] ICU    [x] Acute   Patient: Divine Luu  Room: 8689/7989-47      Divine Luu not seen on 11/6/2021 at 11:36 AM due to Pt refusing stating she wanted to rest. Therapist attempted to reposition pt in bed with P sequencing and initiation.         Signature: MAKENZIE Nogueira/DELORES resolved

## 2021-11-06 NOTE — PROGRESS NOTES
Progress Note  Blas Calle MD    OBJECTIVE:    Patient seen for f/u of COVID-19 virus infection. She is comfortable at rest,off oxygen     ROS:   Constitutional: negative  for fevers, and negative for chills. Respiratory: negative for shortness of breath, positive for cough, and negative for wheezing  Cardiovascular: negative for chest pain, and negative for palpitations  Gastrointestinal: negative for abdominal pain, negative for nausea,negative for vomiting, negative for diarrhea, and negative for constipation     All other systems were reviewed with the patient and are negative unless otherwise stated in HPI    OBJECTIVE:  Vitals:   Temp: 98.8 °F (37.1 °C)  BP: 129/74  Resp: 18  Pulse: 110  SpO2: 95 %    24HR INTAKE/OUTPUT:      Intake/Output Summary (Last 24 hours) at 11/6/2021 1318  Last data filed at 11/6/2021 0815  Gross per 24 hour   Intake 390 ml   Output --   Net 390 ml     -----------------------------------------------------------------  Exam:  GEN:    Awake,not on any distress. EYES:  EOMI, pupils equal   NECK: Supple. No lymphadenopathy. No carotid bruit  CVS:    regular rate and rhythm, no audible murmur  PULM:  CTA, no wheezes, rales or rhonchi, no acute respiratory distress  ABD:    Bowels sounds normal.  Abdomen is soft. No distention. no tenderness to palpation. EXT:   no edema bilaterally . No calf tenderness. SKIN:  No rashes.   No skin lesions.    -----------------------------------------------------------------  Diagnostic Data:    · All available data reviewed  Lab Results   Component Value Date    WBC 13.6 (H) 11/06/2021    HGB 12.2 11/06/2021    MCV 83.7 11/06/2021     11/06/2021      Lab Results   Component Value Date    GLUCOSE 161 (H) 11/06/2021    BUN 12 11/06/2021    CREATININE 0.36 (L) 11/06/2021     11/06/2021    K 3.3 (L) 11/06/2021    CALCIUM 9.3 11/06/2021     11/06/2021    CO2 22 11/06/2021       PROBLEM LIST:  Principal Problem:    COVID-19 virus infection  Active Problems:    Generalized weakness  Resolved Problems:    * No resolved hospital problems.  *      ASSESSMENT / PLAN:  COVID-19 virus infection  · Continue current therapy  · Pt and ot  · Discharge planning  · Nutrition status: at risk for malnutrition  · DVT prophylaxis: Lovenox   · High risk medications: none   · Disposition:  Discharge plan is home    Wing Baum MD , MTomaszD.  11/6/2021  1:18 PM

## 2021-11-07 LAB
ABSOLUTE EOS #: <0.03 K/UL (ref 0–0.44)
ABSOLUTE IMMATURE GRANULOCYTE: 0.06 K/UL (ref 0–0.3)
ABSOLUTE LYMPH #: 0.9 K/UL (ref 1.1–3.7)
ABSOLUTE MONO #: 0.77 K/UL (ref 0.1–1.2)
ANION GAP SERPL CALCULATED.3IONS-SCNC: 11 MMOL/L (ref 9–17)
BASOPHILS # BLD: 0 % (ref 0–2)
BASOPHILS ABSOLUTE: <0.03 K/UL (ref 0–0.2)
BUN BLDV-MCNC: 14 MG/DL (ref 8–23)
BUN/CREAT BLD: 41 (ref 9–20)
CALCIUM SERPL-MCNC: 9.2 MG/DL (ref 8.6–10.4)
CHLORIDE BLD-SCNC: 101 MMOL/L (ref 98–107)
CO2: 25 MMOL/L (ref 20–31)
CREAT SERPL-MCNC: 0.34 MG/DL (ref 0.5–0.9)
D-DIMER QUANTITATIVE: <0.27 MG/L FEU (ref 0–0.59)
DIFFERENTIAL TYPE: ABNORMAL
EOSINOPHILS RELATIVE PERCENT: 0 % (ref 1–4)
GFR AFRICAN AMERICAN: >60 ML/MIN
GFR NON-AFRICAN AMERICAN: >60 ML/MIN
GFR SERPL CREATININE-BSD FRML MDRD: ABNORMAL ML/MIN/{1.73_M2}
GFR SERPL CREATININE-BSD FRML MDRD: ABNORMAL ML/MIN/{1.73_M2}
GLUCOSE BLD-MCNC: 189 MG/DL (ref 70–99)
HCT VFR BLD CALC: 37 % (ref 36.3–47.1)
HEMOGLOBIN: 12.1 G/DL (ref 11.9–15.1)
IMMATURE GRANULOCYTES: 1 %
LYMPHOCYTES # BLD: 11 % (ref 24–43)
MCH RBC QN AUTO: 27.8 PG (ref 25.2–33.5)
MCHC RBC AUTO-ENTMCNC: 32.7 G/DL (ref 28.4–34.8)
MCV RBC AUTO: 84.9 FL (ref 82.6–102.9)
MONOCYTES # BLD: 9 % (ref 3–12)
NRBC AUTOMATED: 0 PER 100 WBC
PDW BLD-RTO: 13.7 % (ref 11.8–14.4)
PLATELET # BLD: 259 K/UL (ref 138–453)
PLATELET ESTIMATE: ABNORMAL
PMV BLD AUTO: 10.2 FL (ref 8.1–13.5)
POTASSIUM SERPL-SCNC: 3.8 MMOL/L (ref 3.7–5.3)
RBC # BLD: 4.36 M/UL (ref 3.95–5.11)
RBC # BLD: ABNORMAL 10*6/UL
SEG NEUTROPHILS: 79 % (ref 36–65)
SEGMENTED NEUTROPHILS ABSOLUTE COUNT: 6.64 K/UL (ref 1.5–8.1)
SODIUM BLD-SCNC: 137 MMOL/L (ref 135–144)
WBC # BLD: 8.4 K/UL (ref 3.5–11.3)
WBC # BLD: ABNORMAL 10*3/UL

## 2021-11-07 PROCEDURE — 97530 THERAPEUTIC ACTIVITIES: CPT

## 2021-11-07 PROCEDURE — 6360000002 HC RX W HCPCS: Performed by: INTERNAL MEDICINE

## 2021-11-07 PROCEDURE — 97116 GAIT TRAINING THERAPY: CPT

## 2021-11-07 PROCEDURE — 36415 COLL VENOUS BLD VENIPUNCTURE: CPT

## 2021-11-07 PROCEDURE — 1200000000 HC SEMI PRIVATE

## 2021-11-07 PROCEDURE — 97110 THERAPEUTIC EXERCISES: CPT

## 2021-11-07 PROCEDURE — 6370000000 HC RX 637 (ALT 250 FOR IP): Performed by: INTERNAL MEDICINE

## 2021-11-07 PROCEDURE — 2580000003 HC RX 258: Performed by: INTERNAL MEDICINE

## 2021-11-07 PROCEDURE — 85025 COMPLETE CBC W/AUTO DIFF WBC: CPT

## 2021-11-07 PROCEDURE — 85379 FIBRIN DEGRADATION QUANT: CPT

## 2021-11-07 PROCEDURE — 94761 N-INVAS EAR/PLS OXIMETRY MLT: CPT

## 2021-11-07 PROCEDURE — 80048 BASIC METABOLIC PNL TOTAL CA: CPT

## 2021-11-07 RX ADMIN — ZINC SULFATE 220 MG (50 MG) CAPSULE 100 MG: CAPSULE at 08:28

## 2021-11-07 RX ADMIN — ATORVASTATIN CALCIUM 80 MG: 40 TABLET, FILM COATED ORAL at 08:28

## 2021-11-07 RX ADMIN — DEXAMETHASONE 6 MG: 4 TABLET ORAL at 08:30

## 2021-11-07 RX ADMIN — MULTIPLE VITAMINS W/ MINERALS TAB 1 TABLET: TAB at 08:28

## 2021-11-07 RX ADMIN — ASPIRIN 81 MG: 81 TABLET, COATED ORAL at 08:28

## 2021-11-07 RX ADMIN — ENOXAPARIN SODIUM 30 MG: 30 INJECTION SUBCUTANEOUS at 21:00

## 2021-11-07 RX ADMIN — OXYCODONE HYDROCHLORIDE AND ACETAMINOPHEN 1000 MG: 500 TABLET ORAL at 08:28

## 2021-11-07 RX ADMIN — SODIUM CHLORIDE, PRESERVATIVE FREE 10 ML: 5 INJECTION INTRAVENOUS at 08:30

## 2021-11-07 RX ADMIN — METFORMIN HYDROCHLORIDE 1000 MG: 500 TABLET, EXTENDED RELEASE ORAL at 08:28

## 2021-11-07 RX ADMIN — SODIUM CHLORIDE, PRESERVATIVE FREE 10 ML: 5 INJECTION INTRAVENOUS at 21:02

## 2021-11-07 RX ADMIN — ENOXAPARIN SODIUM 30 MG: 30 INJECTION SUBCUTANEOUS at 08:29

## 2021-11-07 RX ADMIN — OXYCODONE HYDROCHLORIDE AND ACETAMINOPHEN 1000 MG: 500 TABLET ORAL at 21:00

## 2021-11-07 ASSESSMENT — PAIN SCALES - GENERAL
PAINLEVEL_OUTOF10: 0

## 2021-11-07 NOTE — PROGRESS NOTES
Physical Therapy  Facility/Department: [facility] AT THE [facility] MED SURG  Daily Treatment Note  NAME: Yara Acosta  : 1950  MRN: [id number]    Date of Service: 2021    Discharge Recommendations:  Continue to assess pending progress, Home with Home health PT, Home with assist PRN        Patient Diagnosis(es): The encounter diagnosis was General weakness. has a past medical history of Hyperlipidemia, Hypertension, Left pontine cerebrovascular accident [facility]), Stroke ([facility] and Type 2 diabetes mellitus without complication, without long-term current use of insulin ([facility] has a past surgical history that includes Cataract removal (Right, ). Restrictions  Restrictions/Precautions  Restrictions/Precautions: General Precautions, Fall Risk, Isolation  Subjective   General  Chart Reviewed: Yes  Response To Previous Treatment: Patient with no complaints from previous session. Family / Caregiver Present: No  Referring Practitioner: Dr. Long Gregory MD  Subjective  Subjective: Pt pleasant and agreeable to therapy. Denied pain. Orientation  Orientation  Overall Orientation Status: Within Functional Limits  Cognition      Objective   Bed mobility  Supine to Sit: Minimal assistance  Comment: Cues for technique with fair follow through noted. Transfers  Sit to Stand: Minimal Assistance  Stand to sit: Moderate Assistance; Minimal Assistance  Stand Pivot Transfers: Minimal Assistance; Moderate Assistance  Ambulation  Ambulation?: Yes  Ambulation 1  Device: Rolling Walker  Assistance: Contact guard assistance; Minimal assistance  Distance: 80ft with 180 degree turns x 3--pt with lateral LOB x 2 requiring modA to correct. Cues for safety and to slow down, cues to keep AD on the ground.      Balance  Standing - Static: Fair; -  Standing - Dynamic: Fair; -  Exercises  Straight Leg Raise: 20x  Quad Sets: 20x  Heelslides: 20x  Gluteal Sets: 20x  Hip Flexion: 20x2  Hip Abduction: 20x2  Knee Short Arc Quad: 20x  Knee Active Range

## 2021-11-07 NOTE — PROGRESS NOTES
Occupational Therapy  Facility/Department: [facility] AT THE [facility] MED SURG  Daily Treatment Note  NAME: Dorie Maynard  : 1950  MRN: [id number]    Date of Service: 2021    Discharge Recommendations:  Continue to assess pending progress       Assessment      OT Education: OT Role; Energy Conservation; Transfer Training  Safety Devices  Safety Devices in place: Yes  Type of devices: Call light within reach; Chair alarm in place; Left in chair (PTA present)         Patient Diagnosis(es): The encounter diagnosis was General weakness. has a past medical history of Hyperlipidemia, Hypertension, Left pontine cerebrovascular accident [facility]), Stroke ([facility] and Type 2 diabetes mellitus without complication, without long-term current use of insulin ([facility] has a past surgical history that includes Cataract removal (Right, ). Restrictions  Restrictions/Precautions  Restrictions/Precautions: General Precautions, Fall Risk, Isolation  Subjective   General  Chart Reviewed: Yes  Patient assessed for rehabilitation services?: Yes  Family / Caregiver Present: No  Referring Practitioner: Dr. Radha Chavez  Diagnosis: COVID-19  Subjective  Subjective: Agreeable to OT session. Denies pain this date      Orientation     Objective             Functional Mobility  Functional - Mobility Device: Standard Walker  Activity: Other  Assist Level: Minimal assistance  Functional Mobility Comments: MIN A x2 for safety wehn func throughout room. Pt required vc throughout for safety with walker and to slow pace. Transfers  Sit to stand: Minimal assistance  Stand to sit: Minimal assistance                                            Type of ROM/Therapeutic Exercise  Type of ROM/Therapeutic Exercise: AROM  Comment: B UE AROM to increase endurance for ADLS and transfers. Pt performed 15 reps x 5 planes with verbal/visual cues for technique with F carryover.                     Plan   Plan  Times per week: 7  Times per day: Daily  Current Treatment Recommendations: Self-Care / ADL, Safety Education & Training, Endurance Training, Functional Mobility Training, Strengthening  G-Code     OutComes Score                                                  AM-PAC Score             Goals  Short term goals  Time Frame for Short term goals: 20 visits  Short term goal 1: Patient to peform 10 minutes ther-ex/ther-act to increase strength and endurance with ADLs maintaining SPO2 at or above 90%. Short term goal 2: Patient to complete UB bathing/dressing with SBA. Short term goal 3: Patient to complete functional mobility/dynamic standing activities for 3 minutes to increase endurance for ADLs maintaining SPO2 at or above 90%.        Therapy Time   Individual Concurrent Group Co-treatment   Time In [facility]         Time Out 0950         Minutes MAKENZIE Rosas/DELORES Colindres

## 2021-11-07 NOTE — PROGRESS NOTES
Progress Note  Blas Calle MD    OBJECTIVE:    Patient seen for f/u of COVID-19 virus infection. She is comfortable at rest,off oxygen ,wants to go home    ROS:   Constitutional: negative  for fevers, and negative for chills. Respiratory: negative for shortness of breath, positive for cough, and negative for wheezing  Cardiovascular: negative for chest pain, and negative for palpitations  Gastrointestinal: negative for abdominal pain, negative for nausea,negative for vomiting, negative for diarrhea, and negative for constipation     All other systems were reviewed with the patient and are negative unless otherwise stated in HPI    OBJECTIVE:  Vitals:   Temp: 98.1 °F (36.7 °C)  BP: 125/74  Resp: 16  Pulse: 94  SpO2: 96 %    24HR INTAKE/OUTPUT:      Intake/Output Summary (Last 24 hours) at 11/7/2021 1025  Last data filed at 11/6/2021 1713  Gross per 24 hour   Intake 360 ml   Output --   Net 360 ml     -----------------------------------------------------------------  Exam:  GEN:    Awake,not on any distress. EYES:  EOMI, pupils equal   NECK: Supple. No lymphadenopathy. No carotid bruit  CVS:    regular rate and rhythm, no audible murmur  PULM:  CTA, no wheezes, rales or rhonchi, no acute respiratory distress  ABD:    Bowels sounds normal.  Abdomen is soft. No distention. no tenderness to palpation. EXT:   no edema bilaterally . No calf tenderness. SKIN:  No rashes.   No skin lesions.    -----------------------------------------------------------------  Diagnostic Data:    · All available data reviewed  Lab Results   Component Value Date    WBC 8.4 11/07/2021    HGB 12.1 11/07/2021    MCV 84.9 11/07/2021     11/07/2021      Lab Results   Component Value Date    GLUCOSE 189 (H) 11/07/2021    BUN 14 11/07/2021    CREATININE 0.34 (L) 11/07/2021     11/07/2021    K 3.8 11/07/2021    CALCIUM 9.2 11/07/2021     11/07/2021    CO2 25 11/07/2021       PROBLEM LIST:  Principal Problem: COVID-19 virus infection  Active Problems:    Generalized weakness  Resolved Problems:    * No resolved hospital problems.  *      ASSESSMENT / PLAN:  COVID-19 virus infection  · Continue current therapy  · Pt and ot  · Discharge planning  · Nutrition status: at risk for malnutrition  · DVT prophylaxis: Lovenox   · High risk medications: none   · Disposition:  Discharge plan is home    Gordo Frazier MD , M.D.  11/7/2021  10:25 AM

## 2021-11-08 LAB
ABSOLUTE EOS #: <0.03 K/UL (ref 0–0.44)
ABSOLUTE IMMATURE GRANULOCYTE: 0.19 K/UL (ref 0–0.3)
ABSOLUTE LYMPH #: 0.79 K/UL (ref 1.1–3.7)
ABSOLUTE MONO #: 0.73 K/UL (ref 0.1–1.2)
ANION GAP SERPL CALCULATED.3IONS-SCNC: 11 MMOL/L (ref 9–17)
BASOPHILS # BLD: 0 % (ref 0–2)
BASOPHILS ABSOLUTE: <0.03 K/UL (ref 0–0.2)
BUN BLDV-MCNC: 21 MG/DL (ref 8–23)
BUN/CREAT BLD: 47 (ref 9–20)
CALCIUM SERPL-MCNC: 9.3 MG/DL (ref 8.6–10.4)
CHLORIDE BLD-SCNC: 101 MMOL/L (ref 98–107)
CO2: 25 MMOL/L (ref 20–31)
CREAT SERPL-MCNC: 0.45 MG/DL (ref 0.5–0.9)
D-DIMER QUANTITATIVE: <0.27 MG/L FEU (ref 0–0.59)
DIFFERENTIAL TYPE: ABNORMAL
EOSINOPHILS RELATIVE PERCENT: 0 % (ref 1–4)
GFR AFRICAN AMERICAN: >60 ML/MIN
GFR NON-AFRICAN AMERICAN: >60 ML/MIN
GFR SERPL CREATININE-BSD FRML MDRD: ABNORMAL ML/MIN/{1.73_M2}
GFR SERPL CREATININE-BSD FRML MDRD: ABNORMAL ML/MIN/{1.73_M2}
GLUCOSE BLD-MCNC: 269 MG/DL (ref 70–99)
HCT VFR BLD CALC: 36.7 % (ref 36.3–47.1)
HEMOGLOBIN: 11.9 G/DL (ref 11.9–15.1)
IMMATURE GRANULOCYTES: 2 %
LYMPHOCYTES # BLD: 8 % (ref 24–43)
MCH RBC QN AUTO: 27.5 PG (ref 25.2–33.5)
MCHC RBC AUTO-ENTMCNC: 32.4 G/DL (ref 28.4–34.8)
MCV RBC AUTO: 85 FL (ref 82.6–102.9)
MONOCYTES # BLD: 8 % (ref 3–12)
NRBC AUTOMATED: 0 PER 100 WBC
PDW BLD-RTO: 13.6 % (ref 11.8–14.4)
PLATELET # BLD: 289 K/UL (ref 138–453)
PLATELET ESTIMATE: ABNORMAL
PMV BLD AUTO: 10.2 FL (ref 8.1–13.5)
POTASSIUM SERPL-SCNC: 4 MMOL/L (ref 3.7–5.3)
RBC # BLD: 4.32 M/UL (ref 3.95–5.11)
RBC # BLD: ABNORMAL 10*6/UL
SEG NEUTROPHILS: 82 % (ref 36–65)
SEGMENTED NEUTROPHILS ABSOLUTE COUNT: 7.87 K/UL (ref 1.5–8.1)
SODIUM BLD-SCNC: 137 MMOL/L (ref 135–144)
WBC # BLD: 9.6 K/UL (ref 3.5–11.3)
WBC # BLD: ABNORMAL 10*3/UL

## 2021-11-08 PROCEDURE — 97110 THERAPEUTIC EXERCISES: CPT

## 2021-11-08 PROCEDURE — 97535 SELF CARE MNGMENT TRAINING: CPT

## 2021-11-08 PROCEDURE — 97116 GAIT TRAINING THERAPY: CPT

## 2021-11-08 PROCEDURE — 6360000002 HC RX W HCPCS: Performed by: INTERNAL MEDICINE

## 2021-11-08 PROCEDURE — 6370000000 HC RX 637 (ALT 250 FOR IP): Performed by: INTERNAL MEDICINE

## 2021-11-08 PROCEDURE — 2580000003 HC RX 258: Performed by: INTERNAL MEDICINE

## 2021-11-08 PROCEDURE — 85379 FIBRIN DEGRADATION QUANT: CPT

## 2021-11-08 PROCEDURE — 85025 COMPLETE CBC W/AUTO DIFF WBC: CPT

## 2021-11-08 PROCEDURE — 1200000000 HC SEMI PRIVATE

## 2021-11-08 PROCEDURE — 80048 BASIC METABOLIC PNL TOTAL CA: CPT

## 2021-11-08 PROCEDURE — 36415 COLL VENOUS BLD VENIPUNCTURE: CPT

## 2021-11-08 RX ADMIN — OXYCODONE HYDROCHLORIDE AND ACETAMINOPHEN 1000 MG: 500 TABLET ORAL at 20:35

## 2021-11-08 RX ADMIN — AMLODIPINE BESYLATE 10 MG: 10 TABLET ORAL at 08:47

## 2021-11-08 RX ADMIN — METFORMIN HYDROCHLORIDE 1000 MG: 500 TABLET, EXTENDED RELEASE ORAL at 08:47

## 2021-11-08 RX ADMIN — DEXAMETHASONE 6 MG: 4 TABLET ORAL at 08:47

## 2021-11-08 RX ADMIN — OXYCODONE HYDROCHLORIDE AND ACETAMINOPHEN 1000 MG: 500 TABLET ORAL at 08:47

## 2021-11-08 RX ADMIN — ATORVASTATIN CALCIUM 80 MG: 40 TABLET, FILM COATED ORAL at 08:46

## 2021-11-08 RX ADMIN — MULTIPLE VITAMINS W/ MINERALS TAB 1 TABLET: TAB at 08:46

## 2021-11-08 RX ADMIN — SODIUM CHLORIDE, PRESERVATIVE FREE 10 ML: 5 INJECTION INTRAVENOUS at 20:35

## 2021-11-08 RX ADMIN — ZINC SULFATE 220 MG (50 MG) CAPSULE 100 MG: CAPSULE at 08:47

## 2021-11-08 RX ADMIN — ENOXAPARIN SODIUM 30 MG: 30 INJECTION SUBCUTANEOUS at 20:35

## 2021-11-08 RX ADMIN — SODIUM CHLORIDE, PRESERVATIVE FREE 10 ML: 5 INJECTION INTRAVENOUS at 08:47

## 2021-11-08 RX ADMIN — ENOXAPARIN SODIUM 30 MG: 30 INJECTION SUBCUTANEOUS at 08:47

## 2021-11-08 RX ADMIN — ASPIRIN 81 MG: 81 TABLET, COATED ORAL at 08:47

## 2021-11-08 ASSESSMENT — PAIN SCALES - GENERAL
PAINLEVEL_OUTOF10: 0
PAINLEVEL_OUTOF10: 0

## 2021-11-08 NOTE — PROGRESS NOTES
Pt sitting up in the chair when writer entered the room. Pt is alert and orientated but disorientated to situation. Vitals and assessment as charted. Pt denies pain. Writer offered to assist pt into the bed but pt refused at this time. Call light within reach. Chair alarm on.

## 2021-11-08 NOTE — PROGRESS NOTES
Progress Note    SUBJECTIVE:  FU related to   Denies any chest pain or shortness of breath. Still pretty weak overall. Feels pretty reasonable. No vomiting. No oxygen noted. OBJECTIVE:    Vitals:   TEMPERATURE:  Current - Temp: 98.6 °F (37 °C);  Max - Temp  Av.3 °F (36.8 °C)  Min: 97.9 °F (36.6 °C)  Max: 98.6 °F (37 °C)  RESPIRATIONS RANGE: Resp  Av  Min: 16  Max: 18  PULSE RANGE: Pulse  Av.3  Min: 92  Max: 106  BLOOD PRESSURE RANGE:  Systolic (28COK), OSV:967 , Min:115 , HCO:514   ; Diastolic (02TVW), OQD:20, Min:69, Max:87    PULSE OXIMETRY RANGE: SpO2  Av.5 %  Min: 93 %  Max: 97 %  24HR INTAKE/OUTPUT:    Intake/Output Summary (Last 24 hours) at 2021 1445  Last data filed at 2021 1315  Gross per 24 hour   Intake 910 ml   Output --   Net 910 ml     -----------------------------------------------------------------  Exam:  General: alert  HEENT: Supple neck & negative  Heart: Regular  Lungs: clear to auscultation bilaterally & no retractions  Abdomen: Normal & soft, No tenderness and BS normal  Extremities:  No edema   Neuro: NonFocal     -----------------------------------------------------------------  Diagnostic Data:  Lab Results   Component Value Date    WBC 9.6 2021    HGB 11.9 2021     2021       Lab Results   Component Value Date    BUN 21 2021    CREATININE 0.45 (L) 2021     2021    K 4.0 2021    CALCIUM 9.3 2021     2021    CO2 25 2021    LABGLOM >60 2021       Lab Results   Component Value Date    WBCUA None 2021    RBCUA 0 TO 2 2021    EPITHUA 2 TO 5 2021    LEUKOCYTESUR NEGATIVE 2021    SPECGRAV 1.020 2021    GLUCOSEU NEGATIVE 2021    KETUA 1+ (A) 2021    PROTEINU NEGATIVE 2021    HGBUR NEGATIVE 2021    CASTUA NOT REPORTED 2021    CRYSTUA 0 TO 2 CALCIUM OXALATE (A) 2021    BACTERIA 3+ (A) 2021    YEAST NOT REPORTED 11/04/2021       Lab Results   Component Value Date    TROPONINT NOT REPORTED 11/04/2021    PROBNP 51 11/04/2021       CT Head WO Contrast    Result Date: 11/4/2021  EXAMINATION: CT OF THE HEAD WITHOUT CONTRAST  11/4/2021 3:43 pm TECHNIQUE: CT of the head was performed without the administration of intravenous contrast. Dose modulation, iterative reconstruction, and/or weight based adjustment of the mA/kV was utilized to reduce the radiation dose to as low as reasonably achievable. COMPARISON: 03/05/2017 HISTORY: ORDERING SYSTEM PROVIDED HISTORY: altered mental status TECHNOLOGIST PROVIDED HISTORY: altered mental status Decision Support Exception - unselect if not a suspected or confirmed emergency medical condition->Emergency Medical Condition (MA) FINDINGS: BRAIN/VENTRICLES: The ventricles and sulci are diffusely enlarged. Low attenuation is seen in the periventricular and subcortical white matter. No acute intracranial hemorrhage or acute infarct is identified. ORBITS: The visualized portion of the orbits demonstrate no acute abnormality. SINUSES: The visualized paranasal sinuses and mastoid air cells demonstrate no acute abnormality. SOFT TISSUES/SKULL:  No acute abnormality of the visualized skull or soft tissues. No acute intracranial abnormality. Diffuse atrophic changes with findings suggesting chronic microvascular ischemia     XR CHEST PORTABLE    Result Date: 11/4/2021  EXAMINATION: ONE XRAY VIEW OF THE CHEST 11/4/2021 3:25 pm COMPARISON: Chest x-ray from 03/05/2017 HISTORY: ORDERING SYSTEM PROVIDED HISTORY: altered mental status TECHNOLOGIST PROVIDED HISTORY: altered mental status History of diabetes, stroke, and hypertension. FINDINGS: Overlying ECG monitor leads and gown snaps. Cardiac silhouette WNL in size for AP technique. Mediastinal structures midline unchanged. Somewhat low lung volumes with probable mild bibasilar atelectasis or scarring, similar by comparison.   No localized pulmonary opacity, pneumothorax or blunting of the costophrenic angles. Moderate slightly increased reversed S scoliosis thoracolumbar spine. Osteopenia. DJD spine and shoulders with probable chronic rotator cuff tears bilaterally. No acute cardiopulmonary disease. ASSESSMENT:    Principal Problem:    COVID-19 virus infection  Active Problems:    Generalized weakness  Resolved Problems:    * No resolved hospital problems. *      Patient Active Problem List    Diagnosis Date Noted    COVID-19 virus infection 11/05/2021    Generalized weakness 11/04/2021    Type 2 diabetes mellitus without complication, with long-term current use of insulin ([facility] [address] /  DIET CONTROLLED 05/16/2017    Left pontine cerebrovascular accident [facility]) /  2017        PLAN:  ·   Placement needed.  sick with COVID. ·   PT  ·  monoclonal antibodies. COVID-19 infection treatment.   · Critical Care Time:   Lluvia Delarosa MD , M.D.

## 2021-11-08 NOTE — PROGRESS NOTES
Pt sitting up in chair when writer entered the room. Writer assisted pt into the bed. Vitals and assessment as charted. Patients brief changed at this time. Pt denies pain. Call light within reach. Bed alarm on.

## 2021-11-08 NOTE — PROGRESS NOTES
Physical Therapy  Facility/Department: [facility] AT THE [facility] MED SURG  Daily Treatment Note  NAME: Owen Bahena  : 1950  MRN: [id number]    Date of Service: 2021    Discharge Recommendations:  Continue to assess pending progress, Home with Home health PT, Home with assist PRN        Assessment   Treatment Diagnosis: Difficulty walking  Prognosis: Good  Decision Making: Medium Complexity  REQUIRES PT FOLLOW UP: Yes  Activity Tolerance  Activity Tolerance: Patient Tolerated treatment well; Patient limited by cognitive status     Patient Diagnosis(es): The encounter diagnosis was General weakness. has a past medical history of Hyperlipidemia, Hypertension, Left pontine cerebrovascular accident [facility]), Stroke ([facility] [address] and Type 2 diabetes mellitus without complication, without long-term current use of insulin ([facility] has a past surgical history that includes Cataract removal (Right, ). Restrictions  Restrictions/Precautions  Restrictions/Precautions: General Precautions, Fall Risk, Isolation  Subjective   General  Chart Reviewed: Yes  Response To Previous Treatment: Patient with no complaints from previous session. Family / Caregiver Present: No  Subjective  Subjective: Pt pleasant and agreeable to therapy. Denied pain.   Pain Screening  Patient Currently in Pain: Denies  Vital Signs  Patient Currently in Pain: Denies       Orientation  Orientation  Overall Orientation Status: Within Functional Limits  Cognition      Objective   Bed mobility  Rolling to Left: Supervision  Supine to Sit: Minimal assistance; Contact guard assistance  Comment: cues for proper technique and hand placement with fair/good carryover  Transfers  Sit to Stand: Contact guard assistance; Minimal Assistance  Stand to sit: Contact guard assistance; Minimal Assistance  Ambulation  Ambulation?: Yes  Ambulation 1  Device: Rolling Walker  Assistance: Contact guard assistance; Minimal assistance  Distance: 20ftx2 with x2 turns, no noted LOB but v/c for walker safety with turns to decrease fall risk. Exercises  Comments: B LE therex completed seated x20         Comment: changed pt. brief in bed     G-Code     OutComes Score    AM-PAC Score    Goals  Short term goals  Time Frame for Short term goals: 20 days  Short term goal 1: Patient to complete sit/stand and stand pivot transfers with SUP and no LOB to decrease fall risk. Short term goal 2: Patient to ambulate 150ft with FWW and CGA with no LOB to improve functional endurance. Short term goal 3: Patient to tolerate 20-30 min of ther ex/act to improve functional strength. Short term goal 4: Patient to have F+ static standing balance to decrease fall risk. Plan    Plan  Times per week: 7  Times per day: Twice a day  Current Treatment Recommendations: Strengthening, Neuromuscular Re-education, Home Exercise Program, ROM, Safety Education & Training, Balance Training, Endurance Training, Patient/Caregiver Education & Training, Functional Mobility Training, Transfer Training, Gait Training  Safety Devices  Type of devices:  All fall risk precautions in place, Call light within reach, Chair alarm in place, Gait belt, Left in chair     Therapy Time   Individual Concurrent Group Co-treatment   Time In 0830         Time Out 0900         Minutes [place], PTA

## 2021-11-08 NOTE — PROGRESS NOTES
Occupational Therapy  Facility/Department: [facility] AT THE [facility] MED SURG  Daily Treatment Note  NAME: Bolivar Pizano  : 1950  MRN: [id number]    Date of Service: 2021    Discharge Recommendations:  Continue to assess pending progress       Assessment      OT Education: OT Role; Plan of Care; Transfer Training  Barriers to Learning: none  Activity Tolerance  Activity Tolerance: Patient Tolerated treatment well  Safety Devices  Safety Devices in place: Yes  Type of devices: Call light within reach; Chair alarm in place; Left in chair; All fall risk precautions in place         Patient Diagnosis(es): The encounter diagnosis was General weakness. has a past medical history of Hyperlipidemia, Hypertension, Left pontine cerebrovascular accident [facility]), Stroke ([facility] [address] and Type 2 diabetes mellitus without complication, without long-term current use of insulin ([facility] has a past surgical history that includes Cataract removal (Right, ). Restrictions  Restrictions/Precautions  Restrictions/Precautions: General Precautions, Fall Risk, Isolation  Subjective   General  Chart Reviewed: Yes  Patient assessed for rehabilitation services?: Yes  Response to previous treatment: Patient with no complaints from previous session  Family / Caregiver Present: No  Referring Practitioner: Dr. Sheba Murray  Diagnosis: COVID-19  Subjective  Subjective: Pt agreeable to OT, seated in bedside chair, just finished lunch, agreea appetite is \"pretty good. \"  Vital Signs  Patient Currently in Pain: Denies   Orientation     Objective             Balance  Sitting Balance: Stand by assistance  Standing Balance: Minimal assistance  Standing Balance  Time: ~ 7 min then ~ 2 min  Activity: fxl mob/tranfer training  Comment: Min LOB noted, leaning to sides  Functional Mobility  Functional - Mobility Device: Rolling Walker  Activity: Other (transfer training in room)  Assist Level: Minimal assistance  Functional Mobility Comments: MIN A for safety throughout room. Pt required vc throughout for safety with walker placement/hand placement and too slow pace creating LOB  Bed mobility  Rolling to Left: Supervision  Rolling to Right: Supervision  Supine to Sit: Minimal assistance (use of bed rails)  Transfers  Sit to stand: Minimal assistance  Stand to sit: Minimal assistance  Transfer Comments: Pt demo'd retrograde LOB and P tech sit<>stands from recliner and EOB. Pt completed ~ 3 min static supported standing with use of RW and 0 LOB with CGA, pt requested to walk around room                                                              Plan   Plan  Times per week: 7  Times per day: Daily  Current Treatment Recommendations: Self-Care / ADL, Safety Education & Training, Endurance Training, Functional Mobility Training, Strengthening  G-Code     OutComes Score                                                  AM-PAC Score             Goals  Short term goals  Time Frame for Short term goals: 20 visits  Short term goal 1: Patient to peform 10 minutes ther-ex/ther-act to increase strength and endurance with ADLs maintaining SPO2 at or above 90%. Short term goal 2: Patient to complete UB bathing/dressing with SBA. Short term goal 3: Patient to complete functional mobility/dynamic standing activities for 3 minutes to increase endurance for ADLs maintaining SPO2 at or above 90%.        Therapy Time   Individual Concurrent Group Co-treatment   Time In 1209         Time Out 1235         Minutes 26                 CORY Piña

## 2021-11-08 NOTE — PROGRESS NOTES
Physical Therapy  Facility/Department: [facility] AT THE [facility] MED SURG  Daily Treatment Note  NAME: Sylvester Soliz  : 1950  MRN: [id number]    Date of Service: 2021    Discharge Recommendations:  Continue to assess pending progress, Home with Home health PT, Home with assist PRN        Assessment   Treatment Diagnosis: Difficulty walking  Prognosis: Good  PT Education: Goals; PT Role; Plan of Care; General Safety; Adaptive Device Training; Gait Training  Patient Education: Pt educated on above with fair understanding  REQUIRES PT FOLLOW UP: Yes  Activity Tolerance  Activity Tolerance: Patient Tolerated treatment well; Patient limited by cognitive status     Patient Diagnosis(es): The encounter diagnosis was General weakness. has a past medical history of Hyperlipidemia, Hypertension, Left pontine cerebrovascular accident [facility]), Stroke ([facility] [address] and Type 2 diabetes mellitus without complication, without long-term current use of insulin ([facility] has a past surgical history that includes Cataract removal (Right, ). Restrictions  Restrictions/Precautions  Restrictions/Precautions: General Precautions, Fall Risk, Isolation  Subjective   General  Chart Reviewed: Yes  Response To Previous Treatment: Patient with no complaints from previous session. Family / Caregiver Present: No  Referring Practitioner: Dr. Aziza Wallace MD  Subjective  Subjective: Pt pleasant and agreeable to therapy. Denied pain. Orientation  Orientation  Overall Orientation Status: Within Functional Limits  Cognition      Objective   Bed mobility  Comment: Pt in chair before and after session  Transfers  Sit to Stand: Minimal Assistance  Stand to sit: Minimal Assistance; Contact guard assistance  Stand Pivot Transfers: Minimal Assistance;  Moderate Assistance  Comment: Pt required cues for safety on navigation and walker placement  Ambulation  Ambulation?: Yes  Ambulation 1  Surface: level tile  Device: Rolling Walker  Assistance: Minimal assistance  Gait Deviations: Slow Almaz; Staggers; Shuffles  Distance: 20ftx2 with x2 turns, no noted LOB but v/c for walker safety with turns to decrease fall risk. Balance  Posture: Fair  Sitting - Static: Good  Sitting - Dynamic: Fair; +  Standing - Static: Fair; -  Standing - Dynamic: Fair; -  Exercises  Hip Flexion: 20  Hip Abduction: 20  Knee Long Arc Quad: 20  Ankle Pumps: 20  Comments: B LE therex completed seated x20 requiring cues for proper technique. G-Code     OutComes Score     AM-PAC Score             Goals  Short term goals  Time Frame for Short term goals: 20 days  Short term goal 1: Patient to complete sit/stand and stand pivot transfers with SUP and no LOB to decrease fall risk. Short term goal 2: Patient to ambulate 150ft with FWW and CGA with no LOB to improve functional endurance. Short term goal 3: Patient to tolerate 20-30 min of ther ex/act to improve functional strength. Short term goal 4: Patient to have F+ static standing balance to decrease fall risk. Plan    Plan  Times per week: 7  Times per day: Twice a day  Current Treatment Recommendations: Strengthening, Neuromuscular Re-education, Home Exercise Program, ROM, Safety Education & Training, Balance Training, Endurance Training, Patient/Caregiver Education & Training, Functional Mobility Training, Transfer Training, Gait Training  Safety Devices  Type of devices:  All fall risk precautions in place, Call light within reach, Chair alarm in place, Gait belt, Left in chair, Nurse notified     Therapy Time   Individual Concurrent Group Co-treatment   Time In 1430         Time Out 1453         Minutes [address]

## 2021-11-09 LAB
ABSOLUTE EOS #: 0 K/UL (ref 0–0.44)
ABSOLUTE IMMATURE GRANULOCYTE: 0.13 K/UL (ref 0–0.3)
ABSOLUTE LYMPH #: 1.2 K/UL (ref 1.1–3.7)
ABSOLUTE MONO #: 1.33 K/UL (ref 0.1–1.2)
ANION GAP SERPL CALCULATED.3IONS-SCNC: 10 MMOL/L (ref 9–17)
BASOPHILS # BLD: 0 % (ref 0–2)
BASOPHILS ABSOLUTE: 0 K/UL (ref 0–0.2)
BUN BLDV-MCNC: 20 MG/DL (ref 8–23)
BUN/CREAT BLD: 48 (ref 9–20)
CALCIUM SERPL-MCNC: 9.6 MG/DL (ref 8.6–10.4)
CHLORIDE BLD-SCNC: 100 MMOL/L (ref 98–107)
CO2: 27 MMOL/L (ref 20–31)
CREAT SERPL-MCNC: 0.42 MG/DL (ref 0.5–0.9)
D-DIMER QUANTITATIVE: <0.27 MG/L FEU (ref 0–0.59)
DIFFERENTIAL TYPE: ABNORMAL
EOSINOPHILS RELATIVE PERCENT: 0 % (ref 1–4)
GFR AFRICAN AMERICAN: >60 ML/MIN
GFR NON-AFRICAN AMERICAN: >60 ML/MIN
GFR SERPL CREATININE-BSD FRML MDRD: ABNORMAL ML/MIN/{1.73_M2}
GFR SERPL CREATININE-BSD FRML MDRD: ABNORMAL ML/MIN/{1.73_M2}
GLUCOSE BLD-MCNC: 198 MG/DL (ref 74–100)
GLUCOSE BLD-MCNC: 251 MG/DL (ref 70–99)
GLUCOSE BLD-MCNC: 353 MG/DL (ref 74–100)
HCT VFR BLD CALC: 40.2 % (ref 36.3–47.1)
HEMOGLOBIN: 13.2 G/DL (ref 11.9–15.1)
IMMATURE GRANULOCYTES: 1 %
LYMPHOCYTES # BLD: 9 % (ref 24–43)
MCH RBC QN AUTO: 27.9 PG (ref 25.2–33.5)
MCHC RBC AUTO-ENTMCNC: 32.8 G/DL (ref 28.4–34.8)
MCV RBC AUTO: 85 FL (ref 82.6–102.9)
MONOCYTES # BLD: 10 % (ref 3–12)
MORPHOLOGY: NORMAL
NRBC AUTOMATED: 0 PER 100 WBC
PDW BLD-RTO: 13.4 % (ref 11.8–14.4)
PLATELET # BLD: 372 K/UL (ref 138–453)
PLATELET ESTIMATE: ABNORMAL
PMV BLD AUTO: 10.1 FL (ref 8.1–13.5)
POTASSIUM SERPL-SCNC: 4.8 MMOL/L (ref 3.7–5.3)
RBC # BLD: 4.73 M/UL (ref 3.95–5.11)
RBC # BLD: ABNORMAL 10*6/UL
SEG NEUTROPHILS: 80 % (ref 36–65)
SEGMENTED NEUTROPHILS ABSOLUTE COUNT: 10.64 K/UL (ref 1.5–8.1)
SODIUM BLD-SCNC: 137 MMOL/L (ref 135–144)
WBC # BLD: 13.3 K/UL (ref 3.5–11.3)
WBC # BLD: ABNORMAL 10*3/UL

## 2021-11-09 PROCEDURE — 2580000003 HC RX 258: Performed by: INTERNAL MEDICINE

## 2021-11-09 PROCEDURE — 1200000000 HC SEMI PRIVATE

## 2021-11-09 PROCEDURE — 83036 HEMOGLOBIN GLYCOSYLATED A1C: CPT

## 2021-11-09 PROCEDURE — 82947 ASSAY GLUCOSE BLOOD QUANT: CPT

## 2021-11-09 PROCEDURE — 36415 COLL VENOUS BLD VENIPUNCTURE: CPT

## 2021-11-09 PROCEDURE — 85025 COMPLETE CBC W/AUTO DIFF WBC: CPT

## 2021-11-09 PROCEDURE — 97535 SELF CARE MNGMENT TRAINING: CPT

## 2021-11-09 PROCEDURE — 6370000000 HC RX 637 (ALT 250 FOR IP): Performed by: INTERNAL MEDICINE

## 2021-11-09 PROCEDURE — 6360000002 HC RX W HCPCS: Performed by: INTERNAL MEDICINE

## 2021-11-09 PROCEDURE — 97110 THERAPEUTIC EXERCISES: CPT

## 2021-11-09 PROCEDURE — 80048 BASIC METABOLIC PNL TOTAL CA: CPT

## 2021-11-09 PROCEDURE — 6370000000 HC RX 637 (ALT 250 FOR IP): Performed by: NURSE PRACTITIONER

## 2021-11-09 PROCEDURE — 97116 GAIT TRAINING THERAPY: CPT

## 2021-11-09 PROCEDURE — 85379 FIBRIN DEGRADATION QUANT: CPT

## 2021-11-09 RX ORDER — DEXTROSE MONOHYDRATE 50 MG/ML
100 INJECTION, SOLUTION INTRAVENOUS PRN
Status: DISCONTINUED | OUTPATIENT
Start: 2021-11-09 | End: 2021-11-11 | Stop reason: HOSPADM

## 2021-11-09 RX ORDER — FAMOTIDINE 20 MG/1
40 TABLET, FILM COATED ORAL 2 TIMES DAILY
Status: DISCONTINUED | OUTPATIENT
Start: 2021-11-09 | End: 2021-11-11 | Stop reason: HOSPADM

## 2021-11-09 RX ORDER — NICOTINE POLACRILEX 4 MG
15 LOZENGE BUCCAL PRN
Status: DISCONTINUED | OUTPATIENT
Start: 2021-11-09 | End: 2021-11-11 | Stop reason: HOSPADM

## 2021-11-09 RX ORDER — DEXTROSE MONOHYDRATE 25 G/50ML
12.5 INJECTION, SOLUTION INTRAVENOUS PRN
Status: DISCONTINUED | OUTPATIENT
Start: 2021-11-09 | End: 2021-11-11 | Stop reason: HOSPADM

## 2021-11-09 RX ADMIN — ENOXAPARIN SODIUM 30 MG: 30 INJECTION SUBCUTANEOUS at 21:38

## 2021-11-09 RX ADMIN — ZINC SULFATE 220 MG (50 MG) CAPSULE 100 MG: CAPSULE at 09:18

## 2021-11-09 RX ADMIN — FAMOTIDINE 40 MG: 20 TABLET, FILM COATED ORAL at 14:43

## 2021-11-09 RX ADMIN — SODIUM CHLORIDE, PRESERVATIVE FREE 10 ML: 5 INJECTION INTRAVENOUS at 21:39

## 2021-11-09 RX ADMIN — INSULIN LISPRO 10 UNITS: 100 INJECTION, SOLUTION INTRAVENOUS; SUBCUTANEOUS at 16:41

## 2021-11-09 RX ADMIN — DEXAMETHASONE 6 MG: 4 TABLET ORAL at 09:19

## 2021-11-09 RX ADMIN — OXYCODONE HYDROCHLORIDE AND ACETAMINOPHEN 1000 MG: 500 TABLET ORAL at 21:38

## 2021-11-09 RX ADMIN — MULTIPLE VITAMINS W/ MINERALS TAB 1 TABLET: TAB at 09:19

## 2021-11-09 RX ADMIN — METFORMIN HYDROCHLORIDE 1000 MG: 500 TABLET, EXTENDED RELEASE ORAL at 09:19

## 2021-11-09 RX ADMIN — ENOXAPARIN SODIUM 30 MG: 30 INJECTION SUBCUTANEOUS at 09:20

## 2021-11-09 RX ADMIN — FAMOTIDINE 40 MG: 20 TABLET, FILM COATED ORAL at 21:38

## 2021-11-09 RX ADMIN — ASPIRIN 81 MG: 81 TABLET, COATED ORAL at 09:18

## 2021-11-09 RX ADMIN — OXYCODONE HYDROCHLORIDE AND ACETAMINOPHEN 1000 MG: 500 TABLET ORAL at 09:19

## 2021-11-09 RX ADMIN — SODIUM CHLORIDE, PRESERVATIVE FREE 10 ML: 5 INJECTION INTRAVENOUS at 09:20

## 2021-11-09 RX ADMIN — ATORVASTATIN CALCIUM 80 MG: 40 TABLET, FILM COATED ORAL at 09:19

## 2021-11-09 ASSESSMENT — PAIN SCALES - GENERAL
PAINLEVEL_OUTOF10: 0

## 2021-11-09 NOTE — PROGRESS NOTES
Physical Therapy  Facility/Department: [facility] AT THE [facility] MED SURG  Daily Treatment Note  NAME: Yara Acosta  : 1950  MRN: [id number]    Date of Service: 2021    Discharge Recommendations:  Continue to assess pending progress, Home with Home health PT, Home with assist PRN        Assessment   Treatment Diagnosis: Difficulty walking  Prognosis: Good  Decision Making: Medium Complexity  PT Education: Goals; PT Role; Plan of Care; General Safety; Adaptive Device Training; Gait Training  Patient Education: Pt educated on above with fair understanding  REQUIRES PT FOLLOW UP: Yes  Activity Tolerance  Activity Tolerance: Patient Tolerated treatment well; Patient limited by cognitive status     Patient Diagnosis(es): The encounter diagnosis was General weakness. has a past medical history of Hyperlipidemia, Hypertension, Left pontine cerebrovascular accident [facility]), Stroke ([facility] [address] and Type 2 diabetes mellitus without complication, without long-term current use of insulin ([facility] has a past surgical history that includes Cataract removal (Right, ). Restrictions  Restrictions/Precautions  Restrictions/Precautions: General Precautions, Fall Risk, Isolation  Subjective   General  Chart Reviewed: Yes  Response To Previous Treatment: Patient with no complaints from previous session. Family / Caregiver Present: No  Referring Practitioner: Dr. Long Gregory MD  Subjective  Subjective: Pt pleasant and agreeable to therapy. Denied pain.   Pain Screening  Patient Currently in Pain: Denies  Vital Signs  Patient Currently in Pain: Denies       Orientation  Orientation  Overall Orientation Status: Within Functional Limits  Cognition      Objective      Transfers  Sit to Stand: Contact guard assistance  Stand to sit: Contact guard assistance; Minimal Assistance  Comment: Pt. required v/c for proper hand placement with transfers to decrease fall risk  Ambulation  Ambulation?: Yes  Ambulation 1  Surface: level tile  Device: Rolling Walker  Assistance: Minimal assistance; Contact guard assistance  Gait Deviations: Slow Almaz; Staggers; Shuffles  Distance: 20ftx1        Exercises  Comments: Seated exercises B LE x20         Comment: changed pt. brief while standing, STS x5     G-Code     OutComes Score    AM-PAC Score    Goals  Short term goals  Time Frame for Short term goals: 20 days  Short term goal 1: Patient to complete sit/stand and stand pivot transfers with SUP and no LOB to decrease fall risk. Short term goal 2: Patient to ambulate 150ft with FWW and CGA with no LOB to improve functional endurance. Short term goal 3: Patient to tolerate 20-30 min of ther ex/act to improve functional strength. Short term goal 4: Patient to have F+ static standing balance to decrease fall risk. Plan    Plan  Times per week: 7  Times per day: Twice a day  Current Treatment Recommendations: Strengthening, Neuromuscular Re-education, Home Exercise Program, ROM, Safety Education & Training, Balance Training, Endurance Training, Patient/Caregiver Education & Training, Functional Mobility Training, Transfer Training, Gait Training  Safety Devices  Type of devices:  All fall risk precautions in place, Call light within reach, Chair alarm in place, Gait belt, Left in chair, Nurse notified     Therapy Time   Individual Concurrent Group Co-treatment   Time In 0926         Time Out 0950         Minutes [address] [place], [place]

## 2021-11-09 NOTE — PROGRESS NOTES
Writer at bedside for shift assessment. Patient sitting up in chair, respirations are even and unlabored while on room air. Vitals obtained and assessment completed, see flowsheet for details. pt denies further needs at this time. Pt calm and cooperative at this time. Call light in reach, will continue to monitor.

## 2021-11-09 NOTE — PROGRESS NOTES
Assisted pt with phone to call . Plan   Plan  Times per week: 7  Times per day: Daily  Current Treatment Recommendations: Self-Care / ADL, Safety Education & Training, Endurance Training, Functional Mobility Training, Strengthening  G-Code     OutComes Score                                                  AM-PAC Score             Goals  Short term goals  Time Frame for Short term goals: 20 visits  Short term goal 1: Patient to peform 10 minutes ther-ex/ther-act to increase strength and endurance with ADLs maintaining SPO2 at or above 90%. Short term goal 2: Patient to complete UB bathing/dressing with SBA. Short term goal 3: Patient to complete functional mobility/dynamic standing activities for 3 minutes to increase endurance for ADLs maintaining SPO2 at or above 90%.        Therapy Time   Individual Concurrent Group Co-treatment   Time In  1:31 pm         Time Out  2:02 pm         Minutes  31                 CORY Lopez/DELORES

## 2021-11-09 NOTE — PROGRESS NOTES
Called the  to see if he will be able to care for his wife when she is discharge since he also has Covid. Talked about SNF and explain the only place at this time taking patient under 10 days with Covid is [facility]. He stated he will be her home. Will do home health for nursing care , diease management, and PT/OT.  would like [facility] care because the PT/OT are familiar to the patient. Referral made to [facility].     STEFAN Eddy

## 2021-11-09 NOTE — PROGRESS NOTES
Patient sitting up in chair at this time. Ate fair for both meals today. Continues to deny pain when asked.

## 2021-11-09 NOTE — PROGRESS NOTES
Physical Therapy  Facility/Department: [facility] AT THE [facility] MED SURG  Daily Treatment Note  NAME: Salvatore Owens  : 1950  MRN: [id number]    Date of Service: 2021    Discharge Recommendations:  Continue to assess pending progress, Home with Home health PT, Home with assist PRN        Assessment   Treatment Diagnosis: Difficulty walking  Prognosis: Good  Decision Making: Medium Complexity  PT Education: Gait Training; General Safety  REQUIRES PT FOLLOW UP: Yes  Activity Tolerance  Activity Tolerance: Patient Tolerated treatment well; Patient limited by cognitive status     Patient Diagnosis(es): The encounter diagnosis was General weakness. has a past medical history of Hyperlipidemia, Hypertension, Left pontine cerebrovascular accident [facility]), Stroke ([facility] [address] and Type 2 diabetes mellitus without complication, without long-term current use of [facility] has a past surgical history that includes Cataract removal (Right, ). Restrictions  Restrictions/Precautions  Restrictions/Precautions: General Precautions, Fall Risk, Isolation  Subjective   General  Chart Reviewed: Yes  Response To Previous Treatment: Patient with no complaints from previous session. Family / Caregiver Present: No  Referring Practitioner: Dr. Blaine Phoenix MD  Subjective  Subjective: Pt denies pain today, pt pleasant and agreeable to therapy. Orientation  Orientation  Overall Orientation Status: Within Functional Limits  Cognition      Objective      Transfers  Sit to Stand: Contact guard assistance; Minimal Assistance  Stand to sit: Contact guard assistance; Minimal Assistance  Comment: Pt reqiured VCs for hand placement and safety with all transfers, fair carryover. Ambulation  Ambulation?: Yes  Ambulation 1  Surface: level tile  Device: Rolling Walker  Assistance: Minimal assistance; Moderate assistance  Quality of Gait: Staggers, moderate path deviations, forward flexed posture, slow nohemi.   Distance: 20ftx1  Comments: Pt required assistance with AD negotiation. Pt with 2 LOB while ambulating requiring Alessio/modA to correct, nursing notified. Exercises  Straight Leg Raise: 20x  Heelslides: 20x  Hip Flexion: x20  Hip Abduction: x20  Knee Long Arc Quad: x20  Knee Short Arc Quad: x20  Ankle Pumps: x20  Comments: BLE ther ex completed in seate and reclined. Goals  Short term goals  Time Frame for Short term goals: 20 days  Short term goal 1: Patient to complete sit/stand and stand pivot transfers with SUP and no LOB to decrease fall risk. Short term goal 2: Patient to ambulate 150ft with FWW and CGA with no LOB to improve functional endurance. Short term goal 3: Patient to tolerate 20-30 min of ther ex/act to improve functional strength. Short term goal 4: Patient to have F+ static standing balance to decrease fall risk. Plan    Plan  Times per week: 7  Times per day: Twice a day  Current Treatment Recommendations: Strengthening, Neuromuscular Re-education, Home Exercise Program, ROM, Safety Education & Training, Balance Training, Endurance Training, Patient/Caregiver Education & Training, Functional Mobility Training, Transfer Training, Gait Training  Safety Devices  Type of devices:  All fall risk precautions in place, Call light within reach, Chair alarm in place, Gait belt, Left in chair, Nurse notified, Patient at risk for falls     Therapy Time   Individual Concurrent Group Co-treatment   Time In 1550         Time Out 1613         Minutes [place], [place]

## 2021-11-09 NOTE — PROGRESS NOTES
Comprehensive Nutrition Assessment    Type and Reason for Visit:  Reassess    Nutrition Recommendations/Plan: Encourage oral intakes    Nutrition Assessment:  Continued suboptimal nutrient intakes r/t altered respiratory status, AEB covid and PO records reflecting 1-25% meal intakes. No records of supplemental glucerna use. Weight with mild loss from last review. On vit C, vit D and zinc r/t covid. Hyperglycemia with dexamethasone use. Malnutrition Assessment:  Malnutrition Status: At risk for malnutrition (Comment)    Context:  Acute Illness     Findings of the 6 clinical characteristics of malnutrition:  Energy Intake:  Unable to assess  Weight Loss:  No significant weight loss     Body Fat Loss:  Unable to assess     Muscle Mass Loss:  Unable to assess    Fluid Accumulation:  No significant fluid accumulation     Strength:  Not Performed    Estimated Daily Nutrient Needs:  Energy (kcal):  8016-5282 (23-27); Weight Used for Energy Requirements:  Current     Protein (g):  65-75 (1.3-1.5); Weight Used for Protein Requirements:  Current        Fluid (ml/day):  1400; Method Used for Fluid Requirements:  1 ml/kcal      Nutrition Related Findings:  KIRK in isolation      Wounds:  None       Current Nutrition Therapies:    ADULT DIET; Dysphagia - Minced and Moist; 3 carb choices (45 gm/meal)  ADULT ORAL NUTRITION SUPPLEMENT; Breakfast, Lunch, Dinner; Diabetic Oral Supplement    Anthropometric Measures:  · Height: 4' 8\" (142.2 cm)  · Current Body Weight: 108 lb 0.4 oz (49 kg)   · Admission Body Weight: 111 lb (50.3 kg)    · Usual Body Weight: 107 lb (48.5 kg)     · Ideal Body Weight: 80 lbs; % Ideal Body Weight 137.5 %   · BMI: 24.2  · Adjusted Body Weight:  ; No Adjustment   · BMI Categories: Normal Weight (BMI 18.5-24. 9)       Nutrition Diagnosis:   · Predicted inadequate energy intake related to impaired respiratory function as evidenced by other (comment) (covid)    Lab Results   Component Value Date     11/09/2021    K 4.8 11/09/2021     11/09/2021    CO2 27 11/09/2021    BUN 20 11/09/2021    CREATININE 0.42 (L) 11/09/2021    GLUCOSE 251 (H) 11/09/2021    CALCIUM 9.6 11/09/2021    PROT 6.6 11/04/2021    LABALBU 3.8 11/04/2021    BILITOT 1.60 (H) 11/04/2021    ALKPHOS 85 11/04/2021    AST 38 (H) 11/04/2021    ALT 26 11/04/2021    LABGLOM >60 11/09/2021    GFRAA >60 11/09/2021     No results for input(s): POCGLU in the last 72 hours. Nutrition Interventions:   Food and/or Nutrient Delivery:  Continue Current Diet, Continue Oral Nutrition Supplement  Nutrition Education/Counseling:  No recommendation at this time   Coordination of Nutrition Care:  Continue to monitor while inpatient    Goals:  PO >75% meals and supplements       Nutrition Monitoring and Evaluation:   Behavioral-Environmental Outcomes:  None Identified   Food/Nutrient Intake Outcomes:  Food and Nutrient Intake, Supplement Intake  Physical Signs/Symptoms Outcomes:  Weight, Biochemical Data, Chewing or Swallowing     Discharge Planning:     Too soon to determine     Electronically signed by Shiraz Rizzo RD, LD on 11/9/21 at 12:32 PM EST    Contact: [phone]

## 2021-11-09 NOTE — PROGRESS NOTES
Progress Note    SUBJECTIVE:    Patient seen for f/u of COVID-19 virus infection. She resting in bed alert oriented no acute distress. Patient denies complaints. Tolerating diet well. ROS:   Constitutional: negative  for fevers, and negative for chills. Respiratory: negative for shortness of breath, negative for cough, and negative for wheezing  Cardiovascular: negative for chest pain, and negative for palpitations  Gastrointestinal: negative for abdominal pain, negative for nausea,negative for vomiting, negative for diarrhea, and negative for constipation     All other systems were reviewed with the patient and are negative unless otherwise stated in HPI      OBJECTIVE:      Vitals:   Vitals:    11/09/21 0710   BP: 130/87   Pulse: 94   Resp: 20   Temp: 97.3 °F (36.3 °C)   SpO2: 93%     Weight: 108 lb 0.4 oz (49 kg)   Height: 4' 8\" (142.2 cm)     Weight  Wt Readings from Last 3 Encounters:   11/09/21 108 lb 0.4 oz (49 kg)   10/06/21 111 lb (50.3 kg)   04/06/21 107 lb (48.5 kg)     Body mass index is 24.22 kg/m². 24HR INTAKE/OUTPUT:      Intake/Output Summary (Last 24 hours) at 11/9/2021 1336  Last data filed at 11/9/2021 0503  Gross per 24 hour   Intake 200 ml   Output --   Net 200 ml     -----------------------------------------------------------------  Exam:    GEN:    Awake, alert and oriented x3. EYES:  EOMI, pupils equal   NECK: Supple. No lymphadenopathy. No carotid bruit  CVS:    regular rate and rhythm, no audible murmur  PULM:  CTA, no wheezes, rales or rhonchi, no acute respiratory distress  ABD:    Bowels sounds normal.  Abdomen is soft. No distention. no tenderness to palpation. EXT:   no edema bilaterally . No calf tenderness. NEURO: Moves all extremities. Motor and sensory are grossly intact  SKIN:  No rashes.   No skin lesions.    -----------------------------------------------------------------    Diagnostic Data:      Complete Blood Count:   Recent Labs     11/07/21  0615 scale  · Hypoglycemia protocol  · Nutrition status:   · at risk for malnutrition  · Dietician consult initiated  · Hospital Prophylaxis:   · DVT: Lovenox   · Stress Ulcer: H2 Blocker   · High risk medications: none   · Disposition:    · Discharge plan is pending      LAURA Ferris - CNP , LAURA, NP-C  Hospitalist Medicine        11/9/2021, 1:36 PM

## 2021-11-10 LAB
ABSOLUTE EOS #: 0 K/UL (ref 0–0.44)
ABSOLUTE IMMATURE GRANULOCYTE: 0.78 K/UL (ref 0–0.3)
ABSOLUTE LYMPH #: 1.55 K/UL (ref 1.1–3.7)
ABSOLUTE MONO #: 1.4 K/UL (ref 0.1–1.2)
ANION GAP SERPL CALCULATED.3IONS-SCNC: 11 MMOL/L (ref 9–17)
BASOPHILS # BLD: 0 % (ref 0–2)
BASOPHILS ABSOLUTE: 0 K/UL (ref 0–0.2)
BUN BLDV-MCNC: 18 MG/DL (ref 8–23)
BUN/CREAT BLD: 50 (ref 9–20)
CALCIUM SERPL-MCNC: 9.5 MG/DL (ref 8.6–10.4)
CHLORIDE BLD-SCNC: 99 MMOL/L (ref 98–107)
CO2: 27 MMOL/L (ref 20–31)
CREAT SERPL-MCNC: 0.36 MG/DL (ref 0.5–0.9)
D-DIMER QUANTITATIVE: <0.27 MG/L FEU (ref 0–0.59)
DIFFERENTIAL TYPE: ABNORMAL
EOSINOPHILS RELATIVE PERCENT: 0 % (ref 1–4)
ESTIMATED AVERAGE GLUCOSE: 154 MG/DL
GFR AFRICAN AMERICAN: >60 ML/MIN
GFR NON-AFRICAN AMERICAN: >60 ML/MIN
GFR SERPL CREATININE-BSD FRML MDRD: ABNORMAL ML/MIN/{1.73_M2}
GFR SERPL CREATININE-BSD FRML MDRD: ABNORMAL ML/MIN/{1.73_M2}
GLUCOSE BLD-MCNC: 149 MG/DL (ref 74–100)
GLUCOSE BLD-MCNC: 153 MG/DL (ref 70–99)
GLUCOSE BLD-MCNC: 170 MG/DL (ref 74–100)
GLUCOSE BLD-MCNC: 176 MG/DL (ref 74–100)
GLUCOSE BLD-MCNC: 348 MG/DL (ref 74–100)
HBA1C MFR BLD: 7 % (ref 4–6)
HCT VFR BLD CALC: 40.3 % (ref 36.3–47.1)
HEMOGLOBIN: 13 G/DL (ref 11.9–15.1)
IMMATURE GRANULOCYTES: 5 %
LYMPHOCYTES # BLD: 10 % (ref 24–43)
MCH RBC QN AUTO: 27.4 PG (ref 25.2–33.5)
MCHC RBC AUTO-ENTMCNC: 32.3 G/DL (ref 28.4–34.8)
MCV RBC AUTO: 84.8 FL (ref 82.6–102.9)
MONOCYTES # BLD: 9 % (ref 3–12)
MORPHOLOGY: NORMAL
NRBC AUTOMATED: 0 PER 100 WBC
PDW BLD-RTO: 13.4 % (ref 11.8–14.4)
PLATELET # BLD: 395 K/UL (ref 138–453)
PLATELET ESTIMATE: ABNORMAL
PMV BLD AUTO: 9.7 FL (ref 8.1–13.5)
POTASSIUM SERPL-SCNC: 4.3 MMOL/L (ref 3.7–5.3)
RBC # BLD: 4.75 M/UL (ref 3.95–5.11)
RBC # BLD: ABNORMAL 10*6/UL
SEG NEUTROPHILS: 76 % (ref 36–65)
SEGMENTED NEUTROPHILS ABSOLUTE COUNT: 11.77 K/UL (ref 1.5–8.1)
SODIUM BLD-SCNC: 137 MMOL/L (ref 135–144)
WBC # BLD: 15.5 K/UL (ref 3.5–11.3)
WBC # BLD: ABNORMAL 10*3/UL

## 2021-11-10 PROCEDURE — 1200000000 HC SEMI PRIVATE

## 2021-11-10 PROCEDURE — 97110 THERAPEUTIC EXERCISES: CPT

## 2021-11-10 PROCEDURE — 6370000000 HC RX 637 (ALT 250 FOR IP): Performed by: NURSE PRACTITIONER

## 2021-11-10 PROCEDURE — 85025 COMPLETE CBC W/AUTO DIFF WBC: CPT

## 2021-11-10 PROCEDURE — 6370000000 HC RX 637 (ALT 250 FOR IP): Performed by: INTERNAL MEDICINE

## 2021-11-10 PROCEDURE — 97530 THERAPEUTIC ACTIVITIES: CPT

## 2021-11-10 PROCEDURE — 80048 BASIC METABOLIC PNL TOTAL CA: CPT

## 2021-11-10 PROCEDURE — 6360000002 HC RX W HCPCS: Performed by: INTERNAL MEDICINE

## 2021-11-10 PROCEDURE — 85379 FIBRIN DEGRADATION QUANT: CPT

## 2021-11-10 PROCEDURE — 36415 COLL VENOUS BLD VENIPUNCTURE: CPT

## 2021-11-10 PROCEDURE — 82947 ASSAY GLUCOSE BLOOD QUANT: CPT

## 2021-11-10 PROCEDURE — 2580000003 HC RX 258: Performed by: INTERNAL MEDICINE

## 2021-11-10 PROCEDURE — 97116 GAIT TRAINING THERAPY: CPT

## 2021-11-10 RX ADMIN — ENOXAPARIN SODIUM 30 MG: 30 INJECTION SUBCUTANEOUS at 20:52

## 2021-11-10 RX ADMIN — AMLODIPINE BESYLATE 10 MG: 10 TABLET ORAL at 08:52

## 2021-11-10 RX ADMIN — INSULIN LISPRO 2 UNITS: 100 INJECTION, SOLUTION INTRAVENOUS; SUBCUTANEOUS at 08:54

## 2021-11-10 RX ADMIN — MULTIPLE VITAMINS W/ MINERALS TAB 1 TABLET: TAB at 08:52

## 2021-11-10 RX ADMIN — DEXAMETHASONE 6 MG: 4 TABLET ORAL at 08:52

## 2021-11-10 RX ADMIN — INSULIN LISPRO 10 UNITS: 100 INJECTION, SOLUTION INTRAVENOUS; SUBCUTANEOUS at 16:48

## 2021-11-10 RX ADMIN — ATORVASTATIN CALCIUM 80 MG: 40 TABLET, FILM COATED ORAL at 08:52

## 2021-11-10 RX ADMIN — OXYCODONE HYDROCHLORIDE AND ACETAMINOPHEN 1000 MG: 500 TABLET ORAL at 08:52

## 2021-11-10 RX ADMIN — OXYCODONE HYDROCHLORIDE AND ACETAMINOPHEN 1000 MG: 500 TABLET ORAL at 20:51

## 2021-11-10 RX ADMIN — ENOXAPARIN SODIUM 30 MG: 30 INJECTION SUBCUTANEOUS at 08:52

## 2021-11-10 RX ADMIN — SODIUM CHLORIDE, PRESERVATIVE FREE 10 ML: 5 INJECTION INTRAVENOUS at 09:35

## 2021-11-10 RX ADMIN — INSULIN LISPRO 2 UNITS: 100 INJECTION, SOLUTION INTRAVENOUS; SUBCUTANEOUS at 11:54

## 2021-11-10 RX ADMIN — ASPIRIN 81 MG: 81 TABLET, COATED ORAL at 08:52

## 2021-11-10 RX ADMIN — FAMOTIDINE 40 MG: 20 TABLET, FILM COATED ORAL at 20:51

## 2021-11-10 RX ADMIN — METFORMIN HYDROCHLORIDE 1000 MG: 500 TABLET, EXTENDED RELEASE ORAL at 08:52

## 2021-11-10 RX ADMIN — ZINC SULFATE 220 MG (50 MG) CAPSULE 100 MG: CAPSULE at 08:52

## 2021-11-10 RX ADMIN — FAMOTIDINE 40 MG: 20 TABLET, FILM COATED ORAL at 08:52

## 2021-11-10 RX ADMIN — SODIUM CHLORIDE, PRESERVATIVE FREE 10 ML: 5 INJECTION INTRAVENOUS at 20:52

## 2021-11-10 ASSESSMENT — PAIN SCALES - GENERAL
PAINLEVEL_OUTOF10: 0

## 2021-11-10 NOTE — PROGRESS NOTES
safety with walker placement/hand placement and too slow pace creating LOB     Transfers  Sit to stand: Minimal assistance  Stand to sit: Minimal assistance                                                                 Plan   Plan  Times per week: 7  Times per day: Daily  Current Treatment Recommendations: Self-Care / ADL, Safety Education & Training, Endurance Training, Functional Mobility Training, Strengthening  G-Code     OutComes Score                                                  AM-PAC Score             Goals  Short term goals  Time Frame for Short term goals: 20 visits  Short term goal 1: Patient to peform 10 minutes ther-ex/ther-act to increase strength and endurance with ADLs maintaining SPO2 at or above 90%. Short term goal 2: Patient to complete UB bathing/dressing with SBA. Short term goal 3: Patient to complete functional mobility/dynamic standing activities for 3 minutes to increase endurance for ADLs maintaining SPO2 at or above 90%.        Therapy Time   Individual Concurrent Group Co-treatment   Time In 1120         Time Out 1144         Minutes [address] [place], [place]

## 2021-11-10 NOTE — PROGRESS NOTES
Patient up and sitting in chair today. Was able to complete upper body washing after setup. Needed plus 1 assist for rest of ADL's. Patient able to feed self after setup.

## 2021-11-10 NOTE — PROGRESS NOTES
Progress Note    SUBJECTIVE:    Patient seen for f/u of COVID-19 virus infection. She sitting up in the chair alert oriented no acute distress. Patient denies complaints. Tolerating diet well. ROS:   Constitutional: negative  for fevers, and negative for chills. Respiratory: negative for shortness of breath, negative for cough, and negative for wheezing  Cardiovascular: negative for chest pain, and negative for palpitations  Gastrointestinal: negative for abdominal pain, negative for nausea,negative for vomiting, negative for diarrhea, and negative for constipation     All other systems were reviewed with the patient and are negative unless otherwise stated in HPI      OBJECTIVE:      Vitals:   Vitals:    11/10/21 0730   BP: (!) 147/78   Pulse: 93   Resp: 18   Temp: 97.4 °F (36.3 °C)   SpO2: 97%     Weight: 110 lb 3.4 oz (50 kg)   Height: 4' 8\" (142.2 cm)     Weight  Wt Readings from Last 3 Encounters:   11/10/21 110 lb 3.4 oz (50 kg)   10/06/21 111 lb (50.3 kg)   04/06/21 107 lb (48.5 kg)     Body mass index is 24.71 kg/m². 24HR INTAKE/OUTPUT:      Intake/Output Summary (Last 24 hours) at 11/10/2021 1248  Last data filed at 11/10/2021 0725  Gross per 24 hour   Intake 1250 ml   Output --   Net 1250 ml     -----------------------------------------------------------------  Exam:    GEN:    Awake, alert and oriented x3. EYES:  EOMI, pupils equal   NECK: Supple. No lymphadenopathy. No carotid bruit  CVS:    regular rate and rhythm, no audible murmur  PULM:  CTA, no wheezes, rales or rhonchi, no acute respiratory distress  ABD:    Bowels sounds normal.  Abdomen is soft. No distention. no tenderness to palpation. EXT:   no edema bilaterally . No calf tenderness. NEURO: Moves all extremities. Motor and sensory are grossly intact  SKIN:  No rashes.   No skin lesions.    -----------------------------------------------------------------    Diagnostic Data:      Complete Blood Count:   Recent Labs 11/08/21  0540 11/09/21  0550 11/10/21  0615   WBC 9.6 13.3* 15.5*   RBC 4.32 4.73 4.75   HGB 11.9 13.2 13.0   HCT 36.7 40.2 40.3   MCV 85.0 85.0 84.8   MCH 27.5 27.9 27.4   MCHC 32.4 32.8 32.3   RDW 13.6 13.4 13.4    372 395   MPV 10.2 10.1 9.7        Last 3 Blood Glucose:   Recent Labs     11/08/21  0540 11/09/21  0550 11/10/21  0615   GLUCOSE 269* 251* 153*        Comprehensive Metabolic Profile:   Recent Labs     11/08/21  0540 11/09/21  0550 11/10/21  0615    137 137   K 4.0 4.8 4.3    100 99   CO2 25 27 27   BUN 21 20 18   CREATININE 0.45* 0.42* 0.36*   GLUCOSE 269* 251* 153*   CALCIUM 9.3 9.6 9.5        Urinalysis:   Lab Results   Component Value Date    NITRU NEGATIVE 11/04/2021    COLORU Yellow 11/04/2021    PHUR 6.0 11/04/2021    WBCUA None 11/04/2021    RBCUA 0 TO 2 11/04/2021    MUCUS TRACE 11/04/2021    TRICHOMONAS NOT REPORTED 11/04/2021    YEAST NOT REPORTED 11/04/2021    BACTERIA 3+ 11/04/2021    SPECGRAV 1.020 11/04/2021    LEUKOCYTESUR NEGATIVE 11/04/2021    UROBILINOGEN Normal 11/04/2021    BILIRUBINUR NEGATIVE 11/04/2021    GLUCOSEU NEGATIVE 11/04/2021    KETUA 1+ 11/04/2021    AMORPHOUS NOT REPORTED 11/04/2021       HgBA1c:    Lab Results   Component Value Date    LABA1C 7.0 11/09/2021       Lactic Acid: No results found for: LACTA     Troponin: No results for input(s): TROPONINI in the last 72 hours. Radiology/Imaging:  CT Head WO Contrast   Final Result   No acute intracranial abnormality. Diffuse atrophic changes with findings suggesting chronic microvascular   ischemia         XR CHEST PORTABLE   Final Result   No acute cardiopulmonary disease.                ASSESSMENT / PLAN:  COVID-19 virus infection  · Continue current therapy   · Continue dexamethasone  · Continue vitamin C, D and zinc  · Trend labs  · Generalized weakness  · PT/OT  · Fall precautions  · Type 2 diabetes  · Continue Glucophage XR  · POCT before meals and at bedtime  · Medium dose sliding scale  · Hypoglycemia protocol  · Nutrition status:   · at risk for malnutrition  · Dietician consult initiated  · Hospital Prophylaxis:   · DVT: Lovenox   · Stress Ulcer: H2 Blocker   · High risk medications: none   · Disposition:    · Discharge plan is home tomorrow with [address] [place], [place] [address], APRN - CNP , APRN, NP-C  Hospitalist Medicine        11/10/2021, 12:48 PM

## 2021-11-10 NOTE — PROGRESS NOTES
Writer at bedside for shift assessment. Patient sitting up in chair, respirations are even and unlabored while on room air. Vitals obtained and assessment completed, see flowsheet for details. Pt states she needed her brief changed. Writer assessed patient in changing her brief and cleaning her up. pt denies further needs at this time. Call light in reach, will continue to monitor.

## 2021-11-10 NOTE — PROGRESS NOTES
assistance with AD negotiation. Pt with 3-4 LOB while ambulating requiring Alessio/modA to correct. Stairs/Curb  Stairs?: No     Balance  Posture: Fair  Sitting - Static: Good  Sitting - Dynamic: Fair; +  Standing - Static: Fair; -  Standing - Dynamic: Fair; -; Poor; +  Exercises  Straight Leg Raise: 20x  Heelslides: 20x  Knee Long Arc Quad: 20x  Knee Short Arc Quad: 20x  Ankle Pumps: 20x  Comments: Above exercises completed while reclined in chair. AAROM as needed. G-Code     OutComes Score    AM-PAC Score    Goals  Short term goals  Time Frame for Short term goals: 20 days  Short term goal 1: Patient to complete sit/stand and stand pivot transfers with SUP and no LOB to decrease fall risk. Short term goal 2: Patient to ambulate 150ft with FWW and CGA with no LOB to improve functional endurance. Short term goal 3: Patient to tolerate 20-30 min of ther ex/act to improve functional strength. Short term goal 4: Patient to have F+ static standing balance to decrease fall risk. Plan    Plan  Times per week: 7  Times per day: Twice a day  Current Treatment Recommendations: Strengthening, Neuromuscular Re-education, Home Exercise Program, ROM, Safety Education & Training, Balance Training, Endurance Training, Patient/Caregiver Education & Training, Functional Mobility Training, Transfer Training, Gait Training  Safety Devices  Type of devices:  All fall risk precautions in place, Call light within reach, Chair alarm in place, Gait belt, Left in chair, Nurse notified, Patient at risk for falls     Therapy Time   Individual Concurrent Group Co-treatment   Time In 1406         Time Out [place], [place]

## 2021-11-10 NOTE — PROGRESS NOTES
Physical Therapy  Facility/Department: [facility] AT THE [facility] MED SURG  Daily Treatment Note  NAME: Owen Bahena  : 1950  MRN: [id number]    Date of Service: 11/10/2021    Discharge Recommendations:  Continue to assess pending progress, Home with Home health PT, Home with assist PRN        Assessment   Treatment Diagnosis: Difficulty walking  Prognosis: Good  Decision Making: Medium Complexity  PT Education: Gait Training; General Safety  Patient Education: Pt educated on above with fair understanding  REQUIRES PT FOLLOW UP: Yes  Activity Tolerance  Activity Tolerance: Patient Tolerated treatment well; Patient limited by cognitive status     Patient Diagnosis(es): The encounter diagnosis was General weakness. has a past medical history of Hyperlipidemia, Hypertension, Left pontine cerebrovascular accident [facility]), Stroke ([facility] [address] and Type 2 diabetes mellitus without complication, without long-term current use of insulin [facility] has a past surgical history that includes Cataract removal (Right, ). Restrictions  Restrictions/Precautions  Restrictions/Precautions: General Precautions, Fall Risk, Isolation  Subjective   General  Chart Reviewed: Yes  Response To Previous Treatment: Patient with no complaints from previous session. Family / Caregiver Present: No  Referring Practitioner: Dr. Kendell Lynne MD  Subjective  Subjective: Pt denies pain today, pt pleasant and agreeable to therapy.  Pt. up in chair upon arrival.  Pain Screening  Patient Currently in Pain: Denies  Vital Signs  Patient Currently in Pain: Denies       Orientation  Orientation  Overall Orientation Status: Within Functional Limits  Cognition      Objective         Ambulation  Ambulation?: No        Exercises  Comments: Supine and seated exercises B LE x15      G-Code     OutComes Score    AM-PAC Score    Goals  Short term goals  Time Frame for Short term goals: 20 days  Short term goal 1: Patient to complete sit/stand and stand pivot transfers with SUP and no LOB to decrease fall risk. Short term goal 2: Patient to ambulate 150ft with FWW and CGA with no LOB to improve functional endurance. Short term goal 3: Patient to tolerate 20-30 min of ther ex/act to improve functional strength. Short term goal 4: Patient to have F+ static standing balance to decrease fall risk. Plan    Plan  Times per week: 7  Times per day: Twice a day  Current Treatment Recommendations: Strengthening, Neuromuscular Re-education, Home Exercise Program, ROM, Safety Education & Training, Balance Training, Endurance Training, Patient/Caregiver Education & Training, Functional Mobility Training, Transfer Training, Gait Training  Safety Devices  Type of devices:  All fall risk precautions in place, Call light within reach, Chair alarm in place, Gait belt, Left in chair, Nurse notified, Patient at risk for falls     Therapy Time   Individual Concurrent Group Co-treatment   Time In 0831         Time Out 0846         Minutes [address] [place] [address]

## 2021-11-10 NOTE — PROGRESS NOTES
Patient requesting to go to bed. Writer gave patient night time medications, see MAR for details. Writer also helped patient back to bed. Pt denies further needs at this time, Call light within reach, bed alarm on, will continue to monitor.

## 2021-11-11 VITALS
SYSTOLIC BLOOD PRESSURE: 105 MMHG | DIASTOLIC BLOOD PRESSURE: 75 MMHG | WEIGHT: 108.25 LBS | RESPIRATION RATE: 16 BRPM | HEART RATE: 113 BPM | BODY MASS INDEX: 24.35 KG/M2 | TEMPERATURE: 97.8 F | HEIGHT: 56 IN | OXYGEN SATURATION: 98 %

## 2021-11-11 LAB
ABSOLUTE EOS #: 0.14 K/UL (ref 0–0.44)
ABSOLUTE IMMATURE GRANULOCYTE: 0.29 K/UL (ref 0–0.3)
ABSOLUTE LYMPH #: 1.72 K/UL (ref 1.1–3.7)
ABSOLUTE MONO #: 0.72 K/UL (ref 0.1–1.2)
ANION GAP SERPL CALCULATED.3IONS-SCNC: 12 MMOL/L (ref 9–17)
BASOPHILS # BLD: 0 % (ref 0–2)
BASOPHILS ABSOLUTE: 0 K/UL (ref 0–0.2)
BUN BLDV-MCNC: 19 MG/DL (ref 8–23)
BUN/CREAT BLD: 40 (ref 9–20)
CALCIUM SERPL-MCNC: 9.3 MG/DL (ref 8.6–10.4)
CHLORIDE BLD-SCNC: 99 MMOL/L (ref 98–107)
CO2: 24 MMOL/L (ref 20–31)
CREAT SERPL-MCNC: 0.48 MG/DL (ref 0.5–0.9)
D-DIMER QUANTITATIVE: <0.27 MG/L FEU (ref 0–0.59)
DIFFERENTIAL TYPE: ABNORMAL
EOSINOPHILS RELATIVE PERCENT: 1 % (ref 1–4)
GFR AFRICAN AMERICAN: >60 ML/MIN
GFR NON-AFRICAN AMERICAN: >60 ML/MIN
GFR SERPL CREATININE-BSD FRML MDRD: ABNORMAL ML/MIN/{1.73_M2}
GFR SERPL CREATININE-BSD FRML MDRD: ABNORMAL ML/MIN/{1.73_M2}
GLUCOSE BLD-MCNC: 150 MG/DL (ref 70–99)
GLUCOSE BLD-MCNC: 150 MG/DL (ref 74–100)
GLUCOSE BLD-MCNC: 221 MG/DL (ref 74–100)
HCT VFR BLD CALC: 37.8 % (ref 36.3–47.1)
HEMOGLOBIN: 12.6 G/DL (ref 11.9–15.1)
IMMATURE GRANULOCYTES: 2 %
LYMPHOCYTES # BLD: 12 % (ref 24–43)
MCH RBC QN AUTO: 28.2 PG (ref 25.2–33.5)
MCHC RBC AUTO-ENTMCNC: 33.3 G/DL (ref 28.4–34.8)
MCV RBC AUTO: 84.6 FL (ref 82.6–102.9)
MONOCYTES # BLD: 5 % (ref 3–12)
MORPHOLOGY: ABNORMAL
NRBC AUTOMATED: 0.1 PER 100 WBC
PDW BLD-RTO: 13.8 % (ref 11.8–14.4)
PLATELET # BLD: 382 K/UL (ref 138–453)
PLATELET ESTIMATE: ABNORMAL
PMV BLD AUTO: 9.9 FL (ref 8.1–13.5)
POTASSIUM SERPL-SCNC: 3.9 MMOL/L (ref 3.7–5.3)
RBC # BLD: 4.47 M/UL (ref 3.95–5.11)
RBC # BLD: ABNORMAL 10*6/UL
SEG NEUTROPHILS: 80 % (ref 36–65)
SEGMENTED NEUTROPHILS ABSOLUTE COUNT: 11.43 K/UL (ref 1.5–8.1)
SODIUM BLD-SCNC: 135 MMOL/L (ref 135–144)
WBC # BLD: 14.3 K/UL (ref 3.5–11.3)
WBC # BLD: ABNORMAL 10*3/UL

## 2021-11-11 PROCEDURE — 97112 NEUROMUSCULAR REEDUCATION: CPT

## 2021-11-11 PROCEDURE — 82947 ASSAY GLUCOSE BLOOD QUANT: CPT

## 2021-11-11 PROCEDURE — 6370000000 HC RX 637 (ALT 250 FOR IP): Performed by: INTERNAL MEDICINE

## 2021-11-11 PROCEDURE — 6370000000 HC RX 637 (ALT 250 FOR IP): Performed by: NURSE PRACTITIONER

## 2021-11-11 PROCEDURE — 85379 FIBRIN DEGRADATION QUANT: CPT

## 2021-11-11 PROCEDURE — 6360000002 HC RX W HCPCS: Performed by: INTERNAL MEDICINE

## 2021-11-11 PROCEDURE — 97110 THERAPEUTIC EXERCISES: CPT

## 2021-11-11 PROCEDURE — 97116 GAIT TRAINING THERAPY: CPT

## 2021-11-11 PROCEDURE — 80048 BASIC METABOLIC PNL TOTAL CA: CPT

## 2021-11-11 PROCEDURE — 85025 COMPLETE CBC W/AUTO DIFF WBC: CPT

## 2021-11-11 PROCEDURE — 36415 COLL VENOUS BLD VENIPUNCTURE: CPT

## 2021-11-11 PROCEDURE — 97530 THERAPEUTIC ACTIVITIES: CPT

## 2021-11-11 PROCEDURE — 2580000003 HC RX 258: Performed by: INTERNAL MEDICINE

## 2021-11-11 RX ORDER — MELATONIN
1000 DAILY
Qty: 30 TABLET | Refills: 0 | Status: SHIPPED | OUTPATIENT
Start: 2021-11-11

## 2021-11-11 RX ORDER — ZINC SULFATE 50(220)MG
100 CAPSULE ORAL DAILY
Qty: 28 CAPSULE | Refills: 0 | COMMUNITY
Start: 2021-11-12 | End: 2021-11-26

## 2021-11-11 RX ADMIN — INSULIN LISPRO 2 UNITS: 100 INJECTION, SOLUTION INTRAVENOUS; SUBCUTANEOUS at 09:11

## 2021-11-11 RX ADMIN — ATORVASTATIN CALCIUM 80 MG: 40 TABLET, FILM COATED ORAL at 09:13

## 2021-11-11 RX ADMIN — SODIUM CHLORIDE, PRESERVATIVE FREE 10 ML: 5 INJECTION INTRAVENOUS at 09:11

## 2021-11-11 RX ADMIN — DEXAMETHASONE 6 MG: 4 TABLET ORAL at 09:12

## 2021-11-11 RX ADMIN — ZINC SULFATE 220 MG (50 MG) CAPSULE 100 MG: CAPSULE at 09:12

## 2021-11-11 RX ADMIN — OXYCODONE HYDROCHLORIDE AND ACETAMINOPHEN 1000 MG: 500 TABLET ORAL at 09:12

## 2021-11-11 RX ADMIN — FAMOTIDINE 40 MG: 20 TABLET, FILM COATED ORAL at 09:13

## 2021-11-11 RX ADMIN — METFORMIN HYDROCHLORIDE 1000 MG: 500 TABLET, EXTENDED RELEASE ORAL at 09:12

## 2021-11-11 RX ADMIN — INSULIN LISPRO 4 UNITS: 100 INJECTION, SOLUTION INTRAVENOUS; SUBCUTANEOUS at 12:26

## 2021-11-11 RX ADMIN — MULTIPLE VITAMINS W/ MINERALS TAB 1 TABLET: TAB at 09:13

## 2021-11-11 RX ADMIN — ENOXAPARIN SODIUM 30 MG: 30 INJECTION SUBCUTANEOUS at 09:18

## 2021-11-11 RX ADMIN — ASPIRIN 81 MG: 81 TABLET, COATED ORAL at 09:13

## 2021-11-11 ASSESSMENT — PAIN SCALES - GENERAL: PAINLEVEL_OUTOF10: 0

## 2021-11-11 NOTE — PROGRESS NOTES
Physical Therapy  Facility/Department: [facility] AT THE [facility] MED SURG  Daily Treatment Note  NAME: Justin Hastings  : 1950  MRN: [id number]    Date of Service: 2021    Discharge Recommendations:  Continue to assess pending progress, Home with Home health PT, Home with assist PRN        Assessment   Treatment Diagnosis: Difficulty walking  Prognosis: Good  Decision Making: Medium Complexity  PT Education: Gait Training; General Safety; Goals; Plan of Care; Transfer Training  REQUIRES PT FOLLOW UP: Yes  Activity Tolerance  Activity Tolerance: Patient Tolerated treatment well; Patient limited by cognitive status     Patient Diagnosis(es): The primary encounter diagnosis was General weakness. A diagnosis of COVID-19 virus infection was also pertinent to this visit. has a past medical history of Hyperlipidemia, Hypertension, Left pontine cerebrovascular accident [facility]), Stroke ([facility] [address] and Type 2 diabetes mellitus without complication, without long-term current use of [facility] has a past surgical history that includes Cataract removal (Right, ). Restrictions  Restrictions/Precautions  Restrictions/Precautions: General Precautions, Fall Risk, Isolation  Subjective   General  Chart Reviewed: Yes  Response To Previous Treatment: Patient with no complaints from previous session. Family / Caregiver Present: No  Referring Practitioner: Dr. Angeline Sebastian MD  Subjective  Subjective: No pain reported, pt. agreeable to therapy at this time. Orientation  Orientation  Overall Orientation Status: Within Functional Limits  Cognition      Objective      Transfers  Sit to Stand: Contact guard assistance; Minimal Assistance  Stand to sit: Contact guard assistance; Minimal Assistance  Ambulation  Ambulation?: Yes  Ambulation 1  Surface: level tile  Device: Rolling Walker  Assistance: Minimal assistance  Quality of Gait: Staggers, moderate path deviations, forward flexed posture, slow nohemi.   Gait Deviations: Slow Nohemi; Osito All; Shuffles  Distance: 25ftx1  Comments: Pt required assistance with AD negotiation. Stairs/Curb  Stairs?: No        Exercises  Comments: Supine and seated exercises B LE x20      G-Code     OutComes Score    AM-PAC Score    Goals  Short term goals  Time Frame for Short term goals: 20 days  Short term goal 1: Patient to complete sit/stand and stand pivot transfers with SUP and no LOB to decrease fall risk. Short term goal 2: Patient to ambulate 150ft with FWW and CGA with no LOB to improve functional endurance. Short term goal 3: Patient to tolerate 20-30 min of ther ex/act to improve functional strength. Short term goal 4: Patient to have F+ static standing balance to decrease fall risk. Plan    Plan  Times per week: 7  Times per day: Twice a day  Current Treatment Recommendations: Strengthening, Neuromuscular Re-education, Home Exercise Program, ROM, Safety Education & Training, Balance Training, Endurance Training, Patient/Caregiver Education & Training, Functional Mobility Training, Transfer Training, Gait Training  Safety Devices  Type of devices:  All fall risk precautions in place, Call light within reach, Chair alarm in place, Gait belt, Left in chair, Nurse notified, Patient at risk for falls     Therapy Time   Individual Concurrent Group Co-treatment   Time In 0923         Time Out 0946         Minutes [address], [place]

## 2021-11-11 NOTE — PROGRESS NOTES
Pt laying in bed with eyes closed. Pt awakens easily. Vitals and assessment as charted. Writer changed patients brief at this time. Call light within reach. Bed alarm on.

## 2021-11-11 NOTE — PROGRESS NOTES
Pt sitting up in chair when writer entered the room. Pt is A&O x4. Vitals and assessment as charted. Pt denies pain. Pt denies any further needs. Call light within reach. Chair alarm on.

## 2021-11-11 NOTE — PLAN OF CARE
Problem: Airway Clearance - Ineffective  Goal: Achieve or maintain patent airway  Outcome: Completed     Problem: Gas Exchange - Impaired  Goal: Absence of hypoxia  Outcome: Completed  Goal: Promote optimal lung function  Outcome: Completed     Problem: Breathing Pattern - Ineffective  Goal: Ability to achieve and maintain a regular respiratory rate  Outcome: Completed     Problem:  Body Temperature -  Risk of, Imbalanced  Goal: Ability to maintain a body temperature within defined limits  Outcome: Completed  Goal: Will regain or maintain usual level of consciousness  Outcome: Completed  Goal: Complications related to the disease process, condition or treatment will be avoided or minimized  Outcome: Completed     Problem: Isolation Precautions - Risk of Spread of Infection  Goal: Prevent transmission of infection  Outcome: Completed     Problem: Nutrition Deficits  Goal: Optimize nutritional status  Outcome: Completed     Problem: Risk for Fluid Volume Deficit  Goal: Maintain normal heart rhythm  Outcome: Completed  Goal: Maintain absence of muscle cramping  Outcome: Completed  Goal: Maintain normal serum potassium, sodium, calcium, phosphorus, and pH  Outcome: Completed     Problem: Loneliness or Risk for Loneliness  Goal: Demonstrate positive use of time alone when socialization is not possible  Outcome: Completed     Problem: Fatigue  Goal: Verbalize increase energy and improved vitality  Outcome: Completed     Problem: Patient Education: Go to Patient Education Activity  Goal: Patient/Family Education  Outcome: Completed     Problem: Falls - Risk of:  Goal: Will remain free from falls  Description: Will remain free from falls  Outcome: Completed  Goal: Absence of physical injury  Description: Absence of physical injury  Outcome: Completed     Problem: Nutrition  Goal: Optimal nutrition therapy  Outcome: Completed     Problem: Skin Integrity:  Goal: Will show no infection signs and symptoms  Description: Will show no infection signs and symptoms  Outcome: Completed  Goal: Absence of new skin breakdown  Description: Absence of new skin breakdown  Outcome: Completed

## 2021-11-11 NOTE — DISCHARGE INSTR - COC
Continuity of Care Form    Patient Name: Armando Monsalve   :  1950  MRN:  [id number]    Admit date:  2021  Discharge date: 21    Code Status Order: Full Code   Advance Directives:      Admitting Physician:  Mandeep Jimenez MD  PCP: Chapis Martines MD    Discharging Nurse: [facility]/Room#: 0321/0321-01  Discharging Unit Phone Number: [phone]    Emergency Contact:   Extended Emergency Contact Information  Primary Emergency Contact: Ulysses Raymond  Address: [address] [place] [address],[address]           [address] Phone: [phone]  Relation: Spouse  Hearing or visual needs: None  Other needs: None  Preferred language: English   needed?  No  Secondary Emergency Contact: Jovon Huang  Home Phone: [phone]  Relation: Child    Past Surgical History:  Past Surgical History:   Procedure Laterality Date    CATARACT REMOVAL Right        Immunization History:   Immunization History   Administered Date(s) Administered    Influenza Vaccine, unspecified formulation 10/14/2016    Influenza Virus Vaccine 10/14/2016    Influenza, High-dose, Quadv, 65 yrs +, IM (Fluzone) 10/02/2020, 10/06/2021    Influenza, Quadv, 6 mo and older, IM (Fluzone, Flulaval) 10/13/2017    Pneumococcal Conjugate 13-valent (Harry Brunner) 2017    Pneumococcal Polysaccharide ([address]) 2019       Active Problems:  Patient Active Problem List   Diagnosis Code    Left pontine cerebrovascular accident [facility]) /   I63.9    Type 2 diabetes mellitus without complication, with long-term current use of insulin ([facility] /  DIET CONTROLLED E11.9, Z79.4    Generalized weakness R53.1    COVID-19 virus infection U07.1       Isolation/Infection:   Isolation            Droplet Plus          Patient Infection Status       Infection Onset Added Last Indicated Last Indicated By Review Planned Expiration Resolved Resolved By    COVID-19 10/31/21 11/04/21 11/04/21 COVID-19, Rapid 11/19/21 11/18/21      S/S 10/31    Resolved    COVID-19 Rule Out 11/04/21 11/04/21 11/04/21 COVID-19, Rapid (Ordered)   11/04/21 Rule-Out Test Resulted            Nurse Assessment:  Last Vital Signs: /75   Pulse 113   Temp 97.8 °F (36.6 °C) (Oral)   Resp 16   Ht 4' 8\" (1.422 m)   Wt 108 lb 3.9 oz (49.1 kg)   SpO2 98%   BMI 24.27 kg/m²     Last documented pain score (0-10 scale): Pain Level: 0  Last Weight:   Wt Readings from Last 1 Encounters:   11/11/21 108 lb 3.9 oz (49.1 kg)     Mental Status:  {IP PT MENTAL STATUS:20030}    IV Access:  { KATHY IV ACCESS:078973498}    Nursing Mobility/ADLs:  Walking   {P DME VOZZ:404483977}  Transfer  {P DME WKTZ:200556888}  Bathing  {P DME ZUZQ:322878817}  Dressing  {P DME HOHL:483926810}  Toileting  {P DME GKJP:491677188}  Feeding  {P DME VQAU:827040415}  Med Admin  {P DME GUEV:759278051}  Med Delivery   { KATHY MED Delivery:698753449}    Wound Care Documentation and Therapy:        Elimination:  Continence: Bowel: {YES / JOHN:16584}  Bladder: {YES / KM:09352}  Urinary Catheter: {Urinary Catheter:439824537}   Colostomy/Ileostomy/Ileal Conduit: {YES / SM:99846}       Date of Last BM: 11/11    Intake/Output Summary (Last 24 hours) at 11/11/2021 1357  Last data filed at 11/11/2021 1246  Gross per 24 hour   Intake 1670 ml   Output --   Net 1670 ml     I/O last 3 completed shifts:   In: 26 [P.O.:1620]  Out: -     Safety Concerns:     [employer] Safety Concerns:821009303}    Impairments/Disabilities:      [address] [employer] Impairments/Disabilities:962725541}    Nutrition Therapy:  Current Nutrition Therapy:   [employer] Diet List:885842827}    Routes of Feeding: {CHP DME Other Feedings:889823866}  Liquids: {Slp liquid thickness:70485}  Daily Fluid Restriction: {CHP DME Yes amt example:257198441}  Last Modified Barium Swallow with Video (Video Swallowing Test): {Done Not Done IRZQ:685212781}    Treatments at the Time of Hospital Discharge:   Respiratory Treatments:

## 2021-11-11 NOTE — PROGRESS NOTES
Inform [facility] of patient's discharge to home today.     STEFAN Figueroa

## 2021-11-11 NOTE — PROGRESS NOTES
Pt in chair, just finished breakfast, vitals WNL and on RA, no pain, checked pts iv it was bad and wouldn't flush so it was removed, pt has no further needs at [facility] time

## 2021-11-11 NOTE — DISCHARGE SUMMARY
Discharge Summary    Yoko Munroe  :  1950  MRN:  [id number]    Admit date:  2021      Discharge date: 2021     Admitting Physician:  Kerline Rain MD    Discharge Diagnoses:    Principal Problem:    COVID-19 virus infection  Active Problems:    Type 2 diabetes mellitus without complication, with long-term current use of insulin ([address] /  DIET CONTROLLED    Generalized weakness  Resolved Problems:    * No resolved hospital problems. *      Hospital Course:   Yoko Munroe is a 73 y.o. female admitted with COVID-19 virus infection. She presented to the emergency room for evaluation of generalized weakness. She stated that she was unable to care for herself. She complained of some cough and cold symptoms. Patient complained of myalgias and stated she was exposed to Covid by her . Patient is not vaccinated for Covid. Patient does have history of left CVA in the past.  Due to concerns of increased weakness patient was evaluated and found to be positive for COVID-19. Patient was not hypoxic. Hemodynamically she is stable. Due to her  being ill and not being able to care for the patient patient was admitted and PT and OT were consulted. Patient did not require oxygen throughout her stay. She was placed on dexamethasone but did not qualify for Actemra or remdesivir. Patient was placed on vitamin C, D and zinc.  Patient condition has improved and will be discharged home today with [facility] home care for PT OT. Patient will continue with vitamin C, D and zinc for 14 days. Consultants:  none    Procedures: none    Complications: none    Discharge Condition: fair    Exam:  GEN:    Awake, alert and oriented x3. EYES:   EOMI, pupils equal   NECK: Supple. No lymphadenopathy. No carotid bruit  CVS:     regular rate and rhythm, no audible murmur  PULM:  CTA, no wheezes, rales or rhonchi, no acute respiratory distress  ABD:     Bowels sounds normal.  Abdomen is soft. No distention. no tenderness to palpation. EXT:     no edema bilaterally . No calf tenderness. NEURO: Moves all extremities. Motor and sensory are grossly intact  SKIN:    No rashes. No skin lesions      Significant Diagnostic Studies:   Lab Results   Component Value Date    WBC 14.3 (H) 11/11/2021    HGB 12.6 11/11/2021     11/11/2021       Lab Results   Component Value Date    BUN 19 11/11/2021    CREATININE 0.48 (L) 11/11/2021     11/11/2021    K 3.9 11/11/2021    CALCIUM 9.3 11/11/2021    CL 99 11/11/2021    CO2 24 11/11/2021    LABGLOM >60 11/11/2021       Lab Results   Component Value Date    WBCUA None 11/04/2021    RBCUA 0 TO 2 11/04/2021    EPITHUA 2 TO 5 11/04/2021    LEUKOCYTESUR NEGATIVE 11/04/2021    SPECGRAV 1.020 11/04/2021    GLUCOSEU NEGATIVE 11/04/2021    KETUA 1+ (A) 11/04/2021    PROTEINU NEGATIVE 11/04/2021    HGBUR NEGATIVE 11/04/2021    CASTUA NOT REPORTED 11/04/2021    CRYSTUA 0 TO 2 CALCIUM OXALATE (A) 11/04/2021    BACTERIA 3+ (A) 11/04/2021    YEAST NOT REPORTED 11/04/2021       CT Head WO Contrast    Result Date: 11/4/2021  EXAMINATION: CT OF THE HEAD WITHOUT CONTRAST  11/4/2021 3:43 pm TECHNIQUE: CT of the head was performed without the administration of intravenous contrast. Dose modulation, iterative reconstruction, and/or weight based adjustment of the mA/kV was utilized to reduce the radiation dose to as low as reasonably achievable. COMPARISON: 03/05/2017 HISTORY: ORDERING SYSTEM PROVIDED HISTORY: altered mental status TECHNOLOGIST PROVIDED HISTORY: altered mental status Decision Support Exception - unselect if not a suspected or confirmed emergency medical condition->Emergency Medical Condition (MA) FINDINGS: BRAIN/VENTRICLES: The ventricles and sulci are diffusely enlarged. Low attenuation is seen in the periventricular and subcortical white matter. No acute intracranial hemorrhage or acute infarct is identified.  ORBITS: The visualized portion of the orbits demonstrate no acute abnormality. SINUSES: The visualized paranasal sinuses and mastoid air cells demonstrate no acute abnormality. SOFT TISSUES/SKULL:  No acute abnormality of the visualized skull or soft tissues. No acute intracranial abnormality. Diffuse atrophic changes with findings suggesting chronic microvascular ischemia     XR CHEST PORTABLE    Result Date: 11/4/2021  EXAMINATION: ONE XRAY VIEW OF THE CHEST 11/4/2021 3:25 pm COMPARISON: Chest x-ray from 03/05/2017 HISTORY: ORDERING SYSTEM PROVIDED HISTORY: altered mental status TECHNOLOGIST PROVIDED HISTORY: altered mental status History of diabetes, stroke, and hypertension. FINDINGS: Overlying ECG monitor leads and gown snaps. Cardiac silhouette WNL in size for AP technique. Mediastinal structures midline unchanged. Somewhat low lung volumes with probable mild bibasilar atelectasis or scarring, similar by comparison. No localized pulmonary opacity, pneumothorax or blunting of the costophrenic angles. Moderate slightly increased reversed S scoliosis thoracolumbar spine. Osteopenia. DJD spine and shoulders with probable chronic rotator cuff tears bilaterally. No acute cardiopulmonary disease.        Assessment and Plan:  Patient Active Problem List    Diagnosis Date Noted    COVID-19 virus infection 11/05/2021    Generalized weakness 11/04/2021    Type 2 diabetes mellitus without complication, with long-term current use of insulin ([facility] /  DIET CONTROLLED 05/16/2017    Left pontine cerebrovascular accident [facility]) /  2017         Discharge Medications:         Medication List      START taking these medications    ascorbic acid 1000 MG tablet  Commonly known as: VITAMIN C  Take 1 tablet by mouth 2 times daily for 14 days     vitamin D3 25 MCG (1000 UT) Tabs tablet  Commonly known as: CHOLECALCIFEROL  Take 1 tablet by mouth daily     zinc sulfate 220 (50 Zn) MG capsule  Commonly known as: ZINCATE  Take 2 capsules by mouth daily for 14 days  Start taking on: November 12, 2021        CONTINUE taking these medications    Alcohol Swabs 70 % Pads  1 Units by Does not apply route 3 times daily (with meals)     amLODIPine 10 MG tablet  Commonly known as: NORVASC  Take 1 tablet by mouth every other day     aspirin 81 MG EC tablet  Take 1 tablet by mouth daily     atorvastatin 80 MG tablet  Commonly known as: LIPITOR  Take 1 tablet by mouth daily     blood glucose test strips strip  Commonly known as: ASCENSIA AUTODISC VI;ONE TOUCH ULTRA TEST VI  1 each by In Vitro route daily As needed. glucose monitoring kit  1 kit by Does not apply route 3 times daily     Handicap Placard Misc  by Does not apply route Dx: stroke    Duration: 5 years     metFORMIN 500 MG extended release tablet  Commonly known as: GLUCOPHAGE-XR  Take 2 tablets by mouth daily (with breakfast)     SOFT TOUCH LANCETS Misc  1 Units by Does not apply route 3 times daily (with meals)     therapeutic multivitamin-minerals tablet     UNABLE TO FIND  Manual wheelchair    Diagnosis: Stroke        STOP taking these medications    PEN NEEDLES 31GX5/16\" 31G X 8 MM Misc           Where to Get Your Medications      These medications were sent to [address] [phone]  [facility] [address]    Phone: [phone]   · ascorbic acid 1000 MG tablet  · vitamin D3 25 MCG (1000 UT) Tabs tablet     You can get these medications from any pharmacy    You don't need a prescription for these medications  · zinc sulfate 220 (50 Zn) MG capsule         Patient Instructions:    Activity: activity as tolerated  Diet: regular diet  Wound Care: none needed  Other: None    Disposition:   Discharge to Home with home health    Follow up:  Patient will be followed by Hortencia Marin MD in 1-2 weeks    CORE MEASURES on Discharge (if applicable)  ACE/ARB in CHF: NA  Statin in MI: NA  ASA in MI: NA  Statin in CVA: NA  Antiplatelet in CVA: NA    Total time spent on discharge services: 40 minutes    Including the following activities:  Evaluation and Management of patient  Discussion with patient and/or surrogate about current care plan  Coordination with Case Management and/or   Coordination of care with Consultants (if applicable)   Coordination of care with Receiving Facility Physician (if applicable)  Completion of DME forms (if applicable)  Preparation of Discharge Summary  Preparation of Medication Reconciliation  Preparation of Discharge Prescriptions    Signed:  LAURA Sherman - CNP, LAURA, NP-C  11/11/2021, 11:25 AM

## 2021-11-11 NOTE — PROGRESS NOTES
Data:      Complete Blood Count:   Recent Labs     11/09/21  0550 11/10/21  0615 11/11/21  0610   WBC 13.3* 15.5* 14.3*   RBC 4.73 4.75 4.47   HGB 13.2 13.0 12.6   HCT 40.2 40.3 37.8   MCV 85.0 84.8 84.6   MCH 27.9 27.4 28.2   MCHC 32.8 32.3 33.3   RDW 13.4 13.4 13.8    395 382   MPV 10.1 9.7 9.9        Last 3 Blood Glucose:   Recent Labs     11/09/21  0550 11/10/21  0615 11/11/21  0610   GLUCOSE 251* 153* 150*        Comprehensive Metabolic Profile:   Recent Labs     11/09/21  0550 11/10/21  0615 11/11/21  0610    137 135   K 4.8 4.3 3.9    99 99   CO2 27 27 24   BUN 20 18 19   CREATININE 0.42* 0.36* 0.48*   GLUCOSE 251* 153* 150*   CALCIUM 9.6 9.5 9.3        Urinalysis:   Lab Results   Component Value Date    NITRU NEGATIVE 11/04/2021    COLORU Yellow 11/04/2021    PHUR 6.0 11/04/2021    WBCUA None 11/04/2021    RBCUA 0 TO 2 11/04/2021    MUCUS TRACE 11/04/2021    TRICHOMONAS NOT REPORTED 11/04/2021    YEAST NOT REPORTED 11/04/2021    BACTERIA 3+ 11/04/2021    SPECGRAV 1.020 11/04/2021    LEUKOCYTESUR NEGATIVE 11/04/2021    UROBILINOGEN Normal 11/04/2021    BILIRUBINUR NEGATIVE 11/04/2021    GLUCOSEU NEGATIVE 11/04/2021    KETUA 1+ 11/04/2021    AMORPHOUS NOT REPORTED 11/04/2021       HgBA1c:    Lab Results   Component Value Date    LABA1C 7.0 11/09/2021       Lactic Acid: No results found for: LACTA     Troponin: No results for input(s): TROPONINI in the last 72 hours. Radiology/Imaging:  CT Head WO Contrast   Final Result   No acute intracranial abnormality. Diffuse atrophic changes with findings suggesting chronic microvascular   ischemia         XR CHEST PORTABLE   Final Result   No acute cardiopulmonary disease.                ASSESSMENT / PLAN:  COVID-19 virus infection  · Continue current therapy   · Continue dexamethasone  · Continue vitamin C, D and zinc  · Trend labs  · Generalized weakness  · PT/OT  · Fall precautions  · Type 2 diabetes  · Continue Glucophage XR  · POCT before meals and at bedtime  · Medium dose sliding scale  · Hypoglycemia protocol  · Nutrition status:   · at risk for malnutrition  · Dietician consult initiated  · Hospital Prophylaxis:   · DVT: Lovenox   · Stress Ulcer: H2 Blocker   · High risk medications: none   · Disposition:    · Discharge plan is home [address] [place] [address], APRN - CNP , APRN, NP-C  Hospitalist Medicine        11/11/2021, 8:13 AM

## 2021-11-11 NOTE — PROGRESS NOTES
Fed pt her lunch and told her I would be back to get her dressed, once I did her d/c stuff and called her .  Pt was taken off monitors and iv is out

## 2021-11-11 NOTE — PROGRESS NOTES
Physical Therapy  Facility/Department: [facility] AT THE [facility] MED SURG  Daily Treatment Note  NAME: Steve Daniels  : 1950  MRN: [id number]    Date of Service: 2021    Discharge Recommendations:  Continue to assess pending progress, Home with Home health PT, Home with assist PRN        Assessment   Treatment Diagnosis: Difficulty walking  Prognosis: Good  Decision Making: Medium Complexity  PT Education: Gait Training; General Safety; Goals; Plan of Care; Transfer Training  REQUIRES PT FOLLOW UP: Yes  Activity Tolerance  Activity Tolerance: Patient Tolerated treatment well; Patient limited by cognitive status     Patient Diagnosis(es): The primary encounter diagnosis was General weakness. A diagnosis of COVID-19 virus infection was also pertinent to this visit. has a past medical history of Hyperlipidemia, Hypertension, Left pontine cerebrovascular accident [facility]), Stroke ([facility] [address] and Type 2 diabetes mellitus without complication, without long-term current use of insulin ([facility] has a past surgical history that includes Cataract removal (Right, ). Restrictions  Restrictions/Precautions  Restrictions/Precautions: General Precautions, Fall Risk, Isolation  Subjective   General  Chart Reviewed: Yes  Response To Previous Treatment: Patient with no complaints from previous session. Family / Caregiver Present: No  Referring Practitioner: Dr. Marco Morales MD  Subjective  Subjective: Pt. discharging and agreeable to get changed and ready for d/c.   Pain Screening  Patient Currently in Pain: Denies  Vital Signs  Patient Currently in Pain: Denies       Orientation  Orientation  Overall Orientation Status: Within Functional Limits  Cognition      Objective      Transfers  Sit to Stand: Contact guard assistance; Minimal Assistance  Stand to sit: Contact guard assistance; Minimal Assistance  Ambulation  Ambulation?: Yes  Ambulation 1  Surface: level tile  Device: Rolling Walker  Assistance: Minimal assistance  Quality of Gait: Staggers, moderate path deviations, forward flexed posture, slow nohemi. Gait Deviations: Slow Nohemi; Staggers; Shuffles  Distance: 25ftx1  Comments: Pt required assistance with AD negotiation. Stairs/Curb  Stairs?: No  Neuromuscular Education  Neuromuscular Comments: Seated/Standing balance to perform ADL tasks. Balance  Posture: Fair  Sitting - Static: Good  Sitting - Dynamic: Fair; +  Standing - Static: Fair; +  Standing - Dynamic: Fair  Exercises  Comments: Supine and seated exercises B LE x20     G-Code     OutComes Score    AM-PAC Score    Goals  Short term goals  Time Frame for Short term goals: 20 days  Short term goal 1: Patient to complete sit/stand and stand pivot transfers with SUP and no LOB to decrease fall risk. Short term goal 2: Patient to ambulate 150ft with FWW and CGA with no LOB to improve functional endurance. Short term goal 3: Patient to tolerate 20-30 min of ther ex/act to improve functional strength. Short term goal 4: Patient to have F+ static standing balance to decrease fall risk. Plan    Plan  Times per week: 7  Times per day: Twice a day  Current Treatment Recommendations: Strengthening, Neuromuscular Re-education, Home Exercise Program, ROM, Safety Education & Training, Balance Training, Endurance Training, Patient/Caregiver Education & Training, Functional Mobility Training, Transfer Training, Gait Training  Safety Devices  Type of devices:  All fall risk precautions in place, Call light within reach, Chair alarm in place, Gait belt, Left in chair, Nurse notified, Patient at risk for falls     Therapy Time   Individual Concurrent Group Co-treatment   Time In 1350         Time Out 1414         Minutes 262 Barbie Lemon, PTA

## 2021-11-12 ENCOUNTER — CARE COORDINATION (OUTPATIENT)
Dept: CASE MANAGEMENT | Age: 71
End: 2021-11-12

## 2021-11-12 DIAGNOSIS — U07.1 COVID-19 VIRUS INFECTION: Primary | ICD-10-CM

## 2021-11-12 PROCEDURE — 1111F DSCHRG MED/CURRENT MED MERGE: CPT | Performed by: INTERNAL MEDICINE

## 2021-11-12 NOTE — CARE COORDINATION
[facility] (COVID) Transitions Follow Up Call    2021    Patient: Anila Hare    Patient : 1950   MRN: [id number]    Reason for Admission: COVID INFECTION  Discharge Date: 21   RARS: Readmission Risk Score: 14.3 ( )      Spoke with: spouse who answered phone - he is with patient & [facility] is at their home now        Transitions of Care Initial Call      Spoke with spouse who is with patient. She is being seen by [facility] now for PT/OT/skilled visit. Denies any new symptom - covid symptoms resolved or improved - no cough, denies myalgia or SOB - reports she \"feels good. \"    Informed of care transition & contact # for CTN. Denies any questions or concerns. Has appt with PCP  scheduled. Was this an external facility discharge? No     Challenges to be reviewed by the provider   Additional needs identified to be addressed with provider: No  none             Method of communication with provider : none      Advance Care Planning:   Does patient have an Advance Directive: reviewed and current. Was this a readmission? No  Patient stated reason for admission: COVID infection  Patients top risk factors for readmission: medical condition-covid and medication management    Care Transition Nurse (CTN) contacted the SPOUSE/ family by telephone to perform post hospital discharge assessment. Verified name and  with family as identifiers. Provided introduction to self, and explanation of the CTN role. CTN reviewed discharge instructions, medical action plan and red flags with family who verbalized understanding. Family given an opportunity to ask questions and does not have any further questions or concerns at this time. Were discharge instructions available to patient? Yes. Reviewed appropriate site of care based on symptoms and resources available to patient including: PCP, Home health and CTN.  The family agrees to contact the PCP office for questions related to their healthcare. Medication reconciliation was performed with family, who verbalizes understanding of administration of home medications. Covid Risk Education     Educated patient about risk for severe COVID-19 due to risk factors according to CDC guidelines. CTN reviewed discharge instructions, medical action plan and red flag symptoms with the family who verbalized understanding. Discussed COVID vaccination status: Yes, NOT VACCINATED  . Education provided on COVID-19 vaccination as appropriate. Discussed exposure protocols and quarantine with CDC Guidelines. Family was given an opportunity to verbalize any questions and concerns and agrees to contact CTN or health care provider for questions related to their healthcare. Reviewed and educated family on any new and changed medications related to discharge diagnosis. Was patient discharged with a pulse oximeter? No    CTN provided contact information. Plan for follow-up call in 5-7 days based on severity of symptoms and risk factors.   Plan for next call: symptom management-reassess and follow up appointment-review           Follow Up  Future Appointments   Date Time Provider Araceli Vance   11/17/2021  1:00 PM Reggie Viera 1615, APRN - CNP Yared KRAFT   4/4/2022  1:00 PM MD Yared Hartley   10/12/2022  1:00 PM MD Yared Hartley RN

## 2021-11-19 ENCOUNTER — CARE COORDINATION (OUTPATIENT)
Dept: CASE MANAGEMENT | Age: 71
End: 2021-11-19

## 2021-11-19 NOTE — CARE COORDINATION
[facility] Transitions Follow Up Call    2021    Patient: Dewayne Mora  Patient : 1950   MRN: <[id number]>  Reason for Admission: COVID INFECTION  Discharge Date: 21 RARS: Readmission Risk Score: 14.3 ( )         Spoke with: Saleem Keith patient , states things are going very well. Denies cough, SOB, fever. Chest pain. She does have right sided weakness, and has [address] and PT/OT that is going well and they have seen improvement with strength. Had F/U on  the CNP at Dr Jennifer Quarles office and he has cleared her of Covid protocol. She is eating and drinking well, with normal bowel and bladder elimination. Denies concerns at present time. Care Transitions Follow Up Call    Needs to be reviewed by the provider   Additional needs identified to be addressed with provider: No  none             Method of communication with provider : none      Care Transition Nurse (CTN) contacted the family by telephone to follow up after admission on . Verified name and  with family as identifiers. Addressed changes since last contact: PT/OT going well cleared of Covid protocols per NP with PCP office  Discussed follow-up appointments. If no appointment was previously scheduled, appointment scheduling offered: No.   Is follow up appointment scheduled within 7 days of discharge? Yes. Advance Care Planning:   Does patient have an Advance Directive: reviewed and needs to be updated, not on file; education provided, not on file, patient declined education, decision maker updated and referral to internal ACP facilitator. CTN reviewed discharge instructions, medical action plan and red flags with family and discussed any barriers to care and/or understanding of plan of care after discharge. Discussed appropriate site of care based on symptoms and resources available to patient including: PCP and Home health. The family agrees to contact the PCP office for questions related to their healthcare. CTN provided contact information for future needs. Plan for follow-up call in 7-10 days based on severity of symptoms and risk factors. Plan for next call: routine        Care Transitions Subsequent and Final Call    Subsequent and Final Calls  Do you have any ongoing symptoms?: No  Have your medications changed?: No  Do you have any questions related to your medications?: No  Do you currently have any active services?: Yes  Are you currently active with any services?: Home Health  Do you have any needs or concerns that I can assist you with?: No  Identified Barriers: Other, Lack of Education  Care Transitions Interventions  Other Interventions:            Follow Up  Future Appointments   Date Time Provider Araceli Vance   4/4/2022  1:00 PM MD Yared Okeefe   10/12/2022  1:00 PM MD Yared Okeefe LPN

## 2021-12-08 ENCOUNTER — CARE COORDINATION (OUTPATIENT)
Dept: CASE MANAGEMENT | Age: 71
End: 2021-12-08

## 2021-12-08 NOTE — CARE COORDINATION
[facility] Transitions Follow Up Call    2021    Patient: Jam Romo  Patient : 1950   MRN: [id number]  Reason for Admission: COVID  Discharge Date: 21 RARS: Readmission Risk Score: 14.3 ( )         Spoke with: Spouse, states because Albert Client has a hx. Of Stroke and cannot talk very well, he speaks for her. States that she is doing well. \"The only problem she is having is Right sided weakness\". Blood sugars are running between 124-135. Will be discharged from [place] [address] today and is scheduled for OP PT. Care Transitions Episode Completed.     Follow Up  Future Appointments   Date Time Provider Araceli Vance   2022  1:00 PM MD Yared Talamantes   10/12/2022  1:00 PM Gates Novak, MD [facility] Hershel Curling, RN

## 2022-02-09 NOTE — PROGRESS NOTES
Patient has been proning for about 2 hours. Sski Pregnancy And Lactation Text: This medication is Pregnancy Category D and isn't considered safe during pregnancy. It is excreted in breast milk.

## 2022-04-04 PROBLEM — U07.1 COVID-19 VIRUS INFECTION: Status: RESOLVED | Noted: 2021-11-05 | Resolved: 2022-04-04

## 2022-10-12 PROBLEM — E78.2 MIXED HYPERLIPIDEMIA: Status: ACTIVE | Noted: 2022-10-12

## 2022-10-12 PROBLEM — I10 ESSENTIAL HYPERTENSION: Status: ACTIVE | Noted: 2022-10-12

## 2022-12-07 ENCOUNTER — HOSPITAL ENCOUNTER (EMERGENCY)
Age: 72
Discharge: HOME | End: 2022-12-07
Attending: EMERGENCY MEDICINE
Payer: MEDICARE

## 2022-12-07 ENCOUNTER — APPOINTMENT (OUTPATIENT)
Dept: CT IMAGING | Age: 72
End: 2022-12-07
Payer: MEDICARE

## 2022-12-07 VITALS
OXYGEN SATURATION: 94 % | DIASTOLIC BLOOD PRESSURE: 85 MMHG | HEART RATE: 106 BPM | SYSTOLIC BLOOD PRESSURE: 112 MMHG | RESPIRATION RATE: 20 BRPM | TEMPERATURE: 98.2 F

## 2022-12-07 DIAGNOSIS — L03.213 PRESEPTAL CELLULITIS OF RIGHT EYE: Primary | ICD-10-CM

## 2022-12-07 LAB
ABSOLUTE EOS #: <0.03 K/UL (ref 0–0.44)
ABSOLUTE IMMATURE GRANULOCYTE: <0.03 K/UL (ref 0–0.3)
ABSOLUTE LYMPH #: 0.77 K/UL (ref 1.1–3.7)
ABSOLUTE MONO #: 0.98 K/UL (ref 0.1–1.2)
ALBUMIN SERPL-MCNC: 4.5 G/DL (ref 3.5–5.2)
ALBUMIN/GLOBULIN RATIO: 1.7 (ref 1–2.5)
ALP BLD-CCNC: 83 U/L (ref 35–104)
ALT SERPL-CCNC: 30 U/L (ref 5–33)
ANION GAP SERPL CALCULATED.3IONS-SCNC: 13 MMOL/L (ref 9–17)
AST SERPL-CCNC: 30 U/L
BASOPHILS # BLD: 0 % (ref 0–2)
BASOPHILS ABSOLUTE: 0.03 K/UL (ref 0–0.2)
BILIRUB SERPL-MCNC: 2 MG/DL (ref 0.3–1.2)
BUN BLDV-MCNC: 18 MG/DL (ref 8–23)
BUN/CREAT BLD: 28 (ref 9–20)
C-REACTIVE PROTEIN: 28.8 MG/L (ref 0–5)
CALCIUM SERPL-MCNC: 10.4 MG/DL (ref 8.6–10.4)
CHLORIDE BLD-SCNC: 104 MMOL/L (ref 98–107)
CO2: 25 MMOL/L (ref 20–31)
CREAT SERPL-MCNC: 0.65 MG/DL (ref 0.5–0.9)
EOSINOPHILS RELATIVE PERCENT: 0 % (ref 1–4)
GFR SERPL CREATININE-BSD FRML MDRD: >60 ML/MIN/1.73M2
GLUCOSE BLD-MCNC: 243 MG/DL (ref 70–99)
HCT VFR BLD CALC: 40.8 % (ref 36.3–47.1)
HEMOGLOBIN: 13.4 G/DL (ref 11.9–15.1)
IMMATURE GRANULOCYTES: 0 %
LYMPHOCYTES # BLD: 11 % (ref 24–43)
MCH RBC QN AUTO: 28.3 PG (ref 25.2–33.5)
MCHC RBC AUTO-ENTMCNC: 32.8 G/DL (ref 28.4–34.8)
MCV RBC AUTO: 86.1 FL (ref 82.6–102.9)
MONOCYTES # BLD: 14 % (ref 3–12)
NRBC AUTOMATED: 0 PER 100 WBC
PDW BLD-RTO: 15.4 % (ref 11.8–14.4)
PLATELET # BLD: 279 K/UL (ref 138–453)
PMV BLD AUTO: 10.2 FL (ref 8.1–13.5)
POTASSIUM SERPL-SCNC: 4 MMOL/L (ref 3.7–5.3)
RBC # BLD: 4.74 M/UL (ref 3.95–5.11)
SEG NEUTROPHILS: 75 % (ref 36–65)
SEGMENTED NEUTROPHILS ABSOLUTE COUNT: 5.43 K/UL (ref 1.5–8.1)
SODIUM BLD-SCNC: 142 MMOL/L (ref 135–144)
TOTAL PROTEIN: 7.1 G/DL (ref 6.4–8.3)
WBC # BLD: 7.2 K/UL (ref 3.5–11.3)

## 2022-12-07 PROCEDURE — 80053 COMPREHEN METABOLIC PANEL: CPT

## 2022-12-07 PROCEDURE — 70480 CT ORBIT/EAR/FOSSA W/O DYE: CPT

## 2022-12-07 PROCEDURE — 6370000000 HC RX 637 (ALT 250 FOR IP): Performed by: EMERGENCY MEDICINE

## 2022-12-07 PROCEDURE — 99284 EMERGENCY DEPT VISIT MOD MDM: CPT

## 2022-12-07 PROCEDURE — 2580000003 HC RX 258: Performed by: EMERGENCY MEDICINE

## 2022-12-07 PROCEDURE — 85025 COMPLETE CBC W/AUTO DIFF WBC: CPT

## 2022-12-07 PROCEDURE — 87205 SMEAR GRAM STAIN: CPT

## 2022-12-07 PROCEDURE — 96361 HYDRATE IV INFUSION ADD-ON: CPT

## 2022-12-07 PROCEDURE — 36415 COLL VENOUS BLD VENIPUNCTURE: CPT

## 2022-12-07 PROCEDURE — 87070 CULTURE OTHR SPECIMN AEROBIC: CPT

## 2022-12-07 PROCEDURE — 96360 HYDRATION IV INFUSION INIT: CPT

## 2022-12-07 PROCEDURE — 86140 C-REACTIVE PROTEIN: CPT

## 2022-12-07 RX ORDER — CEFDINIR 300 MG/1
300 CAPSULE ORAL 2 TIMES DAILY
Qty: 10 CAPSULE | Refills: 0 | Status: SHIPPED | OUTPATIENT
Start: 2022-12-07 | End: 2022-12-12

## 2022-12-07 RX ORDER — CEFDINIR 300 MG/1
300 CAPSULE ORAL ONCE
Status: COMPLETED | OUTPATIENT
Start: 2022-12-07 | End: 2022-12-07

## 2022-12-07 RX ORDER — 0.9 % SODIUM CHLORIDE 0.9 %
1000 INTRAVENOUS SOLUTION INTRAVENOUS ONCE
Status: COMPLETED | OUTPATIENT
Start: 2022-12-07 | End: 2022-12-07

## 2022-12-07 RX ADMIN — CEFDINIR 300 MG: 300 CAPSULE ORAL at 17:39

## 2022-12-07 RX ADMIN — SODIUM CHLORIDE 1000 ML: 9 INJECTION, SOLUTION INTRAVENOUS at 15:21

## 2022-12-07 ASSESSMENT — VISUAL ACUITY
OU: 20/50
OS: 20/50
OD: 20/50

## 2022-12-07 ASSESSMENT — PAIN - FUNCTIONAL ASSESSMENT: PAIN_FUNCTIONAL_ASSESSMENT: NONE - DENIES PAIN

## 2022-12-07 NOTE — DISCHARGE INSTRUCTIONS
Please take antibiotics as prescribed. Please follow-up with your primary care doctor, call tomorrow to set up the appointment for a few days from now. Make sure to sleep sitting somewhat propped up today. Okay to use a damp washcloth that is clean. Please do not use very hot water as it will burn the skin. Please make sure to return here if there is pain with moving the eyeball, increased swelling, a fever, drainage of pus, or if the eye it looks like it is popping out of socket. I have referred you to the eye doctor here in [facility], call to set up an appointment.   Return here anytime for reevaluation

## 2022-12-07 NOTE — ED NOTES
Left message for Dr. Joseline Cortés per Dr Roni Sharpe request.     Jessica Luu Reser  12/07/22 [phone]

## 2022-12-07 NOTE — ED PROVIDER NOTES
[facility] ED  EMERGENCY DEPARTMENT ENCOUNTER      Pt Name: Castro Sanchez  MRN: [id number]  [place] 1950  Date of evaluation: 12/7/2022  Provider: Sharmin Valdez DO    CHIEF COMPLAINT       Chief Complaint   Patient presents with    Eye Problem     Right eye swelling and redness         HISTORY OF PRESENT ILLNESS   (Location/Symptom, Timing/Onset, Context/Setting, Quality, Duration, Modifying Factors, Severity)  Note limiting factors. Castro Sanchez is a 81 y.o. female who presents to the emergency department     This is a 79-year-old female who has suffered a stroke which has left her with significant deficit on the left side presenting with , erythema, swelling around the right eye. The patient's  states that since they have been here, for approximately 1 hour prior to my seeing the patient that it has actually improved some. There is an attached picture in the media section of this chart. The patient woke up with this symptom. She has no pain with extraocular movements. Due to her stroke she normally sleeps on her left side but last evening did sleep on her right. The patient has long nails and it is thought that she may have scratched this area. She has not previously had symptoms like this. There are no fevers, chills, headache, ear pain, neck pain or stiffness        Nursing Notes were reviewed. REVIEW OF SYSTEMS    (2-9 systems for level 4, 10 or more for level 5)     Review of Systems   Unable to perform ROS: Age   Constitutional:  Negative for fatigue and fever. Eyes:  Positive for discharge (pinkish discharge was seen) and redness. Negative for photophobia, pain and visual disturbance. Skin:  Positive for color change. Except as noted above the remainder of the review of systems was reviewed and negative.        PAST MEDICAL HISTORY     Past Medical History:   Diagnosis Date    COVID-19 virus infection 2021    Essential hypertension     Left pontine cerebrovascular accident [facility]) 2017    Mixed hyperlipidemia     Type 2 diabetes mellitus without complication, without long-term current use of insulin ([facility] [address]          SURGICAL HISTORY       Past Surgical History:   Procedure Laterality Date    CATARACT REMOVAL Right 2020         CURRENT MEDICATIONS       Discharge Medication List as of 12/7/2022  5:24 PM        CONTINUE these medications which have NOT CHANGED    Details   metFORMIN (GLUCOPHAGE-XR) 500 MG extended release tablet Take 2 tablets by mouth daily (with breakfast), Disp-180 tablet, R-3Normal      amLODIPine (NORVASC) 2.5 MG tablet Take 1 tablet by mouth daily, Disp-90 tablet, R-3Normal      Handicap Placard MISC Starting Mon 2/28/2022, Disp-1 each, R-0, PrintDx: stroke  Duration: 5 years      zinc sulfate (ZINCATE) 220 (50 Zn) MG capsule Take 2 capsules by mouth daily for 14 days, Disp-28 capsule, R-0OTC      vitamin D3 (CHOLECALCIFEROL) 25 MCG (1000 UT) TABS tablet Take 1 tablet by mouth daily, Disp-30 tablet, R-0Normal      atorvastatin (LIPITOR) 80 MG tablet Take 1 tablet by mouth daily, Disp-90 tablet, R-3Normal      UNABLE TO FIND Manual wheelchair    Diagnosis: Stroke, Disp-1 Device, R-0Print      glucose blood VI test strips (ASCENSIA AUTODISC VI;ONE TOUCH ULTRA TEST VI) strip DAILY Starting 3/9/2017, Until Discontinued, Disp-100 each, R-3, NormalAs needed.       aspirin 81 MG EC tablet Take 1 tablet by mouth daily, Disp-30 tablet, R-3Normal      SOFT TOUCH LANCETS MISC 3 TIMES DAILY WITH MEALS Starting 3/8/2017, Until Discontinued, Disp-100 each, R-3, Normal      Alcohol Swabs 70 % PADS 3 TIMES DAILY WITH MEALS Starting 3/8/2017, Until Discontinued, Disp-100 each, R-3, Normal      glucose monitoring kit (FREESTYLE) monitoring kit 3 TIMES DAILY Starting 3/8/2017, Until Discontinued, Disp-1 kit, R-0, Normal      Multiple Vitamins-Minerals (THERAPEUTIC MULTIVITAMIN-MINERALS) tablet Take 1 tablet by mouth dailyHistorical Med             ALLERGIES     Patient has no known allergies. FAMILY HISTORY       Family History   Problem Relation Age of Onset    Heart Attack Mother     Heart Disease Mother     Diabetes Mother     Heart Attack Father     High Blood Pressure Father           SOCIAL HISTORY       Social History     Socioeconomic History    Marital status:    Tobacco Use    Smoking status: Never    Smokeless tobacco: Never   Vaping Use    Vaping Use: Never used   Substance and Sexual Activity    Alcohol use: No    Drug use: No    Sexual activity: Yes     Social Determinants of Health     Financial Resource Strain: Low Risk     Difficulty of Paying Living Expenses: Not hard at all   Food Insecurity: No Food Insecurity    Worried About Running Out of Food in the Last Year: Never true    Ran Out of Food in the Last Year: Never true   Transportation Needs: No Transportation Needs    Lack of Transportation (Medical): No    Lack of Transportation (Non-Medical): No   Physical Activity: Inactive    Days of Exercise per Week: 0 days    Minutes of Exercise per Session: 0 min   Stress: No Stress Concern Present    Feeling of Stress : Not at all   Social Connections:  Moderately Isolated    Frequency of Communication with Friends and Family: Twice a week    Frequency of Social Gatherings with Friends and Family: Once a week    Attends Yarsani Services: Never    Active Member of Clubs or Organizations: No    Attends Club or Organization Meetings: Never    Marital Status:    Intimate Partner Violence: Not At Risk    Fear of Current or Ex-Partner: No    Emotionally Abused: No    Physically Abused: No    Sexually Abused: No       SCREENINGS         Delfina Coma Scale  Eye Opening: Spontaneous  Best Verbal Response: Oriented  Best Motor Response: Obeys commands  Delfina Coma Scale Score: 15                     CIWA Assessment  BP: 112/85  Heart Rate: (!) 106                 PHYSICAL EXAM    (up to 7 for level 4, 8 or more for level 5)     ED Triage Vitals [12/07/22 1423] BP Temp Temp Source Heart Rate Resp SpO2 Height Weight   112/85 98.2 °F (36.8 °C) Tympanic (!) 124 20 94 % -- --       Physical Exam  Vitals and nursing note reviewed. HENT:      Head: Normocephalic. Right periorbital erythema present. Mouth/Throat:      Mouth: Mucous membranes are dry. Eyes:      General: No visual field deficit or scleral icterus. Extraocular Movements: Extraocular movements intact. Right eye: Normal extraocular motion and no nystagmus. Left eye: Normal extraocular motion and no nystagmus. Conjunctiva/sclera:      Right eye: Right conjunctiva is not injected. No chemosis, exudate or hemorrhage. Comments: See attached media    Cardiovascular:      Rate and Rhythm: Regular rhythm. Tachycardia present. Pulmonary:      Effort: Pulmonary effort is normal.   Abdominal:      Palpations: Abdomen is soft. Musculoskeletal:      Cervical back: Neck supple. Skin:     Capillary Refill: Capillary refill takes less than 2 seconds. Findings: Erythema present. Comments: Around the right eye, there is swelling and redness noted, there is no fluctuance or induration   Neurological:      Mental Status: She is alert. Mental status is at baseline. Motor: Weakness present.       Coordination: Coordination abnormal.      Gait: Gait abnormal.       DIAGNOSTIC RESULTS     EKG: All EKG's are interpreted by the Emergency Department Physician who either signs or Co-signs this chart in the absence of a cardiologist.        RADIOLOGY:   Non-plain film images such as CT, Ultrasound and MRI are read by the radiologist. Plain radiographic images are visualized and preliminarily interpreted by the emergency physician with the below findings:        Interpretation per the Radiologist below, if available at the time of this note:    CT ORBITS WO CONTRAST   Final Result   Soft tissue swelling of the right periorbital/premalar region, likely related   to preseptal periorbital cellulitis. No postseptal extension of inflammatory   changes. ED BEDSIDE ULTRASOUND:   Performed by ED Physician - none    LABS:  Labs Reviewed   CBC WITH AUTO DIFFERENTIAL - Abnormal; Notable for the following components:       Result Value    RDW 15.4 (*)     Seg Neutrophils 75 (*)     Lymphocytes 11 (*)     Monocytes 14 (*)     Eosinophils % 0 (*)     Absolute Lymph # 0.77 (*)     All other components within normal limits   COMPREHENSIVE METABOLIC PANEL - Abnormal; Notable for the following components:    Glucose 243 (*)     Bun/Cre Ratio 28 (*)     Total Bilirubin 2.0 (*)     All other components within normal limits   C-REACTIVE PROTEIN - Abnormal; Notable for the following components:    CRP 28.8 (*)     All other components within normal limits   CULTURE, WOUND       All other labs were within normal range or not returned as of this dictation. EMERGENCY DEPARTMENT COURSE and DIFFERENTIAL DIAGNOSIS/MDM:   Vitals:    Vitals:    12/07/22 1423 12/07/22 1653   BP: 112/85    Pulse: (!) 124 (!) 106   Resp: 20    Temp: 98.2 °F (36.8 °C)    TempSrc: Tympanic    SpO2: 94%            MDM  Number of Diagnoses or Management Options  Preseptal cellulitis of right eye  Diagnosis management comments: The patient was resuscitated with IV fluids as she does appear somewhat dehydrated overall. She was sent to CT which demonstrated a preseptal cellulitis. This is consistent with the patient's physical exam and lack of pain with movement of the eye itself. I did culture the eye as the patient's  states there was some discharge. The patient has been provided with antibiotics and I have also contacted the patient's primary care doctor for outpatient follow-up. I have instructed them to return here if the symptom is not better in 48 hours or seems to be worsening before then. Additionally I have instructed them to sleep sitting up for the next few days to help with swelling.   To apply a damp compress to the eye with a clean towel. And also for the  to trim the patient's nails so that she is unable to injure herself to cause further injury or infection. REASSESSMENT          CRITICAL CARE TIME       CONSULTS:  None    PROCEDURES:  Unless otherwise noted below, none     Procedures        FINAL IMPRESSION      1. Preseptal cellulitis of right eye          DISPOSITION/PLAN   DISPOSITION Decision To Discharge 12/07/2022 05:07:41 PM      PATIENT REFERRED TO:  Estrella Drew MD  [facility], [address]  [phone]    Schedule an appointment as soon as possible for a visit       [address]  [phone]    If symptoms worsen    Raisa Ron DO  [address] [facility]  [phone]    Schedule an appointment as soon as possible for a visit       DISCHARGE MEDICATIONS:  Discharge Medication List as of 12/7/2022  5:24 PM        START taking these medications    Details   cefdinir (OMNICEF) 300 MG capsule Take 1 capsule by mouth 2 times daily for 5 days, Disp-10 capsule, R-0Normal           Controlled Substances Monitoring:     No flowsheet data found.     (Please note that portions of this note were completed with a voice recognition program.  Efforts were made to edit the dictations but occasionally words are mis-transcribed.)    Manju Stewart DO (electronically signed)  Attending Emergency Physician             Majnu Stewart DO  12/08/22 5023

## 2022-12-08 ASSESSMENT — ENCOUNTER SYMPTOMS
EYE REDNESS: 1
EYE PAIN: 0
COLOR CHANGE: 1
EYE DISCHARGE: 1
PHOTOPHOBIA: 0

## 2022-12-09 LAB
CULTURE: NORMAL
DIRECT EXAM: NORMAL
DIRECT EXAM: NORMAL
SPECIMEN DESCRIPTION: NORMAL

## 2022-12-13 PROBLEM — L03.213 PERIORBITAL CELLULITIS: Status: ACTIVE | Noted: 2022-12-13

## 2023-04-17 PROBLEM — L03.213 PERIORBITAL CELLULITIS: Status: RESOLVED | Noted: 2022-12-13 | Resolved: 2023-04-17

## 2023-10-17 ENCOUNTER — HOSPITAL ENCOUNTER (OUTPATIENT)
Dept: PHYSICAL THERAPY | Age: 73
Setting detail: THERAPIES SERIES
Discharge: HOME | End: 2023-10-17
Payer: COMMERCIAL

## 2023-10-17 NOTE — PROGRESS NOTES
Phone: [address]          Fax: [phone]                      Outpatient Physical Therapy                                                                      Evaluation  Date: 10/17/2023  Patient: Maribell Aiken  : 1950  [facility] #: [id number]    Referring Physician: Samantha Garcia MD    Medical Diagnosis: Left pontine cerebrovascular accident I63.9, Abnormal gait R26.9    Treatment Diagnosis: unsteady gait, imbalance  Onset Date: 10/16/23  PT Insurance Information: Medicare  Total # of Visits Approved: 1   Total # of Visits to Date: 1     Subjective  Subjective: Pt's  states pateint has been doing really well and walks while hanging onto his arm but would like to walk short distances independently. Pt's  fan pt is still having trouble with balance. Pt reports one fall in the past 6 months. Pt's  states the rolling walker gets too far out in front of her. Additional Pertinent Hx: CVA 2017, diabetes           Ambulation/Gait (if applicable):   Ambulation  Quality of Gait: steppage ataxic gait with HHA    Balance Screen:   Balance  Standing - Static: Fair, -  Standing - Dynamic: Poor  Comments: able to stand 1 min with no no support with WBOS    Objective              Strength RLE  R Hip Flexion: 3-/5  R Hip ABduction: 3-/5  R Hip ADduction: 3/5  R Knee Flexion: 3/5  R Knee Extension: 3/5  Strength LLE  L Hip Flexion: 3+/5  L Hip ABduction: 3+/5  L Hip ADduction: 3+/5  L Knee Flexion: 3+/5  L Knee Extension: 3+/5         Functional Outcome Measures        Sitting Balance: Leans or slides in chair  Arises: Unable without help  Attempts to Arise: Unable without help  Immediate Standing Balance (First 5 Seconds):  Unsteady (swaggers, moves feet, trunk sway)  Standing Balance: Steady but wide stance, uses cane or other support  Nudged: Begins to fall  Eyes Closed: Unsteady  Turned 360 Degrees: Steadiness: Unsteady (grabs, staggers)  Turned 360

## 2023-10-19 ENCOUNTER — HOSPITAL ENCOUNTER (OUTPATIENT)
Dept: PHYSICAL THERAPY | Age: 73
Setting detail: THERAPIES SERIES
Discharge: HOME | End: 2023-10-19
Payer: COMMERCIAL

## 2023-10-19 PROCEDURE — 97116 GAIT TRAINING THERAPY: CPT

## 2023-10-19 PROCEDURE — 97110 THERAPEUTIC EXERCISES: CPT

## 2023-10-19 NOTE — PROGRESS NOTES
Phone: [facility] [place]           Fax: [phone]                           Outpatient Physical Therapy                                                                            Daily Note    Patient: Idris Balderas : 1950  CSN #: [id number]   Referring Physician: Leatha Menjivar MD  Date: 10/19/2023    Diagnosis: Left pontine cerebrovascular accident I63.9, Abnormal gait R26.9  Treatment Diagnosis: unsteady gait, imbalance    Onset Date: 10/16/23  PT Insurance Information: Medicare  Total # of Visits Approved: 1 Per Physician Order  Total # of Visits to Date: 2  No Show: 0  Canceled Appointment: 0    23 Plan of Care/Recert Due    Pre-Treatment Pain:  0/10  Subjective: Pt husbands states patient would like to walk independently. Exercises:  Exercise 1: sit to stands  Exercise 2: island walking  Exercise 7: Gait with rolling walker 3x until fatigued           Assessment  Assessment: Pt seen for gait traing and strengthening. Pt abmulated with [employer] 100 feet before fatigued. Pt with LOB forward, poor walker control at [facility] and needed moderate VC for posture during ambulation. Pt with SBA with sit to stand transfers. Safety during gait is an issue and discussed with pt . Will add exercise as tolerated for strengthenin R LE    Activity Tolerance  Activity Tolerance: Patient tolerated treatment well    Patient Education  Patient Education: Progression of exercise  Pt verbalized/demonstrated good understanding:     [x] Yes         [] No, pt required further clarification.        Post Treatment Pain:  0/10      Plan  Plan Frequency: 2x/wk  Plan weeks: 4 weeks       Goals  (Total # of Visits to Date: 2)      Short Term Goals  Time Frame for Short Term Goals: 3 weeks  Short Term Goal 1: Pt will be educated on her POC and HEP-met  Short Term Goal 2: Pt will be able to  modified tandem stance for 1 minute with minimal postural sway and LOB    Long Term

## 2023-10-24 ENCOUNTER — HOSPITAL ENCOUNTER (OUTPATIENT)
Dept: PHYSICAL THERAPY | Age: 73
Setting detail: THERAPIES SERIES
Discharge: HOME | End: 2023-10-24
Payer: COMMERCIAL

## 2023-10-24 PROCEDURE — 97110 THERAPEUTIC EXERCISES: CPT

## 2023-10-25 NOTE — PROGRESS NOTES
Phone: [address] [place]           Fax: [phone]                           Outpatient Physical Therapy                                                                            Daily Note    Patient: Al Kingsley : 1950  CSN #: [id number]   Referring Physician: Abraham Zhang MD  Date: 10/24/2023     Treatment Diagnosis: unsteady gait, imbalance    Onset Date: 10/16/23  PT Insurance Information: Medicare    Per Physician Order  Total # of Visits to Date: 3  No Show: 0  Canceled Appointment: 0    23 Plan of Care/Recert Due    Pre-Treatment Pain:  0/10  Subjective: Pt  reports she is doing okay at home, just hoping she can move a little more independently. Exercises:  Exercise 1: sit to stands 10x with v/c for hand  Exercise 2: island walking  Exercise 3: seated hurdke charles overs  Exercise 7: Gait with rolling walker 3x until fatigued    Assessment  Assessment: Focused on slow controlled movements with amb today with fair tolerance noted. Pt continues to display LOB during amb and transfers d/t impulsive decision making. Activity Tolerance  Activity Tolerance: Patient tolerated treatment well    Patient Education  Patient Education: Progression of exercise  Pt verbalized/demonstrated good understanding:     [x] Yes         [] No, pt required further clarification.      Post Treatment Pain:  0/10    Plan  Plan Frequency: 2x/wk  Plan weeks: 4 weeks     Goals  (Total # of Visits to Date: 3)      Short Term Goals  Time Frame for Short Term Goals: 3 weeks  Short Term Goal 1: Pt will be educated on her POC and HEP-met  Short Term Goal 2: Pt will be able to  modified tandem stance for 1 minute with minimal postural sway and LOB    Long Term Goals  Time Frame for Long Term Goals : 6 weeks  Long Term Goal 1: Pt will be safe and independent with her HEP  Long Term Goal 2: Pt will be able to perform 3 to 5 sit to stand with proper hand placement

## 2023-10-26 ENCOUNTER — HOSPITAL ENCOUNTER (OUTPATIENT)
Dept: PHYSICAL THERAPY | Age: 73
Setting detail: THERAPIES SERIES
Discharge: HOME | End: 2023-10-26
Payer: COMMERCIAL

## 2023-10-26 PROCEDURE — 97110 THERAPEUTIC EXERCISES: CPT

## 2023-10-26 PROCEDURE — 97116 GAIT TRAINING THERAPY: CPT

## 2023-10-26 NOTE — PROGRESS NOTES
5 sit to stand with proper hand placement independently  Long Term Goal 3: Pt will be able to perform forward and backward stepping at counter with one hand held assist with L LE  Long Term Goal 4: Pt will be able to ambulate 25-50 feet with FWW Mod I with no LOB    Minutes Tracking:  Time In: 1400  Time Out: 1440  Minutes: 40           Nirmala Lesser     Date: 10/26/2023

## 2023-10-31 ENCOUNTER — HOSPITAL ENCOUNTER (OUTPATIENT)
Dept: PHYSICAL THERAPY | Age: 73
Setting detail: THERAPIES SERIES
Discharge: HOME | End: 2023-10-31
Payer: COMMERCIAL

## 2023-10-31 PROCEDURE — 97116 GAIT TRAINING THERAPY: CPT

## 2023-10-31 PROCEDURE — 97110 THERAPEUTIC EXERCISES: CPT

## 2023-10-31 NOTE — PROGRESS NOTES
Phone: [address] [place]           Fax: [phone]                           Outpatient Physical Therapy                                                                            Daily Note    Patient: Emmie Flores : 1950  CSN #: [id number]   Referring Physician: La Schwartz MD  Date: 10/31/2023    Diagnosis: Left pontine cerebrovascular accident I63.9, Abnormal gait R26.9  Treatment Diagnosis: unsteady gait, imbalance    Onset Date: 10/16/23  PT Insurance Information: Medicare  Total # of Visits Approved: 1 Per Physician Order  Total # of Visits to Date: 5  No Show: 0  Canceled Appointment: 0    23 Plan of Care/Recert Due    Pre-Treatment Pain:  0/10  Subjective: Pt with no neew complaints.  reports she gets her new walker tomorrow    Exercises:  Exercise 1: sit to stands 10x with v/c for hand  Exercise 2: island walking  Exercise 3: seated hurdke step overs  Exercise 7: Gait with rolling walker 4x until fatigued           Assessment  Assessment: Pt ambulated 125 feet before needing a rest. Gait 2 additional times 100 feet with rolling walker. Pt continues to lose focus easily and needs VC for walker control and posture. Activity Tolerance  Activity Tolerance: Patient tolerated treatment well    Patient Education  Patient Education: Gait safety  Pt verbalized/demonstrated good understanding:     [x] Yes         [] No, pt required further clarification.        Post Treatment Pain:  0/10      Plan  Plan Frequency: 2x/wk  Plan weeks: 4 weeks       Goals  (Total # of Visits to Date: 5)      Short Term Goals  Time Frame for Short Term Goals: 3 weeks  Short Term Goal 1: Pt will be educated on her POC and HEP-met  Short Term Goal 2: Pt will be able to  modified tandem stance for 1 minute with minimal postural sway and LOB--progressing    Long Term Goals  Time Frame for Long Term Goals : 6 weeks  Long Term Goal 1: Pt will be safe and independent with

## 2023-11-02 ENCOUNTER — HOSPITAL ENCOUNTER (OUTPATIENT)
Dept: PHYSICAL THERAPY | Age: 73
Setting detail: THERAPIES SERIES
Discharge: HOME | End: 2023-11-02
Payer: COMMERCIAL

## 2023-11-02 PROCEDURE — 97110 THERAPEUTIC EXERCISES: CPT

## 2023-11-03 NOTE — PROGRESS NOTES
Phone: [address] [place]           Fax: [phone]                           Outpatient Physical Therapy                                                                            Daily Note    Patient: Isi Israel : 1950  CSN #: [id number]   Referring Physician: Riley Fernandez MD  Date: 2023     Treatment Diagnosis: unsteady gait, imbalance    Onset Date: 10/16/23  PT Insurance Information: Medicare    Per Physician Order  Total # of Visits to Date: 6  No Show: 0  Canceled Appointment: 0    23 Plan of Care/Recert Due    Pre-Treatment Pain:  0/10  Subjective: Pt  arrives with new RW they got online. Pt denies current pain. Exercises:  Exercise 1: sit to stands 10x with v/c for hand  Exercise 3: seated hurdke step overs  Exercise 4: walking with tape on floor for step length and guidance. Exercise 7: Gait with rolling walker 4x until fatigued    Assessment  Assessment: Pt fatigues quickly with use of dynamic balance exercises today requiring rest break. Pt struggles with control of RW during amb causing increased fall risk. Activity Tolerance  Activity Tolerance: Patient tolerated treatment well    Patient Education  Patient Education: Gait safety  Pt verbalized/demonstrated good understanding:     [x] Yes         [] No, pt required further clarification.      Post Treatment Pain:  0/10    Plan  Plan Frequency: 2x/wk  Plan weeks: 4 weeks       Goals  (Total # of Visits to Date: 6)      Short Term Goals  Time Frame for Short Term Goals: 3 weeks  Short Term Goal 2: Pt will be able to  modified tandem stance for 1 minute with minimal postural sway and LOB--progressing    Long Term Goals  Time Frame for Long Term Goals : 6 weeks  Long Term Goal 1: Pt will be safe and independent with her HEP  Long Term Goal 2: Pt will be able to perform 3 to 5 sit to stand with proper hand placement independently  Long Term Goal 3: Pt will be able to

## 2023-11-07 ENCOUNTER — HOSPITAL ENCOUNTER (OUTPATIENT)
Dept: PHYSICAL THERAPY | Age: 73
Setting detail: THERAPIES SERIES
Discharge: HOME | End: 2023-11-07
Payer: COMMERCIAL

## 2023-11-07 PROCEDURE — 97110 THERAPEUTIC EXERCISES: CPT

## 2023-11-08 NOTE — PROGRESS NOTES
Phone: [address] [place]           Fax: [phone]                           Outpatient Physical Therapy                                                                            Daily Note    Patient: Tom Valentin : 1950  [facility] #: [id number]   Referring Physician: Arturo Red MD  Date: 2023       Treatment Diagnosis: unsteady gait, imbalance    PT Insurance Information: Medicare    Per Physician Order  Total # of Visits to Date: 7  No Show: 0  Canceled Appointment: 0    23 Plan of Care/Recert Due    Pre-Treatment Pain:  0/10  Subjective: Pt  reports they have been working at home and she seems to be doing a little better but still not safe. Pt denies pain. Exercises:  Exercise 3: seated hurdke step overs  Exercise 4: walking with tape on floor for step length and guidance. Exercise 5: cone turns L and R  Exercise 7: Gait with rolling walker 4x until fatigued    Assessment  Assessment: Better tolerance to standing and direction change exercises today but Pt continues to display safety concerns with amb with RW. Pt displays greater LOB with amb leading with RLE. Activity Tolerance  Activity Tolerance: Patient tolerated treatment well    Patient Education  Patient Education: Gait safety  Pt verbalized/demonstrated good understanding:     [x] Yes         [] No, pt required further clarification.      Post Treatment Pain:  0/10    Plan  Plan Frequency: 2x/wk  Plan weeks: 4 weeks     Goals  (Total # of Visits to Date: 7)      Short Term Goals  Time Frame for Short Term Goals: 3 weeks  Short Term Goal 1: Pt will be educated on her POC and HEP-met  Short Term Goal 2: Pt will be able to  modified tandem stance for 1 minute with minimal postural sway and LOB--progressing    Long Term Goals  Time Frame for Long Term Goals : 6 weeks  Long Term Goal 1: Pt will be safe and independent with her HEP  Long Term Goal 2: Pt will be able to perform 3

## 2023-11-09 ENCOUNTER — HOSPITAL ENCOUNTER (OUTPATIENT)
Dept: PHYSICAL THERAPY | Age: 73
Setting detail: THERAPIES SERIES
Discharge: HOME | End: 2023-11-09
Payer: COMMERCIAL

## 2023-11-09 PROCEDURE — 97110 THERAPEUTIC EXERCISES: CPT

## 2023-11-10 NOTE — PROGRESS NOTES
Phone: [address] [place]           Fax: [phone]                           Outpatient Physical Therapy                                                                            Daily Note    Patient: Brayden Sanchez : 1950  CSN #: [id number]   Referring Physician: Silvana Wagner MD  Date: 2023     Treatment Diagnosis: unsteady gait, imbalance    Onset Date: 10/16/23  PT Insurance Information: Medicare    Per Physician Order  Total # of Visits to Date: 8  No Show: 0  Canceled Appointment: 0    23 Plan of Care/Recert Due    Pre-Treatment Pain:  0/10  Subjective: Pt state she isnt doing to bad. Pt  states they are working on alot at home and happy with ideas they are getting for progressions. Exercises:  Exercise 3: seated hurdke step overs  Exercise 4: walking with tape on floor for step length and guidance. Exercise 5: cone turns L and R  Exercise 6: RW amb with band for step length  Exercise 7: Gait with rolling walker 4x until fatigued    Assessment  Assessment: Pt conitnues to struggle with hand placement during transfers along with distractions during amb causing safety concerns. Pt displays ability to amb safely with RW but displays quick LOB requiring therapist assist to correct with turning and distractions. Continued to work with  to advance ideas for HEP for greater control. Activity Tolerance  Activity Tolerance: Patient tolerated treatment well    Patient Education  Patient Education: Gait safety  Pt verbalized/demonstrated good understanding:     [x] Yes         [] No, pt required further clarification.      Post Treatment Pain:  0/10    Plan  Plan Frequency: 2x/wk  Plan weeks: 4 weeks     Goals  (Total # of Visits to Date: 8)      Short Term Goals  Time Frame for Short Term Goals: 3 weeks  Short Term Goal 1: Pt will be educated on her POC and HEP-met  Short Term Goal 2: Pt will be able to  modified tandem stance for 1

## 2023-11-14 ENCOUNTER — HOSPITAL ENCOUNTER (OUTPATIENT)
Dept: PHYSICAL THERAPY | Age: 73
Setting detail: THERAPIES SERIES
Discharge: HOME | End: 2023-11-14
Payer: COMMERCIAL

## 2023-11-14 PROCEDURE — 97116 GAIT TRAINING THERAPY: CPT

## 2023-11-14 PROCEDURE — 97112 NEUROMUSCULAR REEDUCATION: CPT

## 2023-11-14 NOTE — PROGRESS NOTES
Phone: [address] [facility]           Fax: [phone]                           Outpatient Physical Therapy                                                                            Daily Note    Patient: Isi Israel : 1950  CSN #: [id number]   Referring Physician: Riley Fernandez MD  Date: 2023    Diagnosis: Left pontine cerebrovascular accident I63.9, Abnormal gait R26.9  Treatment Diagnosis: unsteady gait, imbalance    Onset Date: 10/16/23  PT Insurance Information: Medicare  Total # of Visits Approved: 12 Per Physician Order  Total # of Visits to Date: 23 Plan of Care/Recert Due    Pre-Treatment Pain:  0/10  Subjective: Pt's  states they have been doin gher exercsies at home and feel comfortable continuing with just an HEP    Exercises:  Exercise 2: island walking  Exercise 5: cone turns L and R      Assessment  Assessment: Pt has been seen for 9 Pt visits and has made some progress with ambulation with a FWW. Pt still presents with very ataxic gait and impulsive behavior and is not safe to ambulate independently. Pt is unable to hold body weight on R LE due to weakness and has extreme difficulty negoitiating obstacles and turning. Pt still presents with dynamic standing balance of Poor. Pt will be placed on hold for maximum functional mobility potential.    Activity Tolerance  Activity Tolerance: Patient tolerated treatment well    Patient Education  Patient Education: hold from Pt and to continue HEP at home  Pt verbalized/demonstrated good understanding:     [x] Yes         [] No, pt required further clarification.        Post Treatment Pain:  0/10      Plan  Plan Frequency: 2x/wk  Plan weeks: 4 weeks       Goals  (Total # of Visits to Date: 5)      Short Term Goals  Time Frame for Short Term Goals: 3 weeks  Short Term Goal 1: Pt will be educated on her POC and HEP-met  Short Term Goal 2: Pt will be able to  modified tandem stance for

## 2024-02-12 ENCOUNTER — APPOINTMENT (OUTPATIENT)
Dept: CT IMAGING | Age: 74
DRG: 854 | End: 2024-02-12
Payer: MEDICARE

## 2024-02-12 ENCOUNTER — HOSPITAL ENCOUNTER (INPATIENT)
Age: 74
LOS: 3 days | Discharge: HOME | DRG: 854 | End: 2024-02-15
Admitting: INTERNAL MEDICINE
Payer: MEDICARE

## 2024-02-12 DIAGNOSIS — N13.30 HYDROURETERONEPHROSIS: ICD-10-CM

## 2024-02-12 DIAGNOSIS — A41.9 SEPSIS, DUE TO UNSPECIFIED ORGANISM, UNSPECIFIED WHETHER ACUTE ORGAN DYSFUNCTION PRESENT (HCC): Primary | ICD-10-CM

## 2024-02-12 PROBLEM — N39.0 SEPSIS DUE TO URINARY TRACT INFECTION (HCC): Status: ACTIVE | Noted: 2024-02-12

## 2024-02-12 LAB
ALBUMIN SERPL-MCNC: 4.5 G/DL (ref 3.5–5.2)
ALBUMIN/GLOB SERPL: 1.4 {RATIO} (ref 1–2.5)
ALP SERPL-CCNC: 86 U/L (ref 35–104)
ALT SERPL-CCNC: 31 U/L (ref 5–33)
ANION GAP SERPL CALCULATED.3IONS-SCNC: 19 MMOL/L (ref 9–17)
AST SERPL-CCNC: 30 U/L
BACTERIA URNS QL MICRO: ABNORMAL
BASOPHILS # BLD: 0 K/UL (ref 0–0.2)
BASOPHILS NFR BLD: 0 % (ref 0–2)
BILIRUB SERPL-MCNC: 2.2 MG/DL (ref 0.3–1.2)
BILIRUB UR QL STRIP: NEGATIVE
BUN SERPL-MCNC: 19 MG/DL (ref 8–23)
BUN/CREAT SERPL: 27 (ref 9–20)
CALCIUM SERPL-MCNC: 10.3 MG/DL (ref 8.6–10.4)
CHLORIDE SERPL-SCNC: 102 MMOL/L (ref 98–107)
CLARITY UR: CLEAR
CO2 SERPL-SCNC: 19 MMOL/L (ref 20–31)
COLOR UR: YELLOW
CREAT SERPL-MCNC: 0.7 MG/DL (ref 0.5–0.9)
EKG ATRIAL RATE: 129 BPM
EKG P AXIS: 49 DEGREES
EKG P-R INTERVAL: 138 MS
EKG Q-T INTERVAL: 316 MS
EKG QRS DURATION: 80 MS
EKG QTC CALCULATION (BAZETT): 462 MS
EKG R AXIS: -9 DEGREES
EKG T AXIS: 24 DEGREES
EKG VENTRICULAR RATE: 129 BPM
EOSINOPHIL # BLD: 0 K/UL (ref 0–0.44)
EOSINOPHILS RELATIVE PERCENT: 0 % (ref 1–4)
EPI CELLS #/AREA URNS HPF: ABNORMAL /HPF (ref 0–25)
ERYTHROCYTE [DISTWIDTH] IN BLOOD BY AUTOMATED COUNT: 15.6 % (ref 11.8–14.4)
FLUAV AG SPEC QL: NEGATIVE
FLUBV AG SPEC QL: NEGATIVE
GFR SERPL CREATININE-BSD FRML MDRD: >60 ML/MIN/1.73M2
GLUCOSE BLD-MCNC: 289 MG/DL (ref 74–100)
GLUCOSE SERPL-MCNC: 311 MG/DL (ref 70–99)
GLUCOSE UR STRIP-MCNC: ABNORMAL MG/DL
HCT VFR BLD AUTO: 39.5 % (ref 36.3–47.1)
HGB BLD-MCNC: 12.5 G/DL (ref 11.9–15.1)
HGB UR QL STRIP.AUTO: ABNORMAL
IMM GRANULOCYTES # BLD AUTO: 0 K/UL (ref 0–0.3)
IMM GRANULOCYTES NFR BLD: 0 %
INR PPP: 1
KETONES UR STRIP-MCNC: ABNORMAL MG/DL
LACTATE BLDV-SCNC: 3.5 MMOL/L (ref 0.5–2.2)
LACTATE BLDV-SCNC: 5.6 MMOL/L (ref 0.5–2.2)
LEUKOCYTE ESTERASE UR QL STRIP: ABNORMAL
LYMPHOCYTES NFR BLD: 0.82 K/UL (ref 1.1–3.7)
LYMPHOCYTES RELATIVE PERCENT: 4 % (ref 24–43)
MCH RBC QN AUTO: 27.1 PG (ref 25.2–33.5)
MCHC RBC AUTO-ENTMCNC: 31.6 G/DL (ref 28.4–34.8)
MCV RBC AUTO: 85.7 FL (ref 82.6–102.9)
MONOCYTES NFR BLD: 0.82 K/UL (ref 0.1–1.2)
MONOCYTES NFR BLD: 4 % (ref 3–12)
MORPHOLOGY: ABNORMAL
NEUTROPHILS NFR BLD: 92 % (ref 36–65)
NEUTS SEG NFR BLD: 18.96 K/UL (ref 1.5–8.1)
NITRITE UR QL STRIP: NEGATIVE
NRBC BLD-RTO: 0 PER 100 WBC
PARTIAL THROMBOPLASTIN TIME: 27 SEC (ref 26.8–34.8)
PH UR STRIP: 6.5 [PH] (ref 5–9)
PLATELET # BLD AUTO: 320 K/UL (ref 138–453)
PMV BLD AUTO: 11 FL (ref 8.1–13.5)
POTASSIUM SERPL-SCNC: 3.8 MMOL/L (ref 3.7–5.3)
PROT SERPL-MCNC: 7.8 G/DL (ref 6.4–8.3)
PROT UR STRIP-MCNC: ABNORMAL MG/DL
PROTHROMBIN TIME: 13 SEC (ref 11.9–14.8)
RBC # BLD AUTO: 4.61 M/UL (ref 3.95–5.11)
RBC #/AREA URNS HPF: ABNORMAL /HPF (ref 0–2)
SARS-COV-2 RDRP RESP QL NAA+PROBE: NOT DETECTED
SODIUM SERPL-SCNC: 140 MMOL/L (ref 135–144)
SP GR UR STRIP: 1.01 (ref 1.01–1.02)
SPECIMEN DESCRIPTION: NORMAL
TROPONIN I SERPL HS-MCNC: 12 NG/L (ref 0–14)
UROBILINOGEN UR STRIP-ACNC: NORMAL EU/DL (ref 0–1)
WBC #/AREA URNS HPF: ABNORMAL /HPF (ref 0–5)
WBC OTHER # BLD: 20.6 K/UL (ref 3.5–11.3)

## 2024-02-12 PROCEDURE — 85610 PROTHROMBIN TIME: CPT

## 2024-02-12 PROCEDURE — 87086 URINE CULTURE/COLONY COUNT: CPT

## 2024-02-12 PROCEDURE — 6360000002 HC RX W HCPCS

## 2024-02-12 PROCEDURE — 71250 CT THORAX DX C-: CPT

## 2024-02-12 PROCEDURE — 93005 ELECTROCARDIOGRAM TRACING: CPT

## 2024-02-12 PROCEDURE — 87040 BLOOD CULTURE FOR BACTERIA: CPT

## 2024-02-12 PROCEDURE — 83605 ASSAY OF LACTIC ACID: CPT

## 2024-02-12 PROCEDURE — 82947 ASSAY GLUCOSE BLOOD QUANT: CPT

## 2024-02-12 PROCEDURE — 93010 ELECTROCARDIOGRAM REPORT: CPT | Performed by: INTERNAL MEDICINE

## 2024-02-12 PROCEDURE — 85730 THROMBOPLASTIN TIME PARTIAL: CPT

## 2024-02-12 PROCEDURE — 85025 COMPLETE CBC W/AUTO DIFF WBC: CPT

## 2024-02-12 PROCEDURE — 87635 SARS-COV-2 COVID-19 AMP PRB: CPT

## 2024-02-12 PROCEDURE — 80053 COMPREHEN METABOLIC PANEL: CPT

## 2024-02-12 PROCEDURE — 36415 COLL VENOUS BLD VENIPUNCTURE: CPT

## 2024-02-12 PROCEDURE — 87154 CUL TYP ID BLD PTHGN 6+ TRGT: CPT

## 2024-02-12 PROCEDURE — 87205 SMEAR GRAM STAIN: CPT

## 2024-02-12 PROCEDURE — 74177 CT ABD & PELVIS W/CONTRAST: CPT

## 2024-02-12 PROCEDURE — 84484 ASSAY OF TROPONIN QUANT: CPT

## 2024-02-12 PROCEDURE — 96365 THER/PROPH/DIAG IV INF INIT: CPT

## 2024-02-12 PROCEDURE — 70496 CT ANGIOGRAPHY HEAD: CPT

## 2024-02-12 PROCEDURE — 99285 EMERGENCY DEPT VISIT HI MDM: CPT

## 2024-02-12 PROCEDURE — 70450 CT HEAD/BRAIN W/O DYE: CPT

## 2024-02-12 PROCEDURE — 6360000004 HC RX CONTRAST MEDICATION

## 2024-02-12 PROCEDURE — 1200000000 HC SEMI PRIVATE

## 2024-02-12 PROCEDURE — 87804 INFLUENZA ASSAY W/OPTIC: CPT

## 2024-02-12 PROCEDURE — 2580000003 HC RX 258

## 2024-02-12 PROCEDURE — 87077 CULTURE AEROBIC IDENTIFY: CPT

## 2024-02-12 PROCEDURE — 81001 URINALYSIS AUTO W/SCOPE: CPT

## 2024-02-12 PROCEDURE — 96361 HYDRATE IV INFUSION ADD-ON: CPT

## 2024-02-12 RX ORDER — 0.9 % SODIUM CHLORIDE 0.9 %
500 INTRAVENOUS SOLUTION INTRAVENOUS ONCE
Status: COMPLETED | OUTPATIENT
Start: 2024-02-12 | End: 2024-02-12

## 2024-02-12 RX ORDER — SODIUM CHLORIDE 9 MG/ML
INJECTION, SOLUTION INTRAVENOUS CONTINUOUS
Status: DISCONTINUED | OUTPATIENT
Start: 2024-02-12 | End: 2024-02-14

## 2024-02-12 RX ORDER — LANOLIN ALCOHOL/MO/W.PET/CERES
3 CREAM (GRAM) TOPICAL NIGHTLY PRN
Status: ON HOLD | COMMUNITY

## 2024-02-12 RX ORDER — 0.9 % SODIUM CHLORIDE 0.9 %
1000 INTRAVENOUS SOLUTION INTRAVENOUS ONCE
Status: COMPLETED | OUTPATIENT
Start: 2024-02-12 | End: 2024-02-12

## 2024-02-12 RX ADMIN — SODIUM CHLORIDE 1000 ML: 9 INJECTION, SOLUTION INTRAVENOUS at 16:39

## 2024-02-12 RX ADMIN — SODIUM CHLORIDE: 9 INJECTION, SOLUTION INTRAVENOUS at 19:22

## 2024-02-12 RX ADMIN — SODIUM CHLORIDE 500 ML: 9 INJECTION, SOLUTION INTRAVENOUS at 18:23

## 2024-02-12 RX ADMIN — CEFTRIAXONE SODIUM 2000 MG: 2 INJECTION, POWDER, FOR SOLUTION INTRAMUSCULAR; INTRAVENOUS at 18:21

## 2024-02-12 RX ADMIN — IOPAMIDOL 75 ML: 755 INJECTION, SOLUTION INTRAVENOUS at 15:35

## 2024-02-12 NOTE — ED NOTES
stated patient is usually able to ambulate with one assistance. He states she was unable to do that this morning and it took two people to get her to the car. He states her speak was worse but now improving back to baseline.

## 2024-02-12 NOTE — ED PROVIDER NOTES
education: Not on file    Highest education level: Not on file   Occupational History    Not on file   Tobacco Use    Smoking status: Never    Smokeless tobacco: Never   Vaping Use    Vaping Use: Never used   Substance and Sexual Activity    Alcohol use: No    Drug use: No    Sexual activity: Yes   Other Topics Concern    Not on file   Social History Narrative    Not on file     Social Determinants of Health     Financial Resource Strain: Low Risk  (4/17/2023)    Overall Financial Resource Strain (CARDIA)     Difficulty of Paying Living Expenses: Not hard at all   Food Insecurity: Not on file (4/17/2023)   Transportation Needs: Unknown (4/17/2023)    PRAPARE - Transportation     Lack of Transportation (Medical): Not on file     Lack of Transportation (Non-Medical): No   Physical Activity: Inactive (10/12/2022)    Exercise Vital Sign     Days of Exercise per Week: 0 days     Minutes of Exercise per Session: 0 min   Stress: No Stress Concern Present (10/12/2022)    [place] [employer] - Occupational Stress Questionnaire     Feeling of Stress : Not at all   Social Connections: Moderately Isolated (10/12/2022)    Social Connection and Isolation Panel [NHANES]     Frequency of Communication with Friends and Family: Twice a week     Frequency of Social Gatherings with Friends and Family: Once a week     Attends Taoist Services: Never     Active Member of Clubs or Organizations: No     Attends Club or Organization Meetings: Never     Marital Status:    Intimate Partner Violence: Not At Risk (10/12/2022)    Humiliation, Afraid, Rape, and Kick questionnaire     Fear of Current or Ex-Partner: No     Emotionally Abused: No     Physically Abused: No     Sexually Abused: No   Housing Stability: Unknown (4/17/2023)    Housing Stability Vital Sign     Unable to Pay for Housing in the Last Year: Not on file     Number of Places Lived in the Last Year: Not on file     Unstable Housing in the Last Year:

## 2024-02-13 LAB
ALBUMIN SERPL-MCNC: 3.8 G/DL (ref 3.5–5.2)
ALBUMIN/GLOB SERPL: 1.4 {RATIO} (ref 1–2.5)
ALP SERPL-CCNC: 80 U/L (ref 35–104)
ALT SERPL-CCNC: 26 U/L (ref 5–33)
ANION GAP SERPL CALCULATED.3IONS-SCNC: 10 MMOL/L (ref 9–17)
AST SERPL-CCNC: 24 U/L
BASOPHILS # BLD: 0.03 K/UL (ref 0–0.2)
BASOPHILS NFR BLD: 0 % (ref 0–2)
BILIRUB SERPL-MCNC: 1.7 MG/DL (ref 0.3–1.2)
BUN SERPL-MCNC: 9 MG/DL (ref 8–23)
BUN/CREAT SERPL: 18 (ref 9–20)
CALCIUM SERPL-MCNC: 9.4 MG/DL (ref 8.6–10.4)
CHLORIDE SERPL-SCNC: 111 MMOL/L (ref 98–107)
CO2 SERPL-SCNC: 22 MMOL/L (ref 20–31)
CREAT SERPL-MCNC: 0.5 MG/DL (ref 0.5–0.9)
EOSINOPHIL # BLD: <0.03 K/UL (ref 0–0.44)
EOSINOPHILS RELATIVE PERCENT: 0 % (ref 1–4)
ERYTHROCYTE [DISTWIDTH] IN BLOOD BY AUTOMATED COUNT: 15.8 % (ref 11.8–14.4)
GFR SERPL CREATININE-BSD FRML MDRD: >60 ML/MIN/1.73M2
GLUCOSE SERPL-MCNC: 149 MG/DL (ref 70–99)
HCT VFR BLD AUTO: 35.3 % (ref 36.3–47.1)
HGB BLD-MCNC: 11.1 G/DL (ref 11.9–15.1)
IMM GRANULOCYTES # BLD AUTO: 0.07 K/UL (ref 0–0.3)
IMM GRANULOCYTES NFR BLD: 0 %
LACTATE BLDV-SCNC: 1.6 MMOL/L (ref 0.5–1.9)
LACTATE BLDV-SCNC: 2.3 MMOL/L (ref 0.5–1.9)
LACTATE BLDV-SCNC: 2.8 MMOL/L (ref 0.5–2.2)
LYMPHOCYTES NFR BLD: 1.07 K/UL (ref 1.1–3.7)
LYMPHOCYTES RELATIVE PERCENT: 6 % (ref 24–43)
MAGNESIUM SERPL-MCNC: 1.8 MG/DL (ref 1.6–2.6)
MCH RBC QN AUTO: 26.9 PG (ref 25.2–33.5)
MCHC RBC AUTO-ENTMCNC: 31.4 G/DL (ref 28.4–34.8)
MONOCYTES NFR BLD: 1.45 K/UL (ref 0.1–1.2)
MONOCYTES NFR BLD: 8 % (ref 3–12)
NEUTROPHILS NFR BLD: 86 % (ref 36–65)
NEUTS SEG NFR BLD: 16.5 K/UL (ref 1.5–8.1)
NRBC BLD-RTO: 0 PER 100 WBC
PLATELET # BLD AUTO: 242 K/UL (ref 138–453)
PMV BLD AUTO: 10.8 FL (ref 8.1–13.5)
POTASSIUM SERPL-SCNC: 3.4 MMOL/L (ref 3.7–5.3)
PROCALCITONIN SERPL-MCNC: 1.09 NG/ML
PROT SERPL-MCNC: 6.5 G/DL (ref 6.4–8.3)
RBC # BLD AUTO: 4.12 M/UL (ref 3.95–5.11)
SODIUM SERPL-SCNC: 143 MMOL/L (ref 135–144)
WBC OTHER # BLD: 19.1 K/UL (ref 3.5–11.3)

## 2024-02-13 PROCEDURE — 6370000000 HC RX 637 (ALT 250 FOR IP): Performed by: INTERNAL MEDICINE

## 2024-02-13 PROCEDURE — 94761 N-INVAS EAR/PLS OXIMETRY MLT: CPT

## 2024-02-13 PROCEDURE — 85025 COMPLETE CBC W/AUTO DIFF WBC: CPT

## 2024-02-13 PROCEDURE — 6360000002 HC RX W HCPCS: Performed by: INTERNAL MEDICINE

## 2024-02-13 PROCEDURE — 80053 COMPREHEN METABOLIC PANEL: CPT

## 2024-02-13 PROCEDURE — 97162 PT EVAL MOD COMPLEX 30 MIN: CPT

## 2024-02-13 PROCEDURE — 6370000000 HC RX 637 (ALT 250 FOR IP)

## 2024-02-13 PROCEDURE — 6370000000 HC RX 637 (ALT 250 FOR IP): Performed by: NURSE PRACTITIONER

## 2024-02-13 PROCEDURE — 84145 PROCALCITONIN (PCT): CPT

## 2024-02-13 PROCEDURE — 2580000003 HC RX 258: Performed by: INTERNAL MEDICINE

## 2024-02-13 PROCEDURE — 36415 COLL VENOUS BLD VENIPUNCTURE: CPT

## 2024-02-13 PROCEDURE — 83735 ASSAY OF MAGNESIUM: CPT

## 2024-02-13 PROCEDURE — 83605 ASSAY OF LACTIC ACID: CPT

## 2024-02-13 PROCEDURE — 2580000003 HC RX 258: Performed by: NURSE PRACTITIONER

## 2024-02-13 PROCEDURE — 1200000000 HC SEMI PRIVATE

## 2024-02-13 PROCEDURE — 97166 OT EVAL MOD COMPLEX 45 MIN: CPT

## 2024-02-13 RX ORDER — AMLODIPINE BESYLATE 2.5 MG/1
2.5 TABLET ORAL DAILY
Status: DISCONTINUED | OUTPATIENT
Start: 2024-02-13 | End: 2024-02-14

## 2024-02-13 RX ORDER — METFORMIN HYDROCHLORIDE 500 MG/1
1000 TABLET, EXTENDED RELEASE ORAL
Status: DISCONTINUED | OUTPATIENT
Start: 2024-02-13 | End: 2024-02-14

## 2024-02-13 RX ORDER — CHOLECALCIFEROL (VITAMIN D3) 125 MCG
5 CAPSULE ORAL NIGHTLY PRN
Status: DISCONTINUED | OUTPATIENT
Start: 2024-02-13 | End: 2024-02-14

## 2024-02-13 RX ORDER — POTASSIUM CHLORIDE 20 MEQ/1
40 TABLET, EXTENDED RELEASE ORAL ONCE
Status: COMPLETED | OUTPATIENT
Start: 2024-02-13 | End: 2024-02-13

## 2024-02-13 RX ORDER — FAMOTIDINE 20 MG/1
20 TABLET, FILM COATED ORAL 2 TIMES DAILY
Status: DISCONTINUED | OUTPATIENT
Start: 2024-02-13 | End: 2024-02-14

## 2024-02-13 RX ORDER — ACETAMINOPHEN 650 MG/1
650 SUPPOSITORY RECTAL EVERY 6 HOURS PRN
Status: DISCONTINUED | OUTPATIENT
Start: 2024-02-13 | End: 2024-02-15 | Stop reason: HOSPADM

## 2024-02-13 RX ORDER — SODIUM CHLORIDE 9 MG/ML
INJECTION, SOLUTION INTRAVENOUS PRN
Status: DISCONTINUED | OUTPATIENT
Start: 2024-02-13 | End: 2024-02-14

## 2024-02-13 RX ORDER — SODIUM CHLORIDE 9 MG/ML
INJECTION, SOLUTION INTRAVENOUS CONTINUOUS
Status: DISCONTINUED | OUTPATIENT
Start: 2024-02-13 | End: 2024-02-13

## 2024-02-13 RX ORDER — ATORVASTATIN CALCIUM 40 MG/1
80 TABLET, FILM COATED ORAL DAILY
Status: DISCONTINUED | OUTPATIENT
Start: 2024-02-13 | End: 2024-02-14

## 2024-02-13 RX ORDER — SODIUM CHLORIDE 0.9 % (FLUSH) 0.9 %
5-40 SYRINGE (ML) INJECTION EVERY 12 HOURS SCHEDULED
Status: DISCONTINUED | OUTPATIENT
Start: 2024-02-13 | End: 2024-02-15 | Stop reason: HOSPADM

## 2024-02-13 RX ORDER — ACETAMINOPHEN 325 MG/1
650 TABLET ORAL EVERY 6 HOURS PRN
Status: DISCONTINUED | OUTPATIENT
Start: 2024-02-13 | End: 2024-02-15 | Stop reason: HOSPADM

## 2024-02-13 RX ORDER — ENOXAPARIN SODIUM 100 MG/ML
40 INJECTION SUBCUTANEOUS DAILY
Status: DISCONTINUED | OUTPATIENT
Start: 2024-02-13 | End: 2024-02-13

## 2024-02-13 RX ORDER — SODIUM CHLORIDE 0.9 % (FLUSH) 0.9 %
5-40 SYRINGE (ML) INJECTION PRN
Status: DISCONTINUED | OUTPATIENT
Start: 2024-02-13 | End: 2024-02-15 | Stop reason: HOSPADM

## 2024-02-13 RX ORDER — ENOXAPARIN SODIUM 100 MG/ML
30 INJECTION SUBCUTANEOUS DAILY
Status: DISCONTINUED | OUTPATIENT
Start: 2024-02-13 | End: 2024-02-14

## 2024-02-13 RX ORDER — M-VIT,TX,IRON,MINS/CALC/FOLIC 27MG-0.4MG
1 TABLET ORAL WEEKLY
Status: DISCONTINUED | OUTPATIENT
Start: 2024-02-13 | End: 2024-02-14

## 2024-02-13 RX ORDER — ASPIRIN 81 MG/1
81 TABLET ORAL DAILY
Status: DISCONTINUED | OUTPATIENT
Start: 2024-02-13 | End: 2024-02-14

## 2024-02-13 RX ADMIN — Medication 5 MG: at 01:05

## 2024-02-13 RX ADMIN — METFORMIN HYDROCHLORIDE 1000 MG: 500 TABLET, EXTENDED RELEASE ORAL at 10:15

## 2024-02-13 RX ADMIN — FAMOTIDINE 20 MG: 20 TABLET, FILM COATED ORAL at 22:02

## 2024-02-13 RX ADMIN — ASPIRIN 81 MG: 81 TABLET, COATED ORAL at 10:15

## 2024-02-13 RX ADMIN — SODIUM CHLORIDE: 9 INJECTION, SOLUTION INTRAVENOUS at 19:52

## 2024-02-13 RX ADMIN — ENOXAPARIN SODIUM 30 MG: 100 INJECTION SUBCUTANEOUS at 10:16

## 2024-02-13 RX ADMIN — SODIUM CHLORIDE, PRESERVATIVE FREE 10 ML: 5 INJECTION INTRAVENOUS at 22:03

## 2024-02-13 RX ADMIN — Medication 1 TABLET: at 10:15

## 2024-02-13 RX ADMIN — Medication 5 MG: at 22:02

## 2024-02-13 RX ADMIN — ACETAMINOPHEN 650 MG: 325 TABLET ORAL at 14:55

## 2024-02-13 RX ADMIN — ATORVASTATIN CALCIUM 80 MG: 40 TABLET, FILM COATED ORAL at 10:15

## 2024-02-13 RX ADMIN — POTASSIUM CHLORIDE 40 MEQ: 1500 TABLET, EXTENDED RELEASE ORAL at 14:22

## 2024-02-13 RX ADMIN — SODIUM CHLORIDE: 9 INJECTION, SOLUTION INTRAVENOUS at 00:48

## 2024-02-13 RX ADMIN — FAMOTIDINE 20 MG: 20 TABLET, FILM COATED ORAL at 10:15

## 2024-02-13 RX ADMIN — AMLODIPINE BESYLATE 2.5 MG: 2.5 TABLET ORAL at 10:15

## 2024-02-13 RX ADMIN — CEFTRIAXONE SODIUM 1000 MG: 1 INJECTION, POWDER, FOR SOLUTION INTRAMUSCULAR; INTRAVENOUS at 18:35

## 2024-02-13 NOTE — CARE COORDINATION
Case Management Assessment  Initial Evaluation    Date/Time of Evaluation: 2/13/2024 2:06 PM  Assessment Completed by: STEFAN Little    If patient is discharged prior to next notation, then this note serves as note for discharge by case management.    Patient Name: Macey Zazueta                   YOB: 1950  Diagnosis: Hydroureteronephrosis [N13.30]  Sepsis due to urinary tract infection (HCC) [A41.9, N39.0]  Sepsis, due to unspecified organism, unspecified whether acute organ dysfunction present (HCC) [A41.9]                   Date / Time: 2/12/2024  3:05 PM    Patient Admission Status: Inpatient   Readmission Risk (Low < 19, Mod (19-27), High > 27): Readmission Risk Score: 16.6    Current PCP: Byron Castaneda MD  PCP verified by CM? Yes    Chart Reviewed: Yes      History Provided by: Significant Other  Patient Orientation: Alert and Oriented    Patient Cognition: Short Term Memory Deficit    Hospitalization in the last 30 days (Readmission):  No    If yes, Readmission Assessment in CM Navigator will be completed.    Advance Directives:      Code Status: Full Code   Patient's Primary Decision Maker is: Named in Scanned ACP Document    Primary Decision Maker: Yan Zazueta - Spouse - [phone]    Secondary Decision Maker: Kali Zazueta - Child - [phone]    Discharge Planning:    Patient lives with: Spouse/Significant Other Type of Home: House  Primary Care Giver: Spouse  Patient Support Systems include: Spouse/Significant Other, Family Members   Current Financial resources: Medicare  Current community resources: None  Current services prior to admission: Durable Medical Equipment            Current DME: Shower Chair, Wheelchair, Walker, Bedside Commode            Type of Home Care services:  None    ADLS  Prior functional level: Assistance with the following:, Bathing, Dressing, Toileting, Feeding, Cooking, Housework, Shopping, Mobility  Current functional level: Assistance with the

## 2024-02-13 NOTE — CONSULTS
Urology Consultation    Patient:  Macey Zazueta  MRN: [id number]  YOB: 1950  Consult requested from Dr. Castaneda for purpose of evaluation of hydronephrosis.    CHIEF COMPLAINT:  Left hydronephrosis    HISTORY OF PRESENT ILLNESS:   The patient is a 74 y.o. female who presents with left hydronephrosis.  Patient presented to the emergency department with altered mental status and left-sided weakness.  Patient does have a history of a stroke on the right side.  Patient did have some nausea and vomiting at home.  But not in the hospital.  Patient had a CT scan performed.  This film was independently reviewed today.  This does show a large stone in the left renal pelvis.  There is some mild hydronephrosis of this kidney.  Patient does also have a large fibroid filled uterus that is causing lateral displacement of the bladder.  This is on the patient's left as well.  Patient did have a low white blood cell count of 20,000 upon admission.  It has improved slightly to 19,000.  Patient was afebrile.  Patient has had a Tmax of 101.3 today.  Patient has never had stones in the past    Patient's old records, notes and chart reviewed and summarized above.    Past Medical History:    Past Medical History:   Diagnosis Date    COVID-19 virus infection 2021    Essential hypertension     Left pontine cerebrovascular accident (HCC) 2017    Mixed hyperlipidemia     Periorbital cellulitis /  right side /  resolved with Omnicef 2022 12/13/2022    Type 2 diabetes mellitus without complication, without long-term current use of insulin (HCC)        Past Surgical History:    Past Surgical History:   Procedure Laterality Date    CATARACT REMOVAL Right 2020       Medications:    Scheduled Meds:   sodium chloride flush  5-40 mL IntraVENous 2 times per day    famotidine  20 mg Oral BID    cefTRIAXone (ROCEPHIN) IV  1,000 mg IntraVENous Q24H    amLODIPine  2.5 mg Oral Daily    aspirin  81 mg Oral Daily    atorvastatin  80 mg Oral Daily

## 2024-02-13 NOTE — H&P (VIEW-ONLY)
Urology Consultation    Patient:  Macey Zazueta  MRN: [id number]  YOB: 1950  Consult requested from Dr. Castaneda for purpose of evaluation of hydronephrosis.    CHIEF COMPLAINT:  Left hydronephrosis    HISTORY OF PRESENT ILLNESS:   The patient is a 74 y.o. female who presents with left hydronephrosis.  Patient presented to the emergency department with altered mental status and left-sided weakness.  Patient does have a history of a stroke on the right side.  Patient did have some nausea and vomiting at home.  But not in the hospital.  Patient had a CT scan performed.  This film was independently reviewed today.  This does show a large stone in the left renal pelvis.  There is some mild hydronephrosis of this kidney.  Patient does also have a large fibroid filled uterus that is causing lateral displacement of the bladder.  This is on the patient's left as well.  Patient did have a low white blood cell count of 20,000 upon admission.  It has improved slightly to 19,000.  Patient was afebrile.  Patient has had a Tmax of 101.3 today.  Patient has never had stones in the past    Patient's old records, notes and chart reviewed and summarized above.    Past Medical History:    Past Medical History:   Diagnosis Date    COVID-19 virus infection 2021    Essential hypertension     Left pontine cerebrovascular accident (HCC) 2017    Mixed hyperlipidemia     Periorbital cellulitis /  right side /  resolved with Omnicef 2022 12/13/2022    Type 2 diabetes mellitus without complication, without long-term current use of insulin (HCC)        Past Surgical History:    Past Surgical History:   Procedure Laterality Date    CATARACT REMOVAL Right 2020       Medications:    Scheduled Meds:   sodium chloride flush  5-40 mL IntraVENous 2 times per day    famotidine  20 mg Oral BID    cefTRIAXone (ROCEPHIN) IV  1,000 mg IntraVENous Q24H    amLODIPine  2.5 mg Oral Daily    aspirin  81 mg Oral Daily    atorvastatin  80 mg Oral Daily  results:    Urinalysis:   Recent Labs     02/12/24  1720   COLORU Yellow   PHUR 6.5   WBCUA 50    RBCUA 2 TO 5   BACTERIA 2+*   SPECGRAV 1.015   LEUKOCYTESUR MODERATE*   UROBILINOGEN Normal   BILIRUBINUR NEGATIVE        -----------------------------------------------------------------  Imaging Results: none      Assessment and Plan   Impression:    Patient Active Problem List   Diagnosis    Left pontine cerebrovascular accident (HCC) /  2017    Type 2 diabetes mellitus without complication, with long-term current use of insulin ([facility]) /  Metformin ER    Essential hypertension    Mixed hyperlipidemia    Sepsis due to urinary tract infection ([facility])       Plan: Cysto with left stent.  N.p.o. at midnight.  We will do this in the morning.  Will plan for definitive stone therapy in the next 1 to 2 weeks after antibiotics are given    Kieran Lees MD  4:03 PM 2/13/2024

## 2024-02-13 NOTE — H&P
known   risk factors.      Radiology 2017 http://pubs.rsna.org/doi/full/10.1148/radiol.[email or web address]         CT HEAD WO CONTRAST   Final Result   No acute intracranial abnormality.      Mild parenchymal volume loss.      Moderate ventriculomegaly, disproportionate to the degree of volume loss,   likely with superimposed mild communicating hydrocephalus.      Moderate chronic microvascular disease.      Results were reported to Dr. Stephenson by radiology results communication at 3:58   p.m. on February 12, 2024.                 Assessment:    Principal Problem:    Sepsis due to urinary tract infection (HCC)  Active Problems:    Left pontine cerebrovascular accident (HCC) /  2017    Type 2 diabetes mellitus without complication, with long-term current use of insulin (HCC) /  Metformin ER  Resolved Problems:    * No resolved hospital problems. *      Patient Active Problem List    Diagnosis Date Noted    Essential hypertension 10/12/2022    Mixed hyperlipidemia 10/12/2022    Sepsis due to urinary tract infection (HCC) 02/12/2024    Type 2 diabetes mellitus without complication, with long-term current use of insulin (HCC) /  Metformin ER 05/16/2017    Left pontine cerebrovascular accident (HCC) /  2017        Plan:     MEDICAL DECISION MAKING:    Primary Problem(s): Sepsis due to urinary tract infection (HCC)  Differential diagnoses: Complicated UTI, pyelonephritis  Condition is an acute or chronic illness or injury that poses threat to life or bodily function  Condition is stable  Treatment plan:   Blood cultures pending  Urine culture pending  Consult urology for renal lithiasis   CBC, CMP, trend lactic  Imaging: no further imaging studies ordered today  Medications:   IV Rocephin  IV fluids  Medication Monitoring / High Risk Medications: none      Type 2 Diabetes     Condition is a chronic stable condition  Treatment plan: Labs ordered: BMP, CBC  Imaging: no further imaging studies ordered today  Medications: Continue

## 2024-02-14 ENCOUNTER — APPOINTMENT (OUTPATIENT)
Dept: GENERAL RADIOLOGY | Age: 74
DRG: 854 | End: 2024-02-14
Payer: MEDICARE

## 2024-02-14 ENCOUNTER — ANESTHESIA EVENT (OUTPATIENT)
Dept: OPERATING ROOM | Age: 74
DRG: 854 | End: 2024-02-14
Payer: MEDICARE

## 2024-02-14 ENCOUNTER — ANESTHESIA (OUTPATIENT)
Dept: OPERATING ROOM | Age: 74
DRG: 854 | End: 2024-02-14
Payer: MEDICARE

## 2024-02-14 LAB
ALBUMIN SERPL-MCNC: 3.2 G/DL (ref 3.5–5.2)
ALBUMIN/GLOB SERPL: 1.3 {RATIO} (ref 1–2.5)
ALP SERPL-CCNC: 77 U/L (ref 35–104)
ALT SERPL-CCNC: 23 U/L (ref 5–33)
ANION GAP SERPL CALCULATED.3IONS-SCNC: 10 MMOL/L (ref 9–17)
AST SERPL-CCNC: 24 U/L
BASOPHILS # BLD: 0.04 K/UL (ref 0–0.2)
BASOPHILS NFR BLD: 0 % (ref 0–2)
BILIRUB SERPL-MCNC: 1.4 MG/DL (ref 0.3–1.2)
BUN SERPL-MCNC: 11 MG/DL (ref 8–23)
BUN/CREAT SERPL: 22 (ref 9–20)
CALCIUM SERPL-MCNC: 8.7 MG/DL (ref 8.6–10.4)
CHLORIDE SERPL-SCNC: 111 MMOL/L (ref 98–107)
CO2 SERPL-SCNC: 19 MMOL/L (ref 20–31)
CREAT SERPL-MCNC: 0.5 MG/DL (ref 0.5–0.9)
EOSINOPHIL # BLD: 0.16 K/UL (ref 0–0.44)
EOSINOPHILS RELATIVE PERCENT: 1 % (ref 1–4)
ERYTHROCYTE [DISTWIDTH] IN BLOOD BY AUTOMATED COUNT: 15.8 % (ref 11.8–14.4)
GFR SERPL CREATININE-BSD FRML MDRD: >60 ML/MIN/1.73M2
GLUCOSE SERPL-MCNC: 124 MG/DL (ref 70–99)
HCT VFR BLD AUTO: 32.3 % (ref 36.3–47.1)
HGB BLD-MCNC: 10.3 G/DL (ref 11.9–15.1)
IMM GRANULOCYTES # BLD AUTO: 0.05 K/UL (ref 0–0.3)
IMM GRANULOCYTES NFR BLD: 0 %
LYMPHOCYTES NFR BLD: 1.51 K/UL (ref 1.1–3.7)
LYMPHOCYTES RELATIVE PERCENT: 12 % (ref 24–43)
MCH RBC QN AUTO: 27.4 PG (ref 25.2–33.5)
MCHC RBC AUTO-ENTMCNC: 31.9 G/DL (ref 28.4–34.8)
MCV RBC AUTO: 85.9 FL (ref 82.6–102.9)
MICROORGANISM SPEC CULT: ABNORMAL
MONOCYTES NFR BLD: 0.87 K/UL (ref 0.1–1.2)
MONOCYTES NFR BLD: 7 % (ref 3–12)
NEUTROPHILS NFR BLD: 79 % (ref 36–65)
NEUTS SEG NFR BLD: 9.72 K/UL (ref 1.5–8.1)
NRBC BLD-RTO: 0 PER 100 WBC
PLATELET # BLD AUTO: 216 K/UL (ref 138–453)
PMV BLD AUTO: 10.7 FL (ref 8.1–13.5)
POTASSIUM SERPL-SCNC: 3.6 MMOL/L (ref 3.7–5.3)
PROT SERPL-MCNC: 5.7 G/DL (ref 6.4–8.3)
RBC # BLD AUTO: 3.76 M/UL (ref 3.95–5.11)
SERVICE CMNT-IMP: ABNORMAL
SODIUM SERPL-SCNC: 140 MMOL/L (ref 135–144)
SPECIMEN DESCRIPTION: ABNORMAL
WBC OTHER # BLD: 12.4 K/UL (ref 3.5–11.3)

## 2024-02-14 PROCEDURE — 2580000003 HC RX 258: Performed by: NURSE PRACTITIONER

## 2024-02-14 PROCEDURE — 0T778DZ DILATION OF LEFT URETER WITH INTRALUMINAL DEVICE, VIA NATURAL OR ARTIFICIAL OPENING ENDOSCOPIC: ICD-10-PCS | Performed by: UROLOGY

## 2024-02-14 PROCEDURE — 6370000000 HC RX 637 (ALT 250 FOR IP): Performed by: UROLOGY

## 2024-02-14 PROCEDURE — 6360000002 HC RX W HCPCS: Performed by: NURSE ANESTHETIST, CERTIFIED REGISTERED

## 2024-02-14 PROCEDURE — 6360000002 HC RX W HCPCS: Performed by: UROLOGY

## 2024-02-14 PROCEDURE — 80053 COMPREHEN METABOLIC PANEL: CPT

## 2024-02-14 PROCEDURE — 2580000003 HC RX 258: Performed by: UROLOGY

## 2024-02-14 PROCEDURE — 94761 N-INVAS EAR/PLS OXIMETRY MLT: CPT

## 2024-02-14 PROCEDURE — 2500000003 HC RX 250 WO HCPCS: Performed by: NURSE ANESTHETIST, CERTIFIED REGISTERED

## 2024-02-14 PROCEDURE — 97116 GAIT TRAINING THERAPY: CPT

## 2024-02-14 PROCEDURE — 1200000000 HC SEMI PRIVATE

## 2024-02-14 PROCEDURE — 97110 THERAPEUTIC EXERCISES: CPT

## 2024-02-14 PROCEDURE — 85025 COMPLETE CBC W/AUTO DIFF WBC: CPT

## 2024-02-14 PROCEDURE — 36415 COLL VENOUS BLD VENIPUNCTURE: CPT

## 2024-02-14 RX ORDER — ASPIRIN 81 MG/1
81 TABLET ORAL DAILY
Status: DISCONTINUED | OUTPATIENT
Start: 2024-02-14 | End: 2024-02-15 | Stop reason: HOSPADM

## 2024-02-14 RX ORDER — METFORMIN HYDROCHLORIDE 500 MG/1
1000 TABLET, EXTENDED RELEASE ORAL
Status: DISCONTINUED | OUTPATIENT
Start: 2024-02-15 | End: 2024-02-15 | Stop reason: HOSPADM

## 2024-02-14 RX ORDER — M-VIT,TX,IRON,MINS/CALC/FOLIC 27MG-0.4MG
1 TABLET ORAL WEEKLY
Status: DISCONTINUED | OUTPATIENT
Start: 2024-02-20 | End: 2024-02-15 | Stop reason: HOSPADM

## 2024-02-14 RX ORDER — ONDANSETRON 2 MG/ML
4 INJECTION INTRAMUSCULAR; INTRAVENOUS
Status: DISCONTINUED | OUTPATIENT
Start: 2024-02-14 | End: 2024-02-14 | Stop reason: HOSPADM

## 2024-02-14 RX ORDER — CHOLECALCIFEROL (VITAMIN D3) 125 MCG
5 CAPSULE ORAL NIGHTLY PRN
Status: DISCONTINUED | OUTPATIENT
Start: 2024-02-14 | End: 2024-02-15 | Stop reason: HOSPADM

## 2024-02-14 RX ORDER — KETOROLAC TROMETHAMINE 30 MG/ML
INJECTION, SOLUTION INTRAMUSCULAR; INTRAVENOUS PRN
Status: DISCONTINUED | OUTPATIENT
Start: 2024-02-14 | End: 2024-02-14 | Stop reason: SDUPTHER

## 2024-02-14 RX ORDER — SODIUM CHLORIDE 0.9 % (FLUSH) 0.9 %
5-40 SYRINGE (ML) INJECTION EVERY 12 HOURS SCHEDULED
Status: DISCONTINUED | OUTPATIENT
Start: 2024-02-14 | End: 2024-02-14 | Stop reason: HOSPADM

## 2024-02-14 RX ORDER — PROPOFOL 10 MG/ML
INJECTION, EMULSION INTRAVENOUS PRN
Status: DISCONTINUED | OUTPATIENT
Start: 2024-02-14 | End: 2024-02-14 | Stop reason: SDUPTHER

## 2024-02-14 RX ORDER — ENOXAPARIN SODIUM 100 MG/ML
30 INJECTION SUBCUTANEOUS DAILY
Status: DISCONTINUED | OUTPATIENT
Start: 2024-02-14 | End: 2024-02-15 | Stop reason: HOSPADM

## 2024-02-14 RX ORDER — SODIUM CHLORIDE 9 MG/ML
INJECTION, SOLUTION INTRAVENOUS PRN
Status: DISCONTINUED | OUTPATIENT
Start: 2024-02-14 | End: 2024-02-14 | Stop reason: HOSPADM

## 2024-02-14 RX ORDER — CEFAZOLIN SODIUM IN 0.9 % NACL 2 G/100 ML
2000 PLASTIC BAG, INJECTION (ML) INTRAVENOUS ONCE
Status: COMPLETED | OUTPATIENT
Start: 2024-02-14 | End: 2024-02-14

## 2024-02-14 RX ORDER — LIDOCAINE HYDROCHLORIDE 20 MG/ML
INJECTION, SOLUTION EPIDURAL; INFILTRATION; INTRACAUDAL; PERINEURAL PRN
Status: DISCONTINUED | OUTPATIENT
Start: 2024-02-14 | End: 2024-02-14 | Stop reason: SDUPTHER

## 2024-02-14 RX ORDER — ONDANSETRON 2 MG/ML
INJECTION INTRAMUSCULAR; INTRAVENOUS PRN
Status: DISCONTINUED | OUTPATIENT
Start: 2024-02-14 | End: 2024-02-14 | Stop reason: SDUPTHER

## 2024-02-14 RX ORDER — LIDOCAINE HYDROCHLORIDE 20 MG/ML
JELLY TOPICAL PRN
Status: DISCONTINUED | OUTPATIENT
Start: 2024-02-14 | End: 2024-02-14 | Stop reason: ALTCHOICE

## 2024-02-14 RX ORDER — SODIUM CHLORIDE 9 MG/ML
INJECTION, SOLUTION INTRAVENOUS CONTINUOUS
Status: DISCONTINUED | OUTPATIENT
Start: 2024-02-14 | End: 2024-02-15 | Stop reason: HOSPADM

## 2024-02-14 RX ORDER — FAMOTIDINE 20 MG/1
20 TABLET, FILM COATED ORAL 2 TIMES DAILY
Status: DISCONTINUED | OUTPATIENT
Start: 2024-02-14 | End: 2024-02-15 | Stop reason: HOSPADM

## 2024-02-14 RX ORDER — ATORVASTATIN CALCIUM 40 MG/1
80 TABLET, FILM COATED ORAL DAILY
Status: DISCONTINUED | OUTPATIENT
Start: 2024-02-14 | End: 2024-02-15 | Stop reason: HOSPADM

## 2024-02-14 RX ORDER — AMLODIPINE BESYLATE 2.5 MG/1
2.5 TABLET ORAL DAILY
Status: DISCONTINUED | OUTPATIENT
Start: 2024-02-14 | End: 2024-02-15 | Stop reason: HOSPADM

## 2024-02-14 RX ORDER — SODIUM CHLORIDE 0.9 % (FLUSH) 0.9 %
5-40 SYRINGE (ML) INJECTION PRN
Status: DISCONTINUED | OUTPATIENT
Start: 2024-02-14 | End: 2024-02-14 | Stop reason: HOSPADM

## 2024-02-14 RX ORDER — METOCLOPRAMIDE HYDROCHLORIDE 5 MG/ML
10 INJECTION INTRAMUSCULAR; INTRAVENOUS
Status: DISCONTINUED | OUTPATIENT
Start: 2024-02-14 | End: 2024-02-14 | Stop reason: HOSPADM

## 2024-02-14 RX ORDER — DEXAMETHASONE SODIUM PHOSPHATE 4 MG/ML
INJECTION, SOLUTION INTRA-ARTICULAR; INTRALESIONAL; INTRAMUSCULAR; INTRAVENOUS; SOFT TISSUE PRN
Status: DISCONTINUED | OUTPATIENT
Start: 2024-02-14 | End: 2024-02-14 | Stop reason: SDUPTHER

## 2024-02-14 RX ORDER — SODIUM CHLORIDE 9 MG/ML
INJECTION, SOLUTION INTRAVENOUS PRN
Status: DISCONTINUED | OUTPATIENT
Start: 2024-02-14 | End: 2024-02-15 | Stop reason: HOSPADM

## 2024-02-14 RX ADMIN — SODIUM CHLORIDE: 9 INJECTION, SOLUTION INTRAVENOUS at 05:18

## 2024-02-14 RX ADMIN — PROPOFOL 120 MG: 10 INJECTION, EMULSION INTRAVENOUS at 07:47

## 2024-02-14 RX ADMIN — AMLODIPINE BESYLATE 2.5 MG: 2.5 TABLET ORAL at 13:38

## 2024-02-14 RX ADMIN — SODIUM CHLORIDE: 9 INJECTION, SOLUTION INTRAVENOUS at 13:38

## 2024-02-14 RX ADMIN — KETOROLAC TROMETHAMINE 15 MG: 30 INJECTION, SOLUTION INTRAMUSCULAR; INTRAVENOUS at 07:57

## 2024-02-14 RX ADMIN — DEXAMETHASONE SODIUM PHOSPHATE 4 MG: 4 INJECTION INTRA-ARTICULAR; INTRALESIONAL; INTRAMUSCULAR; INTRAVENOUS; SOFT TISSUE at 07:47

## 2024-02-14 RX ADMIN — ENOXAPARIN SODIUM 30 MG: 100 INJECTION SUBCUTANEOUS at 13:38

## 2024-02-14 RX ADMIN — ONDANSETRON 4 MG: 2 INJECTION INTRAMUSCULAR; INTRAVENOUS at 07:57

## 2024-02-14 RX ADMIN — Medication 2000 MG: at 07:38

## 2024-02-14 RX ADMIN — CEFTRIAXONE SODIUM 1000 MG: 1 INJECTION, POWDER, FOR SOLUTION INTRAMUSCULAR; INTRAVENOUS at 17:20

## 2024-02-14 RX ADMIN — LIDOCAINE HYDROCHLORIDE 60 MG: 20 INJECTION, SOLUTION EPIDURAL; INFILTRATION; INTRACAUDAL; PERINEURAL at 07:47

## 2024-02-14 RX ADMIN — ASPIRIN 81 MG: 81 TABLET, COATED ORAL at 13:38

## 2024-02-14 RX ADMIN — FAMOTIDINE 20 MG: 20 TABLET, FILM COATED ORAL at 13:38

## 2024-02-14 RX ADMIN — FAMOTIDINE 20 MG: 20 TABLET, FILM COATED ORAL at 20:54

## 2024-02-14 RX ADMIN — SODIUM CHLORIDE: 9 INJECTION, SOLUTION INTRAVENOUS at 07:38

## 2024-02-14 RX ADMIN — ATORVASTATIN CALCIUM 80 MG: 40 TABLET, FILM COATED ORAL at 13:38

## 2024-02-14 NOTE — ANESTHESIA PRE PROCEDURE
summary reviewed and Nursing notes reviewed  Airway: Mallampati: III  TM distance: >3 FB   Neck ROM: full  Mouth opening: > = 3 FB   Dental:    (+) poor dentition      Pulmonary:Negative Pulmonary ROS and normal exam                               Cardiovascular:  Exercise tolerance: good (>4 METS)  (+) hypertension: no interval change                  Neuro/Psych:   (+) CVA: residual symptoms            GI/Hepatic/Renal: Neg GI/Hepatic/Renal ROS            Endo/Other:    (+) DiabetesType II DM, no interval change.                 Abdominal: normal exam            Vascular: negative vascular ROS.         Other Findings:       Anesthesia Plan      general     ASA 3       Induction: intravenous.    MIPS: Prophylactic antiemetics administered.  Anesthetic plan and risks discussed with patient and spouse.                    Jennifer Bailey, APRN - CRNA   2/14/2024

## 2024-02-14 NOTE — ANESTHESIA POSTPROCEDURE EVALUATION
Department of Anesthesiology  Postprocedure Note    Patient: Macey Zazueta  MRN: [id number]  YOB: 1950  Date of evaluation: 2/14/2024    Procedure Summary       Date: 02/14/24 Room / Location: [facility]    Anesthesia Start: 0738 Anesthesia Stop: 0806    Procedure: CYSTOSCOPY URETERAL STENT INSERTION (Left) Diagnosis:       Hydroureteronephrosis      (Hydroureteronephrosis [N13.30])    Surgeons: Kieran Lees MD Responsible Provider: Jennifer Bailey APRN - CRNA    Anesthesia Type: general ASA Status: 3            Anesthesia Type: No value filed.    Anna Phase I: Anna Score: 10    Anna Phase II: Anna Score: 9    Anesthesia Post Evaluation    Patient location during evaluation: PACU  Patient participation: complete - patient participated  Level of consciousness: awake and alert  Airway patency: patent  Nausea & Vomiting: no nausea and no vomiting  Cardiovascular status: blood pressure returned to baseline  Respiratory status: acceptable, room air and spontaneous ventilation  Hydration status: stable  Multimodal analgesia pain management approach  Pain management: adequate and satisfactory to patient        No notable events documented.

## 2024-02-15 VITALS
RESPIRATION RATE: 17 BRPM | OXYGEN SATURATION: 96 % | WEIGHT: 105.97 LBS | HEIGHT: 58 IN | BODY MASS INDEX: 22.25 KG/M2 | DIASTOLIC BLOOD PRESSURE: 83 MMHG | SYSTOLIC BLOOD PRESSURE: 137 MMHG | TEMPERATURE: 97.5 F | HEART RATE: 83 BPM

## 2024-02-15 PROBLEM — N20.0 LEFT NEPHROLITHIASIS: Status: ACTIVE | Noted: 2024-02-15

## 2024-02-15 LAB
ALBUMIN SERPL-MCNC: 3.2 G/DL (ref 3.5–5.2)
ALBUMIN/GLOB SERPL: 1.3 {RATIO} (ref 1–2.5)
ALP SERPL-CCNC: 84 U/L (ref 35–104)
ALT SERPL-CCNC: 21 U/L (ref 5–33)
ANION GAP SERPL CALCULATED.3IONS-SCNC: 10 MMOL/L (ref 9–17)
AST SERPL-CCNC: 25 U/L
BASOPHILS # BLD: <0.03 K/UL (ref 0–0.2)
BASOPHILS NFR BLD: 0 % (ref 0–2)
BILIRUB SERPL-MCNC: 0.9 MG/DL (ref 0.3–1.2)
BUN SERPL-MCNC: 9 MG/DL (ref 8–23)
BUN/CREAT SERPL: 23 (ref 9–20)
CALCIUM SERPL-MCNC: 8.3 MG/DL (ref 8.6–10.4)
CHLORIDE SERPL-SCNC: 110 MMOL/L (ref 98–107)
CO2 SERPL-SCNC: 19 MMOL/L (ref 20–31)
CREAT SERPL-MCNC: 0.4 MG/DL (ref 0.5–0.9)
EOSINOPHIL # BLD: <0.03 K/UL (ref 0–0.44)
EOSINOPHILS RELATIVE PERCENT: 0 % (ref 1–4)
ERYTHROCYTE [DISTWIDTH] IN BLOOD BY AUTOMATED COUNT: 15.1 % (ref 11.8–14.4)
EST. AVERAGE GLUCOSE BLD GHB EST-MCNC: 117 MG/DL
GFR SERPL CREATININE-BSD FRML MDRD: >60 ML/MIN/1.73M2
GLUCOSE SERPL-MCNC: 183 MG/DL (ref 70–99)
HBA1C MFR BLD: 5.7 % (ref 4–6)
HCT VFR BLD AUTO: 31.5 % (ref 36.3–47.1)
HGB BLD-MCNC: 10.1 G/DL (ref 11.9–15.1)
IMM GRANULOCYTES # BLD AUTO: 0.05 K/UL (ref 0–0.3)
IMM GRANULOCYTES NFR BLD: 1 %
LYMPHOCYTES NFR BLD: 1 K/UL (ref 1.1–3.7)
LYMPHOCYTES RELATIVE PERCENT: 10 % (ref 24–43)
MCH RBC QN AUTO: 27.2 PG (ref 25.2–33.5)
MCHC RBC AUTO-ENTMCNC: 32.1 G/DL (ref 28.4–34.8)
MCV RBC AUTO: 84.9 FL (ref 82.6–102.9)
MICROORGANISM SPEC CULT: ABNORMAL
MONOCYTES NFR BLD: 0.68 K/UL (ref 0.1–1.2)
MONOCYTES NFR BLD: 6 % (ref 3–12)
NEUTROPHILS NFR BLD: 83 % (ref 36–65)
NEUTS SEG NFR BLD: 8.81 K/UL (ref 1.5–8.1)
NRBC BLD-RTO: 0 PER 100 WBC
PLATELET # BLD AUTO: 221 K/UL (ref 138–453)
PMV BLD AUTO: 10.5 FL (ref 8.1–13.5)
POTASSIUM SERPL-SCNC: 3.8 MMOL/L (ref 3.7–5.3)
PROT SERPL-MCNC: 5.6 G/DL (ref 6.4–8.3)
RBC # BLD AUTO: 3.71 M/UL (ref 3.95–5.11)
SODIUM SERPL-SCNC: 139 MMOL/L (ref 135–144)
SPECIMEN DESCRIPTION: ABNORMAL
WBC OTHER # BLD: 10.6 K/UL (ref 3.5–11.3)

## 2024-02-15 PROCEDURE — 94761 N-INVAS EAR/PLS OXIMETRY MLT: CPT

## 2024-02-15 PROCEDURE — 80053 COMPREHEN METABOLIC PANEL: CPT

## 2024-02-15 PROCEDURE — 85025 COMPLETE CBC W/AUTO DIFF WBC: CPT

## 2024-02-15 PROCEDURE — 97116 GAIT TRAINING THERAPY: CPT

## 2024-02-15 PROCEDURE — 6360000002 HC RX W HCPCS: Performed by: UROLOGY

## 2024-02-15 PROCEDURE — 97110 THERAPEUTIC EXERCISES: CPT

## 2024-02-15 PROCEDURE — 83036 HEMOGLOBIN GLYCOSYLATED A1C: CPT

## 2024-02-15 PROCEDURE — 2580000003 HC RX 258: Performed by: UROLOGY

## 2024-02-15 PROCEDURE — 36415 COLL VENOUS BLD VENIPUNCTURE: CPT

## 2024-02-15 PROCEDURE — 6370000000 HC RX 637 (ALT 250 FOR IP): Performed by: UROLOGY

## 2024-02-15 RX ORDER — CIPROFLOXACIN 500 MG/1
500 TABLET, FILM COATED ORAL 2 TIMES DAILY
Qty: 10 TABLET | Refills: 0 | Status: ON HOLD | OUTPATIENT
Start: 2024-02-15 | End: 2024-02-20

## 2024-02-15 RX ADMIN — METFORMIN HYDROCHLORIDE 1000 MG: 500 TABLET, EXTENDED RELEASE ORAL at 08:03

## 2024-02-15 RX ADMIN — ENOXAPARIN SODIUM 30 MG: 100 INJECTION SUBCUTANEOUS at 09:02

## 2024-02-15 RX ADMIN — SODIUM CHLORIDE: 9 INJECTION, SOLUTION INTRAVENOUS at 00:23

## 2024-02-15 RX ADMIN — FAMOTIDINE 20 MG: 20 TABLET, FILM COATED ORAL at 09:02

## 2024-02-15 RX ADMIN — ATORVASTATIN CALCIUM 80 MG: 40 TABLET, FILM COATED ORAL at 09:02

## 2024-02-15 RX ADMIN — AMLODIPINE BESYLATE 2.5 MG: 2.5 TABLET ORAL at 09:02

## 2024-02-15 RX ADMIN — ASPIRIN 81 MG: 81 TABLET, COATED ORAL at 09:02

## 2024-02-15 NOTE — OP NOTE
Distal curl was confirmed  via visualization.  At this point in time, the bladder was drained.  She  was then awoken from general anesthesia, transferred to the [facility], and  taken to the PACU in stable condition by the Nursing and Anesthesia  Teams.    PLAN:  The patient will return to the floor.  She will follow up with us  in one or two weeks.  At that point time, we can plan for definitive  stone therapy.        LILIBETH NO    D: 02/14/2024 13:51:51       T: 02/14/2024 14:55:51     [email or web address]  Job#: [id number]     Doc#: [id number]    CC:

## 2024-02-15 NOTE — DISCHARGE INSTRUCTIONS
Cipro antibiotic until gone.    Imodium for diarrhea if necessary.    Make sure to follow-up with urology for kidney stone issue.

## 2024-02-15 NOTE — PROGRESS NOTES
Pharmacist Review and Automatic Dose Adjustment of prophylactic enoxaparin      The reviewing pharmacist has made an adjustment to the ordered enoxaparin dose or converted to UFH per the approved [facility] protocol and table as identified below.        Macey Zazueta is a 74 y.o. female.     Recent Labs     02/12/24  1514 02/13/24  0525   CREATININE 0.7 0.5       Estimated Creatinine Clearance: 76 mL/min (based on SCr of 0.5 mg/dL).    Height:   Ht Readings from Last 1 Encounters:   02/13/24 1.473 m (4' 10\")     Weight:  Wt Readings from Last 1 Encounters:   02/13/24 48.1 kg (106 lb)         Enoxaparin Indication: Prophylaxis          Plan: Based upon patient weight < 50 kg, the enoxaparin dose has been changed/converted to 30 mg sq daily.       Thank you,  Lottie Stehi, [facility], 2/13/2024, 7:24 AM    
Comprehensive Nutrition Assessment    Type and Reason for Visit:  Initial    Nutrition Recommendations/Plan:   Encourage oral intakes  Assisted feeding      Malnutrition Assessment:  Malnutrition Status:  At risk for malnutrition (Comment) (02/13/24 1257)    Context:  Acute Illness     Findings of the 6 clinical characteristics of malnutrition:  Energy Intake:  Mild decrease in energy intake (Comment)  Weight Loss:  No significant weight loss     Body Fat Loss:  No significant body fat loss     Muscle Mass Loss:  No significant muscle mass loss    Fluid Accumulation:  No significant fluid accumulation     Strength:  Not Performed    Nutrition Assessment:    Altered nutrition related labs r/t endocrine dysfunction, AEB hyperglycemia in stressed state. Appetite and intakes improving.  Requires assisted feeding which  does when present but may not be available early morning. A1C 5.9 suggesting well controlled diabetes outside of infection. No dysphagia reported with former CVA. Will notify nursing of assisted feeding needs.    Nutrition Related Findings:    assisted feeding needed. Wound Type: None       Current Nutrition Intake & Therapies:    Average Meal Intake: Unable to assess (no PO records)  Average Supplements Intake: None Ordered  ADULT DIET; Regular; 4 carb choices (60 gm/meal)    Anthropometric Measures:  Height: 147.3 cm (4' 10\")  Ideal Body Weight (IBW): 90 lbs (41 kg)    Admission Body Weight: 48.1 kg (106 lb)  Current Body Weight: 48.1 kg (106 lb), 117.8 % IBW. Weight Source: Bed Scale  Current BMI (kg/m2): 22.2  Usual Body Weight: 47.6 kg (105 lb)  % Weight Change (Calculated): 1  Weight Adjustment For: No Adjustment                 BMI Categories: Normal Weight (BMI 18.5-24.9)    Estimated Daily Nutrient Needs:  Energy Requirements Based On: Kcal/kg  Weight Used for Energy Requirements: Current  Energy (kcal/day): 4891-2346 (23-28)  Weight Used for Protein Requirements: Current  Protein 
Discharge instructions reviewed with pt and  at this time. Education on sepsis and kidney stones given to pt at this time. All questions answered. Will escort pt to private car shortly.    
[facility]  Inpatient/Observation/Outpatient Rehabilitation    Date: 2024  Patient Name: Macey Zazueta       [x] Inpatient Acute/Observation       []  Outpatient  : 1950     Plan of Care/Recert ends    [] Pt no showed for scheduled appointment    [] Pt refused/declined therapy at this time due to:           [x] Pt cancelled due to:  [] No Reason Given   [] Sick/ill   [] Other:Pt. Had procedure this morning, spoke with RN and hold for AM. Able to see for PM and get up to chair.     [] Evaluation held by RN/Provider due to:    [] High Heart Rate   [] High Blood Pressure   [] Orthopedic Consult   [] Hgb < 7   [] Other:    Therapist/Assistant will attempt to see this patient, at our earliest opportunity.       Gayatri Robles, PTA Date: 2024    
Occupational Therapy  Facility/Department: [facility] MED SURG  Occupational Therapy Initial Assessment    Name: Macey Zazueta  : 1950  MRN: [id number]  Date of Service: 2024    Discharge Recommendations:  Continue to assess pending progress, Subacute/Skilled Nursing Facility  OT Equipment Recommendations  Equipment Needed: Yes  Mobility Devices: Walker  Walker: Rolling     Patient Diagnosis(es): The primary encounter diagnosis was Sepsis, due to unspecified organism, unspecified whether acute organ dysfunction present (HCC). A diagnosis of Hydroureteronephrosis was also pertinent to this visit.  Past Medical History:  has a past medical history of COVID-19 virus infection, Essential hypertension, Left pontine cerebrovascular accident (HCC), Mixed hyperlipidemia, Periorbital cellulitis /  right side /  resolved with Omnicef , and Type 2 diabetes mellitus without complication, without long-term current use of insulin ([facility]).  Past Surgical History:  has a past surgical history that includes Cataract removal (Right, ).    Treatment Diagnosis: Generalized weakness    Assessment   Performance deficits / Impairments: Decreased functional mobility ;Decreased safe awareness;Decreased balance;Decreased coordination;Decreased ADL status;Decreased vision/visual deficit;Decreased posture;Decreased ROM;Decreased endurance;Decreased high-level IADLs;Decreased strength;Decreased fine motor control  Assessment: Pt is 73 yo female presenting to [facility] d/t sepsis due to UTI. Pt demo decreased balance, coordination, ROM, endurance, strength, and safety awareness, impacting ADLs and functional mobility. Pt would benefit from skilled OT to increase safety awareness, strength, endurance, ROM, balance, and coordination during ADL tasks and functional mobility/transfers.  Treatment Diagnosis: Generalized weakness  Prognosis: Fair  Decision Making: Medium Complexity  REQUIRES OT FOLLOW-UP: Yes        Plan   Occupational Therapy 
Occupational Therapy  [facility]  Inpatient/Observation/Outpatient Rehabilitation    Date: 2024  Patient Name: Macey Zazueta       [x] Inpatient Acute/Observation       []  Outpatient  : 1950         [] Pt no showed for scheduled appointment    [x] Pt refused/declined therapy at this time due to:      Pt declined therapy  reported \" with surgery this morning she has been in and out all day\"      [] Pt cancelled due to:  [] No Reason Given   [] Sick/ill   [] Other:    [] Evaluation held by RN/Provider due to:    [] High Heart Rate   [] High Blood Pressure   [] Orthopedic Consult   [] Hgb < 7   [] Other:          Hailey RAY/S Date: 2024   
Patient awake and in bed.  present at bedside. Patient denies any pain. VS and assessment completed. Plan of care gone over with patient and her . Deny any questions at this time. Surgery coming to get patient for preop.   
Patient taken down to OR by Samantha AVILA.   
Physical Therapy  Facility/Department: [facility] MED SURG  Physical Therapy Initial Assessment    Name: Macey Zazutea  : 1950  MRN: [id number]  Date of Service: 2024    Discharge Recommendations:  Continue to assess pending progress, 24 hour supervision or assist, Home with Home health PT   PT Equipment Recommendations  Equipment Needed: No      Patient Diagnosis(es): The primary encounter diagnosis was Sepsis, due to unspecified organism, unspecified whether acute organ dysfunction present (HCC). A diagnosis of Hydroureteronephrosis was also pertinent to this visit.  Past Medical History:  has a past medical history of COVID-19 virus infection, Essential hypertension, Left pontine cerebrovascular accident (HCC), Mixed hyperlipidemia, Periorbital cellulitis /  right side /  resolved with Omnicef , and Type 2 diabetes mellitus without complication, without long-term current use of insulin (HCC).  Past Surgical History:  has a past surgical history that includes Cataract removal (Right, ).    Assessment   Body Structures, Functions, Activity Limitations Requiring Skilled Therapeutic Intervention: Decreased functional mobility ;Decreased ADL status;Decreased ROM;Decreased endurance;Decreased safe awareness;Decreased high-level IADLs;Decreased posture;Decreased balance;Decreased coordination  Assessment: Pt is a 74 year old female being evaluated for skilled acute care physical therapy follow admission to the hospital. Pt with multiple co-morbidities that impact baseline function including history of stroke. Since stroke pt has been mostly wheelchair bound and requires assistance with most mobility and daily tasks. Pt completed supine to and from sit edge of bed with maximum assistance from therapist. Pt demonstrating significant left lateral lean in sitting edge of bed that required maximum assistance from therapist to prevent fall or loss of balance. Pt complete sit to stand with max assist from 
Physical Therapy  Facility/Department: [facility] MED SURG  Daily Treatment Note  NAME: Macey Zazueta  : 1950  MRN: [id number]    Date of Service: 2/15/2024    Discharge Recommendations:  Continue to assess pending progress, 24 hour supervision or assist, Home with Home health PT     Patient Diagnosis(es): The primary encounter diagnosis was Sepsis, due to unspecified organism, unspecified whether acute organ dysfunction present (HCC). A diagnosis of Hydroureteronephrosis was also pertinent to this visit.    Assessment   Assessment: Pt. able to ambulate 3ft x1 from bed ot w/c, Mod A x1. Bed mobility/transferS:Min/Mod A x1. Supine exercises B LE x15. Seated balance at EOB, Mod/Max A x1  to amintain seated balance.  Activity Tolerance: Patient tolerated treatment well     Plan    Physical Therapy Plan  General Plan: 2 times a day 7 days a week  Current Treatment Recommendations: Strengthening;ROM;Balance training;Functional mobility training;Transfer training;Gait training;Neuromuscular re-education;Home exercise program;Stair training;Positioning;Therapeutic activities;Patient/Caregiver education & training;Safety education & training;Endurance training     Restrictions  Restrictions/Precautions  Restrictions/Precautions: Fall Risk, General Precautions  Required Braces or Orthoses?: No     Subjective    Subjective  Subjective: Pt. in bed upon arrival, agreeable to therapy, getting ready to d/c.  Pain: denies  Orientation  Overall Orientation Status: Within Functional Limits     Objective   Bed Mobility Training  Bed Mobility Training: Yes  Overall Level of Assistance: Minimum assistance;Moderate assistance;Assist X1  Interventions: Verbal cues  Rolling: Minimum assistance;Moderate assistance;Assist X1  Supine to Sit: Moderate assistance;Assist X1  Scooting: Assist X1;Moderate assistance  Transfer Training  Transfer Training: Yes  Overall Level of Assistance: Minimum assistance;Moderate assistance;Assist 
Progress Note    SUBJECTIVE:    Patient seen for f/u of Sepsis due to urinary tract infection (HCC).  She resting in bed no distress.  Quiet.  Has weakness at baseline     ROS:   Constitutional: negative  for fevers, and negative for chills.  Respiratory: negative for shortness of breath, negative for cough, and negative for wheezing  Cardiovascular: negative for chest pain, and negative for palpitations  Gastrointestinal: negative for abdominal pain, negative for nausea,negative for vomiting, negative for diarrhea, and negative for constipation     All other systems were reviewed with the patient and are negative unless otherwise stated in HPI      OBJECTIVE:      Vitals:   Vitals:    02/13/24 0035   BP: (!) 144/84   Pulse: 97   Resp: 22   Temp: 100.1 °F (37.8 °C)   SpO2: 97%     Weight - Scale: 48.1 kg (106 lb)   Height: 147.3 cm (4' 10\")     Weight  Wt Readings from Last 3 Encounters:   02/13/24 48.1 kg (106 lb)   10/16/23 47.6 kg (105 lb)   04/17/23 47.9 kg (105 lb 8 oz)     Body mass index is 22.15 kg/m².    24HR INTAKE/OUTPUT:      Intake/Output Summary (Last 24 hours) at 2/13/2024 0718  Last data filed at 2/13/2024 0513  Gross per 24 hour   Intake 1132.8 ml   Output --   Net 1132.8 ml     -----------------------------------------------------------------  Exam:    GEN:    Awake, alert and oriented x3.  Slow to respond however this is baseline  EYES:  EOMI, pupils equal   NECK: Supple. No lymphadenopathy.  No carotid bruit  CVS:    regular rate and rhythm, no audible murmur  PULM:  CTA, no wheezes, rales or rhonchi, no acute respiratory distress  ABD:    Bowels sounds normal.  Abdomen is soft.  No distention.  no tenderness to palpation.   EXT:   no edema bilaterally .  No calf tenderness.   NEURO: Moves all extremities.  Motor and sensory are grossly intact.  Generalized weakness this is baseline  SKIN:  No rashes.  No skin lesions.  
Urology Progress Note  CC:left flank pain    Subjective: AFVSS    Patient Vitals for the past 24 hrs:   BP Temp Temp src Pulse Resp SpO2 Height Weight   02/14/24 0637 (!) 147/91 98.2 °F (36.8 °C) Temporal (!) 102 20 96 % -- --   02/14/24 0538 124/80 98.9 °F (37.2 °C) Temporal 96 20 98 % -- --   02/14/24 0523 -- -- -- -- -- -- -- 48.1 kg (105 lb 15.6 oz)   02/13/24 1915 125/68 98.7 °F (37.1 °C) Temporal (!) 112 20 94 % -- --   02/13/24 1638 -- (!) 100.6 °F (38.1 °C) Temporal (!) 120 -- -- -- --   02/13/24 1455 125/78 (!) 101.3 °F (38.5 °C) Temporal (!) 121 -- 95 % -- --   02/13/24 1227 -- -- -- -- -- -- 1.473 m (4' 10\") --   02/13/24 0725 138/82 100.3 °F (37.9 °C) Temporal (!) 105 24 93 % -- --       Intake/Output Summary (Last 24 hours) at 2/14/2024 0708  Last data filed at 2/14/2024 0640  Gross per 24 hour   Intake 3635.45 ml   Output --   Net 3635.45 ml       Recent Labs     02/12/24  1514 02/13/24  0525 02/14/24  0530   WBC 20.6* 19.1* 12.4*   HGB 12.5 11.1* 10.3*   HCT 39.5 35.3* 32.3*   MCV 85.7 85.7 85.9    242 216     Recent Labs     02/12/24  1514 02/13/24  0525 02/14/24  0530    143 140   K 3.8 3.4* 3.6*    111* 111*   CO2 19* 22 19*   BUN 19 9 11   CREATININE 0.7 0.5 0.5       Recent Labs     02/12/24  1720   COLORU Yellow   PHUR 6.5   WBCUA 50    RBCUA 2 TO 5   BACTERIA 2+*   SPECGRAV 1.015   LEUKOCYTESUR MODERATE*   UROBILINOGEN Normal   BILIRUBINUR NEGATIVE       Additional Lab/culture results:     ROS:  Constitutional:  Negative for appetite change, chills and fever.   Eyes:  Negative for redness and visual disturbance.   Respiratory:  Negative for cough, shortness of breath and wheezing.    Cardiovascular:  Negative for chest pain and leg swelling.   Gastrointestinal:  Negative for abdominal pain, constipation, nausea and vomiting.   Genitourinary:  Negative for decreased urine volume, difficulty urinating, dysuria, enuresis, flank pain, frequency, hematuria, penile discharge, 
Vitals and assessment complete at this time. See flowsheets for details. Patient denies pain. SPO2 is 95% on room air. Breathing is regular and unlabored. Patient is oriented to self, place, and situation. Patient is disoriented to time. Writer repositioned the patient in bed. Patient denies further needs. Call light is within reach. Care ongoing.   
Writer at bedside for shift assessment. Patient sitting up in chair, visiting with . Vitals obtained and assessment completed, see flowsheet for details. Patient changed and moved back to bed. pt denies further needs at this time. Call light in reach.   
Writer at bedside to complete admission assessment, navigator and vital signs. Pt arrived to floor via ER bed. Pt transfered with assistance. Shift assessment, vital signs and addmission navigator complete, see flow sheet for details. Pt stated pain level is 0/10. Pt denies any other needs at this time. Call light within reach. Care is ongoing.    
Writer at bedside to complete evening assessment. Upon entry to room, pt awake and in bed, respirations normal and unlabored while on room air. Vitals obtained and assessment completed, see flow sheet for details. Pt denies needs from writer at this time. Call light in reach. Care is ongoing.     
Writer to bedside to complete morning assessment. Upon entry to room, pt laying in bed, respirations even and unlabored while on room air. Vitals obtained and assessment completed, see flow sheet for details. Metformin given at this time. See MAR for details. Pt denies needs from writer at this time. Call light in reach. Bed alarm active. Care ongoing.      
assistance;Moderate assistance;Assist X1  Transfer Training  Transfer Training: Yes  Overall Level of Assistance: Minimum assistance;Moderate assistance;Assist X1  Interventions: Verbal cues  Sit to Stand: Minimum assistance;Moderate assistance;Assist X1  Stand to Sit: Minimum assistance;Moderate assistance;Assist X1  Bed to Chair: Minimum assistance;Moderate assistance;Assist X1  Gait Training  Gait Training: Yes  Gait  Gait Training: Yes  Overall Level of Assistance: Minimum assistance;Moderate assistance;Assist X1  Assistive Device: Other (comment) (HHA)  Interventions: Verbal cues  Speed/Almaz: Shuffled;Slow  Other Specialty Interventions  Other Treatments/Modalities: self-care in bed  Safety Devices  Type of Devices: All carey prominences offloaded;All fall risk precautions in place;Call light within reach;Patient at risk for falls;Nurse notified;Left in chair;Chair alarm in place       Goals  Short Term Goals  Time Frame for Short Term Goals: 20 days  Short Term Goal 1: Pt will roll bilaterally and hold position at mod A x1 in order to improve participation with bed mobility  Short Term Goal 2: Pt will perform supine to and from sit edge of bed at mod A x1 in order to improve safety with bed mobility and daily tasks  Short Term Goal 3: Pt will perform stand pivot transfer to and from wheelchair at Min A x1 in order to reduce fall risk with transfers    Education  Patient Education  Education Given To: Patient;Family  Education Provided: Role of Therapy;Plan of Care  Education Method: Verbal  Barriers to Learning: None  Education Outcome: Verbalized understanding      Therapy Time   Individual Concurrent Group Co-treatment   Time In 1302         Time Out 1334         Minutes 32           Gayatri Robles PTA

## 2024-02-15 NOTE — DISCHARGE SUMMARY
Discharge Summary    Macey Zazueta  :  1950  MRN:  [id number]    Admit date:  2024      Discharge date:   2/15/2024    Admitting Physician:  Will Dawson MD    Discharge Diagnoses:      Principal Problem:    Sepsis due to urinary tract infection (HCC)  Active Problems:    Left pontine cerebrovascular accident (HCC) /      Type 2 diabetes mellitus without complication, with long-term current use of insulin (HCC) /  Metformin ER    Left nephrolithiasis with mild hydronephrosis / CT scan  / urology  Resolved Problems:    * No resolved hospital problems. *      Active Hospital Problems    Diagnosis Date Noted    Left nephrolithiasis with mild hydronephrosis / CT scan  / urology [N20.0] 02/15/2024    Sepsis due to urinary tract infection (HCC) [A41.9, N39.0] 2024    Type 2 diabetes mellitus without complication, with long-term current use of insulin (HCC) /  Metformin ER [E11.9, Z79.4] 2017    Left pontine cerebrovascular accident (HCC) /   [I63.9]        Discharge Medications:         Medication List        START taking these medications      ciprofloxacin 500 MG tablet  Commonly known as: CIPRO  Take 1 tablet by mouth 2 times daily for 5 days            CONTINUE taking these medications      Alcohol Swabs 70 % Pads  1 Units by Does not apply route 3 times daily (with meals)     amLODIPine 2.5 MG tablet  Commonly known as: NORVASC  Take 1 tablet by mouth daily     aspirin 81 MG EC tablet  Take 1 tablet by mouth daily     atorvastatin 80 MG tablet  Commonly known as: LIPITOR  Take 1 tablet by mouth daily     blood glucose test strips strip  Commonly known as: ASCENSIA AUTODISC VI;ONE TOUCH ULTRA TEST VI  1 each by In Vitro route daily As needed.     glucose monitoring kit  1 kit by Does not apply route 3 times daily     Handicap Placard Misc  by Does not apply route Dx: stroke    Duration: 5 years     melatonin 3 MG Tabs tablet     metFORMIN 500 MG extended release

## 2024-02-15 NOTE — PLAN OF CARE
Problem: Discharge Planning  Goal: Discharge to home or other facility with appropriate resources  2/15/2024 0918 by Katlyn Gonzalez RN  Outcome: Progressing  Flowsheets (Taken 2/15/2024 0918)  Discharge to home or other facility with appropriate resources:   Identify barriers to discharge with patient and caregiver   Identify discharge learning needs (meds, wound care, etc)   Arrange for needed discharge resources and transportation as appropriate     Problem: Skin/Tissue Integrity  Goal: Absence of new skin breakdown  Description: 1.  Monitor for areas of redness and/or skin breakdown  2.  Assess vascular access sites hourly  3.  Every 4-6 hours minimum:  Change oxygen saturation probe site  4.  Every 4-6 hours:  If on nasal continuous positive airway pressure, respiratory therapy assess nares and determine need for appliance change or resting period.  2/15/2024 0918 by Katlyn Gonzalez RN  Outcome: Progressing  Note: Bryant scale monitoring per protocol. Inspect skin for breakdown frequently. Encourage pt to make frequent large adjustments in position or assist patient with turning. Document all areas of breakdown.        Problem: Safety - Adult  Goal: Free from fall injury  2/15/2024 0918 by Katlyn Gonzalez RN  Outcome: Progressing  Flowsheets (Taken 2/15/2024 0918)  Free From Fall Injury: Instruct family/caregiver on patient safety     Problem: Chronic Conditions and Co-morbidities  Goal: Patient's chronic conditions and co-morbidity symptoms are monitored and maintained or improved  2/15/2024 0918 by Katlyn Gonzalez RN  Outcome: Progressing  Flowsheets (Taken 2/15/2024 0918)  Care Plan - Patient's Chronic Conditions and Co-Morbidity Symptoms are Monitored and Maintained or Improved:   Monitor and assess patient's chronic conditions and comorbid symptoms for stability, deterioration, or improvement   Collaborate with multidisciplinary team to address chronic and comorbid conditions and prevent exacerbation or 
  Problem: Discharge Planning  Goal: Discharge to home or other facility with appropriate resources  2/15/2024 1002 by Katlyn Gonzalez RN  Outcome: Completed     Problem: Skin/Tissue Integrity  Goal: Absence of new skin breakdown  Description: 1.  Monitor for areas of redness and/or skin breakdown  2.  Assess vascular access sites hourly  3.  Every 4-6 hours minimum:  Change oxygen saturation probe site  4.  Every 4-6 hours:  If on nasal continuous positive airway pressure, respiratory therapy assess nares and determine need for appliance change or resting period.  2/15/2024 1002 by Katlyn Gonzalez, RN  Outcome: Completed     Problem: Safety - Adult  Goal: Free from fall injury  2/15/2024 1002 by Katlyn Gonzalez RN  Outcome: Completed     Problem: Nutrition Deficit:  Goal: Optimize nutritional status  2/15/2024 1002 by Katlyn Gonzalez RN  Outcome: Completed     Problem: Chronic Conditions and Co-morbidities  Goal: Patient's chronic conditions and co-morbidity symptoms are monitored and maintained or improved  2/15/2024 1002 by Katlyn Gonzalez RN  Outcome: Completed     Problem: Pain  Goal: Verbalizes/displays adequate comfort level or baseline comfort level  2/15/2024 1002 by Katlyn Gonzalez, RN  Outcome: Completed     
  Problem: Discharge Planning  Goal: Discharge to home or other facility with appropriate resources  Outcome: Progressing  Flowsheets (Taken 2/13/2024 0153)  Discharge to home or other facility with appropriate resources:   Identify barriers to discharge with patient and caregiver   Identify discharge learning needs (meds, wound care, etc)     Problem: Skin/Tissue Integrity  Goal: Absence of new skin breakdown  Description: 1.  Monitor for areas of redness and/or skin breakdown  2.  Assess vascular access sites hourly  3.  Every 4-6 hours minimum:  Change oxygen saturation probe site  4.  Every 4-6 hours:  If on nasal continuous positive airway pressure, respiratory therapy assess nares and determine need for appliance change or resting period.  Outcome: Progressing  Note: Bryant scale monitoring per protocol. Inspect skin for breakdown frequently. Encourage pt to make frequent large adjustments in position or assist patient with turning. Document all areas of breakdown.        
  Problem: Discharge Planning  Goal: Discharge to home or other facility with appropriate resources  Outcome: Progressing  Flowsheets (Taken 2/14/2024 0117)  Discharge to home or other facility with appropriate resources:   Identify barriers to discharge with patient and caregiver   Identify discharge learning needs (meds, wound care, etc)     Problem: Safety - Adult  Goal: Free from fall injury  Outcome: Progressing  Flowsheets (Taken 2/14/2024 0117)  Free From Fall Injury: Instruct family/caregiver on patient safety     
deterioration, or improvement   Collaborate with multidisciplinary team to address chronic and comorbid conditions and prevent exacerbation or deterioration   Update acute care plan with appropriate goals if chronic or comorbid symptoms are exacerbated and prevent overall improvement and discharge     Problem: Pain  Goal: Verbalizes/displays adequate comfort level or baseline comfort level  Outcome: Progressing  Flowsheets (Taken 2/15/2024 0314)  Verbalizes/displays adequate comfort level or baseline comfort level:   Encourage patient to monitor pain and request assistance   Administer analgesics based on type and severity of pain and evaluate response   Consider cultural and social influences on pain and pain management   Assess pain using appropriate pain scale   Implement non-pharmacological measures as appropriate and evaluate response   Notify Licensed Independent Practitioner if interventions unsuccessful or patient reports new pain

## 2024-02-16 ENCOUNTER — CARE COORDINATION (OUTPATIENT)
Dept: CASE MANAGEMENT | Age: 74
End: 2024-02-16

## 2024-02-16 ENCOUNTER — HOSPITAL ENCOUNTER (INPATIENT)
Age: 74
LOS: 1 days | Discharge: HOME | DRG: 872 | End: 2024-02-20
Attending: INTERNAL MEDICINE | Admitting: INTERNAL MEDICINE
Payer: MEDICARE

## 2024-02-16 DIAGNOSIS — A41.9 SEPTICEMIA (HCC): Primary | ICD-10-CM

## 2024-02-16 PROBLEM — N39.0 UTI (URINARY TRACT INFECTION): Status: ACTIVE | Noted: 2024-02-16

## 2024-02-16 LAB
ALBUMIN SERPL-MCNC: 4 G/DL (ref 3.5–5.2)
ALBUMIN/GLOB SERPL: 1.2 {RATIO} (ref 1–2.5)
ALP SERPL-CCNC: 99 U/L (ref 35–104)
ALT SERPL-CCNC: 31 U/L (ref 5–33)
ANION GAP SERPL CALCULATED.3IONS-SCNC: 13 MMOL/L (ref 9–17)
AST SERPL-CCNC: 32 U/L
BACTERIA URNS QL MICRO: ABNORMAL
BASOPHILS # BLD: 0 K/UL (ref 0–0.2)
BASOPHILS NFR BLD: 0 % (ref 0–2)
BILIRUB SERPL-MCNC: 1.9 MG/DL (ref 0.3–1.2)
BILIRUB UR QL STRIP: NEGATIVE
BUN SERPL-MCNC: 7 MG/DL (ref 8–23)
BUN/CREAT SERPL: 12 (ref 9–20)
CALCIUM SERPL-MCNC: 9.2 MG/DL (ref 8.6–10.4)
CHLORIDE SERPL-SCNC: 97 MMOL/L (ref 98–107)
CLARITY UR: CLEAR
CO2 SERPL-SCNC: 23 MMOL/L (ref 20–31)
COLOR UR: YELLOW
CREAT SERPL-MCNC: 0.6 MG/DL (ref 0.5–0.9)
EKG ATRIAL RATE: 116 BPM
EKG P AXIS: 69 DEGREES
EKG P-R INTERVAL: 148 MS
EKG Q-T INTERVAL: 312 MS
EKG QRS DURATION: 78 MS
EKG QTC CALCULATION (BAZETT): 433 MS
EKG R AXIS: 35 DEGREES
EKG T AXIS: 35 DEGREES
EKG VENTRICULAR RATE: 116 BPM
EOSINOPHIL # BLD: 0 K/UL (ref 0–0.44)
EOSINOPHILS RELATIVE PERCENT: 0 % (ref 1–4)
EPI CELLS #/AREA URNS HPF: ABNORMAL /HPF (ref 0–25)
ERYTHROCYTE [DISTWIDTH] IN BLOOD BY AUTOMATED COUNT: 15.3 % (ref 11.8–14.4)
GFR SERPL CREATININE-BSD FRML MDRD: >60 ML/MIN/1.73M2
GLUCOSE SERPL-MCNC: 275 MG/DL (ref 70–99)
GLUCOSE UR STRIP-MCNC: ABNORMAL MG/DL
HCT VFR BLD AUTO: 39 % (ref 36.3–47.1)
HGB BLD-MCNC: 12.6 G/DL (ref 11.9–15.1)
HGB UR QL STRIP.AUTO: ABNORMAL
IMM GRANULOCYTES # BLD AUTO: 0 K/UL (ref 0–0.3)
IMM GRANULOCYTES NFR BLD: 0 %
KETONES UR STRIP-MCNC: NEGATIVE MG/DL
LACTATE BLDV-SCNC: 2 MMOL/L (ref 0.5–2.2)
LACTATE BLDV-SCNC: 2.3 MMOL/L (ref 0.5–2.2)
LEUKOCYTE ESTERASE UR QL STRIP: ABNORMAL
LYMPHOCYTES NFR BLD: 0.2 K/UL (ref 1.1–3.7)
LYMPHOCYTES RELATIVE PERCENT: 1 % (ref 24–43)
MCH RBC QN AUTO: 26.8 PG (ref 25.2–33.5)
MCHC RBC AUTO-ENTMCNC: 32.3 G/DL (ref 28.4–34.8)
MCV RBC AUTO: 83 FL (ref 82.6–102.9)
MONOCYTES NFR BLD: 0.2 K/UL (ref 0.1–1.2)
MONOCYTES NFR BLD: 1 % (ref 3–12)
MORPHOLOGY: NORMAL
MUCOUS THREADS URNS QL MICRO: ABNORMAL
NEUTROPHILS NFR BLD: 98 % (ref 36–65)
NEUTS SEG NFR BLD: 19.2 K/UL (ref 1.5–8.1)
NITRITE UR QL STRIP: NEGATIVE
NRBC BLD-RTO: 0 PER 100 WBC
PH UR STRIP: 6.5 [PH] (ref 5–9)
PLATELET # BLD AUTO: 335 K/UL (ref 138–453)
PMV BLD AUTO: 10.2 FL (ref 8.1–13.5)
POTASSIUM SERPL-SCNC: 3.5 MMOL/L (ref 3.7–5.3)
PROT SERPL-MCNC: 7.3 G/DL (ref 6.4–8.3)
PROT UR STRIP-MCNC: ABNORMAL MG/DL
RBC # BLD AUTO: 4.7 M/UL (ref 3.95–5.11)
RBC #/AREA URNS HPF: ABNORMAL /HPF (ref 0–2)
SODIUM SERPL-SCNC: 133 MMOL/L (ref 135–144)
SP GR UR STRIP: 1.01 (ref 1.01–1.02)
UROBILINOGEN UR STRIP-ACNC: NORMAL EU/DL (ref 0–1)
WBC #/AREA URNS HPF: ABNORMAL /HPF (ref 0–5)
WBC OTHER # BLD: 19.6 K/UL (ref 3.5–11.3)

## 2024-02-16 PROCEDURE — G0378 HOSPITAL OBSERVATION PER HR: HCPCS

## 2024-02-16 PROCEDURE — 6370000000 HC RX 637 (ALT 250 FOR IP): Performed by: STUDENT IN AN ORGANIZED HEALTH CARE EDUCATION/TRAINING PROGRAM

## 2024-02-16 PROCEDURE — 51701 INSERT BLADDER CATHETER: CPT

## 2024-02-16 PROCEDURE — 2580000003 HC RX 258: Performed by: NURSE PRACTITIONER

## 2024-02-16 PROCEDURE — 93010 ELECTROCARDIOGRAM REPORT: CPT | Performed by: INTERNAL MEDICINE

## 2024-02-16 PROCEDURE — 6370000000 HC RX 637 (ALT 250 FOR IP): Performed by: NURSE PRACTITIONER

## 2024-02-16 PROCEDURE — 96361 HYDRATE IV INFUSION ADD-ON: CPT

## 2024-02-16 PROCEDURE — 6360000002 HC RX W HCPCS: Performed by: STUDENT IN AN ORGANIZED HEALTH CARE EDUCATION/TRAINING PROGRAM

## 2024-02-16 PROCEDURE — 2580000003 HC RX 258: Performed by: STUDENT IN AN ORGANIZED HEALTH CARE EDUCATION/TRAINING PROGRAM

## 2024-02-16 PROCEDURE — 83605 ASSAY OF LACTIC ACID: CPT

## 2024-02-16 PROCEDURE — 36415 COLL VENOUS BLD VENIPUNCTURE: CPT

## 2024-02-16 PROCEDURE — 96365 THER/PROPH/DIAG IV INF INIT: CPT

## 2024-02-16 PROCEDURE — 6360000002 HC RX W HCPCS: Performed by: NURSE PRACTITIONER

## 2024-02-16 PROCEDURE — 96360 HYDRATION IV INFUSION INIT: CPT

## 2024-02-16 PROCEDURE — 80053 COMPREHEN METABOLIC PANEL: CPT

## 2024-02-16 PROCEDURE — 94761 N-INVAS EAR/PLS OXIMETRY MLT: CPT

## 2024-02-16 PROCEDURE — 87040 BLOOD CULTURE FOR BACTERIA: CPT

## 2024-02-16 PROCEDURE — 85025 COMPLETE CBC W/AUTO DIFF WBC: CPT

## 2024-02-16 PROCEDURE — 81001 URINALYSIS AUTO W/SCOPE: CPT

## 2024-02-16 PROCEDURE — 93005 ELECTROCARDIOGRAM TRACING: CPT | Performed by: NURSE PRACTITIONER

## 2024-02-16 PROCEDURE — 99285 EMERGENCY DEPT VISIT HI MDM: CPT

## 2024-02-16 RX ORDER — ACETAMINOPHEN 650 MG/1
650 SUPPOSITORY RECTAL EVERY 6 HOURS PRN
Status: DISCONTINUED | OUTPATIENT
Start: 2024-02-16 | End: 2024-02-20 | Stop reason: HOSPADM

## 2024-02-16 RX ORDER — SODIUM CHLORIDE 0.9 % (FLUSH) 0.9 %
5-40 SYRINGE (ML) INJECTION PRN
Status: DISCONTINUED | OUTPATIENT
Start: 2024-02-16 | End: 2024-02-20 | Stop reason: HOSPADM

## 2024-02-16 RX ORDER — ACETAMINOPHEN 325 MG/1
650 TABLET ORAL EVERY 6 HOURS PRN
Status: DISCONTINUED | OUTPATIENT
Start: 2024-02-16 | End: 2024-02-20 | Stop reason: HOSPADM

## 2024-02-16 RX ORDER — ENOXAPARIN SODIUM 100 MG/ML
30 INJECTION SUBCUTANEOUS DAILY
Status: DISCONTINUED | OUTPATIENT
Start: 2024-02-16 | End: 2024-02-20 | Stop reason: HOSPADM

## 2024-02-16 RX ORDER — ATORVASTATIN CALCIUM 40 MG/1
80 TABLET, FILM COATED ORAL DAILY
Status: DISCONTINUED | OUTPATIENT
Start: 2024-02-16 | End: 2024-02-20 | Stop reason: HOSPADM

## 2024-02-16 RX ORDER — LANOLIN ALCOHOL/MO/W.PET/CERES
3 CREAM (GRAM) TOPICAL NIGHTLY PRN
Status: DISCONTINUED | OUTPATIENT
Start: 2024-02-16 | End: 2024-02-20 | Stop reason: HOSPADM

## 2024-02-16 RX ORDER — 0.9 % SODIUM CHLORIDE 0.9 %
1000 INTRAVENOUS SOLUTION INTRAVENOUS ONCE
Status: COMPLETED | OUTPATIENT
Start: 2024-02-16 | End: 2024-02-16

## 2024-02-16 RX ORDER — POTASSIUM CHLORIDE 20 MEQ/1
20 TABLET, EXTENDED RELEASE ORAL ONCE
Status: COMPLETED | OUTPATIENT
Start: 2024-02-16 | End: 2024-02-16

## 2024-02-16 RX ORDER — SODIUM CHLORIDE 9 MG/ML
INJECTION, SOLUTION INTRAVENOUS CONTINUOUS
Status: DISCONTINUED | OUTPATIENT
Start: 2024-02-16 | End: 2024-02-20 | Stop reason: HOSPADM

## 2024-02-16 RX ORDER — 0.9 % SODIUM CHLORIDE 0.9 %
500 INTRAVENOUS SOLUTION INTRAVENOUS ONCE
Status: COMPLETED | OUTPATIENT
Start: 2024-02-16 | End: 2024-02-16

## 2024-02-16 RX ORDER — M-VIT,TX,IRON,MINS/CALC/FOLIC 27MG-0.4MG
1 TABLET ORAL WEEKLY
Status: DISCONTINUED | OUTPATIENT
Start: 2024-02-17 | End: 2024-02-20 | Stop reason: HOSPADM

## 2024-02-16 RX ORDER — ONDANSETRON 2 MG/ML
4 INJECTION INTRAMUSCULAR; INTRAVENOUS EVERY 6 HOURS PRN
Status: DISCONTINUED | OUTPATIENT
Start: 2024-02-16 | End: 2024-02-20 | Stop reason: HOSPADM

## 2024-02-16 RX ORDER — SODIUM CHLORIDE 9 MG/ML
INJECTION, SOLUTION INTRAVENOUS PRN
Status: DISCONTINUED | OUTPATIENT
Start: 2024-02-16 | End: 2024-02-20 | Stop reason: HOSPADM

## 2024-02-16 RX ORDER — POLYETHYLENE GLYCOL 3350 17 G/17G
17 POWDER, FOR SOLUTION ORAL DAILY PRN
Status: DISCONTINUED | OUTPATIENT
Start: 2024-02-16 | End: 2024-02-20 | Stop reason: HOSPADM

## 2024-02-16 RX ORDER — MAGNESIUM SULFATE IN WATER 40 MG/ML
2000 INJECTION, SOLUTION INTRAVENOUS PRN
Status: DISCONTINUED | OUTPATIENT
Start: 2024-02-16 | End: 2024-02-20 | Stop reason: HOSPADM

## 2024-02-16 RX ORDER — POTASSIUM CHLORIDE 20 MEQ/1
40 TABLET, EXTENDED RELEASE ORAL PRN
Status: DISCONTINUED | OUTPATIENT
Start: 2024-02-16 | End: 2024-02-20 | Stop reason: HOSPADM

## 2024-02-16 RX ORDER — AMLODIPINE BESYLATE 2.5 MG/1
2.5 TABLET ORAL DAILY
Status: DISCONTINUED | OUTPATIENT
Start: 2024-02-16 | End: 2024-02-20

## 2024-02-16 RX ORDER — ONDANSETRON 4 MG/1
4 TABLET, ORALLY DISINTEGRATING ORAL EVERY 8 HOURS PRN
Status: DISCONTINUED | OUTPATIENT
Start: 2024-02-16 | End: 2024-02-20 | Stop reason: HOSPADM

## 2024-02-16 RX ORDER — ASPIRIN 81 MG/1
81 TABLET ORAL DAILY
Status: DISCONTINUED | OUTPATIENT
Start: 2024-02-16 | End: 2024-02-20 | Stop reason: HOSPADM

## 2024-02-16 RX ORDER — POTASSIUM CHLORIDE 7.45 MG/ML
10 INJECTION INTRAVENOUS PRN
Status: DISCONTINUED | OUTPATIENT
Start: 2024-02-16 | End: 2024-02-20 | Stop reason: HOSPADM

## 2024-02-16 RX ORDER — SODIUM CHLORIDE 0.9 % (FLUSH) 0.9 %
5-40 SYRINGE (ML) INJECTION EVERY 12 HOURS SCHEDULED
Status: DISCONTINUED | OUTPATIENT
Start: 2024-02-16 | End: 2024-02-20 | Stop reason: HOSPADM

## 2024-02-16 RX ADMIN — POTASSIUM CHLORIDE 20 MEQ: 1500 TABLET, EXTENDED RELEASE ORAL at 17:01

## 2024-02-16 RX ADMIN — CEFTRIAXONE SODIUM 1000 MG: 1 INJECTION, POWDER, FOR SOLUTION INTRAMUSCULAR; INTRAVENOUS at 15:18

## 2024-02-16 RX ADMIN — AMLODIPINE BESYLATE 2.5 MG: 2.5 TABLET ORAL at 20:28

## 2024-02-16 RX ADMIN — ATORVASTATIN CALCIUM 80 MG: 40 TABLET, FILM COATED ORAL at 20:27

## 2024-02-16 RX ADMIN — SODIUM CHLORIDE 1000 ML: 9 INJECTION, SOLUTION INTRAVENOUS at 15:16

## 2024-02-16 RX ADMIN — ASPIRIN 81 MG: 81 TABLET, COATED ORAL at 20:28

## 2024-02-16 RX ADMIN — ENOXAPARIN SODIUM 30 MG: 100 INJECTION SUBCUTANEOUS at 20:28

## 2024-02-16 RX ADMIN — ACETAMINOPHEN 650 MG: 325 TABLET ORAL at 20:29

## 2024-02-16 RX ADMIN — SODIUM CHLORIDE 500 ML: 9 INJECTION, SOLUTION INTRAVENOUS at 17:00

## 2024-02-16 RX ADMIN — SODIUM CHLORIDE: 9 INJECTION, SOLUTION INTRAVENOUS at 20:13

## 2024-02-16 ASSESSMENT — PAIN DESCRIPTION - PAIN TYPE: TYPE: ACUTE PAIN

## 2024-02-16 ASSESSMENT — PAIN DESCRIPTION - FREQUENCY: FREQUENCY: CONTINUOUS

## 2024-02-16 ASSESSMENT — PAIN DESCRIPTION - ORIENTATION: ORIENTATION: RIGHT;LEFT;LOWER

## 2024-02-16 ASSESSMENT — PAIN DESCRIPTION - LOCATION: LOCATION: ABDOMEN

## 2024-02-16 ASSESSMENT — PAIN SCALES - WONG BAKER: WONGBAKER_NUMERICALRESPONSE: 2

## 2024-02-16 ASSESSMENT — PAIN DESCRIPTION - DESCRIPTORS: DESCRIPTORS: DISCOMFORT

## 2024-02-16 ASSESSMENT — PAIN - FUNCTIONAL ASSESSMENT: PAIN_FUNCTIONAL_ASSESSMENT: WONG-BAKER FACES

## 2024-02-16 NOTE — ED PROVIDER NOTES
[facility] ED  EMERGENCY DEPARTMENT ENCOUNTER      Pt Name: Macey Zazueta  MRN: [id number]  Birthdate 1950  Date of evaluation: 2/16/2024  Provider: LAURA Escobar CNP  7:16 PM    CHIEF COMPLAINT       Chief Complaint   Patient presents with    Fatigue     Patient to the emergency department for increased weakness and not feeling well since discharged from the hospital yesterday. She had been admitted for UTI this past week          HISTORY OF PRESENT ILLNESS      Macey Zazueta 75 yo female presents from home to ED with c/o  increased weakness and not feeling well since discharged from the hospital yesterday. She had been admitted for UTI this past week. Pt and  report pt much worse this am. Weak, unable to talk, control arms. No fever.     Past HX essential hypertension, left pontine CVA,mixed hyperlipidemia, Type 2 diabetes.       The history is provided by the patient and medical records. No  was used.       Nursing Notes were reviewed.    REVIEW OF SYSTEMS       Review of Systems   Neurological:  Positive for weakness.   All other systems reviewed and are negative.      Except as noted above the remainder of the review of systems was reviewed and negative.       PAST MEDICAL HISTORY     Past Medical History:   Diagnosis Date    COVID-19 virus infection 2021    Essential hypertension     Left pontine cerebrovascular accident (HCC) 2017    Mixed hyperlipidemia     Periorbital cellulitis /  right side /  resolved with Omnicef 2022 12/13/2022    Type 2 diabetes mellitus without complication, without long-term current use of insulin (HCC)          SURGICAL HISTORY       Past Surgical History:   Procedure Laterality Date    CATARACT REMOVAL Right 2020         CURRENT MEDICATIONS       Previous Medications    ALCOHOL SWABS 70 % PADS    1 Units by Does not apply route 3 times daily (with meals)    AMLODIPINE (NORVASC) 2.5 MG TABLET    Take 1 tablet by mouth daily     high probability of clinically significant/life threatening deterioration in the patient's condition which required my urgent intervention.      CONSULTS:  IP CONSULT TO CASE MANAGEMENT    PROCEDURES:  Unless otherwise noted below, none     Procedures        FINAL IMPRESSION      1. Septicemia (HCC)          DISPOSITION/PLAN   DISPOSITION Admitted 02/16/2024 06:29:32 PM      PATIENT REFERRED TO:  No follow-up provider specified.    DISCHARGE MEDICATIONS:  New Prescriptions    No medications on file     Controlled Substances Monitoring:          No data to display                (Please note that portions of this note were completed with a voice recognition program.  Efforts were made to edit the dictations but occasionally words are mis-transcribed.)    SANDY Dickens, LAURA - CNP (electronically signed)  Attending Emergency Physician           ABENA Dickens, LAURA - CNP  02/16/24 1954

## 2024-02-16 NOTE — CARE COORDINATION
Care Transitions Initial Follow Up Call    Call within 2 business days of discharge: Yes    Patient Current Location:  Home: [address] [place], [place] [id number]    Care Transition Nurse contacted the spouse/partner by telephone to perform post hospital discharge assessment. Verified name and  with spouse/partner as identifiers. Provided introduction to self, and explanation of the Care Transition Nurse role.     Patient: Macey Zazueta Patient : 1950   MRN: [id number]  Reason for Admission: Sepsis, UTI  Discharge Date: 2/15/24 RARS: Readmission Risk Score: 17.5      Last Discharge Facility       Date Complaint Diagnosis Description Type Department Provider    24 Stroke Sepsis, due to unspecified organism, unspecified whether acute organ dysfunction present (HCC) ... ED to Hosp-Admission (Discharged) (ADMITTED) [facility] Byron Castaneda MD; [facility]            Was this an external facility discharge? No Discharge Facility: [place]    Challenges to be reviewed by the provider   Additional needs identified to be addressed with provider: No  none               Method of communication with provider: none.    Spoke to patient's spouse Yan for transitions initial call. Stated pt is sleeping still, no concerns since discharge. Urinating wnl, not complaining of pain/discomfort, bleeding with urination. Confusion is resolved. Left sided weakness improved, has chronic right sided weakness from CVA in 2017, does home exercises for therapy. Pt has equipment for RPM from her insurance company, checking sugars, BP. Stated vitals are stable.     Patient has PCP appt on  and Urology f/u on  to f/u on stent placement. Agreeable to transitions calls.     Care Transition Nurse reviewed discharge instructions with spouse/partner who verbalized understanding. The spouse/partner was given an opportunity to ask questions and does not have any further questions or concerns at this time. Were discharge instructions

## 2024-02-16 NOTE — PROGRESS NOTES
Spiritual Services Interventions  06/06 2/16/2024  Dimple Zazueta  74 y.o. year old female    Encounter Summary  Encounter Overview/Reason : (P) Initial Encounter  Service Provided For:: (P) Patient  Referral/Consult From:: (P) Rounding  Support System: (P) Spouse, Family members, Children  Last Encounter : (P) 02/16/24  Complexity of Encounter: (P) Moderate  Begin Time: (P) 1430  End Time : (P) 1450  Total Time Calculated: (P) 20 min  Crisis  Type: (P)  (GENERAL)                       Assessment/Intervention/Outcome  Assessment: (P) Anxious  Intervention: (P) Active listening, Explored/Affirmed feelings, thoughts, concerns, Nurtured Hope  Outcome: (P) Engaged in conversation, Expressed feelings, needs, and concerns, Coping

## 2024-02-17 LAB
ANION GAP SERPL CALCULATED.3IONS-SCNC: 11 MMOL/L (ref 9–17)
BASOPHILS # BLD: 0 K/UL (ref 0–0.2)
BASOPHILS NFR BLD: 0 % (ref 0–2)
BUN SERPL-MCNC: 7 MG/DL (ref 8–23)
BUN/CREAT SERPL: 14 (ref 9–20)
CALCIUM SERPL-MCNC: 8.4 MG/DL (ref 8.6–10.4)
CHLORIDE SERPL-SCNC: 105 MMOL/L (ref 98–107)
CO2 SERPL-SCNC: 22 MMOL/L (ref 20–31)
CREAT SERPL-MCNC: 0.5 MG/DL (ref 0.5–0.9)
EOSINOPHIL # BLD: 0 K/UL (ref 0–0.44)
EOSINOPHILS RELATIVE PERCENT: 0 % (ref 1–4)
ERYTHROCYTE [DISTWIDTH] IN BLOOD BY AUTOMATED COUNT: 15.9 % (ref 11.8–14.4)
GFR SERPL CREATININE-BSD FRML MDRD: >60 ML/MIN/1.73M2
GLUCOSE SERPL-MCNC: 192 MG/DL (ref 70–99)
HCT VFR BLD AUTO: 30.6 % (ref 36.3–47.1)
HGB BLD-MCNC: 10.4 G/DL (ref 11.9–15.1)
IMM GRANULOCYTES # BLD AUTO: 0 K/UL (ref 0–0.3)
IMM GRANULOCYTES NFR BLD: 0 %
LYMPHOCYTES NFR BLD: 0.52 K/UL (ref 1.1–3.7)
LYMPHOCYTES RELATIVE PERCENT: 2 % (ref 24–43)
MAGNESIUM SERPL-MCNC: 1.7 MG/DL (ref 1.6–2.6)
MCH RBC QN AUTO: 27.4 PG (ref 25.2–33.5)
MCHC RBC AUTO-ENTMCNC: 34 G/DL (ref 28.4–34.8)
MCV RBC AUTO: 80.7 FL (ref 82.6–102.9)
MICROORGANISM SPEC CULT: NORMAL
MONOCYTES NFR BLD: 1.56 K/UL (ref 0.1–1.2)
MONOCYTES NFR BLD: 6 % (ref 3–12)
MORPHOLOGY: NORMAL
NEUTROPHILS NFR BLD: 92 % (ref 36–65)
NEUTS SEG NFR BLD: 23.92 K/UL (ref 1.5–8.1)
NRBC BLD-RTO: 0 PER 100 WBC
PLATELET # BLD AUTO: 264 K/UL (ref 138–453)
PMV BLD AUTO: 9.7 FL (ref 8.1–13.5)
POTASSIUM SERPL-SCNC: 2.9 MMOL/L (ref 3.7–5.3)
POTASSIUM SERPL-SCNC: 3.7 MMOL/L (ref 3.7–5.3)
RBC # BLD AUTO: 3.79 M/UL (ref 3.95–5.11)
SERVICE CMNT-IMP: NORMAL
SODIUM SERPL-SCNC: 138 MMOL/L (ref 135–144)
SPECIMEN DESCRIPTION: NORMAL
WBC OTHER # BLD: 26 K/UL (ref 3.5–11.3)

## 2024-02-17 PROCEDURE — 80048 BASIC METABOLIC PNL TOTAL CA: CPT

## 2024-02-17 PROCEDURE — 6370000000 HC RX 637 (ALT 250 FOR IP): Performed by: INTERNAL MEDICINE

## 2024-02-17 PROCEDURE — 83735 ASSAY OF MAGNESIUM: CPT

## 2024-02-17 PROCEDURE — 97110 THERAPEUTIC EXERCISES: CPT

## 2024-02-17 PROCEDURE — 2580000003 HC RX 258: Performed by: STUDENT IN AN ORGANIZED HEALTH CARE EDUCATION/TRAINING PROGRAM

## 2024-02-17 PROCEDURE — 97162 PT EVAL MOD COMPLEX 30 MIN: CPT

## 2024-02-17 PROCEDURE — 97166 OT EVAL MOD COMPLEX 45 MIN: CPT

## 2024-02-17 PROCEDURE — G0378 HOSPITAL OBSERVATION PER HR: HCPCS

## 2024-02-17 PROCEDURE — 6360000002 HC RX W HCPCS: Performed by: STUDENT IN AN ORGANIZED HEALTH CARE EDUCATION/TRAINING PROGRAM

## 2024-02-17 PROCEDURE — 84132 ASSAY OF SERUM POTASSIUM: CPT

## 2024-02-17 PROCEDURE — 85025 COMPLETE CBC W/AUTO DIFF WBC: CPT

## 2024-02-17 PROCEDURE — 94761 N-INVAS EAR/PLS OXIMETRY MLT: CPT

## 2024-02-17 PROCEDURE — 36415 COLL VENOUS BLD VENIPUNCTURE: CPT

## 2024-02-17 PROCEDURE — 6370000000 HC RX 637 (ALT 250 FOR IP): Performed by: STUDENT IN AN ORGANIZED HEALTH CARE EDUCATION/TRAINING PROGRAM

## 2024-02-17 RX ADMIN — Medication 1 TABLET: at 08:58

## 2024-02-17 RX ADMIN — POTASSIUM CHLORIDE 10 MEQ: 7.46 INJECTION, SOLUTION INTRAVENOUS at 12:50

## 2024-02-17 RX ADMIN — POTASSIUM CHLORIDE 10 MEQ: 7.46 INJECTION, SOLUTION INTRAVENOUS at 06:54

## 2024-02-17 RX ADMIN — SODIUM CHLORIDE, PRESERVATIVE FREE 10 ML: 5 INJECTION INTRAVENOUS at 20:38

## 2024-02-17 RX ADMIN — ACETAMINOPHEN 650 MG: 325 TABLET ORAL at 06:48

## 2024-02-17 RX ADMIN — ASPIRIN 81 MG: 81 TABLET, COATED ORAL at 08:58

## 2024-02-17 RX ADMIN — POTASSIUM CHLORIDE 10 MEQ: 7.46 INJECTION, SOLUTION INTRAVENOUS at 11:37

## 2024-02-17 RX ADMIN — SODIUM CHLORIDE: 9 INJECTION, SOLUTION INTRAVENOUS at 22:37

## 2024-02-17 RX ADMIN — POTASSIUM CHLORIDE 10 MEQ: 7.46 INJECTION, SOLUTION INTRAVENOUS at 10:25

## 2024-02-17 RX ADMIN — POTASSIUM CHLORIDE 10 MEQ: 7.46 INJECTION, SOLUTION INTRAVENOUS at 07:57

## 2024-02-17 RX ADMIN — SODIUM CHLORIDE: 9 INJECTION, SOLUTION INTRAVENOUS at 09:11

## 2024-02-17 RX ADMIN — ACETAMINOPHEN 650 MG: 325 TABLET ORAL at 15:23

## 2024-02-17 RX ADMIN — METOPROLOL TARTRATE 25 MG: 25 TABLET, FILM COATED ORAL at 16:12

## 2024-02-17 RX ADMIN — ACETAMINOPHEN 650 MG: 325 TABLET ORAL at 20:38

## 2024-02-17 RX ADMIN — POTASSIUM CHLORIDE 10 MEQ: 7.46 INJECTION, SOLUTION INTRAVENOUS at 09:12

## 2024-02-17 RX ADMIN — ATORVASTATIN CALCIUM 80 MG: 40 TABLET, FILM COATED ORAL at 08:58

## 2024-02-17 RX ADMIN — ENOXAPARIN SODIUM 30 MG: 100 INJECTION SUBCUTANEOUS at 08:58

## 2024-02-17 RX ADMIN — CEFTRIAXONE SODIUM 1000 MG: 1 INJECTION, POWDER, FOR SOLUTION INTRAMUSCULAR; INTRAVENOUS at 15:19

## 2024-02-17 RX ADMIN — AMLODIPINE BESYLATE 2.5 MG: 2.5 TABLET ORAL at 08:58

## 2024-02-17 NOTE — H&P
Will Dawson M.D.  Internal Medicine History and Phyisical    Patient: Macey Zazueta  Date of Admission: 2/16/2024  1:31 PM  Date of Evaluation: 2/17/2024      Subjective:  Chief Complaint:    Chief Complaint   Patient presents with    Fatigue     Patient to the emergency department for increased weakness and not feeling well since discharged from the hospital yesterday. She had been admitted for UTI this past week        History Obtained From:  patient, electronic medical record  PCP: Byron Castaneda MD      History of Present Illness:   Macey Zazueta is a 74 y.o. female who came back to ER yesterday afternoon after having just DC'd been home 2/15 following admission for UTI and sepsis.  She felt good at time of DC, (in fact  states she \"looked great when we got her home Thursday\") but over the next 24 hours at home started feeling worse.   tried to wake her around 9:30 AM on Friday morning and she was tired so she said she didn't want to get up.  When he went back to check on her around 11:30 AM she was pretty much unresponsive, just like she was prior to her recent admission.  Her main complaints in ER yesterday were fatigue and weakness.  She denied fever or chills.  In ER her WBC was up to 19.6 (previously 10.6 at DC on 2/15).  She denies abd pain, nausea, vomiting or diarrhea.  She doesn't really have any dysuria, but due to her previous stroke does have some incontinence.  She has a known left ureteral calculus s/p left ureteral stent placement by Dr. Lees last week.        Review of Systems:  Constitutional:negative  for fevers, and negative for chills.  Respiratory: negative for shortness of breath, negative for cough, and negative for wheezing  Cardiovascular: negative for chest pain, negative for palpitations, and negative for syncope  Gastrointestinal: negative for abdominal pain, negative for nausea,negative for vomiting, negative for diarrhea, negative for constipation, and  intracranial abnormality. Mild parenchymal volume loss. Moderate ventriculomegaly, disproportionate to the degree of volume loss, likely with superimposed mild communicating hydrocephalus. Moderate chronic microvascular disease. Results were reported to Dr. Stephenson by radiology results communication at 3:58 p.m. on February 12, 2024.     CTA HEAD NECK W CONTRAST    Result Date: 2/12/2024  EXAMINATION: CTA OF THE HEAD AND NECK WITH CONTRAST 2/12/2024 3:26 pm: TECHNIQUE: CTA of the head and neck was performed with the administration of intravenous contrast. Multiplanar reformatted images are provided for review.  MIP images are provided for review. Stenosis of the internal carotid arteries measured using NASCET criteria. Automated exposure control, iterative reconstruction, and/or weight based adjustment of the mA/kV was utilized to reduce the radiation dose to as low as reasonably achievable. COMPARISON: CT head from earlier today HISTORY: ORDERING SYSTEM PROVIDED HISTORY: weakness TECHNOLOGIST PROVIDED HISTORY: weakness Decision Support Exception - unselect if not a suspected or confirmed emergency medical condition->Emergency Medical Condition (MA) FINDINGS: CTA NECK: Limited evaluation of motion artifact.  AORTIC ARCH/ARCH VESSELS: No dissection or arterial injury.  No significant stenosis of the brachiocephalic or subclavian arteries. CAROTID ARTERIES: No dissection, arterial injury, or hemodynamically significant stenosis by NASCET criteria. VERTEBRAL ARTERIES: No dissection, arterial injury, or significant stenosis. SOFT TISSUES: There is mild discoid atelectasis in the bilateral upper lobes. No cervical or superior mediastinal lymphadenopathy.  The larynx and pharynx are unremarkable.  No acute abnormality of the salivary and thyroid glands. BONES: There are mild chronic compression deformities at the superior endplates of T2 and T3.  No acute osseous abnormality.  There are moderate to severe degenerative

## 2024-02-17 NOTE — PROGRESS NOTES
Occupational Therapy  Facility/Department: [facility] MED SURG  Occupational Therapy Initial Assessment    Name: Macey Zazueta  : 1950  MRN: [id number]  Date of Service: 2024    Discharge Recommendations:  Continue to assess pending progress          Patient Diagnosis(es): The encounter diagnosis was Septicemia (HCC).  Past Medical History:  has a past medical history of COVID-19 virus infection, Essential hypertension, Left pontine cerebrovascular accident (HCC), Mixed hyperlipidemia, Periorbital cellulitis /  right side /  resolved with Omnicef , and Type 2 diabetes mellitus without complication, without long-term current use of insulin (HCC).  Past Surgical History:  has a past surgical history that includes Cataract removal (Right, ).    Treatment Diagnosis: Weakenss      Assessment   Performance deficits / Impairments: Decreased functional mobility ;Decreased endurance;Decreased ADL status;Decreased balance;Decreased strength;Decreased posture  Assessment: 75 y/o F admitted to T for UTI. Patient recently d/c from this facility d/t same issue. patient currently presents with weakness and deconditioning, requiring increased need for assist during self care and transfers. Patient also with decreased sitting balance/core strength. Patient would benefit from OT services to address to improve ADL safety and independence for safe return home with .  Treatment Diagnosis: Weakenss  Prognosis: Good;Fair  Decision Making: Medium Complexity  REQUIRES OT FOLLOW-UP: Yes        Plan   Occupational Therapy Plan  Times Per Day: Once a day  Days Per Week: 7 Days  Current Treatment Recommendations: Strengthening, Balance training, Functional mobility training, Endurance training, Safety education & training, Patient/Caregiver education & training, Pain management, Self-Care / ADL     Restrictions  Restrictions/Precautions  Restrictions/Precautions: General Precautions, Fall Risk    Subjective  None  Education Outcome: Verbalized understanding;Demonstrated understanding;Continued education needed                          AM-PAC - ADL  AM-PAC Daily Activity - Inpatient   How much help is needed for putting on and taking off regular lower body clothing?: A Lot  How much help is needed for bathing (which includes washing, rinsing, drying)?: A Lot  How much help is needed for toileting (which includes using toilet, bedpan, or urinal)?: A Lot  How much help is needed for putting on and taking off regular upper body clothing?: A Lot  How much help is needed for taking care of personal grooming?: A Lot  How much help for eating meals?: A Little  AM-PAC Inpatient Daily Activity Raw Score: 13  AM-PAC Inpatient ADL T-Scale Score : 32.03  ADL Inpatient CMS 0-100% Score: 63.03  ADL Inpatient CMS G-Code Modifier : CL      Goals  Short Term Goals  Time Frame for Short Term Goals: 21 visits  Short Term Goal 1: Patient to safely sit EOB x 10 minutes without LOB during functional task of choice to improve sitting baalnce and safety during bedside ADL.  Short Term Goal 2: Patient to complete UB self care tasks c min A to improve ADL independence and engagement.  Short Term Goal 3: Patient to complete ADL transfers with CGA to min A to improve ADL safety and independence.  Short Term Goal 4: Patient to engage in 15 minutes of ther ex/ther act / ROM to improve strength and activity tolerance for ADL and transfers.       Therapy Time   Individual Concurrent Group Co-treatment   Time In 0956         Time Out 1010         Minutes 14                 Hermila Marin OTR/L

## 2024-02-17 NOTE — PROGRESS NOTES
Patient admitted to room 316 [facility] at this time. Patient is oriented to person only at this time. Patient denies pain. Patient admission assessment and vitals completed at this time as charted. Patient admission navigator and medication list completed as well. Patient assisted with getting comfortable in bed. Patient given turkey sandwich and  assisted with feeding her. Patient states no further needs, call light in reach, plan of care ongoing.

## 2024-02-17 NOTE — PROGRESS NOTES
Physical Therapy  Facility/Department: [facility] MED SURG  Physical Therapy Initial Assessment    Name: Macey Zazueta  : 1950  MRN: [id number]  Date of Service: 2024    Discharge Recommendations:  Continue to assess pending progress, 24 hour supervision or assist, Home with Home health PT   PT Equipment Recommendations  Equipment Needed: No      Patient Diagnosis(es): The encounter diagnosis was Septicemia (HCC).  Past Medical History:  has a past medical history of COVID-19 virus infection, Essential hypertension, Left pontine cerebrovascular accident (HCC), Mixed hyperlipidemia, Periorbital cellulitis /  right side /  resolved with Omnicef , and Type 2 diabetes mellitus without complication, without long-term current use of insulin (HCC).  Past Surgical History:  has a past surgical history that includes Cataract removal (Right, ).    Assessment   Body Structures, Functions, Activity Limitations Requiring Skilled Therapeutic Intervention: Decreased functional mobility ;Decreased ADL status;Decreased ROM;Decreased body mechanics;Decreased tolerance to work activity;Decreased balance;Decreased endurance;Decreased high-level IADLs;Decreased posture;Decreased strength  Assessment: Pt is a 74 year old female being evaluated for skilled acute care physical therapy follow admission to the hospital. Pt recently admitted to hospital and returned shortly after discharge due to increased fatigue. Pt with multiple co-morbidities that impact baseline function including history of stroke. Since stroke pt has been mostly wheelchair bound and requires assistance with most mobility and daily tasks. Pt completed supine to and from sit edge of bed with maximum assistance from therapist. Pt demonstrating significant left lateral lean in sitting edge of bed that required mod assistance from therapist to prevent fall or loss of balance. Pt complete sit to stand with mod assist x2 from therapist. Pt transfered from bed  in hospital room?: A Lot  AM-PAC Inpatient Mobility without Stair Climbing Raw Score : 10  AM-PAC Inpatient without Stair Climbing T-Scale Score : 34.07  Mobility Inpatient CMS 0-100% Score: 71.66  Mobility Inpatient without Stair CMS G-Code Modifier : CL         Goals  Short Term Goals  Time Frame for Short Term Goals: 20 days  Short Term Goal 1: Pt will roll bilaterally and hold position at mod A x1 in order to improve participation with bed mobility  Short Term Goal 2: Pt will perform supine to and from sit edge of bed at mod A x1 in order to improve safety with bed mobility and daily tasks  Short Term Goal 3: Pt will perform stand pivot transfer to and from wheelchair at Min A x1 in order to reduce fall risk with transfers       Education  Patient Education  Education Given To: Patient  Education Provided: Role of Therapy;Plan of Care  Education Method: Verbal  Education Outcome: Verbalized understanding      Therapy Time   Individual Concurrent Group Co-treatment   Time In 0956         Time Out 1010         Minutes 14         Timed Code Treatment Minutes: 14 Minutes       Kelly Thurston, PT, DPT

## 2024-02-17 NOTE — PROGRESS NOTES
Entered patient's room for morning vital signs and head to toe assessment. Patient resting in the bed at this time. A&O x2, calm, and cooperative. Patient denies of pain at this time. Vital signs and head to toe assessment completed at this time, see flowsheets for more details. Patient presents with a fever at this time and skin warm to the touch. PRN Tylenol given at this time. Potassium replacement protocol started at this time. Patient denies no more needs at this time. Call light within reach. Patient repositioned in the bed at this time. Bed alarm on. Bed wheels locked. Bed in lowest position.

## 2024-02-17 NOTE — PROGRESS NOTES
Occupational Therapy  Facility/Department: [facility] MED SURG  Daily Treatment Note  NAME: Macey Zazueta  : 1950  MRN: [id number]    Date of Service: 2024    Discharge Recommendations:  Continue to assess pending progress         Patient Diagnosis(es): The encounter diagnosis was Septicemia (HCC).     Assessment    Activity Tolerance: Patient limited by fatigue;Patient limited by endurance  Discharge Recommendations: Continue to assess pending progress      Plan   Occupational Therapy Plan  Times Per Day: Once a day  Days Per Week: 7 Days  Current Treatment Recommendations: Strengthening;Balance training;Functional mobility training;Endurance training;Safety education & training;Patient/Caregiver education & training;Pain management;Self-Care / ADL     Restrictions  Restrictions/Precautions  Restrictions/Precautions: General Precautions;Fall Risk    Subjective   Subjective  Subjective: Pt sitting up in bedside chair upon arrival. Pt agreed to participate in therapy session.  Pain: Pt had no complaints of pain.  Orientation  Overall Orientation Status: Within Functional Limits  Cognition  Overall Cognitive Status: WFL  Following Commands: Follows one step commands with increased time        Objective    Vitals             OT Exercises  Exercise Treatment: Pt completed AAROM x 5 planes x 10 reps x 1 set to increase UE strength and endurance in order to ease completion of ADL tasks. Pt required RBs as needed secondary to fatigue.     Safety Devices  Type of Devices: All fall risk precautions in place;Call light within reach;Left in chair;Chair alarm in place     Patient Education  Education Given To: Patient  Education Provided: Role of Therapy;Plan of Care  Education Method: Verbal  Barriers to Learning: None  Education Outcome: Verbalized understanding    Goals  Short Term Goals  Time Frame for Short Term Goals: 21 visits  Short Term Goal 1: Patient to safely sit EOB x 10 minutes without LOB during functional  task of choice to improve sitting baalnce and safety during bedside ADL.  Short Term Goal 2: Patient to complete UB self care tasks c min A to improve ADL independence and engagement.  Short Term Goal 3: Patient to complete ADL transfers with CGA to min A to improve ADL safety and independence.  Short Term Goal 4: Patient to engage in 15 minutes of ther ex/ther act / ROM to improve strength and activity tolerance for ADL and transfers.    AM-PAC - ADL       Therapy Time   Individual Concurrent Group Co-treatment   Time In 1325         Time Out 1340         Minutes 15                 MAKENZIE Armas/DELORES

## 2024-02-18 LAB
ANION GAP SERPL CALCULATED.3IONS-SCNC: 11 MMOL/L (ref 9–17)
BASOPHILS # BLD: 0 K/UL (ref 0–0.2)
BASOPHILS NFR BLD: 0 % (ref 0–2)
BUN SERPL-MCNC: 7 MG/DL (ref 8–23)
BUN/CREAT SERPL: 18 (ref 9–20)
CALCIUM SERPL-MCNC: 8.1 MG/DL (ref 8.6–10.4)
CHLORIDE SERPL-SCNC: 105 MMOL/L (ref 98–107)
CO2 SERPL-SCNC: 19 MMOL/L (ref 20–31)
CREAT SERPL-MCNC: 0.4 MG/DL (ref 0.5–0.9)
EOSINOPHIL # BLD: 0 K/UL (ref 0–0.44)
EOSINOPHILS RELATIVE PERCENT: 0 % (ref 1–4)
ERYTHROCYTE [DISTWIDTH] IN BLOOD BY AUTOMATED COUNT: 15.9 % (ref 11.8–14.4)
GFR SERPL CREATININE-BSD FRML MDRD: >60 ML/MIN/1.73M2
GLUCOSE SERPL-MCNC: 206 MG/DL (ref 70–99)
HCT VFR BLD AUTO: 32.2 % (ref 36.3–47.1)
HGB BLD-MCNC: 10.5 G/DL (ref 11.9–15.1)
IMM GRANULOCYTES # BLD AUTO: 0 K/UL (ref 0–0.3)
IMM GRANULOCYTES NFR BLD: 0 %
LYMPHOCYTES NFR BLD: 1.16 K/UL (ref 1.1–3.7)
LYMPHOCYTES RELATIVE PERCENT: 4 % (ref 24–43)
MAGNESIUM SERPL-MCNC: 1.9 MG/DL (ref 1.6–2.6)
MCH RBC QN AUTO: 26.9 PG (ref 25.2–33.5)
MCHC RBC AUTO-ENTMCNC: 32.6 G/DL (ref 28.4–34.8)
MCV RBC AUTO: 82.4 FL (ref 82.6–102.9)
MONOCYTES NFR BLD: 1.16 K/UL (ref 0.1–1.2)
MONOCYTES NFR BLD: 4 % (ref 3–12)
MORPHOLOGY: NORMAL
NEUTROPHILS NFR BLD: 92 % (ref 36–65)
NEUTS SEG NFR BLD: 26.68 K/UL (ref 1.5–8.1)
NRBC BLD-RTO: 0 PER 100 WBC
PLATELET # BLD AUTO: 260 K/UL (ref 138–453)
PMV BLD AUTO: 9.7 FL (ref 8.1–13.5)
POTASSIUM SERPL-SCNC: 3.3 MMOL/L (ref 3.7–5.3)
RBC # BLD AUTO: 3.91 M/UL (ref 3.95–5.11)
SODIUM SERPL-SCNC: 135 MMOL/L (ref 135–144)
WBC OTHER # BLD: 29 K/UL (ref 3.5–11.3)

## 2024-02-18 PROCEDURE — 97110 THERAPEUTIC EXERCISES: CPT

## 2024-02-18 PROCEDURE — G0378 HOSPITAL OBSERVATION PER HR: HCPCS

## 2024-02-18 PROCEDURE — 36415 COLL VENOUS BLD VENIPUNCTURE: CPT

## 2024-02-18 PROCEDURE — 83735 ASSAY OF MAGNESIUM: CPT

## 2024-02-18 PROCEDURE — 80048 BASIC METABOLIC PNL TOTAL CA: CPT

## 2024-02-18 PROCEDURE — 6370000000 HC RX 637 (ALT 250 FOR IP): Performed by: INTERNAL MEDICINE

## 2024-02-18 PROCEDURE — 6370000000 HC RX 637 (ALT 250 FOR IP): Performed by: STUDENT IN AN ORGANIZED HEALTH CARE EDUCATION/TRAINING PROGRAM

## 2024-02-18 PROCEDURE — 6360000002 HC RX W HCPCS: Performed by: STUDENT IN AN ORGANIZED HEALTH CARE EDUCATION/TRAINING PROGRAM

## 2024-02-18 PROCEDURE — 2580000003 HC RX 258: Performed by: STUDENT IN AN ORGANIZED HEALTH CARE EDUCATION/TRAINING PROGRAM

## 2024-02-18 PROCEDURE — 94761 N-INVAS EAR/PLS OXIMETRY MLT: CPT

## 2024-02-18 PROCEDURE — 85025 COMPLETE CBC W/AUTO DIFF WBC: CPT

## 2024-02-18 RX ADMIN — ASPIRIN 81 MG: 81 TABLET, COATED ORAL at 09:00

## 2024-02-18 RX ADMIN — AMLODIPINE BESYLATE 2.5 MG: 2.5 TABLET ORAL at 09:00

## 2024-02-18 RX ADMIN — POTASSIUM BICARBONATE 40 MEQ: 782 TABLET, EFFERVESCENT ORAL at 07:27

## 2024-02-18 RX ADMIN — METOPROLOL TARTRATE 25 MG: 25 TABLET, FILM COATED ORAL at 20:29

## 2024-02-18 RX ADMIN — SODIUM CHLORIDE: 9 INJECTION, SOLUTION INTRAVENOUS at 12:00

## 2024-02-18 RX ADMIN — ACETAMINOPHEN 650 MG: 325 TABLET ORAL at 20:29

## 2024-02-18 RX ADMIN — SODIUM CHLORIDE, PRESERVATIVE FREE 10 ML: 5 INJECTION INTRAVENOUS at 09:00

## 2024-02-18 RX ADMIN — ENOXAPARIN SODIUM 30 MG: 100 INJECTION SUBCUTANEOUS at 09:00

## 2024-02-18 RX ADMIN — ACETAMINOPHEN 650 MG: 325 TABLET ORAL at 14:00

## 2024-02-18 RX ADMIN — METOPROLOL TARTRATE 25 MG: 25 TABLET, FILM COATED ORAL at 07:27

## 2024-02-18 RX ADMIN — CEFTRIAXONE SODIUM 1000 MG: 1 INJECTION, POWDER, FOR SOLUTION INTRAMUSCULAR; INTRAVENOUS at 15:23

## 2024-02-18 RX ADMIN — Medication 3 MG: at 20:29

## 2024-02-18 RX ADMIN — ATORVASTATIN CALCIUM 80 MG: 40 TABLET, FILM COATED ORAL at 09:00

## 2024-02-18 RX ADMIN — ACETAMINOPHEN 650 MG: 325 TABLET ORAL at 07:27

## 2024-02-18 NOTE — PROGRESS NOTES
Will Dawson M.D.  Internal Medicine History and Phyisical    Patient: Macey Zazueta  Date of Admission: 2/16/2024  1:31 PM  Date of Evaluation: 2/18/2024      Subjective:  Macey Zazueta is a 74 y.o. female who is seen for f/u of UTI with sepsis.  She is feeling better this morning.  She is still running fever up to 101.8.  HR starting to improve.   No new issues per .  She ate about half her turkey and all her peas and pears for lunch today.       Review of Systems:  Constitutional:negative  for fevers, and negative for chills.  Respiratory: negative for shortness of breath, negative for cough, and negative for wheezing  Cardiovascular: negative for chest pain, negative for palpitations, and negative for syncope  Gastrointestinal: negative for abdominal pain, negative for nausea,negative for vomiting, negative for diarrhea, negative for constipation, and negative for hematochezia or melena  Genitourinary: negative for dysuria, negative for urinary urgency, negative for urinary frequency, and negative for hematuria  Neurological: negative for unilateral weakness, numbness or tingling.    All other systems were reviewed with the patient and are negative except as stated above      VITALS:  Temp: 99.6 °F (37.6 °C)  BP: (!) 156/82  Respirations: 22  Pulse: 98  SpO2: 94 %    Weight  Wt Readings from Last 3 Encounters:   02/16/24 47.6 kg (105 lb)   02/14/24 48.1 kg (105 lb 15.6 oz)   10/16/23 47.6 kg (105 lb)     Body mass index is 21.95 kg/m².  -----------------------------------------------------------------  EXAM:  GEN:    Awake, alert and oriented x 3.    EYES:  EOMI, pupils equal   NECK: Supple. No lymphadenopathy.  No carotid bruit  CVS:    regular rate and rhythm, no audible murmur  PULM:  CTA, no wheezes, rales or rhonchi, no acute respiratory distress  ABD:    Bowels sounds normal.  Abdomen is soft.  No distention.  no tenderness to palpation.   EXT:   no edema bilaterally .  No calf tenderness.  today  Medications:   Discontinue Cipro  Start IV Rocephin  Switch to PO Augmentin at DC (aerococcus typically sensitive to PCNs)  Medication Monitoring / High Risk Medications: none      Hypokalemia    Condition is a new condition requiring treatment and monitoring  Treatment plan:   Labs ordered: BMP, Mg+  Medications:   K+ replacement protocol ordered    Hypertension    Condition is a chronic stable condition  Treatment plan:   Continue current treatment  Medications:   Increase Amlodipine dose to 5 mg po QD due to elevated BPs    Diabetes mellitus type 2    Condition is a chronic stable condition  Treatment plan:   Continue current treatment  Medications:   Continue metformin    Nutrition status:   At risk for malnutrition  Dietician consult ordered     Hospital Prophylaxis:   DVT: Lovenox   Stress Ulcer: H2 Blocker     MDM Data:   Test interpretation:  My independent EKG interpretation: Sinus tachycardia, otherwise normal  My independent X-ray interpretation:   no new imaging studies performed in ER yesterday  Management and/or test interpretation discussed with Dr. Castaneda  Consults and Nursing notes were personally reviewed, all current labs and imaging were personally reviewed, and tests ordered: CBC, BMP, blood cultures, urine culture      Disposition:  Shared decision making: All test results, treatment options and disposition options were discussed with the patient today  Social determinants of health that may impact management: none  Code status: Full Code   Disposition:  pending      Will Dawson MD , M.D.  2/18/2024  10:23 AM

## 2024-02-18 NOTE — PROGRESS NOTES
Entered patient's room for morning vital signs and head to toe assessment. Patient resting in the bed at this time. A&O x2, calm, and cooperative. Patient denies of pain at this time. Vital signs and head to toe assessment completed at this time, see flowsheets for more details. Patient had a fever at this time, PRN Tylenol given. Potassium low this AM, potassium protocol followed, see MAR. Patient repositioned in the bed at this time. Patient denies no more needs at this time. Oral care completed. Call light within reach. Bed alarm on. Bed wheels locked. Bed in lowest position.

## 2024-02-18 NOTE — PROGRESS NOTES
Occupational Therapy  Facility/Department: [facility] MED SURG  Daily Treatment Note  NAME: Macey Zazueta  : 1950  MRN: [id number]    Date of Service: 2024    Discharge Recommendations:  Continue to assess pending progress         Patient Diagnosis(es): The encounter diagnosis was Septicemia (HCC).     Assessment    Activity Tolerance: Patient limited by fatigue  Discharge Recommendations: Continue to assess pending progress      Plan   Occupational Therapy Plan  Times Per Day: Once a day  Days Per Week: 7 Days  Current Treatment Recommendations: Strengthening;Balance training;Functional mobility training;Endurance training;Safety education & training;Patient/Caregiver education & training;Pain management;Self-Care / ADL     Restrictions  Restrictions/Precautions  Restrictions/Precautions: General Precautions;Fall Risk    Subjective   Subjective  Subjective: Pt lying in bed upon arrival. Pt agreed to participate in therapy session.  Pain: Pt had no complaints of pain.             Objective    Vitals          ADL  Additional Comments: Mod A x 2 to complete repositioning in bed.  Skin Care: Bath wipes  OT Exercises  Exercise Treatment: Pt completed AAROM x 6 planes x 10 reps x 1 set to increase UE strength and endurance in order to ease completion of ADL tasks. Pt required RBs as needed secondary to fatigue.     Safety Devices  Type of Devices: All fall risk precautions in place;Patient at risk for falls;Nurse notified;Call light within reach;Left in bed;Bed alarm in place     Patient Education  Education Given To: Patient  Education Provided: Role of Therapy;Plan of Care  Education Method: Verbal  Barriers to Learning: None  Education Outcome: Verbalized understanding    Goals  Short Term Goals  Time Frame for Short Term Goals: 21 visits  Short Term Goal 1: Patient to safely sit EOB x 10 minutes without LOB during functional task of choice to improve sitting baalnce and safety during bedside ADL.  Short Term

## 2024-02-18 NOTE — PROGRESS NOTES
Physical Therapy  Facility/Department: [facility] MED SURG  Daily Treatment Note  NAME: Macey Zazueta  : 1950  MRN: [id number]    Date of Service: 2024    Discharge Recommendations:  Continue to assess pending progress, 24 hour supervision or assist, Home with Home health PT        Patient Diagnosis(es): The encounter diagnosis was Septicemia (HCC).    Assessment   Assessment: Pt in bed upon arrival asleep, easy to arouse awake.  and pt agreeable to therapy at this time.  AAROM/PROM therex BLE x10 in all available planes. pt required constant verbal cues for proper technique with fair carryover.  Activity Tolerance: Patient limited by fatigue     Plan    Physical Therapy Plan  General Plan: 2 times a day 7 days a week (1x/day on weekends)     Restrictions  Restrictions/Precautions  Restrictions/Precautions: General Precautions, Fall Risk     Subjective    Subjective  Subjective: Pt in bed asleep upon arrival with  present.  and patient is agreeable to therapy at this time.  Pain: did not vocalize pain     Objective   Vitals     Bed Mobility Training  Bed Mobility Training: Yes  Overall Level of Assistance: Moderate assistance;Assist X2  Interventions: Verbal cues  Rolling: Moderate assistance;Assist X2  Supine to Sit: Moderate assistance;Assist X2  Sit to Supine: Moderate assistance;Assist X2  Scooting: Moderate assistance;Assist X2  Transfer Training  Transfer Training: No  Gait Training  Gait Training: No     PT Exercises  Exercise Treatment: AAROM/PROM therex BLE x10 in all available planes. pt required constant verbal cues for proper technique with fair carryover     Safety Devices  Type of Devices: All fall risk precautions in place;Call light within reach;Left in chair;Chair alarm in place;Patient at risk for falls;Nurse notified       Goals  Short Term Goals  Time Frame for Short Term Goals: 20 days  Short Term Goal 1: Pt will roll bilaterally and hold position at mod A x1 in order  to improve participation with bed mobility  Short Term Goal 2: Pt will perform supine to and from sit edge of bed at mod A x1 in order to improve safety with bed mobility and daily tasks  Short Term Goal 3: Pt will perform stand pivot transfer to and from wheelchair at Min A x1 in order to reduce fall risk with transfers    Education  Patient Education  Education Given To: Patient  Education Provided: Role of Therapy;Plan of Care  Education Method: Verbal  Education Outcome: Verbalized understanding    AM-PAC - Mobility              Therapy Time   Individual Concurrent Group Co-treatment   Time In 1011         Time Out 1028         Minutes 17                 Lorraine Haynes, PTA

## 2024-02-18 NOTE — PROGRESS NOTES
Assessment and vitals obtained at this time as charted. Pt A&O x2 and denies pain. Fever noted and pt given PRN Tylenol at this time. Pt was changed d/t incontinence and repositioned for comfort.  is at bedside. Pt denies any further needs at this time. Call light within reach, care ongoing.

## 2024-02-19 PROBLEM — N39.0 COMPLICATED UTI (URINARY TRACT INFECTION): Status: ACTIVE | Noted: 2024-02-19

## 2024-02-19 LAB
ANION GAP SERPL CALCULATED.3IONS-SCNC: 10 MMOL/L (ref 9–17)
BASOPHILS # BLD: 0.05 K/UL (ref 0–0.2)
BASOPHILS NFR BLD: 0 % (ref 0–2)
BUN SERPL-MCNC: 6 MG/DL (ref 8–23)
BUN/CREAT SERPL: 15 (ref 9–20)
CALCIUM SERPL-MCNC: 8 MG/DL (ref 8.6–10.4)
CHLORIDE SERPL-SCNC: 108 MMOL/L (ref 98–107)
CO2 SERPL-SCNC: 21 MMOL/L (ref 20–31)
CREAT SERPL-MCNC: 0.4 MG/DL (ref 0.5–0.9)
EOSINOPHIL # BLD: 0.29 K/UL (ref 0–0.44)
EOSINOPHILS RELATIVE PERCENT: 2 % (ref 1–4)
ERYTHROCYTE [DISTWIDTH] IN BLOOD BY AUTOMATED COUNT: 16.2 % (ref 11.8–14.4)
GFR SERPL CREATININE-BSD FRML MDRD: >60 ML/MIN/1.73M2
GLUCOSE SERPL-MCNC: 176 MG/DL (ref 70–99)
HCT VFR BLD AUTO: 28.9 % (ref 36.3–47.1)
HGB BLD-MCNC: 9.3 G/DL (ref 11.9–15.1)
IMM GRANULOCYTES # BLD AUTO: 0.28 K/UL (ref 0–0.3)
IMM GRANULOCYTES NFR BLD: 2 %
LYMPHOCYTES NFR BLD: 1.02 K/UL (ref 1.1–3.7)
LYMPHOCYTES RELATIVE PERCENT: 6 % (ref 24–43)
MAGNESIUM SERPL-MCNC: 1.9 MG/DL (ref 1.6–2.6)
MCH RBC QN AUTO: 27.1 PG (ref 25.2–33.5)
MCHC RBC AUTO-ENTMCNC: 32.2 G/DL (ref 28.4–34.8)
MCV RBC AUTO: 84.3 FL (ref 82.6–102.9)
MONOCYTES NFR BLD: 0.97 K/UL (ref 0.1–1.2)
MONOCYTES NFR BLD: 5 % (ref 3–12)
NEUTROPHILS NFR BLD: 85 % (ref 36–65)
NEUTS SEG NFR BLD: 15.35 K/UL (ref 1.5–8.1)
NRBC BLD-RTO: 0 PER 100 WBC
PLATELET # BLD AUTO: 307 K/UL (ref 138–453)
PMV BLD AUTO: 9.9 FL (ref 8.1–13.5)
POTASSIUM SERPL-SCNC: 3.3 MMOL/L (ref 3.7–5.3)
RBC # BLD AUTO: 3.43 M/UL (ref 3.95–5.11)
SODIUM SERPL-SCNC: 139 MMOL/L (ref 135–144)
WBC OTHER # BLD: 18 K/UL (ref 3.5–11.3)

## 2024-02-19 PROCEDURE — 6360000002 HC RX W HCPCS: Performed by: STUDENT IN AN ORGANIZED HEALTH CARE EDUCATION/TRAINING PROGRAM

## 2024-02-19 PROCEDURE — 97110 THERAPEUTIC EXERCISES: CPT

## 2024-02-19 PROCEDURE — 85025 COMPLETE CBC W/AUTO DIFF WBC: CPT

## 2024-02-19 PROCEDURE — 83735 ASSAY OF MAGNESIUM: CPT

## 2024-02-19 PROCEDURE — 94761 N-INVAS EAR/PLS OXIMETRY MLT: CPT

## 2024-02-19 PROCEDURE — 6370000000 HC RX 637 (ALT 250 FOR IP): Performed by: INTERNAL MEDICINE

## 2024-02-19 PROCEDURE — 2580000003 HC RX 258: Performed by: STUDENT IN AN ORGANIZED HEALTH CARE EDUCATION/TRAINING PROGRAM

## 2024-02-19 PROCEDURE — 80048 BASIC METABOLIC PNL TOTAL CA: CPT

## 2024-02-19 PROCEDURE — 6370000000 HC RX 637 (ALT 250 FOR IP): Performed by: STUDENT IN AN ORGANIZED HEALTH CARE EDUCATION/TRAINING PROGRAM

## 2024-02-19 PROCEDURE — 1200000000 HC SEMI PRIVATE

## 2024-02-19 PROCEDURE — 36415 COLL VENOUS BLD VENIPUNCTURE: CPT

## 2024-02-19 RX ORDER — AMOXICILLIN AND CLAVULANATE POTASSIUM 875; 125 MG/1; MG/1
1 TABLET, FILM COATED ORAL 2 TIMES DAILY
Qty: 14 TABLET | Refills: 0 | Status: SHIPPED | OUTPATIENT
Start: 2024-02-19 | End: 2024-02-26

## 2024-02-19 RX ADMIN — AMLODIPINE BESYLATE 2.5 MG: 2.5 TABLET ORAL at 08:41

## 2024-02-19 RX ADMIN — POTASSIUM BICARBONATE 40 MEQ: 782 TABLET, EFFERVESCENT ORAL at 08:41

## 2024-02-19 RX ADMIN — SODIUM CHLORIDE: 9 INJECTION, SOLUTION INTRAVENOUS at 00:03

## 2024-02-19 RX ADMIN — ASPIRIN 81 MG: 81 TABLET, COATED ORAL at 08:41

## 2024-02-19 RX ADMIN — ENOXAPARIN SODIUM 30 MG: 100 INJECTION SUBCUTANEOUS at 08:42

## 2024-02-19 RX ADMIN — SODIUM CHLORIDE: 9 INJECTION, SOLUTION INTRAVENOUS at 13:30

## 2024-02-19 RX ADMIN — METOPROLOL TARTRATE 25 MG: 25 TABLET, FILM COATED ORAL at 21:04

## 2024-02-19 RX ADMIN — SODIUM CHLORIDE, PRESERVATIVE FREE 10 ML: 5 INJECTION INTRAVENOUS at 08:42

## 2024-02-19 RX ADMIN — ATORVASTATIN CALCIUM 80 MG: 40 TABLET, FILM COATED ORAL at 08:41

## 2024-02-19 RX ADMIN — ACETAMINOPHEN 650 MG: 325 TABLET ORAL at 19:03

## 2024-02-19 RX ADMIN — METOPROLOL TARTRATE 25 MG: 25 TABLET, FILM COATED ORAL at 08:41

## 2024-02-19 RX ADMIN — CEFTRIAXONE SODIUM 1000 MG: 1 INJECTION, POWDER, FOR SOLUTION INTRAMUSCULAR; INTRAVENOUS at 14:45

## 2024-02-19 NOTE — PROGRESS NOTES
Spiritual Services Interventions  0316/0316-01   2/19/2024  Teddy Richards    Macey VAZQUEZ Zazueta  74 y.o. year old female    Encounter Summary  Encounter Overview/Reason : (P) Spiritual/Emotional Needs  Service Provided For:: (P) Patient  Referral/Consult From:: (P) Rounding  Support System: Spouse, Children, Family members  Last Encounter : 02/16/24  Complexity of Encounter: (P) Moderate  Begin Time: (P) 1000  End Time : (P) 1015  Total Time Calculated: (P) 15 min  Crisis  Type:  (GENERAL)  Spiritual/Emotional needs  Type: (P) Spiritual Support                    Assessment/Intervention/Outcome  Assessment: (P) Calm  Intervention: (P) Discussed belief system/Hindu practices/frandy, Discussed illness injury and it’s impact  Outcome: (P) Encouraged, Engaged in conversation

## 2024-02-19 NOTE — PROGRESS NOTES
Progress Note    SUBJECTIVE:    Patient seen for f/u of UTI (urinary tract infection).  She resting in bed eating breakfast. No complaints     ROS:   Constitutional: negative  for fevers, and negative for chills.  Respiratory: negative for shortness of breath, negative for cough, and negative for wheezing  Cardiovascular: negative for chest pain, and negative for palpitations  Gastrointestinal: negative for abdominal pain, negative for nausea,negative for vomiting, negative for diarrhea, and negative for constipation     All other systems were reviewed with the patient and are negative unless otherwise stated in HPI      OBJECTIVE:      Vitals:   Vitals:    02/19/24 0700   BP: (!) 152/90   Pulse: (!) 101   Resp: 18   Temp: 98.6 °F (37 °C)   SpO2: 94%     Weight - Scale: 47.6 kg (104 lb 15 oz)   Height: 147.3 cm (4' 10\")     Weight  Wt Readings from Last 3 Encounters:   02/19/24 47.6 kg (104 lb 15 oz)   02/14/24 48.1 kg (105 lb 15.6 oz)   10/16/23 47.6 kg (105 lb)     Body mass index is 21.93 kg/m².    24HR INTAKE/OUTPUT:      Intake/Output Summary (Last 24 hours) at 2/19/2024 0810  Last data filed at 2/19/2024 0718  Gross per 24 hour   Intake 2388.64 ml   Output --   Net 2388.64 ml     -----------------------------------------------------------------  Exam:    GEN:    Awake, alert and oriented x3.   EYES:  EOMI, pupils equal   NECK: Supple. No lymphadenopathy.  No carotid bruit  CVS:    regular but tachycardic, no audible murmur  PULM:  CTA, no wheezes, rales or rhonchi, no acute respiratory distress  ABD:    Bowels sounds normal.  Abdomen is soft.  No distention.  no tenderness to palpation.   EXT:   no edema bilaterally .  No calf tenderness.   NEURO: Moves all extremities.  Motor and sensory are grossly intact  SKIN:  No rashes.  No skin lesions.    -----------------------------------------------------------------    Diagnostic Data:      Complete Blood Count:   Recent Labs     02/17/24  0555 02/18/24  0555

## 2024-02-19 NOTE — PROGRESS NOTES
Patient resting in bed denies needs at this time, vitals and assessment completed. Call light in reach. Bed alarm on.

## 2024-02-19 NOTE — PROGRESS NOTES
Physical Therapy  Facility/Department: [facility] MED SURG  Daily Treatment Note  NAME: Macey Zazueta  : 1950  MRN: [id number]    Date of Service: 2024    Discharge Recommendations:  Continue to assess pending progress, 24 hour supervision or assist, Home with Home health PT        Patient Diagnosis(es): The encounter diagnosis was Septicemia (HCC).    Assessment   Assessment: Patient in chair upon arrival, denied pain, agreeable to therapy at this time. Patient completed seated AAROM/PROM therex BLE x10 in all available planes. pt required constant verbal cues for proper technique with fair carryover  Activity Tolerance: Patient limited by fatigue     Plan    Physical Therapy Plan  General Plan: 2 times a day 7 days a week     Restrictions  Restrictions/Precautions  Restrictions/Precautions: General Precautions, Fall Risk     Subjective    Subjective  Subjective: pt in chair upon arrival,  present, agreeable to therapy at this time  Pain: denied pain  Orientation  Overall Orientation Status: Within Functional Limits  Cognition  Overall Cognitive Status: WFL  Following Commands: Follows one step commands with increased time     Objective      Bed Mobility Training  Bed Mobility Training: No  Transfer Training  Transfer Training: No  Gait Training  Gait Training: No  Wheelchair Management  Wheelchair Management: No  Neuromuscular Education  Neuromuscular Education: No  PT Exercises  Exercise Treatment: AAROM/PROM therex BLE x10 in all available planes. pt required constant verbal cues for proper technique with fair carryover     Safety Devices  Type of Devices: All fall risk precautions in place;Patient at risk for falls;Nurse notified;Call light within reach;Left in chair;Chair alarm in place       Goals  Short Term Goals  Time Frame for Short Term Goals: 20 days  Short Term Goal 1: Pt will roll bilaterally and hold position at mod A x1 in order to improve participation with bed mobility  Short Term

## 2024-02-19 NOTE — CARE COORDINATION
Case Management Assessment  Initial Evaluation    Date/Time of Evaluation: 2/19/2024 11:31 AM  Assessment Completed by: STEFAN Son    If patient is discharged prior to next notation, then this note serves as note for discharge by case management.    Patient Name: Macey Zazueta                   YOB: 1950  Diagnosis: UTI (urinary tract infection) [N39.0]  Septicemia (HCC) [A41.9]  Complicated UTI (urinary tract infection) [N39.0]                   Date / Time: 2/16/2024  1:31 PM    Patient Admission Status: Inpatient   Readmission Risk (Low < 19, Mod (19-27), High > 27): Readmission Risk Score: 17.5    Current PCP: Byron Castaneda MD  PCP verified by CM? Yes    Chart Reviewed: Yes      History Provided by: Spouse, Medical Record  Patient Orientation: Alert and Oriented, Person, Self    Patient Cognition: Other (see comment) (Hx of CVA, Patient did not speak during this assessment;)    Hospitalization in the last 30 days (Readmission):  Yes    If yes, Readmission Assessment in CM Navigator will be completed.    Advance Directives:      Code Status: Full Code   Patient's Primary Decision Maker is: Named in Scanned ACP Document    Primary Decision Maker: Yan Zazueta - Spouse - [phone]    Secondary Decision Maker: Ai Zazuetatt - Child - [phone]    Discharge Planning:    Patient lives with: Spouse/Significant Other Type of Home: House  Primary Care Giver: Spouse  Patient Support Systems include: Spouse/Significant Other, Family Members, Friends/Neighbors   Current Financial resources: Medicare  Current community resources: None  Current services prior to admission: Durable Medical Equipment            Current DME: Bedside Commode, Shower Chair, Walker, Wheelchair            Type of Home Care services:  None    ADLS  Prior functional level: Assistance with the following:, Bathing, Dressing, Toileting, Feeding, Cooking, Housework, Shopping, Mobility  Current functional level:

## 2024-02-19 NOTE — PROGRESS NOTES
Comprehensive Nutrition Assessment    Type and Reason for Visit:  Initial    Nutrition Recommendations/Plan:   Modify diet to CC 3 carbs per meal.   Start 4 oz Glucerna TID with meals.      Malnutrition Assessment:  Malnutrition Status:  At risk for malnutrition (Comment) (02/19/24 1444)    Context:  Acute Illness     Findings of the 6 clinical characteristics of malnutrition:  Energy Intake:  Mild decrease in energy intake (Comment)  Weight Loss:  No significant weight loss     Body Fat Loss:  No significant body fat loss     Muscle Mass Loss:  No significant muscle mass loss    Fluid Accumulation:  No significant fluid accumulation     Strength:  Not Performed    Nutrition Assessment:    Inadequate oral intakes r/t altered GI aeb PO 26-50% of meals. Pt recently d/c'd (2/15/24). Hx CVA and needs assisstance with meals. K+ 2.9, calcium 8.4-albumin 4.0, no correction, glucose 176, A1c 5.7-good glycemic control. Pt admitted with septicemia, recommend probiotic due to ATB therapy. Pt encouraged to eat yougurt daily due to ATB. Add 4 oz Glucerna TID with meals, modify diet to CC 3 carbs per mealto improve glycemic control.    Nutrition Related Findings:    assist with feeding meals Wound Type: None       Current Nutrition Intake & Therapies:    Average Meal Intake: 26-50%  Average Supplements Intake: None Ordered  ADULT DIET; Regular    Anthropometric Measures:  Height: 147.3 cm (4' 10\")  Ideal Body Weight (IBW): 90 lbs (41 kg)    Admission Body Weight: 47.6 kg (104 lb 15 oz)  Current Body Weight: 47.6 kg (104 lb 15 oz), 116.6 % IBW. Weight Source: Bed Scale  Current BMI (kg/m2): 21.9  Usual Body Weight: 47.6 kg (105 lb)  % Weight Change (Calculated): -0.1  Weight Adjustment For: No Adjustment                 BMI Categories: Underweight (BMI less than 22) age over 65    Estimated Daily Nutrient Needs:  Energy Requirements Based On: Kcal/kg  Weight Used for Energy Requirements: Current  Energy (kcal/day): 1111-0497

## 2024-02-19 NOTE — PLAN OF CARE
Problem: Safety - Adult  Goal: Free from fall injury  Outcome: Progressing  Flowsheets (Taken 2/18/2024 1943)  Free From Fall Injury: Instruct family/caregiver on patient safety     Problem: Discharge Planning  Goal: Discharge to home or other facility with appropriate resources  Outcome: Progressing  Flowsheets (Taken 2/18/2024 1943)  Discharge to home or other facility with appropriate resources:   Identify barriers to discharge with patient and caregiver   Identify discharge learning needs (meds, wound care, etc)     Problem: Pain  Goal: Verbalizes/displays adequate comfort level or baseline comfort level  Outcome: Progressing  Flowsheets (Taken 2/18/2024 1943)  Verbalizes/displays adequate comfort level or baseline comfort level:   Encourage patient to monitor pain and request assistance   Administer analgesics based on type and severity of pain and evaluate response   Assess pain using appropriate pain scale   Implement non-pharmacological measures as appropriate and evaluate response     Problem: Skin/Tissue Integrity  Goal: Absence of new skin breakdown  Description: 1.  Monitor for areas of redness and/or skin breakdown  2.  Assess vascular access sites hourly  3.  Every 4-6 hours minimum:  Change oxygen saturation probe site  4.  Every 4-6 hours:  If on nasal continuous positive airway pressure, respiratory therapy assess nares and determine need for appliance change or resting period.  Outcome: Progressing  Note: Bryant scale monitoring per protocol. Inspect skin for breakdown frequently. Encourage pt to make frequent large adjustments in position or assist patient with turning. Document all areas of breakdown.

## 2024-02-19 NOTE — PROGRESS NOTES
Occupational Therapy  Facility/Department: [facility] MED SURG  Daily Treatment Note  NAME: Macey Zazueta  : 1950  MRN: [id number]    Date of Service: 2024    Discharge Recommendations:  Continue to assess pending progress         Patient Diagnosis(es): The encounter diagnosis was Septicemia (HCC).     Assessment    Activity Tolerance: Patient limited by fatigue  Discharge Recommendations: Continue to assess pending progress      Plan   Occupational Therapy Plan  Times Per Day: Once a day  Days Per Week: 7 Days  Current Treatment Recommendations: Strengthening;Balance training;Functional mobility training;Endurance training;Safety education & training;Patient/Caregiver education & training;Pain management;Self-Care / ADL     Restrictions  Restrictions/Precautions  Restrictions/Precautions: General Precautions;Fall Risk    Subjective   Subjective  Subjective: Pt sitting up in bedside chair upon arrival. Pt agreed to participate in therapy session.  Pain: Pt had no complaints of pain.          Objective    Vitals            OT Exercises  Exercise Treatment: Pt completed AAROM x 6 planes x 10 reps x 1 set to increase UE strength and endurance in order to ease completion of ADL tasks. Pt required RBs as needed secondary to fatigue.     Safety Devices  Type of Devices: All fall risk precautions in place;Patient at risk for falls;Nurse notified;Call light within reach;Left in chair;Chair alarm in place     Patient Education  Education Given To: Patient  Education Provided: Role of Therapy;Plan of Care  Education Method: Verbal  Barriers to Learning: None  Education Outcome: Verbalized understanding    Goals  Short Term Goals  Time Frame for Short Term Goals: 21 visits  Short Term Goal 1: Patient to safely sit EOB x 10 minutes without LOB during functional task of choice to improve sitting baalnce and safety during bedside ADL.  Short Term Goal 2: Patient to complete UB self care tasks c min A to improve ADL

## 2024-02-19 NOTE — PROGRESS NOTES
Writer at bedside to complete evening assessment. Upon entry to room, pt awake and in bed, respirations normal and unlabored while on room air. Vitals obtained and assessment completed, see flow sheet for details. Pt denies needs from writer at this time. Call light in reach. Care is ongoing.

## 2024-02-20 VITALS
SYSTOLIC BLOOD PRESSURE: 162 MMHG | TEMPERATURE: 99.1 F | HEART RATE: 94 BPM | HEIGHT: 58 IN | BODY MASS INDEX: 22.75 KG/M2 | DIASTOLIC BLOOD PRESSURE: 86 MMHG | OXYGEN SATURATION: 95 % | WEIGHT: 108.4 LBS | RESPIRATION RATE: 20 BRPM

## 2024-02-20 LAB
ANION GAP SERPL CALCULATED.3IONS-SCNC: 10 MMOL/L (ref 9–17)
BASOPHILS # BLD: 0.03 K/UL (ref 0–0.2)
BASOPHILS NFR BLD: 0 % (ref 0–2)
BUN SERPL-MCNC: 6 MG/DL (ref 8–23)
BUN/CREAT SERPL: 15 (ref 9–20)
CALCIUM SERPL-MCNC: 8.2 MG/DL (ref 8.6–10.4)
CHLORIDE SERPL-SCNC: 108 MMOL/L (ref 98–107)
CO2 SERPL-SCNC: 21 MMOL/L (ref 20–31)
CREAT SERPL-MCNC: 0.4 MG/DL (ref 0.5–0.9)
EOSINOPHIL # BLD: 0.3 K/UL (ref 0–0.44)
EOSINOPHILS RELATIVE PERCENT: 2 % (ref 1–4)
ERYTHROCYTE [DISTWIDTH] IN BLOOD BY AUTOMATED COUNT: 16.4 % (ref 11.8–14.4)
GFR SERPL CREATININE-BSD FRML MDRD: >60 ML/MIN/1.73M2
GLUCOSE SERPL-MCNC: 207 MG/DL (ref 70–99)
HCT VFR BLD AUTO: 30 % (ref 36.3–47.1)
HGB BLD-MCNC: 9.8 G/DL (ref 11.9–15.1)
IMM GRANULOCYTES # BLD AUTO: 0.14 K/UL (ref 0–0.3)
IMM GRANULOCYTES NFR BLD: 1 %
LYMPHOCYTES NFR BLD: 1.15 K/UL (ref 1.1–3.7)
LYMPHOCYTES RELATIVE PERCENT: 8 % (ref 24–43)
MAGNESIUM SERPL-MCNC: 2.1 MG/DL (ref 1.6–2.6)
MCH RBC QN AUTO: 27.1 PG (ref 25.2–33.5)
MCHC RBC AUTO-ENTMCNC: 32.7 G/DL (ref 28.4–34.8)
MCV RBC AUTO: 82.9 FL (ref 82.6–102.9)
MONOCYTES NFR BLD: 0.85 K/UL (ref 0.1–1.2)
MONOCYTES NFR BLD: 6 % (ref 3–12)
NEUTROPHILS NFR BLD: 83 % (ref 36–65)
NEUTS SEG NFR BLD: 11.89 K/UL (ref 1.5–8.1)
NRBC BLD-RTO: 0 PER 100 WBC
PLATELET # BLD AUTO: 317 K/UL (ref 138–453)
PMV BLD AUTO: 9.9 FL (ref 8.1–13.5)
POTASSIUM SERPL-SCNC: 3.3 MMOL/L (ref 3.7–5.3)
RBC # BLD AUTO: 3.62 M/UL (ref 3.95–5.11)
SODIUM SERPL-SCNC: 139 MMOL/L (ref 135–144)
WBC OTHER # BLD: 14.4 K/UL (ref 3.5–11.3)

## 2024-02-20 PROCEDURE — 85025 COMPLETE CBC W/AUTO DIFF WBC: CPT

## 2024-02-20 PROCEDURE — 6370000000 HC RX 637 (ALT 250 FOR IP): Performed by: INTERNAL MEDICINE

## 2024-02-20 PROCEDURE — 94761 N-INVAS EAR/PLS OXIMETRY MLT: CPT

## 2024-02-20 PROCEDURE — 97110 THERAPEUTIC EXERCISES: CPT

## 2024-02-20 PROCEDURE — 97116 GAIT TRAINING THERAPY: CPT

## 2024-02-20 PROCEDURE — 6370000000 HC RX 637 (ALT 250 FOR IP): Performed by: NURSE PRACTITIONER

## 2024-02-20 PROCEDURE — 80048 BASIC METABOLIC PNL TOTAL CA: CPT

## 2024-02-20 PROCEDURE — 2580000003 HC RX 258: Performed by: STUDENT IN AN ORGANIZED HEALTH CARE EDUCATION/TRAINING PROGRAM

## 2024-02-20 PROCEDURE — 6360000002 HC RX W HCPCS: Performed by: STUDENT IN AN ORGANIZED HEALTH CARE EDUCATION/TRAINING PROGRAM

## 2024-02-20 PROCEDURE — 36415 COLL VENOUS BLD VENIPUNCTURE: CPT

## 2024-02-20 PROCEDURE — 6370000000 HC RX 637 (ALT 250 FOR IP): Performed by: STUDENT IN AN ORGANIZED HEALTH CARE EDUCATION/TRAINING PROGRAM

## 2024-02-20 PROCEDURE — 83735 ASSAY OF MAGNESIUM: CPT

## 2024-02-20 PROCEDURE — 97535 SELF CARE MNGMENT TRAINING: CPT

## 2024-02-20 RX ORDER — AMLODIPINE BESYLATE 5 MG/1
5 TABLET ORAL DAILY
Qty: 30 TABLET | Refills: 3 | Status: SHIPPED | OUTPATIENT
Start: 2024-02-21

## 2024-02-20 RX ORDER — ONDANSETRON 4 MG/1
4 TABLET, ORALLY DISINTEGRATING ORAL 3 TIMES DAILY PRN
Qty: 21 TABLET | Refills: 0 | Status: SHIPPED | OUTPATIENT
Start: 2024-02-20

## 2024-02-20 RX ORDER — POTASSIUM CHLORIDE 20 MEQ/1
20 TABLET, EXTENDED RELEASE ORAL
Status: DISCONTINUED | OUTPATIENT
Start: 2024-02-20 | End: 2024-02-20 | Stop reason: HOSPADM

## 2024-02-20 RX ORDER — POTASSIUM CHLORIDE 20 MEQ/1
20 TABLET, EXTENDED RELEASE ORAL
Qty: 7 TABLET | Refills: 0 | Status: SHIPPED | OUTPATIENT
Start: 2024-02-21 | End: 2024-02-28

## 2024-02-20 RX ORDER — AMLODIPINE BESYLATE 2.5 MG/1
2.5 TABLET ORAL ONCE
Status: COMPLETED | OUTPATIENT
Start: 2024-02-20 | End: 2024-02-20

## 2024-02-20 RX ORDER — POTASSIUM CHLORIDE 20 MEQ/1
40 TABLET, EXTENDED RELEASE ORAL ONCE
Status: COMPLETED | OUTPATIENT
Start: 2024-02-20 | End: 2024-02-20

## 2024-02-20 RX ORDER — AMLODIPINE BESYLATE 5 MG/1
5 TABLET ORAL DAILY
Status: DISCONTINUED | OUTPATIENT
Start: 2024-02-21 | End: 2024-02-20 | Stop reason: HOSPADM

## 2024-02-20 RX ORDER — AMOXICILLIN AND CLAVULANATE POTASSIUM 875; 125 MG/1; MG/1
1 TABLET, FILM COATED ORAL ONCE
Status: COMPLETED | OUTPATIENT
Start: 2024-02-20 | End: 2024-02-20

## 2024-02-20 RX ADMIN — AMOXICILLIN AND CLAVULANATE POTASSIUM 1 TABLET: 875; 125 TABLET, FILM COATED ORAL at 08:53

## 2024-02-20 RX ADMIN — ASPIRIN 81 MG: 81 TABLET, COATED ORAL at 08:53

## 2024-02-20 RX ADMIN — ENOXAPARIN SODIUM 30 MG: 100 INJECTION SUBCUTANEOUS at 08:53

## 2024-02-20 RX ADMIN — AMLODIPINE BESYLATE 2.5 MG: 2.5 TABLET ORAL at 08:53

## 2024-02-20 RX ADMIN — METOPROLOL TARTRATE 25 MG: 25 TABLET, FILM COATED ORAL at 08:53

## 2024-02-20 RX ADMIN — ATORVASTATIN CALCIUM 80 MG: 40 TABLET, FILM COATED ORAL at 08:53

## 2024-02-20 RX ADMIN — POTASSIUM CHLORIDE 20 MEQ: 1500 TABLET, EXTENDED RELEASE ORAL at 08:56

## 2024-02-20 RX ADMIN — SODIUM CHLORIDE: 9 INJECTION, SOLUTION INTRAVENOUS at 01:32

## 2024-02-20 RX ADMIN — POTASSIUM CHLORIDE 40 MEQ: 1500 TABLET, EXTENDED RELEASE ORAL at 12:20

## 2024-02-20 RX ADMIN — SODIUM CHLORIDE, PRESERVATIVE FREE 10 ML: 5 INJECTION INTRAVENOUS at 08:54

## 2024-02-20 RX ADMIN — AMLODIPINE BESYLATE 2.5 MG: 2.5 TABLET ORAL at 10:07

## 2024-02-20 NOTE — PROGRESS NOTES
Writer at bedside for shift assessment. Patient sitting up in chair, respirations are even and unlabored while on room air. Vitals obtained and assessment completed, see flowsheet for details. Patient had fever of 101, writer gave tylenol. pt denies further needs at this time. Call light in reach, bed alarm on.

## 2024-02-20 NOTE — PROGRESS NOTES
Physical Therapy  Facility/Department: [facility] MED SURG  Daily Treatment Note  NAME: Macey Zazueta  : 1950  MRN: [id number]    Date of Service: 2024    Discharge Recommendations:  Continue to assess pending progress, 24 hour supervision or assist, Home with Home health PT     Patient Diagnosis(es): The encounter diagnosis was Septicemia (HCC).    Assessment   Assessment: Patient in chair upon arrival, denied pain, agreeable to therapy at this time. Patient completed seated AAROM/PROM therex BLE x20 in all available planes. pt required constant verbal cues for proper technique with fair carryover  Activity Tolerance: Patient limited by fatigue     Plan    Physical Therapy Plan  General Plan: 2 times a day 7 days a week  Current Treatment Recommendations: Strengthening;ROM;Balance training;Functional mobility training;Transfer training;Neuromuscular re-education;Gait training;Home exercise program;Positioning;Therapeutic activities;Patient/Caregiver education & training;Endurance training     Restrictions  Restrictions/Precautions  Restrictions/Precautions: General Precautions, Fall Risk     Subjective    Subjective  Subjective: pt in chair upon arrival,  present, agreeable to therapy at this time  Pain: denied pain  Orientation  Overall Orientation Status: Within Functional Limits     Objective   Bed Mobility Training  Bed Mobility Training: No  Transfer Training  Transfer Training: No  Gait Training  Gait Training: No  PT Exercises  Exercise Treatment: AAROM/PROM therex BLE x20 in all available planes. pt required constant verbal cues for proper technique with fair carryover  Safety Devices  Type of Devices: All fall risk precautions in place;Patient at risk for falls;Nurse notified;Call light within reach;Left in chair;Chair alarm in place       Goals  Short Term Goals  Time Frame for Short Term Goals: 20 days  Short Term Goal 1: Pt will roll bilaterally and hold position at mod A x1 in order to

## 2024-02-20 NOTE — PROGRESS NOTES
Occupational Therapy  Facility/Department: [facility] MED SURG  Daily Treatment Note  NAME: Macey Zazueta  : 1950  MRN: [id number]    Date of Service: 2024    Discharge Recommendations:  Continue to assess pending progress         Patient Diagnosis(es): The encounter diagnosis was Septicemia (HCC).     Assessment    Activity Tolerance: Patient tolerated treatment well  Discharge Recommendations: Continue to assess pending progress      Plan   Occupational Therapy Plan  Times Per Day: Once a day  Days Per Week: 7 Days  Current Treatment Recommendations: Strengthening;Balance training;Functional mobility training;Endurance training;Safety education & training;Patient/Caregiver education & training;Pain management;Self-Care / ADL     Restrictions  Restrictions/Precautions  Restrictions/Precautions: General Precautions;Fall Risk    Subjective   Subjective  Subjective: Pt lying in bed upon arrival. Pt agreed to participate in therapy session.  Pain: Pt had no complaints of pain.          Objective    Vitals          ADL  Toileting: Maximum assistance  Toileting Skilled Clinical Factors: Max A to complete brief change and shirley care in bed.  Additional Comments: Mod A x 2 to complete supine to sit EOB. Mod A x 2 to complete stand pivot transfer from bed to chair.  Skin Care: Bath wipes  OT Exercises  Exercise Treatment: Pt completed AAROM x 5 planes x 10 reps x 1 set to increase UE strength and endurance in order to ease completion of ADL tasks. Pt required RBs as needed secondary to fatigue.     Safety Devices  Type of Devices: All fall risk precautions in place;Call light within reach;Chair alarm in place;Left in chair;Nurse notified     Patient Education  Education Given To: Patient  Education Provided: Role of Therapy;Plan of Care  Education Method: Verbal  Barriers to Learning: None  Education Outcome: Verbalized understanding    Goals  Short Term Goals  Time Frame for Short Term Goals: 21 visits  Short Term

## 2024-02-20 NOTE — DISCHARGE INSTR - DIET
Good nutrition is important when healing from an illness, injury, or surgery.  Follow any nutrition recommendations given to you during your hospital stay.   If you were given an oral nutrition supplement while in the hospital, continue to take this supplement at home.  You can take it with meals, in-between meals, and/or before bedtime. These supplements can be purchased at most local grocery stores, pharmacies, and chain [employer]-stores.   If you have any questions about your diet or nutrition, call the hospital and ask for the dietitian.  Diabetic, carb control  Encourage to increase fluid intakes

## 2024-02-20 NOTE — PROGRESS NOTES
Reviewed discharge instructions with patient and patient's spouse.  Aware of need to  prescriptions.  Reviewed new medications and side effects to monitor for.  Aware of date/time of follow up appointments and need for repeat labs for 2/26/24.  Order forms given to patient.  Instructed to follow a diabetic diet and to increase fluid intakes.  Educational handout given on UTI.  Questions answered.  Verbalizes understanding.  Copy of discharge instructions given to patient.

## 2024-02-20 NOTE — PLAN OF CARE
Problem: Safety - Adult  Goal: Free from fall injury  2/19/2024 2200 by Elaine Bass RN  Outcome: Progressing  Flowsheets (Taken 2/19/2024 2200)  Free From Fall Injury: Instruct family/caregiver on patient safety  2/19/2024 0954 by Luz Andres  Outcome: Progressing     Problem: Discharge Planning  Goal: Discharge to home or other facility with appropriate resources  2/19/2024 2200 by Elaine Bass RN  Outcome: Progressing  Flowsheets (Taken 2/19/2024 2200)  Discharge to home or other facility with appropriate resources:   Arrange for needed discharge resources and transportation as appropriate   Arrange for interpreters to assist at discharge as needed   Identify barriers to discharge with patient and caregiver   Identify discharge learning needs (meds, wound care, etc)   Refer to discharge planning if patient needs post-hospital services based on physician order or complex needs related to functional status, cognitive ability or social support system  2/19/2024 0954 by Luz Andres  Outcome: Progressing     Problem: Pain  Goal: Verbalizes/displays adequate comfort level or baseline comfort level  2/19/2024 2200 by Elaine Bass RN  Outcome: Progressing  Flowsheets (Taken 2/18/2024 1943 by Re Long, RN)  Verbalizes/displays adequate comfort level or baseline comfort level:   Encourage patient to monitor pain and request assistance   Administer analgesics based on type and severity of pain and evaluate response   Assess pain using appropriate pain scale   Implement non-pharmacological measures as appropriate and evaluate response  2/19/2024 0954 by Luz Andres  Outcome: Progressing     Problem: Skin/Tissue Integrity  Goal: Absence of new skin breakdown  Description: 1.  Monitor for areas of redness and/or skin breakdown  2.  Assess vascular access sites hourly  3.  Every 4-6 hours minimum:  Change oxygen saturation probe site  4.  Every 4-6 hours:  If on nasal continuous positive airway

## 2024-02-20 NOTE — PLAN OF CARE
Problem: Safety - Adult  Goal: Free from fall injury  2/20/2024 1118 by Gloria Arana RN  Outcome: Adequate for Discharge  2/19/2024 2200 by Elaine Bass RN  Outcome: Progressing  Flowsheets (Taken 2/19/2024 2200)  Free From Fall Injury: Instruct family/caregiver on patient safety     Problem: Discharge Planning  Goal: Discharge to home or other facility with appropriate resources  2/20/2024 1118 by Gloria Arana RN  Outcome: Adequate for Discharge  2/19/2024 2200 by Elaine Bass RN  Outcome: Progressing  Flowsheets (Taken 2/19/2024 2200)  Discharge to home or other facility with appropriate resources:   Arrange for needed discharge resources and transportation as appropriate   Arrange for interpreters to assist at discharge as needed   Identify barriers to discharge with patient and caregiver   Identify discharge learning needs (meds, wound care, etc)   Refer to discharge planning if patient needs post-hospital services based on physician order or complex needs related to functional status, cognitive ability or social support system     Problem: Pain  Goal: Verbalizes/displays adequate comfort level or baseline comfort level  2/20/2024 1118 by Gloria Arana RN  Outcome: Adequate for Discharge  2/19/2024 2200 by Elaine Bass RN  Outcome: Progressing  Flowsheets (Taken 2/18/2024 1943 by Re Long RN)  Verbalizes/displays adequate comfort level or baseline comfort level:   Encourage patient to monitor pain and request assistance   Administer analgesics based on type and severity of pain and evaluate response   Assess pain using appropriate pain scale   Implement non-pharmacological measures as appropriate and evaluate response     Problem: Skin/Tissue Integrity  Goal: Absence of new skin breakdown  Description: 1.  Monitor for areas of redness and/or skin breakdown  2.  Assess vascular access sites hourly  3.  Every 4-6 hours minimum:  Change oxygen saturation probe site  4.  Every 4-6 hours:

## 2024-02-20 NOTE — DISCHARGE SUMMARY
Discharge Summary    Macey Zazueta  :  1950  MRN:  [id number]    Admit date:  2024      Discharge date: 2024     Admitting Physician:  Will Dawson MD    Discharge Diagnoses:    Principal Problem:    UTI (urinary tract infection)  Active Problems:    Complicated UTI (urinary tract infection)  Resolved Problems:    * No resolved hospital problems. *      Hospital Course:   Macey Zazueta is a 74 y.o. female admitted with UTI.  She returned to the emergency room following a discharge home for UTI and sepsis.  Patient was discharged on 2/15/2024 and returned on 2024.  Patient felt good at the time of discharge but over 24 hours of being at home patient began to feel worse.   also reported that the patient \"looked great when they got her home on Thursday\".  Over 24-hour period patient began to feel worse.  Patient complained of fatigue and unable to get up.  Around 11:30 AM patient was pretty much unresponsive similar to presentation of prior admission.  Main complaints were fatigue and weakness.  She denied fever or chills.  During patient's evaluation her initial WBC count was elevated at 19.6 and had been 10.6 on 2/15.  She denied abdominal pain nausea vomiting diarrhea.  She denied dysuria however does have history of previous stroke and incontinence.  She does have known left ureteral calculi as/P left ureteral stent placement by Dr. Lees the week before.  During patient's workup lactic acid was 2.3 at 2.0.  Review of previous urine culture was Aerococcus urinary.  Patient was flu and COVID-negative.  Blood cultures were negative.  Patient was treated with IV Rocephin during her hospitalization and Cipro was stopped.  Electrolytes replaced however does continue to have hypokalemia with potassium of 3.3.  Patient was placed on Klor-Con 20 mEq daily and will continue on discharge.  I will give her 7 doses and have her recheck a BMP on .  Patient will also  500 MG extended release tablet  Commonly known as: GLUCOPHAGE-XR  Take 2 tablets by mouth daily (with breakfast)     SOFT TOUCH LANCETS Misc  1 Units by Does not apply route 3 times daily (with meals)     therapeutic multivitamin-minerals tablet     UNABLE TO FIND  Manual wheelchair    Diagnosis: Stroke            STOP taking these medications      ciprofloxacin 500 MG tablet  Commonly known as: CIPRO               Where to Get Your Medications        These medications were sent to [facility] [id number] [facility] [address] [phone] - F [phone]  [address] [facility] [address]      Phone: [phone]   amLODIPine 5 MG tablet  amoxicillin-clavulanate 875-125 MG per tablet  metoprolol tartrate 25 MG tablet  ondansetron 4 MG disintegrating tablet  potassium chloride 20 MEQ extended release tablet         Patient Instructions:   Activity: activity as tolerated  Diet: encourage fluids  Wound Care: none needed  Other: None    Disposition:   Discharge to Home    Follow up:  Patient will be followed by Byron Castaneda MD in 1-2 weeks    CORE MEASURES on Discharge (if applicable)  ACE/ARB in CHF: NA  Statin in MI: NA  ASA in MI: NA  Statin in CVA: NA  Antiplatelet in CVA: NA    Total time spent on discharge services: 40 minutes    Including the following activities:  Evaluation and Management of patient  Discussion with patient and/or surrogate about current care plan  Coordination with Case Management and/or   Coordination of care with Consultants (if applicable)   Coordination of care with Receiving Facility Physician (if applicable)  Completion of DME forms (if applicable)  Preparation of Discharge Summary  Preparation of Medication Reconciliation  Preparation of Discharge Prescriptions    Signed:  LAURA Cortez - CNP, LAURA, NP-C  2/20/2024, 9:19 AM

## 2024-02-20 NOTE — PROGRESS NOTES
Physical Therapy  Facility/Department: [facility] MED SURG  Daily Treatment Note  NAME: Macey Zazueta  : 1950  MRN: [id number]    Date of Service: 2024    Discharge Recommendations:  Continue to assess pending progress, 24 hour supervision or assist, Home with Home health PT     Patient Diagnosis(es): The encounter diagnosis was Septicemia (HCC).    Assessment   Assessment: Pt. able to ambulate 3ftx1 with HHA x2 for safety from bed to chair. Bed mobility/transfers:Min/Mod A 1-2. Reclined and seated exercises B LE x20 AAROM. Required Mod/Max a for seated balance.  Activity Tolerance: Patient tolerated treatment well;Patient limited by fatigue;Patient limited by endurance     Plan    Physical Therapy Plan  General Plan: 2 times a day 7 days a week  Current Treatment Recommendations: Strengthening;ROM;Balance training;Functional mobility training;Transfer training;Neuromuscular re-education;Gait training;Home exercise program;Positioning;Therapeutic activities;Patient/Caregiver education & training;Endurance training     Restrictions  Restrictions/Precautions  Restrictions/Precautions: General Precautions, Fall Risk     Subjective    Subjective  Subjective: Pt. in bed upon arrival, agreeable to therapy at this time.  Pain: denied pain  Orientation  Overall Orientation Status: Within Functional Limits     Objective   Bed Mobility Training  Bed Mobility Training: Yes  Overall Level of Assistance: Moderate assistance;Assist X2  Interventions: Verbal cues  Rolling: Moderate assistance;Assist X2  Supine to Sit: Moderate assistance;Assist X2  Scooting: Moderate assistance;Assist X2  Transfer Training  Transfer Training: Yes  Overall Level of Assistance: Minimum assistance;Moderate assistance;Assist X2  Interventions: Verbal cues  Sit to Stand: Minimum assistance;Moderate assistance;Assist X2  Stand to Sit: Minimum assistance;Moderate assistance;Assist X2  Bed to Chair: Minimum assistance;Moderate assistance;Assist

## 2024-02-21 ENCOUNTER — CARE COORDINATION (OUTPATIENT)
Dept: CASE MANAGEMENT | Age: 74
End: 2024-02-21

## 2024-02-21 LAB
MICROORGANISM SPEC CULT: NORMAL
MICROORGANISM SPEC CULT: NORMAL
SERVICE CMNT-IMP: NORMAL
SERVICE CMNT-IMP: NORMAL
SPECIMEN DESCRIPTION: NORMAL
SPECIMEN DESCRIPTION: NORMAL

## 2024-02-21 NOTE — CARE COORDINATION
Care Transitions Initial Follow Up Call    Call within 2 business days of discharge: Yes    Patient Current Location:  Home: [address] [place] [id number]    Care Transition Nurse contacted the spouse/partner by telephone to perform post hospital discharge assessment. Verified name and  with spouse/partner as identifiers. Provided introduction to self, and explanation of the Care Transition Nurse role.     Patient: Macey Zazueta Patient : 1950   MRN: [id number]  Reason for Admission: Sepsis, UTI  Discharge Date: 24 RARS: Readmission Risk Score: 19.7      Last Discharge Facility       Date Complaint Diagnosis Description Type Department Provider    24 Fatigue Septicemia (HCC) ED to Hosp-Admission (Discharged) (ADMITTED) [facility] Will Dawson MD; Tonya, ...            Was this an external facility discharge? No Discharge Facility: [place]    Challenges to be reviewed by the provider   Additional needs identified to be addressed with provider: No  none               Method of communication with provider: none.    Spoke to patient's spouse Yan. Patient was a readmit to [facility] on  for increased weakness and fatigue r/t UTI/sepsis. Pt has urinary stent in place, has post op appt with Urology on . Will follow up with PCP tomorrow.     Stated pt is currently sleeping, slept well overnight. Pt is taking Augmentin as prescribed along with new medications Lopressor and Kcl+. Able to eat and drink without increased nausea, vomiting, diarrhea, constipation. Pt has Zofran as needed.     Stated pt is urinating without pain or spasms, has small amount of blood in urine, knows to expect that with stent in place. Patient will complete labs on  prior to Urology appt.    Patient has RPM equipment from insurance company, will check vitals when she wakes up. Has not checked sugars since discharge, will check this morning before breakfast. Agreeable to transitions calls.     Care

## 2024-02-26 ENCOUNTER — OFFICE VISIT (OUTPATIENT)
Dept: UROLOGY | Age: 74
End: 2024-02-26
Payer: COMMERCIAL

## 2024-02-26 VITALS — TEMPERATURE: 98 F | DIASTOLIC BLOOD PRESSURE: 66 MMHG | SYSTOLIC BLOOD PRESSURE: 109 MMHG

## 2024-02-26 DIAGNOSIS — N20.0 RENAL STONE: Primary | ICD-10-CM

## 2024-02-26 PROCEDURE — 1036F TOBACCO NON-USER: CPT | Performed by: PHYSICIAN ASSISTANT

## 2024-02-26 PROCEDURE — G8427 DOCREV CUR MEDS BY ELIG CLIN: HCPCS | Performed by: PHYSICIAN ASSISTANT

## 2024-02-26 PROCEDURE — 1090F PRES/ABSN URINE INCON ASSESS: CPT | Performed by: PHYSICIAN ASSISTANT

## 2024-02-26 PROCEDURE — 3078F DIAST BP <80 MM HG: CPT | Performed by: PHYSICIAN ASSISTANT

## 2024-02-26 PROCEDURE — G8484 FLU IMMUNIZE NO ADMIN: HCPCS | Performed by: PHYSICIAN ASSISTANT

## 2024-02-26 PROCEDURE — 1111F DSCHRG MED/CURRENT MED MERGE: CPT | Performed by: PHYSICIAN ASSISTANT

## 2024-02-26 PROCEDURE — 1123F ACP DISCUSS/DSCN MKR DOCD: CPT | Performed by: PHYSICIAN ASSISTANT

## 2024-02-26 PROCEDURE — G8420 CALC BMI NORM PARAMETERS: HCPCS | Performed by: PHYSICIAN ASSISTANT

## 2024-02-26 PROCEDURE — G8400 PT W/DXA NO RESULTS DOC: HCPCS | Performed by: PHYSICIAN ASSISTANT

## 2024-02-26 PROCEDURE — 3074F SYST BP LT 130 MM HG: CPT | Performed by: PHYSICIAN ASSISTANT

## 2024-02-26 PROCEDURE — 99214 OFFICE O/P EST MOD 30 MIN: CPT | Performed by: PHYSICIAN ASSISTANT

## 2024-02-26 PROCEDURE — 3017F COLORECTAL CA SCREEN DOC REV: CPT | Performed by: PHYSICIAN ASSISTANT

## 2024-02-26 ASSESSMENT — ENCOUNTER SYMPTOMS
SHORTNESS OF BREATH: 0
NAUSEA: 0
ABDOMINAL PAIN: 0
CONSTIPATION: 0
VOMITING: 0
BACK PAIN: 0
COUGH: 0
WHEEZING: 0
APNEA: 0
EYE REDNESS: 0
COLOR CHANGE: 0

## 2024-02-26 NOTE — PROGRESS NOTES
HPI:    Patient is a 74 y.o. female in no acute distress.  She is alert and oriented to person, place, and time.      2/2024 The patient is a 74 y.o. female who presents with left hydronephrosis.  Patient presented to the emergency department with altered mental status and left-sided weakness.  Patient does have a history of a stroke on the right side.  Patient did have some nausea and vomiting at home.  But not in the hospital.  Patient had a CT scan performed.  This film was independently reviewed today.  This does show a large stone in the left renal pelvis.  There is some mild hydronephrosis of this kidney.  Patient does also have a large fibroid filled uterus that is causing lateral displacement of the bladder.  This is on the patient's left as well.  Patient did have a low white blood cell count of 20,000 upon admission.  It has improved slightly to 19,000.  Patient was afebrile.  Patient has had a Tmax of 101.3 today.  Patient has never had stones in the past     2/14/2024 - left ureteral stent placement    Today:  Patient is here today accompanied by her  who is her POA and caregiver.  Patient underwent a left ureteral stent placement for a septic renal pelvis stone.  Patient is finishing up her antibiotics.  She is feeling better.  She is nonverbal.  They deny fever and chills.    Past Medical History:   Diagnosis Date    COVID-19 virus infection 2021    Essential hypertension     Left pontine cerebrovascular accident (HCC) 2017    Mixed hyperlipidemia     Periorbital cellulitis /  right side /  resolved with Omnicef 2022 12/13/2022    Type 2 diabetes mellitus without complication, without long-term current use of insulin (HCC)      Past Surgical History:   Procedure Laterality Date    CATARACT REMOVAL Right 2020    CYSTOSCOPY Left 2/14/2024    CYSTOSCOPY URETERAL STENT INSERTION performed by Kieran Lees MD at [facility] OR     Outpatient Encounter Medications as of 2/26/2024   Medication Sig

## 2024-02-27 NOTE — PROGRESS NOTES
Patient's  Yan instructed on the pre-operative, intra-operative, and post-operative process. Patient's  instructed on pt's NPO status. Medication instructions and pre operative instruction sheet reviewed over the phone with the . Instructed Yan to have the pt stop taking OTC vitamins 7 days prior to surgery and to take metoprolol and amlodipine with a small sip of water prior to arriving to the hospital the day of surgery. Yan states he was not given instructions on stopping aspirin prior to surgery. PAT RN placed patient on hold to call and verify with Dr. Lees's office. Spoke to Joan but is unsure if and how long the patient needs to hold aspirin. Joan states she will obtain aspirin instructions and call the pt's  once received. Informed Yan that Joan at Dr. Lees's office will obtain pre-op aspirin instructions and then notify him on when to stop aspirin if needed. Yan verbalizes understanding.

## 2024-02-28 ENCOUNTER — CARE COORDINATION (OUTPATIENT)
Dept: CASE MANAGEMENT | Age: 74
End: 2024-02-28

## 2024-02-28 NOTE — CARE COORDINATION
Care Transitions Follow Up Call    Patient Current Location:  Home: [address] [place] [id number]    Care Transition Nurse contacted the spouse/partner by telephone to follow up after admission on 24.  Verified name and  with spouse/partner as identifiers.    Patient: Macey Zazueta  Patient : 1950   MRN: [id number]  Reason for Admission: Septicemia  Discharge Date: 24 RARS: Readmission Risk Score: 19.7      Needs to be reviewed by the provider   Additional needs identified to be addressed with provider: No  none             Method of communication with provider: none.    Spoke to pt's spouse Yan, stated pt is doing OK, completed atb yesterday. Pt went to post op appt on , has EWSL procedure and stent exchange on 3/5/24. Reviewed labs from , improved. Glucose was elevated to 300, stated she has been running higher since hospitalization for sepsis. No needs or concerns at this time.     Addressed changes since last contact:  labs-reviewed  labs, post op and PCP appts  Discussed follow-up appointments.    Follow Up  Future Appointments   Date Time Provider Department Center   4/15/2024  1:00 PM Byron Castaneda MD [facility]   2024 11:30 AM Jose Luis Goldsmith PA-C [place] [facility]   10/21/2024  1:00 PM Byron Castaneda MD [facility]         Care Transition Nurse reviewed discharge instructions with spouse/partner and discussed any barriers to care and/or understanding of plan of care after discharge. Discussed appropriate site of care based on symptoms and resources available to patient including: PCP  Specialist  When to call [id number]  MyChart Messaging. The spouse/partner agrees to contact the PCP office for questions related to their healthcare.     Advance Care Planning:   reviewed and current.     Patients top risk factors for readmission: medical condition-sepsis, hx of stroke  Interventions to address risk factors: Obtained and reviewed

## 2024-02-28 NOTE — PROGRESS NOTES
Physician Progress Note      PATIENT:               CHRISTINA GAYLE  CSN #:                  [id number]  :                       1950  ADMIT DATE:       2024 1:31 PM  DISCH DATE:        2024 12:51 PM  RESPONDING  PROVIDER #:        Will Dawson MD          QUERY TEXT:    Janette Meyer,    Pt admitted with Sepsis & Complicated UTI and Noted left ureteral calculus s/p   left ureteral stent placement last week.? If possible, please document in   progress notes and discharge summary the relationship if any between the UTI   and the Cystoscopy with stent placement:    The medical record reflects the following:  Risk Factors:  known left ureteral calculus s/p left ureteral stent placement   last week  Clinical Indicators: Sepsis, Complicated UTI.  Treatment: IV Rocephin, 0.9 NS 1500 ml bolus, PO Tylenol    Radha GARCIA, RN  Options provided:  -- UTI is due to the recent Cystoscopy with stent placement.  -- UTI is not due to the procedure, but is due to -Please specify, Please   specify other incidental risk factor.  -- Other - I will add my own diagnosis  -- Disagree - Not applicable / Not valid  -- Disagree - Clinically unable to determine / Unknown  -- Refer to Clinical Documentation Reviewer    PROVIDER RESPONSE TEXT:    Patient has UTI that is due to the recent Cystoscopy with stent placement    Query created by: Hermila Blount on 2024 10:23 AM      Electronically signed by:  Will Dawson MD 2024 5:17 PM

## 2024-03-04 ENCOUNTER — ANESTHESIA EVENT (OUTPATIENT)
Dept: OPERATING ROOM | Age: 74
End: 2024-03-04
Payer: COMMERCIAL

## 2024-03-05 ENCOUNTER — ANESTHESIA (OUTPATIENT)
Dept: OPERATING ROOM | Age: 74
End: 2024-03-05
Payer: COMMERCIAL

## 2024-03-05 ENCOUNTER — HOSPITAL ENCOUNTER (OUTPATIENT)
Age: 74
Setting detail: OUTPATIENT SURGERY
Discharge: HOME | End: 2024-03-05
Attending: UROLOGY | Admitting: UROLOGY
Payer: COMMERCIAL

## 2024-03-05 VITALS
BODY MASS INDEX: 24.3 KG/M2 | HEART RATE: 76 BPM | RESPIRATION RATE: 20 BRPM | HEIGHT: 56 IN | WEIGHT: 108 LBS | DIASTOLIC BLOOD PRESSURE: 71 MMHG | SYSTOLIC BLOOD PRESSURE: 135 MMHG | OXYGEN SATURATION: 97 % | TEMPERATURE: 96.9 F

## 2024-03-05 PROCEDURE — 2580000003 HC RX 258: Performed by: UROLOGY

## 2024-03-05 PROCEDURE — 2580000003 HC RX 258

## 2024-03-05 PROCEDURE — 6360000002 HC RX W HCPCS: Performed by: NURSE ANESTHETIST, CERTIFIED REGISTERED

## 2024-03-05 PROCEDURE — 6360000002 HC RX W HCPCS: Performed by: UROLOGY

## 2024-03-05 PROCEDURE — 7100000001 HC PACU RECOVERY - ADDTL 15 MIN: Performed by: UROLOGY

## 2024-03-05 PROCEDURE — 3700000001 HC ADD 15 MINUTES (ANESTHESIA): Performed by: UROLOGY

## 2024-03-05 PROCEDURE — 7100000011 HC PHASE II RECOVERY - ADDTL 15 MIN: Performed by: UROLOGY

## 2024-03-05 PROCEDURE — 7100000000 HC PACU RECOVERY - FIRST 15 MIN: Performed by: UROLOGY

## 2024-03-05 PROCEDURE — 2500000003 HC RX 250 WO HCPCS: Performed by: NURSE ANESTHETIST, CERTIFIED REGISTERED

## 2024-03-05 PROCEDURE — 3700000000 HC ANESTHESIA ATTENDED CARE: Performed by: UROLOGY

## 2024-03-05 PROCEDURE — 2709999900 HC NON-CHARGEABLE SUPPLY: Performed by: UROLOGY

## 2024-03-05 PROCEDURE — 7100000010 HC PHASE II RECOVERY - FIRST 15 MIN: Performed by: UROLOGY

## 2024-03-05 PROCEDURE — 3600000003 HC SURGERY LEVEL 3 BASE: Performed by: UROLOGY

## 2024-03-05 PROCEDURE — 6370000000 HC RX 637 (ALT 250 FOR IP)

## 2024-03-05 PROCEDURE — 3600000013 HC SURGERY LEVEL 3 ADDTL 15MIN: Performed by: UROLOGY

## 2024-03-05 RX ORDER — ACETAMINOPHEN 325 MG/1
650 TABLET ORAL ONCE
Status: COMPLETED | OUTPATIENT
Start: 2024-03-05 | End: 2024-03-05

## 2024-03-05 RX ORDER — METOCLOPRAMIDE HYDROCHLORIDE 5 MG/ML
10 INJECTION INTRAMUSCULAR; INTRAVENOUS
Status: CANCELLED | OUTPATIENT
Start: 2024-03-05 | End: 2024-03-06

## 2024-03-05 RX ORDER — PHENYLEPHRINE HYDROCHLORIDE 10 MG/ML
INJECTION INTRAVENOUS PRN
Status: DISCONTINUED | OUTPATIENT
Start: 2024-03-05 | End: 2024-03-05 | Stop reason: SDUPTHER

## 2024-03-05 RX ORDER — LIDOCAINE HYDROCHLORIDE 20 MG/ML
INJECTION, SOLUTION EPIDURAL; INFILTRATION; INTRACAUDAL; PERINEURAL PRN
Status: DISCONTINUED | OUTPATIENT
Start: 2024-03-05 | End: 2024-03-05 | Stop reason: SDUPTHER

## 2024-03-05 RX ORDER — PROPOFOL 10 MG/ML
INJECTION, EMULSION INTRAVENOUS PRN
Status: DISCONTINUED | OUTPATIENT
Start: 2024-03-05 | End: 2024-03-05 | Stop reason: SDUPTHER

## 2024-03-05 RX ORDER — HYDROCODONE BITARTRATE AND ACETAMINOPHEN 5; 325 MG/1; MG/1
1 TABLET ORAL EVERY 6 HOURS PRN
Status: CANCELLED | OUTPATIENT
Start: 2024-03-05

## 2024-03-05 RX ORDER — ACETAMINOPHEN 325 MG/1
650 TABLET ORAL ONCE
Status: DISCONTINUED | OUTPATIENT
Start: 2024-03-05 | End: 2024-03-05 | Stop reason: HOSPADM

## 2024-03-05 RX ORDER — SODIUM CHLORIDE, SODIUM LACTATE, POTASSIUM CHLORIDE, CALCIUM CHLORIDE 600; 310; 30; 20 MG/100ML; MG/100ML; MG/100ML; MG/100ML
INJECTION, SOLUTION INTRAVENOUS CONTINUOUS
Status: DISCONTINUED | OUTPATIENT
Start: 2024-03-05 | End: 2024-03-05 | Stop reason: HOSPADM

## 2024-03-05 RX ORDER — ONDANSETRON 2 MG/ML
INJECTION INTRAMUSCULAR; INTRAVENOUS PRN
Status: DISCONTINUED | OUTPATIENT
Start: 2024-03-05 | End: 2024-03-05 | Stop reason: SDUPTHER

## 2024-03-05 RX ORDER — SODIUM CHLORIDE 0.9 % (FLUSH) 0.9 %
5-40 SYRINGE (ML) INJECTION EVERY 12 HOURS SCHEDULED
Status: CANCELLED | OUTPATIENT
Start: 2024-03-05

## 2024-03-05 RX ORDER — FENTANYL CITRATE 50 UG/ML
50 INJECTION, SOLUTION INTRAMUSCULAR; INTRAVENOUS EVERY 5 MIN PRN
Status: CANCELLED | OUTPATIENT
Start: 2024-03-05

## 2024-03-05 RX ORDER — SODIUM CHLORIDE 0.9 % (FLUSH) 0.9 %
5-40 SYRINGE (ML) INJECTION PRN
Status: CANCELLED | OUTPATIENT
Start: 2024-03-05

## 2024-03-05 RX ORDER — DIMENHYDRINATE 50 MG
50 TABLET ORAL ONCE
Status: COMPLETED | OUTPATIENT
Start: 2024-03-05 | End: 2024-03-05

## 2024-03-05 RX ORDER — SODIUM CHLORIDE 9 MG/ML
INJECTION, SOLUTION INTRAVENOUS PRN
Status: CANCELLED | OUTPATIENT
Start: 2024-03-05

## 2024-03-05 RX ORDER — DEXAMETHASONE SODIUM PHOSPHATE 4 MG/ML
INJECTION, SOLUTION INTRA-ARTICULAR; INTRALESIONAL; INTRAMUSCULAR; INTRAVENOUS; SOFT TISSUE PRN
Status: DISCONTINUED | OUTPATIENT
Start: 2024-03-05 | End: 2024-03-05 | Stop reason: SDUPTHER

## 2024-03-05 RX ORDER — FENTANYL CITRATE 50 UG/ML
INJECTION, SOLUTION INTRAMUSCULAR; INTRAVENOUS PRN
Status: DISCONTINUED | OUTPATIENT
Start: 2024-03-05 | End: 2024-03-05 | Stop reason: SDUPTHER

## 2024-03-05 RX ADMIN — DIMENHYDRINATE 50 MG: 50 TABLET ORAL at 09:20

## 2024-03-05 RX ADMIN — ACETAMINOPHEN 650 MG: 325 TABLET ORAL at 09:20

## 2024-03-05 RX ADMIN — CEFAZOLIN 1000 MG: 1 INJECTION, POWDER, FOR SOLUTION INTRAMUSCULAR; INTRAVENOUS; PARENTERAL at 11:01

## 2024-03-05 RX ADMIN — PHENYLEPHRINE HYDROCHLORIDE 100 MCG: 10 INJECTION INTRAVENOUS at 11:34

## 2024-03-05 RX ADMIN — ONDANSETRON 4 MG: 2 INJECTION INTRAMUSCULAR; INTRAVENOUS at 11:16

## 2024-03-05 RX ADMIN — LIDOCAINE HYDROCHLORIDE 100 MG: 20 INJECTION, SOLUTION EPIDURAL; INFILTRATION; INTRACAUDAL; PERINEURAL at 11:06

## 2024-03-05 RX ADMIN — PHENYLEPHRINE HYDROCHLORIDE 100 MCG: 10 INJECTION INTRAVENOUS at 11:46

## 2024-03-05 RX ADMIN — PROPOFOL 100 MG: 10 INJECTION, EMULSION INTRAVENOUS at 11:06

## 2024-03-05 RX ADMIN — SODIUM CHLORIDE, POTASSIUM CHLORIDE, SODIUM LACTATE AND CALCIUM CHLORIDE: 600; 310; 30; 20 INJECTION, SOLUTION INTRAVENOUS at 09:29

## 2024-03-05 RX ADMIN — PHENYLEPHRINE HYDROCHLORIDE 200 MCG: 10 INJECTION INTRAVENOUS at 11:24

## 2024-03-05 RX ADMIN — DEXAMETHASONE SODIUM PHOSPHATE 4 MG: 4 INJECTION, SOLUTION INTRAMUSCULAR; INTRAVENOUS at 11:16

## 2024-03-05 RX ADMIN — PHENYLEPHRINE HYDROCHLORIDE 100 MCG: 10 INJECTION INTRAVENOUS at 11:40

## 2024-03-05 RX ADMIN — PHENYLEPHRINE HYDROCHLORIDE 100 MCG: 10 INJECTION INTRAVENOUS at 11:52

## 2024-03-05 RX ADMIN — FENTANYL CITRATE 5 MCG: 50 INJECTION, SOLUTION INTRAMUSCULAR; INTRAVENOUS at 11:06

## 2024-03-05 RX ADMIN — PHENYLEPHRINE HYDROCHLORIDE 200 MCG: 10 INJECTION INTRAVENOUS at 11:22

## 2024-03-05 ASSESSMENT — PAIN - FUNCTIONAL ASSESSMENT: PAIN_FUNCTIONAL_ASSESSMENT: FACE, LEGS, ACTIVITY, CRY, AND CONSOLABILITY (FLACC)

## 2024-03-05 NOTE — DISCHARGE INSTRUCTIONS
SAME DAY SURGERY DISCHARGE INSTRUCTIONS    1.  Do not drive or operate hazardous machinery for 24 hours.    2.  Do not make important personal or business decisions for 24 hours.    3.  Do not drink alcoholic beverages for 24 hours.    4.  Do not smoke tobacco products for 24 hours.    5.  Eat light foods (Jell-O, soups, etc....) and drink plenty of fluids (water, Sprite, etc...) up to 8 glasses per day, as you can tolerate.    6.  Limit your activities for 24 hours.  Do not engage in heavy work until your surgeon gives you permission.      7.  Patient should not be left alone for 12-24 hours following surgical procedure.    8.  Wash hands before and after incision care.  It is important to practice good personal hygiene during the post op period.    9.  Call your surgeon for any questions regarding your surgery.    CYSTOSCOPY DISCHARGE INSTRUCTIONS    Possible burning during urination and/or blood tinged urine.    Drink 6-8 glasses of water for the next day or so.  (This helps to flush the urinary tract.)    Call Dr. Lees ([phone]) if you develop:    Fever over 100 degrees  Prolonged soreness/pain  Unusual bleeding/bruising  Unable to urinate or if urine is bloody  You cannot pass urine 8 hours after the test.  You have pain in your belly or your back just below your rib cage.  (This is called flank pain.)  You have frequent urge to urinate but can pass only small amounts of urine.    Call Dr. Lees office for follow-up appointment ([phone]).

## 2024-03-05 NOTE — OP NOTE
[address] [address]                            OPERATIVE REPORT      PATIENT NAME: CHRISTINA GAYLE                 : 1950  MED REC NO: [id number]                          ROOM: [facility]  ACCOUNT NO: [id number]                       ADMIT DATE: 2024  PROVIDER: Kieran Lees MD      DATE OF PROCEDURE:  2024    SURGEON:  Kieran Lees MD    ASSISTANT:  None.    PREOPERATIVE DIAGNOSIS:  Left ureteral calculus.    POSTOPERATIVE DIAGNOSIS:  Left ureteral calculus.    PROCEDURE:  Left-sided extracorporeal shockwave lithotripsy, staged.    ANESTHESIA:  General.    COMPLICATIONS:  None.    ESTIMATED BLOOD LOSS:  Minimal.    SPECIMENS:  None.    PROSTHESIS:  None.    DISPOSITION:  Stable.    FINDINGS:  Large stone in the proximal left ureter.    INDICATIONS:  The patient is a 74-year-old female who had a previous stent placed for a large stone in the proximal ureter/UPJ, here now for definitive therapy.    DESCRIPTION OF PROCEDURE:  The patient was taken back to the operating room after informed consent including all risks, benefits, and alternatives were obtained.  The patient was transferred from the [facility] onto the operating table, where she was induced under general anesthesia, given IV Ancef for preoperative antibiotic prophylaxis.  To begin the case, she was placed on the lithotripsy table in a supine position.  We identified the stone on biplanar fluoroscopy.  We were able to shock the stone, 3000 shocks *** 3 and 6.  We did see some fluoroscopic ablation of the stone complete at 3000 shocks.  The stone was either in the proximal ureter or UPJ.  We did stop at 3000 shocks.  At this point in time, the patient was awoken from general anesthesia, transferred to [facility], taken to PACU in satisfactory condition by nursing and Anesthesia Teams.    PLAN:  The patient will be discharged home per PACU criteria and will

## 2024-03-05 NOTE — INTERVAL H&P NOTE
Here for definitive tx with left ESWL possible stent removal    History and Physical reviewed  I have examined the patient and no changes    Kieran Lees MD

## 2024-03-05 NOTE — ANESTHESIA PRE PROCEDURE
current use of insulin (HCC)        Past Surgical History:        Procedure Laterality Date    CATARACT REMOVAL Right 2020    CYSTOSCOPY Left 2/14/2024    CYSTOSCOPY URETERAL STENT INSERTION performed by Kieran Lees MD at [facility] OR       Social History:    Social History     Tobacco Use    Smoking status: Never    Smokeless tobacco: Never   Substance Use Topics    Alcohol use: No                                Counseling given: Not Answered      Vital Signs (Current):   Vitals:    02/27/24 1135 03/05/24 0907   BP:  121/87   Pulse:  80   Resp:  16   Temp:  36.4 °C (97.5 °F)   TempSrc:  Temporal   SpO2:  97%   Weight: 49 kg (108 lb)    Height: 1.41 m (4' 7.5\")                                               BP Readings from Last 3 Encounters:   03/05/24 121/87   02/26/24 109/66   02/22/24 115/70       NPO Status: Time of last liquid consumption: 2100                        Time of last solid consumption: 2100                        Date of last liquid consumption: 03/04/24                        Date of last solid food consumption: 03/04/24    BMI:   Wt Readings from Last 3 Encounters:   02/27/24 49 kg (108 lb)   02/22/24 49.2 kg (108 lb 6.4 oz)   02/20/24 49.2 kg (108 lb 6.4 oz)     Body mass index is 24.65 kg/m².    CBC:   Lab Results   Component Value Date/Time    WBC 11.3 02/26/2024 12:10 PM    WBC 14.4 02/20/2024 05:30 AM    RBC 3.84 02/26/2024 12:10 PM    HGB 10.2 02/26/2024 12:10 PM    HCT 31.4 02/26/2024 12:10 PM    MCV 81.8 02/26/2024 12:10 PM    RDW 14.7 02/26/2024 12:10 PM     02/26/2024 12:10 PM     02/20/2024 05:30 AM       CMP:   Lab Results   Component Value Date/Time     02/26/2024 12:10 PM    K 5.4 02/26/2024 12:10 PM    CL 98 02/26/2024 12:10 PM    CO2 26 02/26/2024 12:10 PM    BUN 13 02/26/2024 12:10 PM    CREATININE 0.61 02/26/2024 12:10 PM    GFRAA >60 11/11/2021 06:10 AM    AGRATIO 1.2 02/16/2024 01:43 PM    LABGLOM >60 02/20/2024 05:30 AM    GLUCOSE 300 02/26/2024 12:10 PM

## 2024-03-05 NOTE — ANESTHESIA POSTPROCEDURE EVALUATION
Department of Anesthesiology  Postprocedure Note    Patient: Macey Zazueta  MRN: [id number]  YOB: 1950  Date of evaluation: 3/5/2024    Procedure Summary       Date: 03/05/24 Room / Location: [address]    Anesthesia Start: 1101 Anesthesia Stop: 1203    Procedure: EXTRACORPOREAL SHOCK WAVE LITHOTRIPSY (Left) Diagnosis:       Left nephrolithiasis      (Left nephrolithiasis [N20.0])    Surgeons: Kieran Lees MD Responsible Provider: Willi Diaz APRN - CRNA    Anesthesia Type: general ASA Status: 3            Anesthesia Type: No value filed.    Anna Phase I: Anna Score: 9    Anna Phase II: Anna Score: 9    Anesthesia Post Evaluation    Patient location during evaluation: PACU  Patient participation: complete - patient participated  Level of consciousness: awake and alert  Airway patency: patent  Nausea & Vomiting: no nausea and no vomiting  Cardiovascular status: blood pressure returned to baseline  Respiratory status: acceptable  Hydration status: euvolemic  Pain management: adequate and satisfactory to patient    No notable events documented.

## 2024-03-05 NOTE — PROGRESS NOTES
Dr. Lees into review procedure with . Patient states ready to be discharged at this time. Discharge instructions given to pt and spouse. Both verbalize understanding,deny any questions and/or concerns. Pt transfer off unit in wheelchair w/ spouse and all belongings.    Discharge Criteria    Inpatients must meet Criteria 1 through 7. All other patients are either YES or N/A. If a NO is chosen then Anesthesia or Surgeon must be notified.      1.  Minimum 30 minutes after last dose of sedative medication.    Yes      2.  Systolic BP between 90 - 160. Diastolic BP between 60 - 90.    Yes      3.  Pulse between 60 - 120    Yes      4.  Respirations between 8 - 25.    Yes      5.  SpO2 92% - 100%.    Yes      6.  Able to cough and swallow or return to baseline function.    Yes      7.  Alert and oriented or return to baseline mental status.    Yes      8.  Demonstrates controlled, coordinated movements, ambulates with steady gait, or return to baseline activity function.    Yes      9.  Minimal or no pain or nausea, or at a level tolerable and acceptable to patient.    Yes      10. Takes and retains oral fluids as allowed.    Yes      11. Procedural / perioperative site stable.  Minimal or no bleeding.    Yes          12. If GI endoscopy procedure, minimal or no abdominal distention or passing flatus.    N/a      13. Written discharge instructions and emergency telephone number provided.    Yes      14. Accompanied by a responsible adult.    Yes

## 2024-03-06 ENCOUNTER — CARE COORDINATION (OUTPATIENT)
Dept: CASE MANAGEMENT | Age: 74
End: 2024-03-06

## 2024-03-07 ENCOUNTER — CARE COORDINATION (OUTPATIENT)
Dept: CASE MANAGEMENT | Age: 74
End: 2024-03-07

## 2024-03-07 NOTE — CARE COORDINATION
Care Transitions Follow Up Call    Patient Current Location:  Home: [address] [place] [address]    LPN Care Coordinator contacted the spouse/partner by telephone to follow up after admission on 2024.  Verified name and  with spouse/partner as identifiers.    Patient: Macey Zazueta  Patient : 1950   MRN: [id number]  Reason for Admission: Septicemia   Discharge Date: 3/5/24 RARS: Readmission Risk Score: 19.7      Needs to be reviewed by the provider   Additional needs identified to be addressed with provider: No  none             Method of communication with provider: none.    Writer spoke with patient's  Yan for a follow up care transitions call. He states she is doing well since having her lithotripsy done on 2024. He states she is not complaining of pain. No fever or chills. She is urinating frequently-no hematuria. He does not know if she has passed any stones. He denies having any new needs or concerns at this time. CTN will end care transitions.     Addressed changes since last contact:   lithotripsy done 2024  Discussed follow-up appointments. If no appointment was previously scheduled, appointment scheduling offered: Yes.   Is follow up appointment scheduled within 7 days of discharge? Yes.    Follow Up  Future Appointments   Date Time Provider Department Center   4/15/2024  1:00 PM Byron Castaneda MD [facility]   2024 11:30 AM Jose Luis Goldsmith PA-C [place] [facility]   10/21/2024  1:00 PM Byron Castaneda MD [facility]     External follow up appointment(s): N/A    LPN Care Coordinator reviewed red flags with spouse/partner and discussed any barriers to care and/or understanding of plan of care after discharge. Discussed appropriate site of care based on symptoms and resources available to patient including: PCP  Specialist  MyChart Messaging. The spouse/partner agrees to contact the PCP office for questions related to their

## 2024-03-17 PROBLEM — N39.0 UTI (URINARY TRACT INFECTION): Status: RESOLVED | Noted: 2024-02-16 | Resolved: 2024-03-17

## 2024-04-02 NOTE — H&P
Continued Stay Note  [facility]     Patient Name: Brooklyn Johns  MRN: [id number]  Today's Date: 4/2/2024    Admit Date: 4/1/2024    Plan: Home with family support & [facility].   Discharge Plan       Row Name 04/02/24 1629       Plan    Plan Comments CCP was notified today that the patient was discharged yesterday and her new walker was left behind. According to Jesse/Xavi, they cannot deliver walkers after 1400. CCP called/spoke with Emil/Ricardo Orantes who will  the walker from [address] [place] [address] and deliver it to the patient's residence. Emil said they will arrive to [facility] within the next 45 minutes. Updated JOAN Cerda/Ortho Charge Nurse. CCP called and left a message for the patient.                   Discharge Codes    No documentation.                 Expected Discharge Date and Time       Expected Discharge Date Expected Discharge Time    Apr 2, 2024               Hannah CINTRON RN   Patient:  Satira Peabody  MRN: [id number]    Chief Complaint:    Chief Complaint   Patient presents with    Cerebrovascular Accident     brought by --last known well was Monday evening       History Obtained From:  patient, electronic medical record    PCP: Reggie Aranda MD    History of Present Illness: The patient is a 73 y.o. female who presents with  Generalized weakness. Recently diagnosed with COVID-19 virus infection. Admitted to the hospital.   I believe is ill. She denies any shortness of breath. Past Medical History:        Diagnosis Date    Hyperlipidemia     Hypertension     Left pontine cerebrovascular accident [facility]) 2017    Stroke [facility]) 2017    Type 2 diabetes mellitus without complication, without long-term current use of insulin ([facility] [address]        Past Surgical History:        Procedure Laterality Date    CATARACT REMOVAL Right 2020       Family History:       Problem Relation Age of Onset    Heart Attack Mother     Heart Disease Mother     Diabetes Mother     Heart Attack Father     High Blood Pressure Father        Social History:   TOBACCO:   reports that she has never smoked. She has never used smokeless tobacco.  ETOH:   reports no history of alcohol use. Allergies:  Patient has no known allergies. Medications Prior to Admission:    Prior to Admission medications    Medication Sig Start Date End Date Taking?  Authorizing Provider   atorvastatin (LIPITOR) 80 MG tablet Take 1 tablet by mouth daily 10/6/21  Yes Reggie Aranda MD   amLODIPine [facility]) 10 MG tablet Take 1 tablet by mouth every other day 10/6/21  Yes Reggie Aranda MD   metFORMIN (GLUCOPHAGE-XR) 500 MG extended release tablet Take 2 tablets by mouth daily (with breakfast) 10/6/21 1/4/22 Yes Reggie Aranda MD   aspirin 81 MG EC tablet Take 1 tablet by mouth daily 3/8/17  Yes Mona Arzate MD   Multiple Vitamins-Minerals (THERAPEUTIC MULTIVITAMIN-MINERALS) tablet Take 1 tablet by mouth daily   Yes Historical Provider, MD   Handicap Placard [facility] by Does not apply route Dx: stroke    Duration: 5 years 5/16/17   Hortencia Marin MD   UNABLE TO FIND Manual wheelchair    Diagnosis: Stroke 5/16/17   Hortencia Marin MD   glucose blood VI test strips (ASCENSIA AUTODISC VI;ONE TOUCH ULTRA TEST VI) strip 1 each by In Vitro route daily As needed. 3/9/17   Elisabeth Turcios MD   SOFT TOUCH LANCETS MISC 1 Units by Does not apply route 3 times daily (with meals) 3/8/17   Amy Delgadillo MD   Alcohol Swabs 70 % PADS 1 Units by Does not apply route 3 times daily (with meals) 3/8/17   Amy Delgadillo MD   glucose monitoring kit (FREESTYLE) monitoring kit 1 kit by Does not apply route 3 times daily 3/8/17   Russ Eller MD       Review of Systems:  Constitutional:negative  for fevers, and negative for chills. Eyes: negative for visual disturbance   ENT: negative for sore throat, negative nasal congestion, and negative for earache  Respiratory: negative for shortness of breath, negative for cough, and negative for wheezing  Cardiovascular: negative for chest pain, negative for palpitations, and negative for syncope  Gastrointestinal: negative for abdominal pain, negative for nausea,negative for vomiting, negative for diarrhea, negative for constipation, and negative for hematochezia or melena  Genitourinary: negative for dysuria, negative for urinary urgency, negative for urinary frequency, and negative for hematuria  Skin: negative for skin rash, and negative for skin lesions  Neurological: negative for unilateral weakness, numbness or tingling. Physical Exam:    Vitals:   Temp: 97.1 °F (36.2 °C)  BP: 96/60  Resp: 16  Pulse: 110  SpO2: 94 %  24HR INTAKE/OUTPUT:      Intake/Output Summary (Last 24 hours) at 11/5/2021 1634  Last data filed at 11/5/2021 0425  Gross per 24 hour   Intake 200 ml   Output --   Net 200 ml       Exam:  GEN:  pale,   Questions.  and alert & appropriate appearance  EYES: normal eyes, non icteric  NECK: supple with no lymphadenopathy,  no bruits noted  PULM: clear with no rales or rhonchi. No wheezes  COR: regular with rate and rhythm  ABD:  soft & NT, No distension  EXT:   no edema noted  NEURO: follows commands, CLARK, no deficits  SKIN:  negative  -----------------------------------------------------------------  Diagnostic Data:   Lab Results   Component Value Date    WBC 5.2 11/05/2021    HGB 12.6 11/05/2021     11/05/2021       Lab Results   Component Value Date    BUN 14 11/05/2021    CREATININE 0.36 (L) 11/05/2021     11/05/2021    K 4.0 11/05/2021    CALCIUM 9.0 11/05/2021     11/05/2021    CO2 20 11/05/2021    LABGLOM >60 11/05/2021       Lab Results   Component Value Date    WBCUA None 11/04/2021    RBCUA 0 TO 2 11/04/2021    EPITHUA 2 TO 5 11/04/2021    LEUKOCYTESUR NEGATIVE 11/04/2021    SPECGRAV 1.020 11/04/2021    GLUCOSEU NEGATIVE 11/04/2021    KETUA 1+ (A) 11/04/2021    PROTEINU NEGATIVE 11/04/2021    HGBUR NEGATIVE 11/04/2021    CASTUA NOT REPORTED 11/04/2021    CRYSTUA 0 TO 2 CALCIUM OXALATE (A) 11/04/2021    BACTERIA 3+ (A) 11/04/2021    YEAST NOT REPORTED 11/04/2021       Lab Results   Component Value Date    TROPONINT NOT REPORTED 11/04/2021    PROBNP 51 11/04/2021       CT Head WO Contrast    Result Date: 11/4/2021  EXAMINATION: CT OF THE HEAD WITHOUT CONTRAST  11/4/2021 3:43 pm TECHNIQUE: CT of the head was performed without the administration of intravenous contrast. Dose modulation, iterative reconstruction, and/or weight based adjustment of the mA/kV was utilized to reduce the radiation dose to as low as reasonably achievable.  COMPARISON: 03/05/2017 HISTORY: ORDERING SYSTEM PROVIDED HISTORY: altered mental status TECHNOLOGIST PROVIDED HISTORY: altered mental status Decision Support Exception - unselect if not a suspected or confirmed emergency medical condition->Emergency Medical Condition (MA) FINDINGS: BRAIN/VENTRICLES: The ventricles and sulci COVID-19 virus infection with generalized weakness. No hypoxia. · Factors affecting the medical complexity of this patient include Principal Problem:  ·   COVID-19 virus infection  · Active Problems:  ·   Generalized weakness  · Resolved Problems:  ·   * No resolved hospital problems. *  ·   · Estimated length of stay is 2 days  ·   Monoclonal antibodies,  · High risk medication monitorin    CORE MEASURES  Core measures including DVT prophylaxis, Code Status, Nutrition, Therapy Options, chart reviewed and advance directives reviewed at this visit.     Brian Hsu MD, MD  2021, 4:34 PM

## 2024-04-15 ENCOUNTER — TELEPHONE (OUTPATIENT)
Dept: UROLOGY | Age: 74
End: 2024-04-15

## 2024-04-15 DIAGNOSIS — N20.0 RENAL STONE: Primary | ICD-10-CM

## 2024-04-15 NOTE — TELEPHONE ENCOUNTER
Patient is status post ESWL, it does appear per operative report that we wanted to possibly do additional procedure.  Please make her an appointment within the next week or 2 with KUB prior so we can assess her if she needs additional procedures.

## 2024-04-29 ENCOUNTER — HOSPITAL ENCOUNTER (OUTPATIENT)
Age: 74
Discharge: HOME | End: 2024-05-01
Payer: COMMERCIAL

## 2024-04-29 ENCOUNTER — HOSPITAL ENCOUNTER (OUTPATIENT)
Dept: GENERAL RADIOLOGY | Age: 74
Discharge: HOME | End: 2024-05-01
Payer: COMMERCIAL

## 2024-04-29 DIAGNOSIS — N20.0 RENAL STONE: ICD-10-CM

## 2024-04-29 PROCEDURE — 74018 RADEX ABDOMEN 1 VIEW: CPT

## 2024-05-01 ENCOUNTER — OFFICE VISIT (OUTPATIENT)
Dept: UROLOGY | Age: 74
End: 2024-05-01

## 2024-05-01 ENCOUNTER — TELEPHONE (OUTPATIENT)
Dept: UROLOGY | Age: 74
End: 2024-05-01

## 2024-05-01 VITALS — TEMPERATURE: 98.6 F | SYSTOLIC BLOOD PRESSURE: 110 MMHG | HEART RATE: 70 BPM | DIASTOLIC BLOOD PRESSURE: 65 MMHG

## 2024-05-01 DIAGNOSIS — N20.0 RENAL STONE: Primary | ICD-10-CM

## 2024-05-01 PROCEDURE — 99024 POSTOP FOLLOW-UP VISIT: CPT | Performed by: PHYSICIAN ASSISTANT

## 2024-05-01 NOTE — TELEPHONE ENCOUNTER
Patient would like a later morning surgery time if possible, please.     Macey Zazueta     1950        female    [address] [id number]                    Legal Guardian NO  If yes, Name:       Skilled Facility NO     If yes, Name:                                             Home Phone: [phone]         Cell Phone:    No relevant phone numbers on file.                                           Surgeon: Dr Lees Surgery Date: 05/21                    Time: after 10 if possible    Procedure: Left Ureteroscopy Thulium Laser Lithotripsy with Left Stent Exchange  Duration:    Diagnosis: Nephrolithiasis  CPT Codes: 83145    Important Medical History:  In Epic    First Assistant NO  Special Inst/Equip/Implants: Regular    Nickel allergy  NO  Latex Allergy: NO      Cardiac Device:  No  If yes, need most recent pacemaker interrogation from Cardiologist:  Type of pacemaker:    Anesthesia:    General                       Admission Type:  Same Day                        Admit Prior to Day of Surgery: No    Case Location:  Ambulatory            Preadmission Testing:  Phone Call             PAT Date and Time: NA    Need Preop Cardiac Clearance: NA  Need Pre-op/Medical Clearance:NA    Does Patient have Cardiologist/physician? Name of Physician:    AKIL    Special Needs Communication:  Eduardo Lift NO    needed NO          Does patient sign for self YES

## 2024-05-01 NOTE — PROGRESS NOTES
HPI:    Patient is a 74 y.o. female in no acute distress.  She is alert and oriented to person, place, and time.      2/2024 The patient is a 74 y.o. female who presents with left hydronephrosis.  Patient presented to the emergency department with altered mental status and left-sided weakness.  Patient does have a history of a stroke on the right side.  Patient did have some nausea and vomiting at home.  But not in the hospital.  Patient had a CT scan performed.  This film was independently reviewed today.  This does show a large stone in the left renal pelvis.  There is some mild hydronephrosis of this kidney.  Patient does also have a large fibroid filled uterus that is causing lateral displacement of the bladder.  This is on the patient's left as well.  Patient did have a low white blood cell count of 20,000 upon admission.  It has improved slightly to 19,000.  Patient was afebrile.  Patient has had a Tmax of 101.3 today.  Patient has never had stones in the past     2/14/2024 - left ureteral stent placement    3/5/2024 - LEFT ESWL    Today:  Patient is here today accompanied by her  who is her POA and caregiver.  Patient underwent left ESWL.  She does have a retained ureteral stent.  Patient is finishing up her antibiotics.  She is minimally verbal.  They deny fever and chills.  Patient did have a KUB prior visit today which does show adequate position of left ureteral stent.  15 mm left proximal ureteral stone still visible although this is his urine appearance than prior imaging.      Past Medical History:   Diagnosis Date    COVID-19 virus infection 2021    Essential hypertension     Left pontine cerebrovascular accident (HCC) 2017    Mixed hyperlipidemia     Periorbital cellulitis /  right side /  resolved with Omnicef 2022 12/13/2022    Type 2 diabetes mellitus without complication, without long-term current use of insulin (HCC)      Past Surgical History:   Procedure Laterality Date    CATARACT

## 2024-05-13 NOTE — PROGRESS NOTES
Patient's  Yan instructed on the pre-operative, intra-operative, and post-operative process. Patient's  instructed on pt's NPO status. Medication instructions and pre operative instruction sheet reviewed with the patient. Instructed  to have the pt stop taking all OTC vitamins 7 days prior to surgery and to take metoprolol and amlodipine with a small sip of water prior to arriving to the hospital the day of surgery.   states they were instructed to stop aspirin 7 days prior to surgery per MD office.

## 2024-05-20 ENCOUNTER — ANESTHESIA EVENT (OUTPATIENT)
Dept: OPERATING ROOM | Age: 74
End: 2024-05-20
Payer: COMMERCIAL

## 2024-05-20 NOTE — H&P
History and Physical    Patient:  Macey Zazueta  MRN: [id number]    CHIEF COMPLAINT: Left flank pain    HISTORY OF PRESENT ILLNESS:   The patient is a 74 y.o. female who presents with left flank pain.2/2024 The patient is a 74 y.o. female who presents with left hydronephrosis.  Patient presented to the emergency department with altered mental status and left-sided weakness.  Patient does have a history of a stroke on the right side.  Patient did have some nausea and vomiting at home.  But not in the hospital.  Patient had a CT scan performed.  This film was independently reviewed today.  This does show a large stone in the left renal pelvis.  There is some mild hydronephrosis of this kidney.  Patient does also have a large fibroid filled uterus that is causing lateral displacement of the bladder.  This is on the patient's left as well.  Patient did have a low white blood cell count of 20,000 upon admission.  It has improved slightly to 19,000.  Patient was afebrile.  Patient has had a Tmax of 101.3 today.  Patient has never had stones in the past      2/14/2024 - left ureteral stent placement     3/5/2024 - LEFT ESWL    Past Medical History:    Past Medical History:   Diagnosis Date    COVID-19 virus infection 2021    Essential hypertension     Left pontine cerebrovascular accident (HCC) 2017    Mixed hyperlipidemia     Periorbital cellulitis /  right side /  resolved with Omnicef 2022 12/13/2022    Type 2 diabetes mellitus without complication, without long-term current use of insulin (HCC)        Past Surgical History:    Past Surgical History:   Procedure Laterality Date    CATARACT REMOVAL Right 2020    CYSTOSCOPY Left 2/14/2024    CYSTOSCOPY URETERAL STENT INSERTION performed by Kieran Lees MD at [facility] OR    LITHOTRIPSY Left 3/5/2024    EXTRACORPOREAL SHOCK WAVE LITHOTRIPSY performed by Kieran Lees MD at [facility] OR       Medications Prior to Admission:    Prior to Admission medications    Medication Sig  Start Date End Date Taking? Authorizing Provider   amLODIPine (NORVASC) 2.5 MG tablet Take 1 tablet by mouth daily 4/15/24   Byron Castaneda MD   metoprolol tartrate (LOPRESSOR) 25 MG tablet Take 1 tablet by mouth 2 times daily 2/20/24   Janette Almaraz APRN - CNP   melatonin 3 MG TABS tablet Take 1 tablet by mouth nightly as needed    ProviderJayden MD   atorvastatin (LIPITOR) 80 MG tablet Take 1 tablet by mouth daily  Patient taking differently: Take 1 tablet by mouth nightly 12/8/23   Byron Castaneda MD   metFORMIN (GLUCOPHAGE-XR) 500 MG extended release tablet Take 2 tablets by mouth daily (with breakfast) 11/3/23   Byron Castaneda MD   Handicap Placard MISC by Does not apply route Dx: stroke    Duration: 5 years 2/28/22   Byron Castaneda MD   UNABLE TO FIND Manual wheelchair    Diagnosis: Stroke 5/16/17   Byron Castaneda MD   glucose blood VI test strips (ASCENSIA AUTODISC VI;ONE TOUCH ULTRA TEST VI) strip 1 each by In Vitro route daily As needed. 3/9/17   Darrick Turcios MD   aspirin 81 MG EC tablet Take 1 tablet by mouth daily 3/8/17   Dorinda Bush MD   SOFT TOUCH LANCETS MISC 1 Units by Does not apply route 3 times daily (with meals) 3/8/17   Dorinda Bush MD   Alcohol Swabs 70 % PADS 1 Units by Does not apply route 3 times daily (with meals) 3/8/17   Dorinda Bush MD   glucose monitoring kit (FREESTYLE) monitoring kit 1 kit by Does not apply route 3 times daily 3/8/17   Daniel Dubon MD   Multiple Vitamins-Minerals (THERAPEUTIC MULTIVITAMIN-MINERALS) tablet Take 1 tablet by mouth daily Twice weekly    ProviderJayden MD       Allergies:  Patient has no known allergies.    Social History:    Social History     Socioeconomic History    Marital status:      Spouse name: Not on file    Number of children: Not on file    Years of education: Not on file    Highest education level: Not on file   Occupational History    Not on file   Tobacco Use     in the Last Year: No       Family History:    Family History   Problem Relation Age of Onset    Heart Attack Mother     Heart Disease Mother     Diabetes Mother     Heart Attack Father     High Blood Pressure Father        REVIEW OF SYSTEMS:  All systems reviewed and negative except for that already noted in the HPI.    Physical Exam:      This a 74 y.o. female   No data found.  Constitutional: Patient in no acute distress.  Neuro: Alert and oriented to person, place and time.  Psych: mood and affect normal  HEENT negative  Lungs: Respiratory effort is normal  Cardiovascular: Normal peripheral pulses  Abdomen: Soft, non-tender, non-distended with no CVA, flank pain or hepatosplenomegaly.  No hernias.  Kidneys normal.  Lymphatics: No palpable lymphadenopathy.  Bladder non-tender and not distended.  Pelvic exam:  External genitalia normal  Urethral and urethral meatus normal  Vagina normal with no evidence of pelvic prolapse  Uterus normal  Adnexa normal  Anus and perineum normal  Rectal exam not indicated    LABS:   No results for input(s): \"WBC\", \"HGB\", \"HCT\", \"MCV\", \"PLT\" in the last 72 hours.  No results for input(s): \"NA\", \"K\", \"CL\", \"CO2\", \"PHOS\", \"BUN\", \"CREATININE\" in the last 72 hours.    Invalid input(s): \"CA\"    Additional Lab/culture results:    Urinalysis: No results for input(s): \"COLORU\", \"PHUR\", \"LABCAST\", \"WBCUA\", \"RBCUA\", \"MUCUS\", \"TRICHOMONAS\", \"YEAST\", \"BACTERIA\", \"CLARITYU\", \"SPECGRAV\", \"LEUKOCYTESUR\", \"UROBILINOGEN\", \"BILIRUBINUR\", \"BLOODU\" in the last 72 hours.    Invalid input(s): \"NITRATE\", \"GLUCOSEUKETONESUAMORPHOUS\"     -----------------------------------------------------------------  Imaging Results:      Assessment and Plan   Impression:    Patient Active Problem List   Diagnosis    Left pontine cerebrovascular accident (HCC) /  2017    Type 2 diabetes mellitus without complication, with long-term current use of insulin (HCC) /  Metformin ER    Essential hypertension    Mixed

## 2024-05-21 ENCOUNTER — HOSPITAL ENCOUNTER (INPATIENT)
Age: 74
LOS: 2 days | Discharge: ANOTHER HOSPITAL | DRG: 659 | End: 2024-05-23
Attending: EMERGENCY MEDICINE | Admitting: INTERNAL MEDICINE
Payer: MEDICARE

## 2024-05-21 ENCOUNTER — APPOINTMENT (OUTPATIENT)
Dept: GENERAL RADIOLOGY | Age: 74
DRG: 659 | End: 2024-05-21
Payer: MEDICARE

## 2024-05-21 ENCOUNTER — HOSPITAL ENCOUNTER (OUTPATIENT)
Age: 74
Setting detail: OUTPATIENT SURGERY
Discharge: HOME | DRG: 659 | End: 2024-05-21
Attending: UROLOGY | Admitting: UROLOGY
Payer: MEDICARE

## 2024-05-21 ENCOUNTER — APPOINTMENT (OUTPATIENT)
Dept: CT IMAGING | Age: 74
DRG: 659 | End: 2024-05-21
Payer: MEDICARE

## 2024-05-21 ENCOUNTER — APPOINTMENT (OUTPATIENT)
Dept: GENERAL RADIOLOGY | Age: 74
DRG: 659 | End: 2024-05-21
Attending: UROLOGY
Payer: MEDICARE

## 2024-05-21 ENCOUNTER — ANESTHESIA (OUTPATIENT)
Dept: OPERATING ROOM | Age: 74
End: 2024-05-21
Payer: COMMERCIAL

## 2024-05-21 VITALS
OXYGEN SATURATION: 96 % | WEIGHT: 103 LBS | DIASTOLIC BLOOD PRESSURE: 90 MMHG | HEART RATE: 96 BPM | BODY MASS INDEX: 21.62 KG/M2 | RESPIRATION RATE: 15 BRPM | SYSTOLIC BLOOD PRESSURE: 145 MMHG | TEMPERATURE: 97.2 F | HEIGHT: 58 IN

## 2024-05-21 DIAGNOSIS — R65.21 SEPTIC SHOCK (HCC): ICD-10-CM

## 2024-05-21 DIAGNOSIS — R41.82 ALTERED MENTAL STATUS, UNSPECIFIED ALTERED MENTAL STATUS TYPE: Primary | ICD-10-CM

## 2024-05-21 DIAGNOSIS — A41.9 SEPTIC SHOCK (HCC): ICD-10-CM

## 2024-05-21 DIAGNOSIS — N39.0 URINARY TRACT INFECTION WITHOUT HEMATURIA, SITE UNSPECIFIED: ICD-10-CM

## 2024-05-21 DIAGNOSIS — N13.9 URINARY OBSTRUCTION: ICD-10-CM

## 2024-05-21 LAB
ALBUMIN SERPL-MCNC: 3.9 G/DL (ref 3.5–5.2)
ALBUMIN/GLOB SERPL: 1.5 {RATIO} (ref 1–2.5)
ALP SERPL-CCNC: 203 U/L (ref 35–104)
ALT SERPL-CCNC: 157 U/L (ref 5–33)
ANION GAP SERPL CALCULATED.3IONS-SCNC: 22 MMOL/L (ref 9–17)
AST SERPL-CCNC: 247 U/L
BASOPHILS # BLD: 0 K/UL (ref 0–0.2)
BASOPHILS NFR BLD: 0 % (ref 0–2)
BILIRUB SERPL-MCNC: 1.5 MG/DL (ref 0.3–1.2)
BUN SERPL-MCNC: 18 MG/DL (ref 8–23)
BUN/CREAT SERPL: 23 (ref 9–20)
CALCIUM SERPL-MCNC: 9 MG/DL (ref 8.6–10.4)
CHLORIDE SERPL-SCNC: 105 MMOL/L (ref 98–107)
CO2 SERPL-SCNC: 16 MMOL/L (ref 20–31)
CREAT SERPL-MCNC: 0.8 MG/DL (ref 0.5–0.9)
EOSINOPHIL # BLD: 0.05 K/UL (ref 0–0.44)
EOSINOPHILS RELATIVE PERCENT: 1 % (ref 1–4)
ERYTHROCYTE [DISTWIDTH] IN BLOOD BY AUTOMATED COUNT: 16.8 % (ref 11.8–14.4)
GFR, ESTIMATED: 78 ML/MIN/1.73M2
GLUCOSE BLD-MCNC: 172 MG/DL (ref 74–100)
GLUCOSE SERPL-MCNC: 208 MG/DL (ref 70–99)
HCT VFR BLD AUTO: 30.5 % (ref 36.3–47.1)
HCT VFR BLD AUTO: 38.9 % (ref 36.3–47.1)
HGB BLD-MCNC: 12 G/DL (ref 11.9–15.1)
HGB BLD-MCNC: 9 G/DL (ref 11.9–15.1)
IMM GRANULOCYTES # BLD AUTO: 0 K/UL (ref 0–0.3)
IMM GRANULOCYTES NFR BLD: 0 %
LACTATE BLDV-SCNC: 5.2 MMOL/L (ref 0.5–2.2)
LACTATE BLDV-SCNC: 6 MMOL/L (ref 0.5–2.2)
LYMPHOCYTES NFR BLD: 0.24 K/UL (ref 1.1–3.7)
LYMPHOCYTES RELATIVE PERCENT: 5 % (ref 24–43)
MCH RBC QN AUTO: 25.9 PG (ref 25.2–33.5)
MCHC RBC AUTO-ENTMCNC: 30.8 G/DL (ref 28.4–34.8)
MCV RBC AUTO: 83.8 FL (ref 82.6–102.9)
MONOCYTES NFR BLD: 0.09 K/UL (ref 0.1–1.2)
MONOCYTES NFR BLD: 2 % (ref 3–12)
MORPHOLOGY: ABNORMAL
NEUTROPHILS NFR BLD: 92 % (ref 36–65)
NEUTS SEG NFR BLD: 4.32 K/UL (ref 1.5–8.1)
NRBC BLD-RTO: 0.4 PER 100 WBC
PLATELET # BLD AUTO: 179 K/UL (ref 138–453)
PMV BLD AUTO: 10.6 FL (ref 8.1–13.5)
POTASSIUM SERPL-SCNC: 3.4 MMOL/L (ref 3.7–5.3)
PROT SERPL-MCNC: 6.5 G/DL (ref 6.4–8.3)
RBC # BLD AUTO: 4.64 M/UL (ref 3.95–5.11)
SODIUM SERPL-SCNC: 143 MMOL/L (ref 135–144)
TROPONIN I SERPL HS-MCNC: 18 NG/L (ref 0–14)
TROPONIN I SERPL HS-MCNC: 69 NG/L (ref 0–14)
WBC OTHER # BLD: 4.7 K/UL (ref 3.5–11.3)

## 2024-05-21 PROCEDURE — 74176 CT ABD & PELVIS W/O CONTRAST: CPT

## 2024-05-21 PROCEDURE — 3600000014 HC SURGERY LEVEL 4 ADDTL 15MIN: Performed by: UROLOGY

## 2024-05-21 PROCEDURE — 87205 SMEAR GRAM STAIN: CPT

## 2024-05-21 PROCEDURE — 6370000000 HC RX 637 (ALT 250 FOR IP): Performed by: UROLOGY

## 2024-05-21 PROCEDURE — 71045 X-RAY EXAM CHEST 1 VIEW: CPT

## 2024-05-21 PROCEDURE — 85014 HEMATOCRIT: CPT

## 2024-05-21 PROCEDURE — 0TP98DZ REMOVAL OF INTRALUMINAL DEVICE FROM URETER, VIA NATURAL OR ARTIFICIAL OPENING ENDOSCOPIC: ICD-10-PCS | Performed by: UROLOGY

## 2024-05-21 PROCEDURE — 87040 BLOOD CULTURE FOR BACTERIA: CPT

## 2024-05-21 PROCEDURE — 87077 CULTURE AEROBIC IDENTIFY: CPT

## 2024-05-21 PROCEDURE — 2580000003 HC RX 258: Performed by: STUDENT IN AN ORGANIZED HEALTH CARE EDUCATION/TRAINING PROGRAM

## 2024-05-21 PROCEDURE — 2500000003 HC RX 250 WO HCPCS: Performed by: STUDENT IN AN ORGANIZED HEALTH CARE EDUCATION/TRAINING PROGRAM

## 2024-05-21 PROCEDURE — 83605 ASSAY OF LACTIC ACID: CPT

## 2024-05-21 PROCEDURE — 7100000011 HC PHASE II RECOVERY - ADDTL 15 MIN: Performed by: UROLOGY

## 2024-05-21 PROCEDURE — 6360000002 HC RX W HCPCS: Performed by: UROLOGY

## 2024-05-21 PROCEDURE — 2000000000 HC ICU R&B

## 2024-05-21 PROCEDURE — 6370000000 HC RX 637 (ALT 250 FOR IP): Performed by: STUDENT IN AN ORGANIZED HEALTH CARE EDUCATION/TRAINING PROGRAM

## 2024-05-21 PROCEDURE — C2617 STENT, NON-COR, TEM W/O DEL: HCPCS | Performed by: UROLOGY

## 2024-05-21 PROCEDURE — 80053 COMPREHEN METABOLIC PANEL: CPT

## 2024-05-21 PROCEDURE — 85025 COMPLETE CBC W/AUTO DIFF WBC: CPT

## 2024-05-21 PROCEDURE — 87086 URINE CULTURE/COLONY COUNT: CPT

## 2024-05-21 PROCEDURE — 2580000003 HC RX 258: Performed by: EMERGENCY MEDICINE

## 2024-05-21 PROCEDURE — 0TF78ZZ FRAGMENTATION IN LEFT URETER, VIA NATURAL OR ARTIFICIAL OPENING ENDOSCOPIC: ICD-10-PCS | Performed by: UROLOGY

## 2024-05-21 PROCEDURE — C1747 HC ENDOSCOPE, SINGLE, URINARY TRACT: HCPCS | Performed by: UROLOGY

## 2024-05-21 PROCEDURE — 7100000010 HC PHASE II RECOVERY - FIRST 15 MIN: Performed by: UROLOGY

## 2024-05-21 PROCEDURE — 71046 X-RAY EXAM CHEST 2 VIEWS: CPT

## 2024-05-21 PROCEDURE — 87186 SC STD MICRODIL/AGAR DIL: CPT

## 2024-05-21 PROCEDURE — 3E033XZ INTRODUCTION OF VASOPRESSOR INTO PERIPHERAL VEIN, PERCUTANEOUS APPROACH: ICD-10-PCS | Performed by: UROLOGY

## 2024-05-21 PROCEDURE — 2580000003 HC RX 258: Performed by: NURSE ANESTHETIST, CERTIFIED REGISTERED

## 2024-05-21 PROCEDURE — 87154 CUL TYP ID BLD PTHGN 6+ TRGT: CPT

## 2024-05-21 PROCEDURE — 6360000002 HC RX W HCPCS: Performed by: EMERGENCY MEDICINE

## 2024-05-21 PROCEDURE — 70498 CT ANGIOGRAPHY NECK: CPT

## 2024-05-21 PROCEDURE — 85018 HEMOGLOBIN: CPT

## 2024-05-21 PROCEDURE — 2580000003 HC RX 258

## 2024-05-21 PROCEDURE — 36415 COLL VENOUS BLD VENIPUNCTURE: CPT

## 2024-05-21 PROCEDURE — 3700000001 HC ADD 15 MINUTES (ANESTHESIA): Performed by: UROLOGY

## 2024-05-21 PROCEDURE — 3600000004 HC SURGERY LEVEL 4 BASE: Performed by: UROLOGY

## 2024-05-21 PROCEDURE — 6370000000 HC RX 637 (ALT 250 FOR IP): Performed by: NURSE ANESTHETIST, CERTIFIED REGISTERED

## 2024-05-21 PROCEDURE — 2709999900 HC NON-CHARGEABLE SUPPLY: Performed by: UROLOGY

## 2024-05-21 PROCEDURE — 81001 URINALYSIS AUTO W/SCOPE: CPT

## 2024-05-21 PROCEDURE — 82947 ASSAY GLUCOSE BLOOD QUANT: CPT

## 2024-05-21 PROCEDURE — 84484 ASSAY OF TROPONIN QUANT: CPT

## 2024-05-21 PROCEDURE — 70450 CT HEAD/BRAIN W/O DYE: CPT

## 2024-05-21 PROCEDURE — 94761 N-INVAS EAR/PLS OXIMETRY MLT: CPT

## 2024-05-21 PROCEDURE — 6360000002 HC RX W HCPCS: Performed by: STUDENT IN AN ORGANIZED HEALTH CARE EDUCATION/TRAINING PROGRAM

## 2024-05-21 PROCEDURE — 6360000002 HC RX W HCPCS

## 2024-05-21 PROCEDURE — 3700000000 HC ANESTHESIA ATTENDED CARE: Performed by: UROLOGY

## 2024-05-21 PROCEDURE — 2500000003 HC RX 250 WO HCPCS

## 2024-05-21 PROCEDURE — 99285 EMERGENCY DEPT VISIT HI MDM: CPT

## 2024-05-21 PROCEDURE — C1769 GUIDE WIRE: HCPCS | Performed by: UROLOGY

## 2024-05-21 PROCEDURE — 0T778DZ DILATION OF LEFT URETER WITH INTRALUMINAL DEVICE, VIA NATURAL OR ARTIFICIAL OPENING ENDOSCOPIC: ICD-10-PCS | Performed by: UROLOGY

## 2024-05-21 PROCEDURE — 2720000010 HC SURG SUPPLY STERILE: Performed by: UROLOGY

## 2024-05-21 PROCEDURE — 6360000004 HC RX CONTRAST MEDICATION: Performed by: EMERGENCY MEDICINE

## 2024-05-21 DEVICE — URETERAL STENT
Type: IMPLANTABLE DEVICE | Site: URETER | Status: FUNCTIONAL
Brand: PERCUFLEX™ PLUS

## 2024-05-21 RX ORDER — POTASSIUM CHLORIDE 1500 MG/1
40 TABLET, EXTENDED RELEASE ORAL PRN
Status: DISCONTINUED | OUTPATIENT
Start: 2024-05-21 | End: 2024-05-23 | Stop reason: HOSPADM

## 2024-05-21 RX ORDER — METOPROLOL TARTRATE 25 MG/1
25 TABLET, FILM COATED ORAL 2 TIMES DAILY
Status: DISCONTINUED | OUTPATIENT
Start: 2024-05-21 | End: 2024-05-23 | Stop reason: HOSPADM

## 2024-05-21 RX ORDER — POTASSIUM CHLORIDE 7.45 MG/ML
10 INJECTION INTRAVENOUS PRN
Status: DISCONTINUED | OUTPATIENT
Start: 2024-05-21 | End: 2024-05-23 | Stop reason: HOSPADM

## 2024-05-21 RX ORDER — SODIUM CHLORIDE 9 MG/ML
25 INJECTION, SOLUTION INTRAVENOUS PRN
Status: DISCONTINUED | OUTPATIENT
Start: 2024-05-21 | End: 2024-05-23 | Stop reason: HOSPADM

## 2024-05-21 RX ORDER — ONDANSETRON 4 MG/1
4 TABLET, ORALLY DISINTEGRATING ORAL EVERY 8 HOURS PRN
Status: DISCONTINUED | OUTPATIENT
Start: 2024-05-21 | End: 2024-05-23 | Stop reason: HOSPADM

## 2024-05-21 RX ORDER — SODIUM CHLORIDE 0.9 % (FLUSH) 0.9 %
5-40 SYRINGE (ML) INJECTION PRN
Status: DISCONTINUED | OUTPATIENT
Start: 2024-05-21 | End: 2024-05-23 | Stop reason: HOSPADM

## 2024-05-21 RX ORDER — NALOXONE HYDROCHLORIDE 0.4 MG/ML
INJECTION, SOLUTION INTRAMUSCULAR; INTRAVENOUS; SUBCUTANEOUS PRN
Status: DISCONTINUED | OUTPATIENT
Start: 2024-05-21 | End: 2024-05-21 | Stop reason: HOSPADM

## 2024-05-21 RX ORDER — IOPAMIDOL 755 MG/ML
75 INJECTION, SOLUTION INTRAVASCULAR
Status: COMPLETED | OUTPATIENT
Start: 2024-05-21 | End: 2024-05-21

## 2024-05-21 RX ORDER — ACETAMINOPHEN 325 MG/1
650 TABLET ORAL ONCE
Status: COMPLETED | OUTPATIENT
Start: 2024-05-21 | End: 2024-05-21

## 2024-05-21 RX ORDER — ONDANSETRON 2 MG/ML
INJECTION INTRAMUSCULAR; INTRAVENOUS PRN
Status: DISCONTINUED | OUTPATIENT
Start: 2024-05-21 | End: 2024-05-21 | Stop reason: SDUPTHER

## 2024-05-21 RX ORDER — SODIUM CHLORIDE 0.9 % (FLUSH) 0.9 %
5-40 SYRINGE (ML) INJECTION EVERY 12 HOURS SCHEDULED
Status: DISCONTINUED | OUTPATIENT
Start: 2024-05-21 | End: 2024-05-23 | Stop reason: HOSPADM

## 2024-05-21 RX ORDER — SODIUM CHLORIDE 9 MG/ML
INJECTION, SOLUTION INTRAVENOUS PRN
Status: DISCONTINUED | OUTPATIENT
Start: 2024-05-21 | End: 2024-05-21 | Stop reason: HOSPADM

## 2024-05-21 RX ORDER — DEXAMETHASONE SODIUM PHOSPHATE 4 MG/ML
INJECTION, SOLUTION INTRA-ARTICULAR; INTRALESIONAL; INTRAMUSCULAR; INTRAVENOUS; SOFT TISSUE PRN
Status: DISCONTINUED | OUTPATIENT
Start: 2024-05-21 | End: 2024-05-21 | Stop reason: SDUPTHER

## 2024-05-21 RX ORDER — AMLODIPINE BESYLATE 5 MG/1
2.5 TABLET ORAL DAILY
Status: DISCONTINUED | OUTPATIENT
Start: 2024-05-22 | End: 2024-05-23 | Stop reason: HOSPADM

## 2024-05-21 RX ORDER — CEFAZOLIN SODIUM IN 0.9 % NACL 2 G/100 ML
2000 PLASTIC BAG, INJECTION (ML) INTRAVENOUS
Status: COMPLETED | OUTPATIENT
Start: 2024-05-21 | End: 2024-05-21

## 2024-05-21 RX ORDER — LANOLIN ALCOHOL/MO/W.PET/CERES
3 CREAM (GRAM) TOPICAL NIGHTLY PRN
Status: DISCONTINUED | OUTPATIENT
Start: 2024-05-21 | End: 2024-05-23 | Stop reason: HOSPADM

## 2024-05-21 RX ORDER — SODIUM CHLORIDE 0.9 % (FLUSH) 0.9 %
5-40 SYRINGE (ML) INJECTION EVERY 12 HOURS SCHEDULED
Status: DISCONTINUED | OUTPATIENT
Start: 2024-05-21 | End: 2024-05-21 | Stop reason: HOSPADM

## 2024-05-21 RX ORDER — KETOROLAC TROMETHAMINE 30 MG/ML
INJECTION, SOLUTION INTRAMUSCULAR; INTRAVENOUS PRN
Status: DISCONTINUED | OUTPATIENT
Start: 2024-05-21 | End: 2024-05-21 | Stop reason: SDUPTHER

## 2024-05-21 RX ORDER — LIDOCAINE HYDROCHLORIDE 20 MG/ML
INJECTION, SOLUTION EPIDURAL; INFILTRATION; INTRACAUDAL; PERINEURAL PRN
Status: DISCONTINUED | OUTPATIENT
Start: 2024-05-21 | End: 2024-05-21 | Stop reason: SDUPTHER

## 2024-05-21 RX ORDER — DIMENHYDRINATE 50 MG
50 TABLET ORAL ONCE
Status: COMPLETED | OUTPATIENT
Start: 2024-05-21 | End: 2024-05-21

## 2024-05-21 RX ORDER — ACETAMINOPHEN 325 MG/1
650 TABLET ORAL EVERY 6 HOURS PRN
Status: DISCONTINUED | OUTPATIENT
Start: 2024-05-21 | End: 2024-05-23 | Stop reason: HOSPADM

## 2024-05-21 RX ORDER — SODIUM CHLORIDE 9 MG/ML
INJECTION, SOLUTION INTRAVENOUS PRN
Status: DISCONTINUED | OUTPATIENT
Start: 2024-05-21 | End: 2024-05-23 | Stop reason: HOSPADM

## 2024-05-21 RX ORDER — ONDANSETRON 2 MG/ML
4 INJECTION INTRAMUSCULAR; INTRAVENOUS EVERY 6 HOURS PRN
Status: DISCONTINUED | OUTPATIENT
Start: 2024-05-21 | End: 2024-05-23 | Stop reason: HOSPADM

## 2024-05-21 RX ORDER — FENTANYL CITRATE 50 UG/ML
25 INJECTION, SOLUTION INTRAMUSCULAR; INTRAVENOUS EVERY 5 MIN PRN
Status: DISCONTINUED | OUTPATIENT
Start: 2024-05-21 | End: 2024-05-21 | Stop reason: HOSPADM

## 2024-05-21 RX ORDER — ATORVASTATIN CALCIUM 40 MG/1
80 TABLET, FILM COATED ORAL NIGHTLY
Status: DISCONTINUED | OUTPATIENT
Start: 2024-05-21 | End: 2024-05-23 | Stop reason: HOSPADM

## 2024-05-21 RX ORDER — M-VIT,TX,IRON,MINS/CALC/FOLIC 27MG-0.4MG
1 TABLET ORAL DAILY
Status: DISCONTINUED | OUTPATIENT
Start: 2024-05-22 | End: 2024-05-23 | Stop reason: HOSPADM

## 2024-05-21 RX ORDER — SODIUM CHLORIDE 0.9 % (FLUSH) 0.9 %
5-40 SYRINGE (ML) INJECTION PRN
Status: DISCONTINUED | OUTPATIENT
Start: 2024-05-21 | End: 2024-05-21 | Stop reason: HOSPADM

## 2024-05-21 RX ORDER — 0.9 % SODIUM CHLORIDE 0.9 %
1000 INTRAVENOUS SOLUTION INTRAVENOUS ONCE
Status: COMPLETED | OUTPATIENT
Start: 2024-05-21 | End: 2024-05-21

## 2024-05-21 RX ORDER — POLYETHYLENE GLYCOL 3350 17 G/17G
17 POWDER, FOR SOLUTION ORAL DAILY PRN
Status: DISCONTINUED | OUTPATIENT
Start: 2024-05-21 | End: 2024-05-23 | Stop reason: HOSPADM

## 2024-05-21 RX ORDER — ACETAMINOPHEN 650 MG/1
650 SUPPOSITORY RECTAL EVERY 6 HOURS PRN
Status: DISCONTINUED | OUTPATIENT
Start: 2024-05-21 | End: 2024-05-23 | Stop reason: HOSPADM

## 2024-05-21 RX ORDER — METOCLOPRAMIDE HYDROCHLORIDE 5 MG/ML
10 INJECTION INTRAMUSCULAR; INTRAVENOUS
Status: DISCONTINUED | OUTPATIENT
Start: 2024-05-21 | End: 2024-05-21 | Stop reason: HOSPADM

## 2024-05-21 RX ORDER — PROPOFOL 10 MG/ML
INJECTION, EMULSION INTRAVENOUS PRN
Status: DISCONTINUED | OUTPATIENT
Start: 2024-05-21 | End: 2024-05-21 | Stop reason: SDUPTHER

## 2024-05-21 RX ORDER — LIDOCAINE HYDROCHLORIDE 20 MG/ML
JELLY TOPICAL PRN
Status: DISCONTINUED | OUTPATIENT
Start: 2024-05-21 | End: 2024-05-21 | Stop reason: ALTCHOICE

## 2024-05-21 RX ORDER — SODIUM CHLORIDE, SODIUM LACTATE, POTASSIUM CHLORIDE, CALCIUM CHLORIDE 600; 310; 30; 20 MG/100ML; MG/100ML; MG/100ML; MG/100ML
INJECTION, SOLUTION INTRAVENOUS CONTINUOUS
Status: DISCONTINUED | OUTPATIENT
Start: 2024-05-21 | End: 2024-05-21 | Stop reason: HOSPADM

## 2024-05-21 RX ORDER — IOPAMIDOL 755 MG/ML
75 INJECTION, SOLUTION INTRAVASCULAR
Status: DISCONTINUED | OUTPATIENT
Start: 2024-05-21 | End: 2024-05-21

## 2024-05-21 RX ORDER — NOREPINEPHRINE BITARTRATE 0.06 MG/ML
1-100 INJECTION, SOLUTION INTRAVENOUS CONTINUOUS
Status: DISCONTINUED | OUTPATIENT
Start: 2024-05-21 | End: 2024-05-23 | Stop reason: HOSPADM

## 2024-05-21 RX ORDER — SODIUM CHLORIDE 9 MG/ML
INJECTION, SOLUTION INTRAVENOUS CONTINUOUS
Status: DISCONTINUED | OUTPATIENT
Start: 2024-05-21 | End: 2024-05-22

## 2024-05-21 RX ORDER — LIDOCAINE HYDROCHLORIDE 10 MG/ML
5 INJECTION, SOLUTION EPIDURAL; INFILTRATION; INTRACAUDAL; PERINEURAL ONCE
Status: COMPLETED | OUTPATIENT
Start: 2024-05-21 | End: 2024-05-21

## 2024-05-21 RX ORDER — MAGNESIUM SULFATE IN WATER 40 MG/ML
2000 INJECTION, SOLUTION INTRAVENOUS PRN
Status: DISCONTINUED | OUTPATIENT
Start: 2024-05-21 | End: 2024-05-23 | Stop reason: HOSPADM

## 2024-05-21 RX ORDER — ENOXAPARIN SODIUM 100 MG/ML
40 INJECTION SUBCUTANEOUS DAILY
Status: DISCONTINUED | OUTPATIENT
Start: 2024-05-22 | End: 2024-05-23 | Stop reason: HOSPADM

## 2024-05-21 RX ORDER — ASPIRIN 81 MG/1
81 TABLET ORAL DAILY
Status: DISCONTINUED | OUTPATIENT
Start: 2024-05-22 | End: 2024-05-23 | Stop reason: HOSPADM

## 2024-05-21 RX ADMIN — LIDOCAINE HYDROCHLORIDE 5 ML: 10 INJECTION, SOLUTION EPIDURAL; INFILTRATION; INTRACAUDAL; PERINEURAL at 22:35

## 2024-05-21 RX ADMIN — DEXAMETHASONE SODIUM PHOSPHATE 4 MG: 4 INJECTION INTRA-ARTICULAR; INTRALESIONAL; INTRAMUSCULAR; INTRAVENOUS; SOFT TISSUE at 10:18

## 2024-05-21 RX ADMIN — SODIUM CHLORIDE 1000 ML: 9 INJECTION, SOLUTION INTRAVENOUS at 16:59

## 2024-05-21 RX ADMIN — SODIUM CHLORIDE, PRESERVATIVE FREE 10 ML: 5 INJECTION INTRAVENOUS at 23:01

## 2024-05-21 RX ADMIN — KETOROLAC TROMETHAMINE 15 MG: 30 INJECTION, SOLUTION INTRAMUSCULAR at 10:43

## 2024-05-21 RX ADMIN — Medication 5 MCG/MIN: at 20:09

## 2024-05-21 RX ADMIN — PROPOFOL 100 MG: 10 INJECTION, EMULSION INTRAVENOUS at 10:11

## 2024-05-21 RX ADMIN — IOPAMIDOL 75 ML: 755 INJECTION, SOLUTION INTRAVENOUS at 14:50

## 2024-05-21 RX ADMIN — PHENYLEPHRINE HYDROCHLORIDE 50 MCG: 10 INJECTION INTRAVENOUS at 10:40

## 2024-05-21 RX ADMIN — ATORVASTATIN CALCIUM 80 MG: 40 TABLET, FILM COATED ORAL at 23:01

## 2024-05-21 RX ADMIN — LIDOCAINE HYDROCHLORIDE 60 MG: 20 INJECTION, SOLUTION EPIDURAL; INFILTRATION; INTRACAUDAL; PERINEURAL at 10:11

## 2024-05-21 RX ADMIN — CEFTRIAXONE SODIUM 1000 MG: 1 INJECTION, POWDER, FOR SOLUTION INTRAMUSCULAR; INTRAVENOUS at 18:16

## 2024-05-21 RX ADMIN — SODIUM CHLORIDE: 9 INJECTION, SOLUTION INTRAVENOUS at 22:27

## 2024-05-21 RX ADMIN — SODIUM CHLORIDE 25 ML: 9 INJECTION, SOLUTION INTRAVENOUS at 21:22

## 2024-05-21 RX ADMIN — DIMENHYDRINATE 50 MG: 50 TABLET ORAL at 08:23

## 2024-05-21 RX ADMIN — MEROPENEM 1000 MG: 1 INJECTION, POWDER, FOR SOLUTION INTRAVENOUS at 22:34

## 2024-05-21 RX ADMIN — ONDANSETRON 4 MG: 2 INJECTION INTRAMUSCULAR; INTRAVENOUS at 10:18

## 2024-05-21 RX ADMIN — SODIUM CHLORIDE, POTASSIUM CHLORIDE, SODIUM LACTATE AND CALCIUM CHLORIDE: 600; 310; 30; 20 INJECTION, SOLUTION INTRAVENOUS at 08:28

## 2024-05-21 RX ADMIN — ACETAMINOPHEN 650 MG: 325 TABLET ORAL at 08:23

## 2024-05-21 RX ADMIN — SODIUM CHLORIDE 1000 ML: 9 INJECTION, SOLUTION INTRAVENOUS at 19:20

## 2024-05-21 RX ADMIN — SODIUM CHLORIDE 1000 ML: 9 INJECTION, SOLUTION INTRAVENOUS at 18:09

## 2024-05-21 RX ADMIN — Medication 2000 MG: at 10:03

## 2024-05-21 ASSESSMENT — PAIN - FUNCTIONAL ASSESSMENT
PAIN_FUNCTIONAL_ASSESSMENT: NONE - DENIES PAIN

## 2024-05-21 ASSESSMENT — LIFESTYLE VARIABLES
HOW MANY STANDARD DRINKS CONTAINING ALCOHOL DO YOU HAVE ON A TYPICAL DAY: PATIENT DOES NOT DRINK
HOW OFTEN DO YOU HAVE A DRINK CONTAINING ALCOHOL: NEVER

## 2024-05-21 NOTE — ANESTHESIA PRE PROCEDURE
05/21/24 0810   BP:  (!) 141/84   Pulse:  87   Resp:  17   Temp:  36.2 °C (97.2 °F)   TempSrc:  Temporal   SpO2:  96%   Weight: 46.7 kg (103 lb) 46.7 kg (103 lb)   Height: 1.473 m (4' 10\") 1.473 m (4' 10\")                                              BP Readings from Last 3 Encounters:   05/21/24 (!) 141/84   05/01/24 110/65   04/15/24 97/68       NPO Status: Time of last liquid consumption: 0700 (Sip with AM meds)                        Time of last solid consumption: 2100                        Date of last liquid consumption: 05/21/24                        Date of last solid food consumption: 05/20/24    BMI:   Wt Readings from Last 3 Encounters:   05/21/24 46.7 kg (103 lb)   04/15/24 46.9 kg (103 lb 8 oz)   02/27/24 49 kg (108 lb)     Body mass index is 21.53 kg/m².    CBC:   Lab Results   Component Value Date/Time    WBC 11.3 02/26/2024 12:10 PM    WBC 14.4 02/20/2024 05:30 AM    RBC 3.84 02/26/2024 12:10 PM    HGB 10.2 02/26/2024 12:10 PM    HCT 31.4 02/26/2024 12:10 PM    MCV 81.8 02/26/2024 12:10 PM    RDW 14.7 02/26/2024 12:10 PM     02/26/2024 12:10 PM     02/20/2024 05:30 AM       CMP:   Lab Results   Component Value Date/Time     02/26/2024 12:10 PM    K 5.4 02/26/2024 12:10 PM    CL 98 02/26/2024 12:10 PM    CO2 26 02/26/2024 12:10 PM    BUN 13 02/26/2024 12:10 PM    CREATININE 0.61 02/26/2024 12:10 PM    GFRAA >60 11/11/2021 06:10 AM    LABGLOM >60 02/20/2024 05:30 AM    GLUCOSE 300 02/26/2024 12:10 PM    CALCIUM 10.0 02/26/2024 12:10 PM    BILITOT 1.9 02/16/2024 01:43 PM    ALKPHOS 99 02/16/2024 01:43 PM    AST 32 02/16/2024 01:43 PM    ALT 31 02/16/2024 01:43 PM       POC Tests:   No results for input(s): \"POCGLU\", \"POCNA\", \"POCK\", \"POCCL\", \"POCBUN\", \"POCHEMO\", \"POCHCT\" in the last 72 hours.      Coags:   Lab Results   Component Value Date/Time    PROTIME 13.0 02/12/2024 03:14 PM    INR 1.0 02/12/2024 03:14 PM    APTT 27.0 02/12/2024 03:14 PM       HCG (If Applicable): No results

## 2024-05-21 NOTE — ED PROVIDER NOTES
[facility] ICU  EMERGENCY DEPARTMENT ENCOUNTER      Pt Name: Macey Zazueta  MRN: [id number]  Birthdate 1950  Date of evaluation: 5/21/2024  Provider: Lucy Bui MD    CHIEF COMPLAINT       Chief Complaint   Patient presents with    Cerebrovascular Accident     Patient was just discharged today after having a lazer kidney stone removal with a stent replacement. Pt  states that she suddenly became weak, unable to speak clearly or follow directions.   Patient does have hx of strokes in past.     Post-op Problem     Bleeding at stent placement site, stent visible         HISTORY OF PRESENT ILLNESS   (Location/Symptom, Timing/Onset, Context/Setting, Quality, Duration, Modifying Factors, Severity)  Note limiting factors.   Macey Zazueta is a 74 y.o. female who presents to the emergency department      73 yo female with weakness. S/p lithotripsy earlier in the day.  H/o CVA    Sudden onset weakness.  S/p lithotripsy earlier in day.  H/o CVA    Nursing Notes were reviewed.    REVIEW OF SYSTEMS    (2-9 systems for level 4, 10 or more for level 5)     Review of Systems   All other systems reviewed and are negative.      Except as noted above the remainder of the review of systems was reviewed and negative.       PAST MEDICAL HISTORY     Past Medical History:   Diagnosis Date    COVID-19 virus infection 2021    Essential hypertension     Left pontine cerebrovascular accident (HCC) 2017    Mixed hyperlipidemia     Periorbital cellulitis /  right side /  resolved with Omnicef 2022 12/13/2022    Type 2 diabetes mellitus without complication, without long-term current use of insulin (HCC)          SURGICAL HISTORY       Past Surgical History:   Procedure Laterality Date    CATARACT REMOVAL Right 2020    CYSTOSCOPY Left 2/14/2024    CYSTOSCOPY URETERAL STENT INSERTION performed by Kieran Lees MD at [facility] OR    LITHOTRIPSY Left 3/5/2024    EXTRACORPOREAL SHOCK WAVE LITHOTRIPSY performed by Kieran Lees MD at [facility] OR    Transportation Needs: No Transportation Needs (2/16/2024)    PRAPARE - Transportation     Lack of Transportation (Medical): No     Lack of Transportation (Non-Medical): No   Physical Activity: Inactive (10/12/2022)    Exercise Vital Sign     Days of Exercise per Week: 0 days     Minutes of Exercise per Session: 0 min   Stress: No Stress Concern Present (10/12/2022)    [employer] [place] [employer] - Occupational Stress Questionnaire     Feeling of Stress : Not at all   Social Connections: Moderately Isolated (10/12/2022)    Social Connection and Isolation Panel [NHANES]     Frequency of Communication with Friends and Family: Twice a week     Frequency of Social Gatherings with Friends and Family: Once a week     Attends Buddhism Services: Never     Active Member of Clubs or Organizations: No     Attends Club or Organization Meetings: Never     Marital Status:    Intimate Partner Violence: Not At Risk (10/12/2022)    Humiliation, Afraid, Rape, and Kick questionnaire     Fear of Current or Ex-Partner: No     Emotionally Abused: No     Physically Abused: No     Sexually Abused: No   Housing Stability: Low Risk  (2/16/2024)    Housing Stability Vital Sign     Unable to Pay for Housing in the Last Year: No     Number of Places Lived in the Last Year: 1     Unstable Housing in the Last Year: No       SCREENINGS        Simms Coma Scale  Eye Opening: Spontaneous  Best Verbal Response: Confused  Best Motor Response: Obeys commands  Delfina Coma Scale Score: 14               PHYSICAL EXAM    (up to 7 for level 4, 8 or more for level 5)     ED Triage Vitals [05/21/24 1515]   BP Temp Temp src Pulse Respirations SpO2 Height Weight   (!) 120/51 97.6 °F (36.4 °C) -- (!) 123 30 95 % -- --       Physical Exam    DIAGNOSTIC RESULTS     EKG: All EKG's are interpreted by the Emergency Department Physician who either signs or Co-signs this chart in the absence of a cardiologist.    ***    RADIOLOGY:   Non-plain

## 2024-05-21 NOTE — ED NOTES
Per Dr. Bui, hourly NIH scale no longer needed.  Family updated on plan of care.   Pt resting comfortably in stretcher

## 2024-05-21 NOTE — ANESTHESIA POSTPROCEDURE EVALUATION
Department of Anesthesiology  Postprocedure Note    Patient: Macey Zazueta  MRN: [id number]  YOB: 1950  Date of evaluation: 5/21/2024    Procedure Summary       Date: 05/21/24 Room / Location: [facility]    Anesthesia Start: 1005 Anesthesia Stop: 1054    Procedure: CYSTOSCOPY URETEROSCOPY LASER-THULIUM LITHOTRIPSY WITH LEFT STENT EXCHANGE (Left) Diagnosis:       Nephrolithiasis      (Nephrolithiasis [N20.0])    Surgeons: Kieran Lees MD Responsible Provider: Renae Mack APRN - CRNA    Anesthesia Type: general ASA Status: 3            Anesthesia Type: No value filed.    Anna Phase I: Anna Score: 10    Anna Phase II: Anna Score: 10    Anesthesia Post Evaluation    Patient location during evaluation: PACU  Patient participation: complete - patient participated  Level of consciousness: awake  Airway patency: patent  Nausea & Vomiting: no vomiting and no nausea  Cardiovascular status: blood pressure returned to baseline and hemodynamically stable  Respiratory status: acceptable, spontaneous ventilation and room air  Hydration status: stable  Multimodal analgesia pain management approach  Pain management: satisfactory to patient        No notable events documented.

## 2024-05-21 NOTE — PROGRESS NOTES
Pt and  verbalized readiness to go home.    Discharge Criteria    Inpatients must meet Criteria 1 through 7. All other patients are either YES or N/A. If a NO is chosen then Anesthesia or Surgeon must be notified.      1.  Minimum 30 minutes after last dose of sedative medication.    Yes      2.  Systolic BP between 90 - 160. Diastolic BP between 60 - 90.    Yes      3.  Pulse between 60 - 120    Yes      4.  Respirations between 8 - 25.    Yes      5.  SpO2 92% - 100%.    Yes      6.  Able to cough and swallow or return to baseline function.    Yes      7.  Alert and oriented or return to baseline mental status.    Yes      8.  Demonstrates controlled, coordinated movements, ambulates with steady gait, or return to baseline activity function.    Yes      9.  Minimal or no pain or nausea, or at a level tolerable and acceptable to patient.    Yes      10. Takes and retains oral fluids as allowed.    Yes      11. Procedural / perioperative site stable.  Minimal or no bleeding.    Yes          12. If GI endoscopy procedure, minimal or no abdominal distention or passing flatus.    N/A      13. Written discharge instructions and emergency telephone number provided.    Yes      14. Accompanied by a responsible adult.    Yes

## 2024-05-21 NOTE — OP NOTE
[address] [address]                            OPERATIVE REPORT      PATIENT NAME: CHRISTINA GAYLE                 : 1950  MED REC NO: [id number]                          ROOM: [facility]  ACCOUNT NO: [id number]                       ADMIT DATE: 2024  PROVIDER: Kieran Lees MD      DATE OF PROCEDURE:  2024    SURGEON:  Kieran Lees MD    ASSISTANT:  None.    PREOPERATIVE DIAGNOSIS:  Left ureteral calculus.    POSTOPERATIVE DIAGNOSIS:  Left ureteral calculus.    PROCEDURES:  Cystoscopy, left ureteroscopy, left holmium laser lithotripsy, left ureteral stent exchange.    ANESTHESIA:  General.    COMPLICATIONS:  None.    ESTIMATED BLOOD LOSS:  Minimal.    SPECIMENS:  None.    PROSTHESIS:  6-Irish x 24 cm double-J ureteral stent.    DISPOSITION:  Stable.    FINDINGS:  Large proximal left ureteral stone.    INDICATIONS:  The patient is a 74-year-old female who had previous lithotripsy, shockwave on the left, has a large fragment stuck in the proximal ureter, stents in place, here now for definitive therapy.    DESCRIPTION OF PROCEDURE:  The patient was taken back to the operating room after informed consent including all risks, benefits, and alternatives were obtained.  The patient was transferred from [facility] onto the operating table, where she was induced under general anesthesia.  She was given IV Ancef for preoperative antibiotic prophylaxis.  To begin the case, prepped and draped in normal sterile fashion, placed in dorsal lithotomy.  She had a 22-Irish sheath 30-degree lens passed through the urethra into the bladder.  Once in the bladder, we identified the left stent.  This was grasped and removed through the meatus.  A 0.035-inch wire was passed up.  We were then able to place the additional Glidewire up with the dual-lumen catheter.  We then placed a flexible ureteroscope up, identified the stone in the  proximal ureter, took a 400 micron laser fiber and ablated the stone adequately, went into the kidney and worked on this several times, worked on several fragments and several calices.  Once adequate ablation was done, we then removed the scope, removing the Glidewire.  Proximal curl was confirmed by fluoroscopy and distal curl was confirmed by visualization.  At this point in time, stent string was tied to the right thigh with Steri-Strips and benzoin and she returned to the PACU in satisfactory condition by Nursing and Anesthesia Teams.    PLAN:  The patient will be discharged home per PACU criteria and follow up with us next week for stent removal via string.          LILIBETH NO MD      D:  05/21/2024 12:43:51     T:  05/21/2024 13:16:34     CLEMENCIA/NAVEEN  Job #:  [id number]     Doc#:  [id number]

## 2024-05-21 NOTE — ED PROVIDER NOTES
[facility]  Emergency Department  Emergency Medicine Resident Sign-out     Care of Macey Zazueta was assumed from Dr. Bui and is being seen for Cerebrovascular Accident (Patient was just discharged today after having a lazer kidney stone removal with a stent replacement. Pt  states that she suddenly became weak, unable to speak clearly or follow directions. /Patient does have hx of strokes in past. ) and Post-op Problem (Bleeding at stent placement site, stent visible)  .  The patient's initial evaluation and plan have been discussed with the prior provider who initially evaluated the patient.     EMERGENCY DEPARTMENT COURSE / MEDICAL DECISION MAKING:       MEDICATIONS GIVEN:  Orders Placed This Encounter   Medications    iopamidol (ISOVUE-370) 76 % injection 75 mL    DISCONTD: iopamidol (ISOVUE-370) 76 % injection 75 mL    sodium chloride 0.9 % bolus 1,000 mL    sodium chloride 0.9 % bolus 1,000 mL    cefTRIAXone (ROCEPHIN) 1,000 mg in sodium chloride 0.9 % 50 mL IVPB ([id number])     Order Specific Question:   Antimicrobial Indications     Answer:   Urinary Tract Infection    sodium chloride 0.9 % bolus 1,000 mL       LABS / RADIOLOGY:     Results for orders placed or performed during the hospital encounter of 05/21/24   Glucose, Whole Blood   Result Value Ref Range    POC Glucose 172 (H) 74 - 100 mg/dL   CBC with Auto Differential   Result Value Ref Range    WBC 4.7 3.5 - 11.3 k/uL    RBC 4.64 3.95 - 5.11 m/uL    Hemoglobin 12.0 11.9 - 15.1 g/dL    Hematocrit 38.9 36.3 - 47.1 %    MCV 83.8 82.6 - 102.9 fL    MCH 25.9 25.2 - 33.5 pg    MCHC 30.8 28.4 - 34.8 g/dL    RDW 16.8 (H) 11.8 - 14.4 %    Platelets 179 138 - 453 k/uL    MPV 10.6 8.1 - 13.5 fL    NRBC Automated 0.4 (H) 0.0 per 100 WBC    Neutrophils % 92 (H) 36 - 65 %    Lymphocytes % 5 (L) 24 - 43 %    Monocytes % 2 (L) 3 - 12 %    Eosinophils % 1 1 - 4 %    Immature Granulocytes % 0 0 %    Basophils % 0 0 - 2 %    Neutrophils  loss.  There is moderate ventriculomegaly, disproportionate to the degree of volume loss, likely with superimposed mild communicating hydrocephalus, stable.  There is periventricular white matter low attenuation, likely related to moderate chronic microvascular disease. There is no acute intracranial hemorrhage, mass effect or midline shift.  No abnormal extra-axial fluid collection.  The gray-white differentiation is maintained without evidence of an acute infarct. ORBITS: The visualized portion of the orbits demonstrate no acute abnormality. SINUSES: The visualized paranasal sinuses and mastoid air cells demonstrate no acute abnormality. SOFT TISSUES/SKULL:  No acute abnormality of the visualized skull or soft tissues.     No acute intracranial abnormality. Old lacunar infarct in the right thalamus. Mild parenchymal volume loss. Moderate ventriculomegaly, disproportionate to the degree of volume loss, likely with superimposed mild communicating hydrocephalus, stable. Moderate chronic microvascular disease.     FLUORO FOR SURGICAL PROCEDURES    Result Date: 5/21/2024  Radiology exam is complete. No Radiologist dictation. Please follow up with ordering provider.     XR ABDOMEN (KUB) (SINGLE AP VIEW)    Result Date: 4/30/2024  EXAMINATION: ONE SUPINE XRAY VIEW(S) OF THE ABDOMEN 4/29/2024 1:40 pm COMPARISON: None. HISTORY: ORDERING SYSTEM PROVIDED HISTORY: Renal stone TECHNOLOGIST PROVIDED HISTORY: s/p ESWL FINDINGS: Bowel gas pattern nonobstructed.  Renal shadows partially obscured by bowel gas and fecal debris.  Left ureteral stent in place.  15 mm left proximal ureteral/renal stone.  No definite right renal or ureteral stones. Calcifications the pelvis likely uterine fibroids.  No acute osseous abnormality.     Left ureteral stent in place. 15 mm left proximal ureteral/renal stone.       RECENT VITALS:     Temp: 97.6 °F (36.4 °C),  Pulse: (!) 101, Respirations: (!) 33, BP: (!) 86/53, SpO2: 93 %    This patient is a

## 2024-05-21 NOTE — DISCHARGE INSTRUCTIONS
SAME DAY SURGERY DISCHARGE INSTRUCTIONS    1.  Do not drive or operate hazardous machinery for 24 hours.    2.  Do not make important personal or business decisions for 24 hours.    3.  Do not drink alcoholic beverages for 24 hours.    4.  Do not smoke tobacco products for 24 hours.    5.  Eat light foods (Jell-O, soups, etc....) and drink plenty of fluids (water, Sprite, etc...) up to 8 glasses per day, as you can tolerate.    6.  Limit your activities for 24 hours.  Do not engage in heavy work until your surgeon gives you permission.      7.  Patient should not be left alone for 12-24 hours following surgical procedure.    8.  Wash hands before and after incision care.  It is important to practice good personal hygiene during the post op period.    9.  Call your surgeon for any questions regarding your surgery.    CYSTOSCOPY DISCHARGE INSTRUCTIONS    Possible burning during urination and/or blood tinged urine.    Drink 6-8 glasses of water for the next day or so.  (This helps to flush the urinary tract.)    Be careful of stent string taped to inner thigh.    Call Dr. Lees ([phone]) if you develop:    Fever over 100 degrees  Prolonged soreness/pain  Unusual bleeding/bruising  Unable to urinate or if urine is bloody  You cannot pass urine 8 hours after the test.  You have pain in your belly or your back just below your rib cage.  (This is called flank pain.)  You have frequent urge to urinate but can pass only small amounts of urine.    Call Dr. Lees office for follow-up appointment ([phone]).

## 2024-05-22 ENCOUNTER — APPOINTMENT (OUTPATIENT)
Dept: ULTRASOUND IMAGING | Age: 74
DRG: 659 | End: 2024-05-22
Payer: MEDICARE

## 2024-05-22 ENCOUNTER — APPOINTMENT (OUTPATIENT)
Dept: GENERAL RADIOLOGY | Age: 74
DRG: 659 | End: 2024-05-22
Payer: MEDICARE

## 2024-05-22 ENCOUNTER — APPOINTMENT (OUTPATIENT)
Age: 74
DRG: 659 | End: 2024-05-22
Payer: MEDICARE

## 2024-05-22 PROBLEM — R79.89 ELEVATED TROPONIN: Status: ACTIVE | Noted: 2024-05-22

## 2024-05-22 PROBLEM — R00.0 SINUS TACHYCARDIA: Status: ACTIVE | Noted: 2024-05-22

## 2024-05-22 PROBLEM — R41.82 ALTERED MENTAL STATUS: Status: ACTIVE | Noted: 2024-05-22

## 2024-05-22 LAB
ALBUMIN SERPL-MCNC: 3.1 G/DL (ref 3.5–5.2)
ALBUMIN/GLOB SERPL: 1.7 {RATIO} (ref 1–2.5)
ALP SERPL-CCNC: 80 U/L (ref 35–104)
ALT SERPL-CCNC: 244 U/L (ref 5–33)
ANION GAP SERPL CALCULATED.3IONS-SCNC: 12 MMOL/L (ref 9–17)
ANION GAP SERPL CALCULATED.3IONS-SCNC: 9 MMOL/L (ref 9–17)
ARTERIAL PATENCY WRIST A: ABNORMAL
AST SERPL-CCNC: 410 U/L
BACTERIA URNS QL MICRO: ABNORMAL
BASOPHILS # BLD: 0 K/UL (ref 0–0.2)
BASOPHILS NFR BLD: 0 % (ref 0–2)
BILIRUB SERPL-MCNC: 1.3 MG/DL (ref 0.3–1.2)
BILIRUB UR QL STRIP: NEGATIVE
BNP SERPL-MCNC: ABNORMAL PG/ML
BODY TEMPERATURE: 37
BUN SERPL-MCNC: 22 MG/DL (ref 8–23)
BUN SERPL-MCNC: 22 MG/DL (ref 8–23)
BUN/CREAT SERPL: 18 (ref 9–20)
BUN/CREAT SERPL: 20 (ref 9–20)
CALCIUM SERPL-MCNC: 7.3 MG/DL (ref 8.6–10.4)
CALCIUM SERPL-MCNC: 7.7 MG/DL (ref 8.6–10.4)
CHLORIDE SERPL-SCNC: 117 MMOL/L (ref 98–107)
CHLORIDE SERPL-SCNC: 119 MMOL/L (ref 98–107)
CLARITY UR: ABNORMAL
CO2 SERPL-SCNC: 16 MMOL/L (ref 20–31)
CO2 SERPL-SCNC: 17 MMOL/L (ref 20–31)
COLOR UR: ABNORMAL
CREAT SERPL-MCNC: 1.1 MG/DL (ref 0.5–0.9)
CREAT SERPL-MCNC: 1.2 MG/DL (ref 0.5–0.9)
ECHO AV CUSP MM: 1.6 CM
ECHO AV MEAN GRADIENT: 5 MMHG
ECHO AV MEAN VELOCITY: 1.1 M/S
ECHO AV PEAK GRADIENT: 9 MMHG
ECHO AV PEAK VELOCITY: 1.5 M/S
ECHO AV VELOCITY RATIO: 0.73
ECHO AV VTI: 25.3 CM
ECHO BSA: 1.42 M2
ECHO EST RA PRESSURE: 3 MMHG
ECHO LA AREA 2C: 14.3 CM2
ECHO LA AREA 4C: 13.3 CM2
ECHO LA MAJOR AXIS: 5.1 CM
ECHO LA MINOR AXIS: 5.3 CM
ECHO LA VOL BP: 30 ML (ref 22–52)
ECHO LA VOL MOD A2C: 32 ML (ref 22–52)
ECHO LA VOL MOD A4C: 29 ML (ref 22–52)
ECHO LA VOL/BSA BIPLANE: 21 ML/M2 (ref 16–34)
ECHO LA VOLUME INDEX MOD A2C: 23 ML/M2 (ref 16–34)
ECHO LA VOLUME INDEX MOD A4C: 21 ML/M2 (ref 16–34)
ECHO LV E' LATERAL VELOCITY: 8 CM/S
ECHO LV EDV A2C: 21 ML
ECHO LV EDV A4C: 28 ML
ECHO LV EDV INDEX A4C: 20 ML/M2
ECHO LV EDV NDEX A2C: 15 ML/M2
ECHO LV EJECTION FRACTION A2C: 58 %
ECHO LV EJECTION FRACTION A4C: 65 %
ECHO LV EJECTION FRACTION BIPLANE: 62 % (ref 55–100)
ECHO LV ESV A2C: 9 ML
ECHO LV ESV A4C: 10 ML
ECHO LV ESV INDEX A2C: 6 ML/M2
ECHO LV ESV INDEX A4C: 7 ML/M2
ECHO LV FRACTIONAL SHORTENING: 29 % (ref 28–44)
ECHO LV INTERNAL DIMENSION DIASTOLE INDEX: 2.91 CM/M2
ECHO LV INTERNAL DIMENSION DIASTOLIC: 4.1 CM (ref 3.9–5.3)
ECHO LV INTERNAL DIMENSION SYSTOLIC INDEX: 2.06 CM/M2
ECHO LV INTERNAL DIMENSION SYSTOLIC: 2.9 CM
ECHO LV IVSD: 0.8 CM (ref 0.6–0.9)
ECHO LV MASS 2D: 105.6 G (ref 67–162)
ECHO LV MASS INDEX 2D: 74.9 G/M2 (ref 43–95)
ECHO LV POSTERIOR WALL DIASTOLIC: 0.9 CM (ref 0.6–0.9)
ECHO LV RELATIVE WALL THICKNESS RATIO: 0.44
ECHO LVOT AV VTI INDEX: 0.65
ECHO LVOT MEAN GRADIENT: 2 MMHG
ECHO LVOT PEAK GRADIENT: 5 MMHG
ECHO LVOT PEAK VELOCITY: 1.1 M/S
ECHO LVOT VTI: 16.4 CM
ECHO MV A VELOCITY: 1.19 M/S
ECHO MV E DECELERATION TIME (DT): 161 MS
ECHO MV E VELOCITY: 1.14 M/S
ECHO MV E/A RATIO: 0.96
ECHO MV E/E' LATERAL: 14.25
ECHO PV MAX VELOCITY: 0.8 M/S
ECHO PV PEAK GRADIENT: 3 MMHG
ECHO RIGHT VENTRICULAR SYSTOLIC PRESSURE (RVSP): 28 MMHG
ECHO TV REGURGITANT MAX VELOCITY: 2.52 M/S
ECHO TV REGURGITANT PEAK GRADIENT: 25 MMHG
EOSINOPHIL # BLD: 0 K/UL (ref 0–0.44)
EOSINOPHILS RELATIVE PERCENT: 0 % (ref 1–4)
EPI CELLS #/AREA URNS HPF: ABNORMAL /HPF (ref 0–25)
ERYTHROCYTE [DISTWIDTH] IN BLOOD BY AUTOMATED COUNT: 16.7 % (ref 11.8–14.4)
FIO2 ON VENT: 21 %
GAS FLOW.O2 O2 DELIVERY SYS: ABNORMAL L/MIN
GFR, ESTIMATED: 48 ML/MIN/1.73M2
GFR, ESTIMATED: 53 ML/MIN/1.73M2
GLUCOSE SERPL-MCNC: 145 MG/DL (ref 70–99)
GLUCOSE SERPL-MCNC: 96 MG/DL (ref 70–99)
GLUCOSE UR STRIP-MCNC: ABNORMAL MG/DL
HCO3 VENOUS: 12.2 MMOL/L (ref 24–30)
HCT VFR BLD AUTO: 28.5 % (ref 36.3–47.1)
HCT VFR BLD AUTO: 29.7 % (ref 36.3–47.1)
HCT VFR BLD AUTO: NORMAL % (ref 36.3–47.1)
HGB BLD-MCNC: 9.2 G/DL (ref 11.9–15.1)
HGB BLD-MCNC: 9.3 G/DL (ref 11.9–15.1)
HGB BLD-MCNC: NORMAL G/DL (ref 11.9–15.1)
HGB UR QL STRIP.AUTO: ABNORMAL
IMM GRANULOCYTES # BLD AUTO: 0.41 K/UL (ref 0–0.3)
IMM GRANULOCYTES NFR BLD: 2 %
KETONES UR STRIP-MCNC: NEGATIVE MG/DL
LACTATE BLDV-SCNC: 1.7 MMOL/L (ref 0.5–2.2)
LACTATE BLDV-SCNC: 2.7 MMOL/L (ref 0.5–2.2)
LACTATE BLDV-SCNC: 4.4 MMOL/L (ref 0.5–1.9)
LACTATE BLDV-SCNC: 4.5 MMOL/L (ref 0.5–2.2)
LACTATE BLDV-SCNC: NORMAL MMOL/L (ref 0.5–1.9)
LEUKOCYTE ESTERASE UR QL STRIP: ABNORMAL
LYMPHOCYTES NFR BLD: 0.21 K/UL (ref 1.1–3.7)
LYMPHOCYTES RELATIVE PERCENT: 1 % (ref 24–43)
MAGNESIUM SERPL-MCNC: 0.9 MG/DL (ref 1.6–2.6)
MAGNESIUM SERPL-MCNC: 2.1 MG/DL (ref 1.6–2.6)
MCH RBC QN AUTO: 26.4 PG (ref 25.2–33.5)
MCHC RBC AUTO-ENTMCNC: 32.3 G/DL (ref 28.4–34.8)
MCV RBC AUTO: 81.9 FL (ref 82.6–102.9)
MONOCYTES NFR BLD: 0.41 K/UL (ref 0.1–1.2)
MONOCYTES NFR BLD: 2 % (ref 3–12)
MORPHOLOGY: ABNORMAL
NEGATIVE BASE EXCESS, VEN: 11.5 MMOL/L (ref 0–2)
NEUTROPHILS NFR BLD: 95 % (ref 36–65)
NEUTS SEG NFR BLD: 19.67 K/UL (ref 1.5–8.1)
NITRITE UR QL STRIP: NEGATIVE
NRBC BLD-RTO: 0 PER 100 WBC
O2 SAT, VEN: 98.9 % (ref 60–85)
PCO2, VEN, TEMP ADJ: 23.4 MMHG (ref 39–55)
PCO2, VEN: 23.4 MM HG (ref 39–55)
PH UR STRIP: 7 [PH] (ref 5–9)
PH VENOUS: 7.33 (ref 7.32–7.42)
PH, VEN, TEMP ADJ: 7.33 (ref 7.32–7.42)
PLATELET # BLD AUTO: 136 K/UL (ref 138–453)
PMV BLD AUTO: 10.6 FL (ref 8.1–13.5)
PO2, VEN, TEMP ADJ: 150.7 MMHG (ref 30–50)
PO2, VEN: 150.7 MM HG (ref 30–50)
POTASSIUM SERPL-SCNC: 2.4 MMOL/L (ref 3.7–5.3)
POTASSIUM SERPL-SCNC: 4.6 MMOL/L (ref 3.7–5.3)
PROT SERPL-MCNC: 4.9 G/DL (ref 6.4–8.3)
PROT UR STRIP-MCNC: ABNORMAL MG/DL
RBC # BLD AUTO: 3.48 M/UL (ref 3.95–5.11)
RBC #/AREA URNS HPF: ABNORMAL /HPF (ref 0–2)
SODIUM SERPL-SCNC: 144 MMOL/L (ref 135–144)
SODIUM SERPL-SCNC: 146 MMOL/L (ref 135–144)
SP GR UR STRIP: 1.02 (ref 1.01–1.02)
TROPONIN I SERPL HS-MCNC: 133 NG/L (ref 0–14)
TROPONIN I SERPL HS-MCNC: 149 NG/L (ref 0–14)
TROPONIN I SERPL HS-MCNC: 99 NG/L (ref 0–14)
UROBILINOGEN UR STRIP-ACNC: NORMAL EU/DL (ref 0–1)
WBC #/AREA URNS HPF: ABNORMAL /HPF (ref 0–5)
WBC OTHER # BLD: 20.7 K/UL (ref 3.5–11.3)

## 2024-05-22 PROCEDURE — 93005 ELECTROCARDIOGRAM TRACING: CPT | Performed by: EMERGENCY MEDICINE

## 2024-05-22 PROCEDURE — 6360000002 HC RX W HCPCS

## 2024-05-22 PROCEDURE — 6360000002 HC RX W HCPCS: Performed by: STUDENT IN AN ORGANIZED HEALTH CARE EDUCATION/TRAINING PROGRAM

## 2024-05-22 PROCEDURE — 2580000003 HC RX 258: Performed by: STUDENT IN AN ORGANIZED HEALTH CARE EDUCATION/TRAINING PROGRAM

## 2024-05-22 PROCEDURE — 85025 COMPLETE CBC W/AUTO DIFF WBC: CPT

## 2024-05-22 PROCEDURE — 80053 COMPREHEN METABOLIC PANEL: CPT

## 2024-05-22 PROCEDURE — 6360000002 HC RX W HCPCS: Performed by: INTERNAL MEDICINE

## 2024-05-22 PROCEDURE — 83880 ASSAY OF NATRIURETIC PEPTIDE: CPT

## 2024-05-22 PROCEDURE — 82805 BLOOD GASES W/O2 SATURATION: CPT

## 2024-05-22 PROCEDURE — 2000000000 HC ICU R&B

## 2024-05-22 PROCEDURE — 94761 N-INVAS EAR/PLS OXIMETRY MLT: CPT

## 2024-05-22 PROCEDURE — 99223 1ST HOSP IP/OBS HIGH 75: CPT | Performed by: INTERNAL MEDICINE

## 2024-05-22 PROCEDURE — 94660 CPAP INITIATION&MGMT: CPT

## 2024-05-22 PROCEDURE — 97162 PT EVAL MOD COMPLEX 30 MIN: CPT

## 2024-05-22 PROCEDURE — 71045 X-RAY EXAM CHEST 1 VIEW: CPT

## 2024-05-22 PROCEDURE — 93306 TTE W/DOPPLER COMPLETE: CPT | Performed by: FAMILY MEDICINE

## 2024-05-22 PROCEDURE — 51702 INSERT TEMP BLADDER CATH: CPT

## 2024-05-22 PROCEDURE — 76770 US EXAM ABDO BACK WALL COMP: CPT

## 2024-05-22 PROCEDURE — 93306 TTE W/DOPPLER COMPLETE: CPT

## 2024-05-22 PROCEDURE — 2580000003 HC RX 258: Performed by: NURSE PRACTITIONER

## 2024-05-22 PROCEDURE — 5A09357 ASSISTANCE WITH RESPIRATORY VENTILATION, LESS THAN 24 CONSECUTIVE HOURS, CONTINUOUS POSITIVE AIRWAY PRESSURE: ICD-10-PCS | Performed by: UROLOGY

## 2024-05-22 PROCEDURE — 84484 ASSAY OF TROPONIN QUANT: CPT

## 2024-05-22 PROCEDURE — 97166 OT EVAL MOD COMPLEX 45 MIN: CPT

## 2024-05-22 PROCEDURE — 6370000000 HC RX 637 (ALT 250 FOR IP): Performed by: STUDENT IN AN ORGANIZED HEALTH CARE EDUCATION/TRAINING PROGRAM

## 2024-05-22 PROCEDURE — 83735 ASSAY OF MAGNESIUM: CPT

## 2024-05-22 PROCEDURE — NBSRV NON-BILLABLE SERVICE: Performed by: INTERNAL MEDICINE

## 2024-05-22 PROCEDURE — 80048 BASIC METABOLIC PNL TOTAL CA: CPT

## 2024-05-22 PROCEDURE — 83605 ASSAY OF LACTIC ACID: CPT

## 2024-05-22 RX ORDER — 0.9 % SODIUM CHLORIDE 0.9 %
1000 INTRAVENOUS SOLUTION INTRAVENOUS ONCE
Status: COMPLETED | OUTPATIENT
Start: 2024-05-22 | End: 2024-05-22

## 2024-05-22 RX ORDER — SODIUM CHLORIDE AND POTASSIUM CHLORIDE 300; 900 MG/100ML; MG/100ML
INJECTION, SOLUTION INTRAVENOUS CONTINUOUS
Status: DISCONTINUED | OUTPATIENT
Start: 2024-05-22 | End: 2024-05-22

## 2024-05-22 RX ORDER — FUROSEMIDE 10 MG/ML
INJECTION INTRAMUSCULAR; INTRAVENOUS
Status: COMPLETED
Start: 2024-05-22 | End: 2024-05-22

## 2024-05-22 RX ORDER — FUROSEMIDE 10 MG/ML
40 INJECTION INTRAMUSCULAR; INTRAVENOUS ONCE
Status: COMPLETED | OUTPATIENT
Start: 2024-05-22 | End: 2024-05-22

## 2024-05-22 RX ADMIN — ACETAMINOPHEN 650 MG: 325 TABLET ORAL at 04:07

## 2024-05-22 RX ADMIN — POTASSIUM BICARBONATE 40 MEQ: 782 TABLET, EFFERVESCENT ORAL at 12:10

## 2024-05-22 RX ADMIN — POTASSIUM CHLORIDE 10 MEQ: 7.46 INJECTION, SOLUTION INTRAVENOUS at 09:42

## 2024-05-22 RX ADMIN — MEROPENEM 1000 MG: 1 INJECTION, POWDER, FOR SOLUTION INTRAVENOUS at 17:28

## 2024-05-22 RX ADMIN — POTASSIUM CHLORIDE 10 MEQ: 7.46 INJECTION, SOLUTION INTRAVENOUS at 08:40

## 2024-05-22 RX ADMIN — SODIUM CHLORIDE, PRESERVATIVE FREE 10 ML: 5 INJECTION INTRAVENOUS at 07:46

## 2024-05-22 RX ADMIN — POTASSIUM CHLORIDE AND SODIUM CHLORIDE: 900; 300 INJECTION, SOLUTION INTRAVENOUS at 07:44

## 2024-05-22 RX ADMIN — POTASSIUM CHLORIDE 10 MEQ: 7.46 INJECTION, SOLUTION INTRAVENOUS at 07:44

## 2024-05-22 RX ADMIN — POTASSIUM CHLORIDE 10 MEQ: 7.46 INJECTION, SOLUTION INTRAVENOUS at 06:41

## 2024-05-22 RX ADMIN — MAGNESIUM SULFATE HEPTAHYDRATE 2000 MG: 40 INJECTION, SOLUTION INTRAVENOUS at 06:47

## 2024-05-22 RX ADMIN — SODIUM CHLORIDE, PRESERVATIVE FREE 10 ML: 5 INJECTION INTRAVENOUS at 07:47

## 2024-05-22 RX ADMIN — POTASSIUM CHLORIDE 10 MEQ: 7.46 INJECTION, SOLUTION INTRAVENOUS at 12:10

## 2024-05-22 RX ADMIN — MAGNESIUM SULFATE HEPTAHYDRATE 2000 MG: 40 INJECTION, SOLUTION INTRAVENOUS at 08:26

## 2024-05-22 RX ADMIN — FUROSEMIDE 40 MG: 10 INJECTION, SOLUTION INTRAMUSCULAR; INTRAVENOUS at 17:04

## 2024-05-22 RX ADMIN — POTASSIUM CHLORIDE 10 MEQ: 7.46 INJECTION, SOLUTION INTRAVENOUS at 10:41

## 2024-05-22 RX ADMIN — FUROSEMIDE 40 MG: 10 INJECTION INTRAMUSCULAR; INTRAVENOUS at 17:04

## 2024-05-22 RX ADMIN — SODIUM CHLORIDE 1000 ML: 9 INJECTION, SOLUTION INTRAVENOUS at 08:23

## 2024-05-22 RX ADMIN — MEROPENEM 1000 MG: 1 INJECTION, POWDER, FOR SOLUTION INTRAVENOUS at 05:30

## 2024-05-22 NOTE — PROGRESS NOTES
INTENSIVE CARE UNIT   APRN - Progress Note    Patient - Macey Zazueta  Date of Admission -  5/21/2024  2:43 PM  Date of Evaluation -  5/22/2024  Hospital Day - 1      SUBJECTIVE:     The Macey Zazueta is a 74 y.o. female who is seen for follow up in the ICU.  Per nursing report and notes, overnight events include: no significant events.  She resting in bed with  at side no distress. Somnulent but arouses with stimulation.  Pressures are soft      ROS:   Constitutional: negative  for fevers, and negative for chills.  Respiratory: negative for shortness of breath, negative for cough, and negative for wheezing  Cardiovascular: negative for chest pain, and negative for palpitations  Gastrointestinal: negative for abdominal pain, negative for nausea,negative for vomiting, negative for diarrhea, and negative for constipation    All other systems were reviewed with the patient and are negative unless otherwise stated in HPI.      OBJECTIVE:     VITAL SIGNS:  Patient Vitals for the past 8 hrs:   BP Temp Temp src Pulse Resp SpO2 Weight   05/22/24 0714 -- -- -- (!) 112 24 93 % --   05/22/24 0713 -- -- -- (!) 110 22 (!) 88 % --   05/22/24 0700 (!) 102/40 -- -- (!) 110 21 90 % --   05/22/24 0643 (!) 109/44 98.9 °F (37.2 °C) Temporal (!) 112 20 90 % --   05/22/24 0630 (!) 121/42 -- -- (!) 112 27 90 % --   05/22/24 0600 (!) 126/49 -- -- (!) 114 22 91 % --   05/22/24 0530 (!) 112/44 -- -- (!) 115 25 91 % 49.5 kg (109 lb 2 oz)   05/22/24 0500 (!) 104/32 -- -- (!) 114 (!) 31 91 % --   05/22/24 0430 106/74 -- -- (!) 119 28 94 % --   05/22/24 0400 106/65 (!) 101.4 °F (38.6 °C) Temporal (!) 122 21 92 % --   05/22/24 0345 (!) 140/111 -- -- (!) 126 28 90 % --   05/22/24 0330 (!) 139/55 -- -- (!) 124 30 95 % --   05/22/24 0145 (!) 138/50 -- -- (!) 120 27 94 % --   05/22/24 0130 92/68 -- -- (!) 120 22 (!) 84 % --   05/22/24 0115 (!) 91/55 -- -- (!) 118 29 (!) 87 % --   05/22/24 0100 (!) 152/46 -- -- (!) 119 24 94 % --   05/22/24 0045

## 2024-05-22 NOTE — PROGRESS NOTES
Patient has had no loose stools since yesterday at home according to . CDIFF isolation and testing discontinued.

## 2024-05-22 NOTE — ACP (ADVANCE CARE PLANNING)
Advance Care Planning     Palliative Team Advance Care Planning (ACP) Conversation    Date of Conversation: 05/22/24    Individuals present for the conversation: Spouse Pat     ACP documents on file prior to discussion:  -None        Healthcare Decision Maker:    Primary Decision Maker: Yan Zazueta - Spouse - [phone]    Secondary Decision Maker: Kali Zazueta - Child - [phone]     Conversation Summary:  I discuss with  Sandra about advance directives and code status.  He wishes for her to remain a full code    Resuscitation Status:   Code Status: Full Code     Documentation Completed:  -No new documents completed.    I spent 50 minutes with the patient and/or surrogate decision maker discussing the patient's wishes and goals.      Yolanda Galindo RN

## 2024-05-22 NOTE — PROGRESS NOTES
Patient resting in bed at this time and appears lethargic. Wakes up to voice but does not answer questions. Lung sounds clear and diminished. Heart sounds regular but tachycardiac. IV fluids infusing. Electrolytes being replaced see MAR. No swelling noted in extremities. Bed alarm in place. Will continue to monitor.

## 2024-05-22 NOTE — PROGRESS NOTES
[facility]  Inpatient/Observation/Outpatient Rehabilitation    Date: 2024  Patient Name: Macey Zazueta       [x] Inpatient Acute/Observation       []  Outpatient  : 1950           [] Pt no showed for scheduled appointment    [] Pt refused/declined therapy at this time due to:           [] Pt cancelled due to:  [] No Reason Given   [] Sick/ill   [] Other:    [x] Evaluation held by RN/Provider due to:    [] High Heart Rate   [] High Blood Pressure   [] Orthopedic Consult   [] Hgb < 7   [x] Other:    [] Pt does not require skilled services due to:      Therapist/Assistant will attempt to see this patient, at our earliest opportunity.       Deborah Steinberg, PT, DPT Date: 2024

## 2024-05-22 NOTE — PROGRESS NOTES
Meropenem Extended Interval Interchange    The following ordered dose of Meropenem has been changed to optimize its pharmacodynamic profile as approved by the Patient Care Review Committee.    Ordered Dose    __ 500 mg IV every 8 hrs  (30 minute infusion)      _x_ 1 gm  IV every 8  hrs (30 minute infusion)    __ 2 gm IV every 8 hrs (30 minute infusion)    Recent Labs     05/21/24  1430 05/22/24  0530   CREATININE 0.8 1.1*     Estimated Creatinine Clearance: 34 mL/min (A) (based on SCr of 1.1 mg/dL (H)).    CrCl Dose   >50 to <130 500mg-2gm q8hrs over 3 hours   >25 to <49.9 1gm-2gm q12 hrs over 3 hours   10 to <25 500mg-1gm q12hrs over 3 hours   <10 500mg-1gm q24hrs over 3 hours   HD 500mg-1gm q24hrs over 3 hours given after HD   CRRT 500mg-1gm q12hrs over 3 hours       New Dose    Meropenem   1000 gm  IV q 12 hrs  over 3 hours.        Pharmacists should be contacted for issues concerning drug compatibility with multiple IV medications.  All doses will be prepared using 100ml bag to be infused over 3-hours at a rate of 33.3 ml/hr.    Thank You,  Alida Swanson, [facility]5/22/20248:47 AM

## 2024-05-22 NOTE — H&P
History and Physical    Patient:  Macey Zazueta  MRN: [id number]    Chief Complaint: Altered mental status    History Obtained From:  patient, spouse, electronic medical record    PCP: Byron Castaneda MD    History of Present Illness:   The patient is a 74 y.o. female who presents with altered mental status.  Patient had a laser kidney stone removal with stent placement earlier today.  She was discharged from the hospital without issues.  A few hours after being home, her  states that she suddenly became weak, was unable to speak clearly or follow any instructions.  She did have a large amount of diarrhea following this episode.  She also had new left-sided weakness.  Patient has past medical history of a stroke and her  is her primary caregiver.  He states he knew that something \"just was not right\".  Upon arrival to the ER, it was noted that she was having bleeding from her urethra and her stent was partially visible.  The stent was removed by ED staff.  Patient's blood pressure dropped while in the emergency department, with a systolic pressure as low as 78.  She was given IV fluids and started on norepinephrine.  Blood pressure increased to 109/28 by the time of admission.  Patient is alert and at her baseline per her .  She answers questions appropriately.  She does have pain to her lower abdomen with palpation.  Workup revealed a lactic of 6.0, which decreased to 5.2 with fluids.  Initial troponin 18 with repeat of 69.  Initial hemoglobin 12.0 and hematocrit 38.9.  This decreased to 9.0 and 30.5 within a few hours.  Urethral bleeding has slowed at this time.  CT of the abdomen pelvis showed mild left hydronephrosis with diffuse left periureteral inflammatory stranding and scattered small foci of intraureteral air concerning for left-sided urinary tract infection.  No urinalysis was collected in the emergency department for confirmation and is currently pending at this time.        Past  needed. 3/9/17   Darrick Turcios MD   aspirin 81 MG EC tablet Take 1 tablet by mouth daily 3/8/17   Dorinda Bush MD   SOFT TOUCH LANCETS MISC 1 Units by Does not apply route 3 times daily (with meals) 3/8/17   Dorinda Bush MD   Alcohol Swabs 70 % PADS 1 Units by Does not apply route 3 times daily (with meals) 3/8/17   Dorinda Bush MD   glucose monitoring kit (FREESTYLE) monitoring kit 1 kit by Does not apply route 3 times daily 3/8/17   Daniel Dubon MD   Multiple Vitamins-Minerals (THERAPEUTIC MULTIVITAMIN-MINERALS) tablet Take 1 tablet by mouth daily Twice weekly    Provider, MD Jayden       Review of Systems:  Constitutional:negative  for fevers, and negative for chills.  Eyes: negative for visual disturbance   ENT: negative for sore throat, negative nasal congestion, and negative for earache  Respiratory: negative for shortness of breath, negative for cough, and negative for wheezing  Cardiovascular: negative for chest pain, negative for palpitations, and negative for syncope  Gastrointestinal: positive for abdominal pain, negative for nausea,negative for vomiting, positive for diarrhea, negative for constipation, and negative for hematochezia or melena  Genitourinary: negative for dysuria, negative for urinary urgency, negative for urinary frequency, and positive for hematuria  Skin: negative for skin rash, and negative for skin lesions  Neurological: positive for unilateral weakness, numbness or tingling.    Physical Exam:    Vitals:   Temp: 100.3 °F (37.9 °C)  BP: (!) 152/46  Respirations: 24  Pulse: (!) 119  SpO2: 94 %  24HR INTAKE/OUTPUT:    Intake/Output Summary (Last 24 hours) at 5/22/2024 0133  Last data filed at 5/21/2024 2315  Gross per 24 hour   Intake 200 ml   Output --   Net 200 ml       Weight  Wt Readings from Last 1 Encounters:   05/21/24 46.7 kg (103 lb)       There is no height or weight on file to calculate BMI.    Exam:  GEN:    Awake, alert and oriented x3.  At her  and urine culture pending  CBC, CMP  Trend lactate, troponin and H&H  Urology consult  Imaging: no further imaging studies ordered today  Medications:   Norepinephrine infusion  Received 30 mL/kg bolus in ER  Meropenem  IV fluids    Type 2 diabetes  Condition is a chronic stable condition  Treatment plan:  CBC, CMP  Diabetic diet  Medications:  Continue metformin    Nutrition status:   at risk for malnutrition  Dietician consult initiated    Hospital Prophylaxis:   DVT: Lovenox held due to urethral bleeding  Stress Ulcer: Not indicated at this time    MDM Data:   Test interpretation:  My independent EKG interpretation: sinus tachycardia  My independent X-ray interpretation: CT abdomen pelvis showed mild left hydronephrosis with diffuse left periureteral inflammatory stranding and scattered small foci of intraureteral air concerning for left-sided urinary tract infection  Management and/or test interpretation discussed with ER MD at time of admission  Consults and Nursing notes were personally reviewed, all current labs and imaging were personally reviewed, tests ordered: CBC, BMP, and history obtained by independent historian       Disposition:  Shared decision making: All test results, treatment options and disposition options were discussed with the patient today  Social determinants of health that may impact management: none  Code status: Full Code   Disposition: Discharge plan is pending        Critical Care Time:  Total critical care time caring for this patient with life threatening, unstable organ failure, including direct patient contact, management of life support systems, review of data including imaging and labs, discussions with other team members and physicians at least 0 minutes so far today, excluding separately billable procedures.      MIPS Medication Reconciliation documentation:    [x] I have utilized all available immediate resources to obtain, update, or review the patient's current medications  (including all prescriptions, over-the-counter products, herbals, cannabinoid products and bitamin/mineral/dietary/nutritional supplements.  [If 'yes\", STOP HERE]     []  The patient is not eligible for medication reconciliation; the patient is in an emergent medical situation where delaying treatment would jeopardize the patient's health    []  I did NOT confirm, update or review the patient's current list of medications today.  [DOES NOT SATISFY [facility] PERFORMANCE]        [facility] Advanced Care Planning documentation:  [x] I have confirmed that the patient's Advance Care Plan is present, Code Status is documented, or surrogate decision maker is listed in the patient's medical record  [If \"yes\", STOP HERE]     [] The patient's Advance Care Plan is NOT present because:    []  I confirmed today that the patient does not wish or was not able to name a   surrogate decision maker or provide and advance care plan.    [] Hospice care is currently being provided or has been provided within the   calendar year.    []  I did NOT confirm today the presence of an Advance Care Plan or surrogate   decision maker documented within the patient's medical record.   [DOES NOT SATISFY [facility] PERFORMANCE]    CORE MEASURES  DVT prophylaxis: NO chemical ppx due to active bleed  Decubitus ulcer present on admission: No  CODE STATUS: FULL CODE  Nutrition Status: fair   Physical therapy: Yes   Old Charts reviewed: Yes  EKG Reviewed: Yes  Advance Directive Addressed: Yes    LAURA Ramires - CNP, LAURA, NP-C  5/22/2024, 1:33 AM

## 2024-05-22 NOTE — PROGRESS NOTES
Patient more tachypneic and labored breathing at rest. Lung sounds have crackles. Respirations 25-30 at rest. Patient anxious. Patient noted to have some bleeding and clot around garcia catheter. Karely care done at this time. Will update ERIN Savage and Dr. Sosa.

## 2024-05-22 NOTE — CARE COORDINATION
Case Management Assessment  Initial Evaluation    Date/Time of Evaluation: 5/22/2024 3:37 PM  Assessment Completed by: STEFAN Son    If patient is discharged prior to next notation, then this note serves as note for discharge by case management.    Patient Name: Macey Zazueta                   YOB: 1950  Diagnosis: Urinary obstruction [N13.9]  Complicated UTI (urinary tract infection) [N39.0]  Urinary tract infection without hematuria, site unspecified [N39.0]  Altered mental status, unspecified altered mental status type [R41.82]                   Date / Time: 5/21/2024  2:43 PM    Patient Admission Status: Inpatient   Readmission Risk (Low < 19, Mod (19-27), High > 27): Readmission Risk Score: 19    Current PCP: Byron Castaneda MD  PCP verified by CM? Yes    Chart Reviewed: Yes      History Provided by: Patient, Spouse, Medical Record  Patient Orientation: Alert and Oriented ( answered most of assessment questions)    Patient Cognition: Other (see comment) (History of CVA)    Hospitalization in the last 30 days (Readmission):  No    If yes, Readmission Assessment in CM Navigator will be completed.    Advance Directives:      Code Status: Full Code   Patient's Primary Decision Maker is: Named in Scanned ACP Document    Primary Decision Maker: Yan Zazueta - Spouse - [phone]    Secondary Decision Maker: Kali Zazueta - Child - [phone]    Discharge Planning:    Patient lives with: Spouse/Significant Other Type of Home: House  Primary Care Giver: Spouse  Patient Support Systems include: Spouse/Significant Other, Family Members, Friends/Neighbors   Current Financial resources: Medicare  Current community resources: None  Current services prior to admission: Durable Medical Equipment            Current DME: Wheelchair, Shower Chair (transport chair)            Type of Home Care services:  None    ADLS  Prior functional level: Assistance with the following:, Bathing,

## 2024-05-22 NOTE — PROGRESS NOTES
Physical Therapy  Facility/Department: [facility] ICU  Physical Therapy Initial Assessment    Name: Macey Zazueta  : 1950  MRN: [id number]  Date of Service: 2024    Discharge Recommendations:  Continue to assess pending progress, 24 hour supervision or assist, Subacute/Skilled Nursing Facility, Home with Home health PT   PT Equipment Recommendations  Equipment Needed: No      Patient Diagnosis(es): The primary encounter diagnosis was Altered mental status, unspecified altered mental status type. Diagnoses of Urinary tract infection without hematuria, site unspecified, Urinary obstruction, and Septic shock (HCC) were also pertinent to this visit.  Past Medical History:  has a past medical history of COVID-19 virus infection, Essential hypertension, Left pontine cerebrovascular accident (HCC), Mixed hyperlipidemia, Periorbital cellulitis /  right side /  resolved with Omnicef , and Type 2 diabetes mellitus without complication, without long-term current use of insulin (HCC).  Past Surgical History:  has a past surgical history that includes Cataract removal (Right, ); Cystoscopy (Left, 2024); and Lithotripsy (Left, 3/5/2024).    Assessment   Body Structures, Functions, Activity Limitations Requiring Skilled Therapeutic Intervention: Decreased functional mobility ;Decreased strength;Decreased posture;Decreased ADL status;Decreased safe awareness;Decreased high-level IADLs;Decreased balance;Decreased tolerance to work activity;Decreased endurance  Assessment: Pt is a 74 year old female being evaluated for skilled acute care physical therapy following admission to the hospital. Pt with past medical history of CVA and  is main caregiver. Pt presenting with increased need for assistance for all bed mobility tasks and demonstrating poor static sitting balance. Therapist provided Max assist to maintain upright posture. Pt to benefit from skilled acute care physical therapy in order to facilitate  participation in daily tasks. Pt to be re-assessed for transfers when medically appropriate.  Treatment Diagnosis: weakness  Specific Instructions for Next Treatment: 1x daily on weekends  Therapy Prognosis: Fair  Decision Making: Medium Complexity  Requires PT Follow-Up: Yes  Activity Tolerance  Activity Tolerance: Patient tolerated evaluation without incident     Plan   Physical Therapy Plan  General Plan: 2 times a day 7 days a week  Specific Instructions for Next Treatment: 1x daily on weekends  Current Treatment Recommendations: Strengthening, ROM, Balance training, Functional mobility training, Transfer training, Gait training, Neuromuscular re-education, Home exercise program, Positioning, Therapeutic activities, Patient/Caregiver education & training, Safety education & training, Endurance training  Safety Devices  Type of Devices: All fall risk precautions in place, Call light within reach, Nurse notified, Left in bed, Bed alarm in place     Restrictions  Restrictions/Precautions  Restrictions/Precautions: General Precautions, Fall Risk     Subjective   General  Chart Reviewed: Yes  Patient assessed for rehabilitation services?: Yes  Subjective  Subjective: Pt in bed upon therapist arrival.  in room. Per nursing hold out of bed due to blood pressure, but pt ok for bed mobility assessment         Social/Functional History  Social/Functional History  Lives With: Spouse  Type of Home: House  Home Layout: Two level, Able to Live on Main level with bedroom/bathroom  Home Access: Stairs to enter with rails  Entrance Stairs - Number of Steps: 4  Bathroom Shower/Tub:  (sponge bath)  Bathroom Toilet: Bedside commode  Home Equipment: Wheelchair - Manual  ADL Assistance: Needs assistance  Homemaking Assistance: Needs assistance  Ambulation Assistance: Needs assistance  Transfer Assistance: Needs assistance  Additional Comments:  is primary caregiver  Vision/Hearing  Vision  Vision: Within Functional  Limits  Hearing  Hearing: Within functional limits    Cognition   Orientation  Overall Orientation Status: Within Functional Limits  Cognition  Overall Cognitive Status: WFL     Objective   Temp: 98.9 °F (37.2 °C)  Pulse: (!) 115  Heart Rate Source: Monitor  Respirations: 28  SpO2: 96 %  O2 Device: None (Room air)  BP: (!) 145/75  MAP (Calculated): 98  BP Location: Left upper arm  BP Method: Automatic  Patient Position: Semi fowlers                             Bed mobility  Rolling to Left: Moderate assistance  Rolling to Right: Moderate assistance  Supine to Sit: Moderate assistance;2 Person assistance  Sit to Supine: 2 Person assistance;Moderate assistance  Scooting: Maximal assistance           Balance  Posture: Fair  Sitting - Static: Fair  Sitting - Dynamic: Poor;+                                                           AM-PAC - Mobility         AM-PAC Mobility without Stair Climbing Inpatient   How much difficulty turning over in bed?: Unable  How much difficulty sitting down on / standing up from a chair with arms?: Unable  How much difficulty moving from lying on back to sitting on side of bed?: Unable  How much help from another person moving to and from a bed to a chair?: Total  How much help from another person needed to walk in hospital room?: Total  AM-PAC Inpatient Mobility without Stair Climbing Raw Score : 5  AM-PAC Inpatient without Stair Climbing T-Scale Score : 23.59  Mobility Inpatient CMS 0-100% Score: 100  Mobility Inpatient without Stair CMS G-Code Modifier : CN         Goals  Short Term Goals  Time Frame for Short Term Goals: 20 days  Short Term Goal 1: Pt will be re-assessed for out of bed activity and transfers when medically appropriate  Short Term Goal 2: Pt will roll bilaterally at contact guard at most in order to facilitate increased participation with pericare and positioning  Short Term Goal 3: Pt will complete supine to and from sit on edge of bed at [facility] at most in order to

## 2024-05-22 NOTE — PROGRESS NOTES
Writer present, vitals and assessment as charted. Patient alert to self and place, denies pain. Patient incontinent, shirley care performed and HOB elevated slightly per patient request. Patient medicated for fever, see MAR. Patient denies any needs at this time. Call light and bedside table within reach. Bed alarm active for safety.

## 2024-05-22 NOTE — PLAN OF CARE
Problem: Safety - Adult  Goal: Free from fall injury  Outcome: Progressing  Flowsheets (Taken 5/22/2024 1950)  Free From Fall Injury: Instruct family/caregiver on patient safety     Problem: Discharge Planning  Goal: Discharge to home or other facility with appropriate resources  Outcome: Progressing  Flowsheets (Taken 5/22/2024 1950)  Discharge to home or other facility with appropriate resources: Identify barriers to discharge with patient and caregiver     Problem: Skin/Tissue Integrity  Goal: Absence of new skin breakdown  Description: 1.  Monitor for areas of redness and/or skin breakdown  2.  Assess vascular access sites hourly  3.  Every 4-6 hours minimum:  Change oxygen saturation probe site  4.  Every 4-6 hours:  If on nasal continuous positive airway pressure, respiratory therapy assess nares and determine need for appliance change or resting period.  Outcome: Progressing     Problem: Chronic Conditions and Co-morbidities  Goal: Patient's chronic conditions and co-morbidity symptoms are monitored and maintained or improved  Outcome: Progressing  Flowsheets (Taken 5/22/2024 1950)  Care Plan - Patient's Chronic Conditions and Co-Morbidity Symptoms are Monitored and Maintained or Improved:   Monitor and assess patient's chronic conditions and comorbid symptoms for stability, deterioration, or improvement   Collaborate with multidisciplinary team to address chronic and comorbid conditions and prevent exacerbation or deterioration

## 2024-05-22 NOTE — PROGRESS NOTES
Writer present in room, vitals and assessment as charted. EKG performed at this time. Patient alert to self and place, follows commands and denies any pain. Patient refuses to turn or have HOB elevated at this time. Patient denies any needs. Call light and bedside table within reach. Bed alarm active for safety.

## 2024-05-22 NOTE — PROGRESS NOTES
Comprehensive Nutrition Assessment    Type and Reason for Visit:  Initial    Nutrition Recommendations/Plan:   Modify diet to CC with 3 carbs per meal for T2DM.   Start probiotic for UTI and ATB therapy.      Malnutrition Assessment:  Malnutrition Status:  At risk for malnutrition (Comment) (poor intake x 3 mo pta) (05/22/24 8771)    Context:  Acute Illness     Findings of the 6 clinical characteristics of malnutrition:  Energy Intake:  Mild decrease in energy intake (Comment) ( reports decreased intake x 3 mo)  Weight Loss:  No significant weight loss     Body Fat Loss:  No significant body fat loss     Muscle Mass Loss:  No significant muscle mass loss    Fluid Accumulation:  No significant fluid accumulation     Strength:  Not Performed    Nutrition Assessment:    Predicted inadequate energy intake r/t renal dysfunction AEB poor intake pta, diarrhea, and vomiting. Pt admitted for septic shock and UTI one day post surgery for kindey stone removal. Active b/s, no edema noted. Pt has hx of T2DM, glucose was 208 yesterday and WNL today, A1c = 6.2. Modify diet to CC to improve glycemic control. Hx CVA and needs assistance with meals. Frequent coughing during breakfast and SLP evaluation needed per nursing.  reported diarrhea and vomiting yesterday, and poor appetite/ intake x 3 mo pta and he is concerned about her wt. He noted that she typically eats a good breakfast, but does not eat lunch and is often fussy at dinner. She does eat some snacks in place of lunch, usually orange slices. Writer provided brief education to  to encourage high calorie foods first.    Nutrition Related Findings:    Active b/s. No edema noted. Missing teeth. Needs assistance eating, SLP evaluation needed per nursing. Wound Type: None       Current Nutrition Intake & Therapies:    Average Meal Intake: Unable to assess  Average Supplements Intake: None Ordered  ADULT DIET; Regular    Anthropometric Measures:  Height:  147.3 cm (4' 9.99\")  Ideal Body Weight (IBW): 90 lbs (41 kg)    Admission Body Weight: 49.5 kg (109 lb 2 oz)  Current Body Weight: 49.5 kg (109 lb 2 oz), 121.3 % IBW. Weight Source: Bed Scale  Current BMI (kg/m2): 22.8  Usual Body Weight: 49 kg (108 lb)  % Weight Change (Calculated): 1  Weight Adjustment For: No Adjustment                 BMI Categories: Normal Weight (BMI 22.0 to 24.9) age over 65    Estimated Daily Nutrient Needs:  Energy Requirements Based On: Kcal/kg  Weight Used for Energy Requirements: Current  Energy (kcal/day): 3555-6998 (25-30)  Weight Used for Protein Requirements: Current  Protein (g/day): 59-69 (1.2-1.4)  Method Used for Fluid Requirements: 1 ml/kcal  Fluid (ml/day): 1500    Nutrition Diagnosis:   Predicted inadequate energy intake related to renal dysfunction as evidenced by poor intake prior to admission, diarrhea, vomiting    Hematology:  Recent Labs     05/21/24  1430 05/21/24 2030 05/21/24  2345 05/22/24  0530   WBC 4.7  --   --  20.7*   HGB 12.0 9.0* DISREGARD RESULT  9.3* 9.2*   HCT 38.9 30.5* DISREGARD RESULT  29.7* 28.5*     Chemistry:  Recent Labs     05/21/24  1430 05/22/24  0530    146*   K 3.4* 2.4*    117*   CO2 16* 17*   GLUCOSE 208* 96   BUN 18 22   CREATININE 0.8 1.1*   MG  --  0.9*   CALCIUM 9.0 7.7*     Recent Labs     05/21/24  1430 05/22/24  0530   * 410*   * 244*   ALKPHOS 203* 80   BILITOT 1.5* 1.3*     Recent Labs     05/21/24  1500   POCGLU 172*     Hemoglobin A1C   Date Value Ref Range Status   04/09/2024 6.2 (H) 4.2 - 5.6 % Final     Comment:              [employer] GUIDELINES FOR HGB A1C:            PREDIABETES/INCREASED RISK . . . . . . . 5.7-6.4%            DIAGNOSIS OF DIABETES  . . . . . . . . . >=6.5%              WITH CONFIRMATION OR APPROPRIATE SYMPTOMS       NOTE: ASSAY MAY BE AFFECTED BY HEMOGLOBINOPATHIES (SICKLE       CELL ANEMIA, S-C DISEASE, OTHERS) OR ARTIFICIALLY LOWERED BY       DECREASED RED CELL  SURVIVAL (HEMOLYTIC ANEMIAS, BLOOD LOSS,       ETC.).  CONSIDER ALTERNATE TESTING OR LABORATORY CONSULTATION.       No results found for: \"VITD25\"      Nutrition Interventions:   Food and/or Nutrient Delivery: Modify Current Diet  Nutrition Education/Counseling: Education initiated  Coordination of Nutrition Care: Continue to monitor while inpatient  Plan of Care discussed with:     Goals:     Goals: Meet at least 75% of estimated needs       Nutrition Monitoring and Evaluation:      Food/Nutrient Intake Outcomes: Food and Nutrient Intake  Physical Signs/Symptoms Outcomes: Biochemical Data, Diarrhea, Nausea or Vomiting, Weight    Discharge Planning:    Too soon to determine     Katlyn Martínez Dietetic Intern  Contact: [phone]

## 2024-05-22 NOTE — PROGRESS NOTES
Respiratory unable to get ABG. VBG drawn. Dr. Sosa aware. BIPAP applied due to work of breathing. 12/6 at 30% FIO2.

## 2024-05-22 NOTE — CONSULTS
Palliative Care Inpatient Consult    NAME:  Macey Zazueta  MEDICAL RECORD NUMBER:  [id number]  AGE: 74 y.o.   GENDER: female  : 1950  TODAY'S DATE:  2024    Reason for Consult:  code status    History of Present Illness     The patient is a 74 y.o.  Non- / non  female who presents with Cerebrovascular Accident (Patient was just discharged today after having a lazer kidney stone removal with a stent replacement. Pt  states that she suddenly became weak, unable to speak clearly or follow directions. /Patient does have hx of strokes in past. ) and Post-op Problem (Bleeding at stent placement site, stent visible)      Referred to Palliative Care by  [] Physician   [] Nursing  [] Family Request   [x] Other:      She was admitted to the hospitalist service for Urinary obstruction [N13.9]  Complicated UTI (urinary tract infection) [N39.0]  Urinary tract infection without hematuria, site unspecified [N39.0]  Altered mental status, unspecified altered mental status type [R41.82]. The patient has a complicated medical history and has been hospitalized since 2024  2:43 PM.    Active Hospital Problems    Diagnosis Date Noted    Septic shock (HCC) [A41.9, R65.21] 2024    Complicated UTI (urinary tract infection) [N39.0] 2024    Type 2 diabetes mellitus without complication, with long-term current use of insulin (HCC) /  Metformin ER [E11.9, Z79.4] 2017    Left pontine cerebrovascular accident (HCC) /  2017 [I63.9]        Data         BP 97/77   Pulse (!) 103   Temp 98 °F (36.7 °C) (Temporal)   Resp 25   Ht 1.473 m (4' 9.99\")   Wt 49.5 kg (109 lb 2 oz)   SpO2 93%   BMI 22.81 kg/m²     Wt Readings from Last 3 Encounters:   24 49.5 kg (109 lb 2 oz)   24 46.7 kg (103 lb)   04/15/24 46.9 kg (103 lb 8 oz)        Code Status: Full Code     ADVANCED CARE PLANNING:  Patient has capacity for medical decisions: no  Health Care Power of :   states that  19%    Plan      Palliative Interaction: I enter the room and introduce myself to her spouse Sandra.  Macey is resting in bed with her eyes closed at this time.  I explain my role in her care to Sandra.  He tells me that she is living at home with him.  She had a CVA around 7 years ago and has difficulty with balance, so she uses his arm for ambulation.  She does not do well with a walker as she holds it too far out in front of her.  He tells me that she has some times that she does have confusion.  He tells me about their life together, and all of the things that they use to do.  They have a son together and 2 grandsons.  He coaches them for baseball.  He tells me that she is an \"eliazar, and the closest I will ever be to one\".  We discuss her current condition.  I ask him if they had ever discussed what resuscitative measures that she would want.  He tells me that both of them want to have all of the resuscitative measures done, but \"they don't want to live on machines\".  I ask him if she has any advance directives and he tells me that she does and it should be in our system.  There is a general POA, but not one for health care of living will.  He tells me that he will look for it when he goes home, but tells me that she is the one that took care of that and he is not sure where it is.  I explain the legal hierarchy in the State [facility] and explain that he would be her primary decision maker, followed by their son.  I offer supportive care for him, he denies any further needs at this time.    Education/support to family  Continue with current plan of care  Code status clarified: Full Code    Principle Problem/Diagnosis:  Septic shock (HCC)    Goals of care evaluation   The patient goals of care are improve or maintain function/quality of life and preserve independence/autonomy/control   Goals of care discussed with:    [] Patient independently    [] Patient and Family    [x] Family or Healthcare DPOA independently    []

## 2024-05-22 NOTE — CONSULTS
I, Gretchen Mercedes am scribing for and in the presence of Morales Sosa MD, F.A.C.C..    Patient: Macey Zazueta  : 1950  Date of Admission: 2024  Primary Care Physician: Byron Castaneda  Today's Date: 2024    REASON FOR CONSULTATION: Cerebrovascular Accident (Patient was just discharged today after having a lazer kidney stone removal with a stent replacement. Pt  states that she suddenly became weak, unable to speak clearly or follow directions. /Patient does have hx of strokes in past. ) and Post-op Problem (Bleeding at stent placement site, stent visible)      HPI:  Ms. Zazueta is a 74 y.o. female who was admitted to the hospital because of septic shock.  Cardiology consulted because of elevated troponin.    Ms. Zazueta has history of TIA's or strokes 7 years ago.  As per her caregiver at bedside, patient is not mobile.  She presented with hydronephrosis and complicated UTI.  Underwent cystoscopy which went uneventful but later presented to the emergency room with altered mental status with ureteral stent visibly seen.  The stent was removed by the emergency room team.  Patient was hypotensive and with elevated lactate level and tachycardia consistent with septic shock.  Started on IV fluid hydration and Levophed.  Finally we were able to wean her off of the Levophed.  Troponin is elevated but it is trending down consistent with type II myocardial infarction \" myocardial infarction secondary to demand ischemia\"    She has no history of coronary artery disease but I did review her CT scan of the chest from 2024 and patient does have extensive coronary artery calcification consistent with a stable CAD.  She is on aspirin, Lipitor and amlodipine at home.    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  Mild narrowing of the bilateral  Active Problem List    Diagnosis Date Noted    Essential hypertension 10/12/2022    Mixed hyperlipidemia 10/12/2022    Septic shock (HCC) 05/21/2024    Complicated UTI (urinary tract infection) 02/19/2024    Renal calculus 02/15/2024    Sepsis due to urinary tract infection (HCC) 02/12/2024    Type 2 diabetes mellitus without complication, with long-term current use of insulin (HCC) /  Metformin ER 05/16/2017    Left pontine cerebrovascular accident (HCC) /  2017        PLAN:  Critically ill patient with complicated UTI and septic shock.  Hydronephrosis, status post cystoscopy.    Status post fluid hydration and Levophed therapy.  Currently holding blood pressure but she is tachycardiac and her breathing is rapid and shallow.  ABG showed respiratory acidosis.  Still having elevated white blood cell count, not making much urine except for the refugio hematuria.  She does have low hemoglobin.  History of CVA.  Reviewed the CT scan of the chest from February 2024 and she does have three-vessel coronary artery calcification.    After discussion with the caregiver at bedside, she is at significantly high risk of complication including intubation.  We discussed the goal of therapy and the CODE STATUS and after long discussion with her and her caregiver we decided to transfer her to higher level of care.  Patient will need an arterial line, invasive blood pressure monitoring, starting diuresis and she is at high risk for intubation.    Will hold IV fluid for now.  Discontinue amlodipine.    She does have severe electrolyte abnormalities, correct electrolytes as per protocol.    She has shock liver, hold Lipitor for now.  Trend liver enzymes.    Elevated troponin secondary to type II myocardial infarction from demand ischemia and hypertension.  Echo reviewed ejection fraction is normal.  Plan of care discussed with the ICU team and our hospitalist.      Once again, thank you for allowing me to participate in this patients

## 2024-05-22 NOTE — PLAN OF CARE
Problem: Safety - Adult  Goal: Free from fall injury  Outcome: Progressing  Note: Call light and bedside table with in reach. Bed and chair wheels locked at all times, with bed in lowest position. Frequent checks and needs meet in appropriate timing.      Problem: Discharge Planning  Goal: Discharge to home or other facility with appropriate resources  Outcome: Progressing  Flowsheets  Taken 5/22/2024 0400 by Lisset Wisdom RN  Discharge to home or other facility with appropriate resources: Identify barriers to discharge with patient and caregiver  Taken 5/22/2024 0030 by Lisset Wisdom, RN  Discharge to home or other facility with appropriate resources: Identify barriers to discharge with patient and caregiver  Taken 5/21/2024 2138 by Gretchen Son RN  Discharge to home or other facility with appropriate resources:   Identify barriers to discharge with patient and caregiver   Arrange for interpreters to assist at discharge as needed   Arrange for needed discharge resources and transportation as appropriate   Refer to discharge planning if patient needs post-hospital services based on physician order or complex needs related to functional status, cognitive ability or social support system   Identify discharge learning needs (meds, wound care, etc)     Problem: Skin/Tissue Integrity  Goal: Absence of new skin breakdown  Description: 1.  Monitor for areas of redness and/or skin breakdown  2.  Assess vascular access sites hourly  3.  Every 4-6 hours minimum:  Change oxygen saturation probe site  4.  Every 4-6 hours:  If on nasal continuous positive airway pressure, respiratory therapy assess nares and determine need for appliance change or resting period.  Outcome: Progressing  Note: Bryant scale monitoring. inspect skin for breakdown. Chart all shin breakdown. Encourage frequent repositioning. No new breakdown.

## 2024-05-22 NOTE — PROGRESS NOTES
Occupational Therapy  Facility/Department: [facility] ICU  Occupational Therapy Initial Assessment    Name: Macey Zazueta  : 1950  MRN: [id number]  Date of Service: 2024    Discharge Recommendations:  Continue to assess pending progress          Patient Diagnosis(es): The primary encounter diagnosis was Altered mental status, unspecified altered mental status type. Diagnoses of Urinary tract infection without hematuria, site unspecified, Urinary obstruction, and Septic shock (HCC) were also pertinent to this visit.  Past Medical History:  has a past medical history of COVID-19 virus infection, Essential hypertension, Left pontine cerebrovascular accident (HCC), Mixed hyperlipidemia, Periorbital cellulitis /  right side /  resolved with Omnicef , and Type 2 diabetes mellitus without complication, without long-term current use of insulin (HCC).  Past Surgical History:  has a past surgical history that includes Cataract removal (Right, ); Cystoscopy (Left, 2024); and Lithotripsy (Left, 3/5/2024).    Treatment Diagnosis: Weakness      Assessment   Performance deficits / Impairments: Decreased endurance;Decreased ADL status;Decreased balance;Decreased ROM;Decreased strength;Decreased safe awareness;Decreased posture  Assessment: 75 y/o F admitted to [facility] for urinary obstruction. Patient presents with variable BP, weakness and deconditioning, requiring increased need for assist during ADL. Patient would benefit from OT services to address to ensure safe and indep return home with .  Treatment Diagnosis: Weakness  Prognosis: Good  Decision Making: Medium Complexity  REQUIRES OT FOLLOW-UP: Yes        Plan   Occupational Therapy Plan  Times Per Day: Once a day  Days Per Week: 7 Days  Current Treatment Recommendations: Balance training, Endurance training, Safety education & training, Patient/Caregiver education & training, Equipment evaluation, education, & procurement, ROM, Strengthening

## 2024-05-22 NOTE — PROGRESS NOTES
Patient assessed at this time. Patient afebrile and much more alert then this morning. Her neuro is baseline for history of CVA.  remains at bedside.

## 2024-05-22 NOTE — VIRTUAL HEALTH
Macey TAYLOR Zazueta, was evaluated through a synchronous (real-time) audio-video encounter. The patient (and/or guardian if applicable) is aware that this is a billable service, which includes applicable co-pays. This virtual visit was conducted with patient's (and/or legal guardian's) consent. Patient identification was verified, and a caregiver was present when appropriate.  The patient was located at Facility (Appt Department): [facility] ICU  [address] [address]  Loc: [phone]  The provider was located at Facility (Login Dept): [facility] INF DIS  [address] [address]  [phone]  Confirm you are appropriately licensed, registered, or certified to deliver care in the state where the patient is located as indicated above. If you are not or unsure, please re-schedule the visit: Yes, I confirm.   Consults     Total time spent on this encounter: Not billed by time    --Myron Nicholson MD on 5/22/2024 at 7:49 AM    An electronic signature was used to authenticate this note.        [facility] - Initial Consult Note  Telemedicine  Today's Date and Time: 5/22/2024, 7:50 AM    Impression :   Septicemia with Gram positive cocci in chains and pairs and Gram negative bacilli x 2 on 5-21-24  Septic shock  Lactic acidosis  Thrombocytopenia  Prior Left side hydronephrosis in Feb 2024  Prior Left renal pelvis calculus in Feb 2024  S/P left ureteral stent on 2-14-24  S/P lithotripsy on 3-5-24  S/P cystoscopy, ureteroscopy, LASER lithotripsy with Lt stent exchange 5-21-24  Essential HTN  DM type 2  Lt pontine CVA in 2017    Recommendations:   Meropenem 1 gm IV q 8 hr pending further culture data    Medical Decision Making/Summary/Discussion:5/22/2024     Daily Elements of Decision Making provided by Consulting Physician:    Note: I have independently performed the steps listed below as part of the medical decision making and  evaluation.    Review of current Problems:  Today I am seeing the patient for the following problems:  Septicemia with Gram positive cocci in chains and pairs and Gram negative bacilli x 2 on 5-21-24  Septic shock  Lactic acidosis  Thrombocytopenia  Prior Left side hydronephrosis in Feb 2024  Prior Left renal pelvis calculus in Feb 2024  S/P left ureteral stent on 2-14-24  S/P lithotripsy on 3-5-24  S/P cystoscopy, ureteroscopy, LASER lithotripsy with Lt stent exchange 5-21-24  Essential HTN  DM type 2  Lt pontine CVA in 2017  Evaluation of Patient:  Patient evaluated by Telemedicine.  Requesting Institution: [facility]  Provider Institution: [facility] [place]  Intermediary Person at [facility]: Ms Janette Somers, LAURA- BHAKTI  Please see daily details in Interval Changes Section  Changes in physical exam:  Patient evaluated and examined in the ICU.   Hypotensive  On nasal 02  Please see daily details in Physical Exam section and in Interval Changes Section  Changes in ROS:  Unchanged  Please see daily details in Review of Symptoms section and in Interval Changes Section  Discussion with Referring Physician or Service  Dr. Castaneda  Laboratory and Radiologic Studies with personal review of radiologic studies  Laboratory  Radiology  Microbiology  Blood: Gram positive cocci in chains and pairs and Gram negative bacilli   Please see Details in daily Interval Changes Section devoted to Lab, Micro and Radiology and in Medical Decision Laboratory, Imaging and Cultures sections.  Review of Infection Control and Prevention measures:  Contact isolation  Please see daily details in Infection Control Recommendations section  Administer medications as ordered:  Meropenem  Review of Antimicrobial Stewardship Measures:  Targeted therapy  PK dosing   Please see daily details in Antimicrobial Stewardship Recommendations section   Prognosis:  Guarded  Review of Discharge Planning:  Please see daily details in  administration of intravenous contrast. Automated exposure control, iterative reconstruction, and/or weight based adjustment of the mA/kV was utilized to reduce the radiation dose to as low as reasonably achievable. COMPARISON: CT head February 12, 2024 HISTORY: ORDERING SYSTEM PROVIDED HISTORY: weakness TECHNOLOGIST PROVIDED HISTORY: weakness Decision Support Exception - unselect if not a suspected or confirmed emergency medical condition->Emergency Medical Condition (MA) FINDINGS: BRAIN/VENTRICLES: There is old lacunar infarct in the right thalamus.  There is mild parenchymal volume loss.  There is moderate ventriculomegaly, disproportionate to the degree of volume loss, likely with superimposed mild communicating hydrocephalus, stable.  There is periventricular white matter low attenuation, likely related to moderate chronic microvascular disease. There is no acute intracranial hemorrhage, mass effect or midline shift.  No abnormal extra-axial fluid collection.  The gray-white differentiation is maintained without evidence of an acute infarct. ORBITS: The visualized portion of the orbits demonstrate no acute abnormality. SINUSES: The visualized paranasal sinuses and mastoid air cells demonstrate no acute abnormality. SOFT TISSUES/SKULL:  No acute abnormality of the visualized skull or soft tissues.     No acute intracranial abnormality. Old lacunar infarct in the right thalamus. Mild parenchymal volume loss. Moderate ventriculomegaly, disproportionate to the degree of volume loss, likely with superimposed mild communicating hydrocephalus, stable. Moderate chronic microvascular disease.     FLUORO FOR SURGICAL PROCEDURES    Result Date: 5/21/2024  Radiology exam is complete. No Radiologist dictation. Please follow up with ordering provider.       Medical Decision [id number]-Zriqm:     Results       Procedure Component Value Units Date/Time    Culture, Urine [[id number]] Collected: 05/21/24 0949    Order

## 2024-05-22 NOTE — PROGRESS NOTES
Pt is resting in bed. PICC line placed and awaiting stat xray results. A/O to self and place, slow to respond at times and speech is slurred. Left grasp weaker than right. Pt denies any pain or sob. VS, assessment and admission complete. Levo infusing per order.  updated on status and poc. Bed alarm on and call light in reach.

## 2024-05-23 ENCOUNTER — APPOINTMENT (OUTPATIENT)
Dept: GENERAL RADIOLOGY | Age: 74
DRG: 871 | End: 2024-05-23
Attending: INTERNAL MEDICINE
Payer: MEDICARE

## 2024-05-23 ENCOUNTER — APPOINTMENT (OUTPATIENT)
Age: 74
DRG: 871 | End: 2024-05-23
Attending: INTERNAL MEDICINE
Payer: MEDICARE

## 2024-05-23 ENCOUNTER — HOSPITAL ENCOUNTER (INPATIENT)
Age: 74
LOS: 7 days | Discharge: NURSING, REHAB OR LONG-TERM CARE | DRG: 871 | End: 2024-05-30
Attending: INTERNAL MEDICINE | Admitting: INTERNAL MEDICINE
Payer: MEDICARE

## 2024-05-23 VITALS
SYSTOLIC BLOOD PRESSURE: 96 MMHG | DIASTOLIC BLOOD PRESSURE: 59 MMHG | HEIGHT: 58 IN | OXYGEN SATURATION: 100 % | TEMPERATURE: 98.4 F | RESPIRATION RATE: 29 BRPM | HEART RATE: 113 BPM | BODY MASS INDEX: 22.91 KG/M2 | WEIGHT: 109.13 LBS

## 2024-05-23 PROBLEM — E87.20 ACIDOSIS: Status: ACTIVE | Noted: 2024-05-23

## 2024-05-23 PROBLEM — N17.9 AKI (ACUTE KIDNEY INJURY) (HCC): Status: ACTIVE | Noted: 2024-05-23

## 2024-05-23 PROBLEM — A41.9 SEPSIS (HCC): Status: ACTIVE | Noted: 2024-05-23

## 2024-05-23 PROBLEM — I21.4 NSTEMI (NON-ST ELEVATED MYOCARDIAL INFARCTION) (HCC): Status: ACTIVE | Noted: 2024-05-23

## 2024-05-23 LAB
ALBUMIN SERPL-MCNC: 2.8 G/DL (ref 3.5–5.2)
ALBUMIN SERPL-MCNC: 3.1 G/DL (ref 3.5–5.2)
ALBUMIN/GLOB SERPL: 1 {RATIO} (ref 1–2.5)
ALBUMIN/GLOB SERPL: 1 {RATIO} (ref 1–2.5)
ALP SERPL-CCNC: 113 U/L (ref 35–104)
ALP SERPL-CCNC: 116 U/L (ref 35–104)
ALT SERPL-CCNC: 114 U/L (ref 10–35)
ALT SERPL-CCNC: 146 U/L (ref 10–35)
ANION GAP SERPL CALCULATED.3IONS-SCNC: 10 MMOL/L (ref 9–16)
ANION GAP SERPL CALCULATED.3IONS-SCNC: 11 MMOL/L (ref 9–16)
ANION GAP SERPL CALCULATED.3IONS-SCNC: 11 MMOL/L (ref 9–16)
ANION GAP SERPL CALCULATED.3IONS-SCNC: 9 MMOL/L (ref 9–16)
AST SERPL-CCNC: 148 U/L (ref 10–35)
AST SERPL-CCNC: 205 U/L (ref 10–35)
BASOPHILS # BLD: 0 K/UL (ref 0–0.2)
BASOPHILS NFR BLD: 0 % (ref 0–2)
BILIRUB DIRECT SERPL-MCNC: 0.3 MG/DL (ref 0–0.3)
BILIRUB INDIRECT SERPL-MCNC: 1.5 MG/DL (ref 0–1)
BILIRUB SERPL-MCNC: 1.8 MG/DL (ref 0–1.2)
BILIRUB SERPL-MCNC: 1.8 MG/DL (ref 0–1.2)
BNP SERPL-MCNC: ABNORMAL PG/ML (ref 0–300)
BODY TEMPERATURE: 37
BODY TEMPERATURE: 37
BUN SERPL-MCNC: 21 MG/DL (ref 8–23)
BUN SERPL-MCNC: 22 MG/DL (ref 8–23)
BUN SERPL-MCNC: 23 MG/DL (ref 8–23)
BUN SERPL-MCNC: 23 MG/DL (ref 8–23)
C3 SERPL-MCNC: 101 MG/DL (ref 90–180)
C4 SERPL-MCNC: 24 MG/DL (ref 10–40)
CA-I BLD-SCNC: 1.07 MMOL/L (ref 1.13–1.33)
CALCIUM SERPL-MCNC: 7.3 MG/DL (ref 8.6–10.4)
CALCIUM SERPL-MCNC: 7.8 MG/DL (ref 8.6–10.4)
CALCIUM SERPL-MCNC: 8 MG/DL (ref 8.6–10.4)
CALCIUM SERPL-MCNC: 8.2 MG/DL (ref 8.6–10.4)
CHLORIDE SERPL-SCNC: 117 MMOL/L (ref 98–107)
CHLORIDE SERPL-SCNC: 118 MMOL/L (ref 98–107)
CHLORIDE SERPL-SCNC: 119 MMOL/L (ref 98–107)
CHLORIDE SERPL-SCNC: 119 MMOL/L (ref 98–107)
CO2 SERPL-SCNC: 17 MMOL/L (ref 20–31)
CO2 SERPL-SCNC: 18 MMOL/L (ref 20–31)
CO2 SERPL-SCNC: 19 MMOL/L (ref 20–31)
CO2 SERPL-SCNC: 22 MMOL/L (ref 20–31)
COHGB MFR BLD: 0.7 % (ref 0–5)
COHGB MFR BLD: 3.4 % (ref 0–5)
CREAT SERPL-MCNC: 1.4 MG/DL (ref 0.5–0.9)
CREAT SERPL-MCNC: 1.4 MG/DL (ref 0.5–0.9)
CREAT SERPL-MCNC: 1.5 MG/DL (ref 0.5–0.9)
CREAT SERPL-MCNC: 1.6 MG/DL (ref 0.5–0.9)
EKG ATRIAL RATE: 115 BPM
EKG P AXIS: 58 DEGREES
EKG P-R INTERVAL: 164 MS
EKG Q-T INTERVAL: 308 MS
EKG QRS DURATION: 74 MS
EKG QTC CALCULATION (BAZETT): 426 MS
EKG R AXIS: 47 DEGREES
EKG T AXIS: 36 DEGREES
EKG VENTRICULAR RATE: 115 BPM
EOSINOPHIL # BLD: 0 K/UL (ref 0–0.4)
EOSINOPHILS RELATIVE PERCENT: 0 % (ref 1–4)
ERYTHROCYTE [DISTWIDTH] IN BLOOD BY AUTOMATED COUNT: 17.1 % (ref 11.8–14.4)
EST. AVERAGE GLUCOSE BLD GHB EST-MCNC: 126 MG/DL
FIO2 ON VENT: ABNORMAL %
FIO2 ON VENT: ABNORMAL %
GFR, ESTIMATED: 34 ML/MIN/1.73M2
GFR, ESTIMATED: 37 ML/MIN/1.73M2
GFR, ESTIMATED: 40 ML/MIN/1.73M2
GFR, ESTIMATED: 41 ML/MIN/1.73M2
GLOBULIN SER CALC-MCNC: 2.1 G/DL
GLUCOSE BLD-MCNC: 115 MG/DL (ref 65–105)
GLUCOSE BLD-MCNC: 116 MG/DL (ref 65–105)
GLUCOSE BLD-MCNC: 97 MG/DL (ref 65–105)
GLUCOSE SERPL-MCNC: 114 MG/DL (ref 74–99)
GLUCOSE SERPL-MCNC: 116 MG/DL (ref 74–99)
GLUCOSE SERPL-MCNC: 117 MG/DL (ref 74–99)
GLUCOSE SERPL-MCNC: 99 MG/DL (ref 74–99)
HBA1C MFR BLD: 6 % (ref 4–6)
HCO3 VENOUS: 18.8 MMOL/L (ref 24–30)
HCO3 VENOUS: 20.4 MMOL/L (ref 24–30)
HCT VFR BLD AUTO: 30.6 % (ref 36.3–47.1)
HGB BLD-MCNC: 9.3 G/DL (ref 11.9–15.1)
IMM GRANULOCYTES # BLD AUTO: 0.25 K/UL (ref 0–0.3)
IMM GRANULOCYTES NFR BLD: 1 %
LACTIC ACID, WHOLE BLOOD: 1.8 MMOL/L (ref 0.7–2.1)
LACTIC ACID, WHOLE BLOOD: 2.3 MMOL/L (ref 0.7–2.1)
LYMPHOCYTES NFR BLD: 0.49 K/UL (ref 1–4.8)
LYMPHOCYTES RELATIVE PERCENT: 2 % (ref 24–44)
MAGNESIUM SERPL-MCNC: 2.1 MG/DL (ref 1.6–2.4)
MCH RBC QN AUTO: 25.8 PG (ref 25.2–33.5)
MCHC RBC AUTO-ENTMCNC: 30.4 G/DL (ref 28.4–34.8)
MCV RBC AUTO: 85 FL (ref 82.6–102.9)
MICROORGANISM SPEC CULT: NO GROWTH
MONOCYTES NFR BLD: 0.74 K/UL (ref 0.1–0.8)
MONOCYTES NFR BLD: 3 % (ref 1–7)
MORPHOLOGY: ABNORMAL
MRSA, DNA, NASAL: NEGATIVE
NEGATIVE BASE EXCESS, VEN: 3.4 MMOL/L (ref 0–2)
NEGATIVE BASE EXCESS, VEN: 5.7 MMOL/L (ref 0–2)
NEUTROPHILS NFR BLD: 94 % (ref 36–66)
NEUTS SEG NFR BLD: 23.22 K/UL (ref 1.8–7.7)
NRBC BLD-RTO: 0 PER 100 WBC
O2 SAT, VEN: 58.3 % (ref 60–85)
O2 SAT, VEN: 98.8 % (ref 60–85)
PCO2, VEN: 34.1 MM HG (ref 39–55)
PCO2, VEN: 34.9 MM HG (ref 39–55)
PH VENOUS: 7.35 (ref 7.32–7.42)
PH VENOUS: 7.39 (ref 7.32–7.42)
PLATELET # BLD AUTO: ABNORMAL K/UL (ref 138–453)
PLATELET, FLUORESCENCE: 135 K/UL (ref 138–453)
PLATELETS.RETICULATED NFR BLD AUTO: 4.4 % (ref 1.1–10.3)
PO2, VEN: 115 MM HG (ref 30–50)
PO2, VEN: 29.8 MM HG (ref 30–50)
POTASSIUM SERPL-SCNC: 3.7 MMOL/L (ref 3.7–5.3)
POTASSIUM SERPL-SCNC: 3.9 MMOL/L (ref 3.7–5.3)
POTASSIUM SERPL-SCNC: 4.3 MMOL/L (ref 3.7–5.3)
POTASSIUM SERPL-SCNC: 4.6 MMOL/L (ref 3.7–5.3)
PROT SERPL-MCNC: 4.8 G/DL (ref 6.6–8.7)
PROT SERPL-MCNC: 5.2 G/DL (ref 6.6–8.7)
RBC # BLD AUTO: 3.6 M/UL (ref 3.95–5.11)
SODIUM SERPL-SCNC: 147 MMOL/L (ref 136–145)
SODIUM SERPL-SCNC: 147 MMOL/L (ref 136–145)
SODIUM SERPL-SCNC: 148 MMOL/L (ref 136–145)
SODIUM SERPL-SCNC: 148 MMOL/L (ref 136–145)
SPECIMEN DESCRIPTION: NORMAL
SPECIMEN DESCRIPTION: NORMAL
TROPONIN I SERPL HS-MCNC: 112 NG/L (ref 0–14)
TSH SERPL DL<=0.05 MIU/L-ACNC: 4.05 UIU/ML (ref 0.27–4.2)
WBC OTHER # BLD: 24.7 K/UL (ref 3.5–11.3)

## 2024-05-23 PROCEDURE — 2580000003 HC RX 258

## 2024-05-23 PROCEDURE — 2000000000 HC ICU R&B

## 2024-05-23 PROCEDURE — 80053 COMPREHEN METABOLIC PANEL: CPT

## 2024-05-23 PROCEDURE — 83735 ASSAY OF MAGNESIUM: CPT

## 2024-05-23 PROCEDURE — 83036 HEMOGLOBIN GLYCOSYLATED A1C: CPT

## 2024-05-23 PROCEDURE — 71045 X-RAY EXAM CHEST 1 VIEW: CPT

## 2024-05-23 PROCEDURE — 94761 N-INVAS EAR/PLS OXIMETRY MLT: CPT

## 2024-05-23 PROCEDURE — 85025 COMPLETE CBC W/AUTO DIFF WBC: CPT

## 2024-05-23 PROCEDURE — 2500000003 HC RX 250 WO HCPCS: Performed by: STUDENT IN AN ORGANIZED HEALTH CARE EDUCATION/TRAINING PROGRAM

## 2024-05-23 PROCEDURE — 2500000003 HC RX 250 WO HCPCS: Performed by: INTERNAL MEDICINE

## 2024-05-23 PROCEDURE — 82947 ASSAY GLUCOSE BLOOD QUANT: CPT

## 2024-05-23 PROCEDURE — 80076 HEPATIC FUNCTION PANEL: CPT

## 2024-05-23 PROCEDURE — 94660 CPAP INITIATION&MGMT: CPT

## 2024-05-23 PROCEDURE — 99223 1ST HOSP IP/OBS HIGH 75: CPT | Performed by: INTERNAL MEDICINE

## 2024-05-23 PROCEDURE — 2580000003 HC RX 258: Performed by: INTERNAL MEDICINE

## 2024-05-23 PROCEDURE — 82805 BLOOD GASES W/O2 SATURATION: CPT

## 2024-05-23 PROCEDURE — A4216 STERILE WATER/SALINE, 10 ML: HCPCS

## 2024-05-23 PROCEDURE — 2580000003 HC RX 258: Performed by: STUDENT IN AN ORGANIZED HEALTH CARE EDUCATION/TRAINING PROGRAM

## 2024-05-23 PROCEDURE — 6360000002 HC RX W HCPCS: Performed by: STUDENT IN AN ORGANIZED HEALTH CARE EDUCATION/TRAINING PROGRAM

## 2024-05-23 PROCEDURE — 36415 COLL VENOUS BLD VENIPUNCTURE: CPT

## 2024-05-23 PROCEDURE — 85055 RETICULATED PLATELET ASSAY: CPT

## 2024-05-23 PROCEDURE — 83880 ASSAY OF NATRIURETIC PEPTIDE: CPT

## 2024-05-23 PROCEDURE — 99291 CRITICAL CARE FIRST HOUR: CPT | Performed by: INTERNAL MEDICINE

## 2024-05-23 PROCEDURE — 2700000000 HC OXYGEN THERAPY PER DAY

## 2024-05-23 PROCEDURE — 93010 ELECTROCARDIOGRAM REPORT: CPT | Performed by: FAMILY MEDICINE

## 2024-05-23 PROCEDURE — 84443 ASSAY THYROID STIM HORMONE: CPT

## 2024-05-23 PROCEDURE — 2500000003 HC RX 250 WO HCPCS

## 2024-05-23 PROCEDURE — 86160 COMPLEMENT ANTIGEN: CPT

## 2024-05-23 PROCEDURE — 74176 CT ABD & PELVIS W/O CONTRAST: CPT

## 2024-05-23 PROCEDURE — 80048 BASIC METABOLIC PNL TOTAL CA: CPT

## 2024-05-23 PROCEDURE — 82330 ASSAY OF CALCIUM: CPT

## 2024-05-23 PROCEDURE — C9113 INJ PANTOPRAZOLE SODIUM, VIA: HCPCS

## 2024-05-23 PROCEDURE — 99222 1ST HOSP IP/OBS MODERATE 55: CPT | Performed by: INTERNAL MEDICINE

## 2024-05-23 PROCEDURE — 6360000002 HC RX W HCPCS

## 2024-05-23 PROCEDURE — 83605 ASSAY OF LACTIC ACID: CPT

## 2024-05-23 PROCEDURE — 87641 MR-STAPH DNA AMP PROBE: CPT

## 2024-05-23 PROCEDURE — 84484 ASSAY OF TROPONIN QUANT: CPT

## 2024-05-23 PROCEDURE — 93005 ELECTROCARDIOGRAM TRACING: CPT | Performed by: STUDENT IN AN ORGANIZED HEALTH CARE EDUCATION/TRAINING PROGRAM

## 2024-05-23 RX ORDER — SODIUM CHLORIDE, SODIUM LACTATE, POTASSIUM CHLORIDE, CALCIUM CHLORIDE 600; 310; 30; 20 MG/100ML; MG/100ML; MG/100ML; MG/100ML
INJECTION, SOLUTION INTRAVENOUS CONTINUOUS
Status: DISCONTINUED | OUTPATIENT
Start: 2024-05-23 | End: 2024-05-23

## 2024-05-23 RX ORDER — ENOXAPARIN SODIUM 100 MG/ML
30 INJECTION SUBCUTANEOUS DAILY
Status: DISCONTINUED | OUTPATIENT
Start: 2024-05-24 | End: 2024-05-26

## 2024-05-23 RX ORDER — ACETAMINOPHEN 650 MG/1
650 SUPPOSITORY RECTAL EVERY 6 HOURS PRN
Status: DISCONTINUED | OUTPATIENT
Start: 2024-05-23 | End: 2024-05-30 | Stop reason: HOSPADM

## 2024-05-23 RX ORDER — POTASSIUM CHLORIDE 7.45 MG/ML
10 INJECTION INTRAVENOUS PRN
Status: DISCONTINUED | OUTPATIENT
Start: 2024-05-23 | End: 2024-05-23

## 2024-05-23 RX ORDER — CALCIUM GLUCONATE 20 MG/ML
2000 INJECTION, SOLUTION INTRAVENOUS ONCE
Status: COMPLETED | OUTPATIENT
Start: 2024-05-23 | End: 2024-05-23

## 2024-05-23 RX ORDER — SODIUM CHLORIDE 9 MG/ML
INJECTION, SOLUTION INTRAVENOUS PRN
Status: DISCONTINUED | OUTPATIENT
Start: 2024-05-23 | End: 2024-05-30 | Stop reason: HOSPADM

## 2024-05-23 RX ORDER — SODIUM CHLORIDE 0.9 % (FLUSH) 0.9 %
5-40 SYRINGE (ML) INJECTION EVERY 12 HOURS SCHEDULED
Status: DISCONTINUED | OUTPATIENT
Start: 2024-05-23 | End: 2024-05-30 | Stop reason: HOSPADM

## 2024-05-23 RX ORDER — NOREPINEPHRINE BITARTRATE 0.06 MG/ML
1-100 INJECTION, SOLUTION INTRAVENOUS CONTINUOUS
Status: DISCONTINUED | OUTPATIENT
Start: 2024-05-23 | End: 2024-05-24

## 2024-05-23 RX ORDER — ONDANSETRON 2 MG/ML
4 INJECTION INTRAMUSCULAR; INTRAVENOUS EVERY 6 HOURS PRN
Status: DISCONTINUED | OUTPATIENT
Start: 2024-05-23 | End: 2024-05-30 | Stop reason: HOSPADM

## 2024-05-23 RX ORDER — MAGNESIUM SULFATE IN WATER 40 MG/ML
2000 INJECTION, SOLUTION INTRAVENOUS PRN
Status: DISCONTINUED | OUTPATIENT
Start: 2024-05-23 | End: 2024-05-23

## 2024-05-23 RX ORDER — ONDANSETRON 4 MG/1
4 TABLET, ORALLY DISINTEGRATING ORAL EVERY 8 HOURS PRN
Status: DISCONTINUED | OUTPATIENT
Start: 2024-05-23 | End: 2024-05-30 | Stop reason: HOSPADM

## 2024-05-23 RX ORDER — SODIUM CHLORIDE 0.9 % (FLUSH) 0.9 %
5-40 SYRINGE (ML) INJECTION PRN
Status: DISCONTINUED | OUTPATIENT
Start: 2024-05-23 | End: 2024-05-30 | Stop reason: HOSPADM

## 2024-05-23 RX ORDER — GLUCAGON 1 MG/ML
1 KIT INJECTION PRN
Status: DISCONTINUED | OUTPATIENT
Start: 2024-05-23 | End: 2024-05-30 | Stop reason: HOSPADM

## 2024-05-23 RX ORDER — ACETAMINOPHEN 325 MG/1
650 TABLET ORAL EVERY 6 HOURS PRN
Status: DISCONTINUED | OUTPATIENT
Start: 2024-05-23 | End: 2024-05-30 | Stop reason: HOSPADM

## 2024-05-23 RX ORDER — ENOXAPARIN SODIUM 100 MG/ML
40 INJECTION SUBCUTANEOUS DAILY
Status: DISCONTINUED | OUTPATIENT
Start: 2024-05-23 | End: 2024-05-23

## 2024-05-23 RX ORDER — POLYETHYLENE GLYCOL 3350 17 G/17G
17 POWDER, FOR SOLUTION ORAL DAILY PRN
Status: DISCONTINUED | OUTPATIENT
Start: 2024-05-23 | End: 2024-05-30 | Stop reason: HOSPADM

## 2024-05-23 RX ORDER — INSULIN LISPRO 100 [IU]/ML
0-4 INJECTION, SOLUTION INTRAVENOUS; SUBCUTANEOUS EVERY 6 HOURS
Status: DISCONTINUED | OUTPATIENT
Start: 2024-05-23 | End: 2024-05-24

## 2024-05-23 RX ORDER — DEXTROSE MONOHYDRATE 100 MG/ML
INJECTION, SOLUTION INTRAVENOUS CONTINUOUS PRN
Status: DISCONTINUED | OUTPATIENT
Start: 2024-05-23 | End: 2024-05-30 | Stop reason: HOSPADM

## 2024-05-23 RX ORDER — POTASSIUM CHLORIDE 29.8 MG/ML
20 INJECTION INTRAVENOUS PRN
Status: DISCONTINUED | OUTPATIENT
Start: 2024-05-23 | End: 2024-05-23

## 2024-05-23 RX ADMIN — SODIUM BICARBONATE: 84 INJECTION, SOLUTION INTRAVENOUS at 21:00

## 2024-05-23 RX ADMIN — MEROPENEM 1000 MG: 1 INJECTION, POWDER, FOR SOLUTION INTRAVENOUS at 16:18

## 2024-05-23 RX ADMIN — MEROPENEM 1000 MG: 1 INJECTION, POWDER, FOR SOLUTION INTRAVENOUS at 06:20

## 2024-05-23 RX ADMIN — SODIUM CHLORIDE, PRESERVATIVE FREE 10 ML: 5 INJECTION INTRAVENOUS at 08:14

## 2024-05-23 RX ADMIN — Medication 4 MCG/MIN: at 04:04

## 2024-05-23 RX ADMIN — SODIUM BICARBONATE: 84 INJECTION, SOLUTION INTRAVENOUS at 09:37

## 2024-05-23 RX ADMIN — SODIUM CHLORIDE, PRESERVATIVE FREE 10 ML: 5 INJECTION INTRAVENOUS at 19:41

## 2024-05-23 RX ADMIN — PANTOPRAZOLE SODIUM 40 MG: 40 INJECTION, POWDER, FOR SOLUTION INTRAVENOUS at 11:44

## 2024-05-23 RX ADMIN — CALCIUM GLUCONATE 2000 MG: 20 INJECTION, SOLUTION INTRAVENOUS at 19:39

## 2024-05-23 RX ADMIN — SODIUM CHLORIDE, POTASSIUM CHLORIDE, SODIUM LACTATE AND CALCIUM CHLORIDE: 600; 310; 30; 20 INJECTION, SOLUTION INTRAVENOUS at 08:32

## 2024-05-23 ASSESSMENT — PAIN SCALES - GENERAL
PAINLEVEL_OUTOF10: 0
PAINLEVEL_OUTOF10: 0

## 2024-05-23 ASSESSMENT — PULMONARY FUNCTION TESTS
PIF_VALUE: 12
PIF_VALUE: 11
PIF_VALUE: 15

## 2024-05-23 NOTE — RT PROTOCOL NOTE
RT Nebulizer Bronchodilator Protocol Note    There is a bronchodilator order in the chart from a provider indicating to follow the RT Bronchodilator Protocol and there is an “Initiate RT Bronchodilator Protocol” order as well (see protocol at bottom of note).    CXR Findings:  XR CHEST PORTABLE    Result Date: 5/22/2024  Stable chest when compared to the prior exam       The findings from the last RT Protocol Assessment were as follows:  Smoking: None or smoker <15 pack years  Respiratory Pattern: Regular pattern and RR 12-20 bpm  Breath Sounds: Slightly diminished and/or crackles  Cough: Strong, spontaneous, non-productive  Indication for Bronchodilator Therapy:    Bronchodilator Assessment Score: 2    Aerosolized bronchodilator medication orders have been revised according to the RT Nebulizer Bronchodilator Protocol below.    Respiratory Therapist to perform RT Therapy Protocol Assessment initially then follow the protocol.  Repeat RT Therapy Protocol Assessment PRN for score 0-3 or on second treatment, BID, and PRN for scores above 3.    No Indications - adjust the frequency to every 6 hours PRN wheezing or bronchospasm, if no treatments needed after 48 hours then discontinue using Per Protocol order mode.     If indication present, adjust the RT bronchodilator orders based on the Bronchodilator Assessment Score as indicated below.  If a patient is on this medication at home then do not decrease Frequency below that used at home.    0-3 - enter or revise RT bronchodilator order(s) to equivalent RT Bronchodilator order with Frequency of every 4 hours PRN for wheezing or increased work of breathing using Per Protocol order mode.       4-6 - enter or revise RT Bronchodilator order(s) to two equivalent RT bronchodilator orders with one order with BID Frequency and one order with Frequency of every 4 hours PRN wheezing or increased work of breathing using Per Protocol order mode.         7-10 - enter or revise RT  Bronchodilator order(s) to two equivalent RT bronchodilator orders with one order with TID Frequency and one order with Frequency of every 4 hours PRN wheezing or increased work of breathing using Per Protocol order mode.       11-13 - enter or revise RT Bronchodilator order(s) to one equivalent RT bronchodilator order with QID Frequency and an Albuterol order with Frequency of every 4 hours PRN wheezing or increased work of breathing using Per Protocol order mode.      Greater than 13 - enter or revise RT Bronchodilator order(s) to one equivalent RT bronchodilator order with every 4 hours Frequency and an Albuterol order with Frequency of every 2 hours PRN wheezing or increased work of breathing using Per Protocol order mode.     RT to enter RT Home Evaluation for COPD & MDI Assessment order using Per Protocol order mode.    Electronically signed by Jeana Guerrero RCP on 5/23/2024 at 3:47 AM

## 2024-05-23 NOTE — H&P
Critical Care - History and Physical Examination    Patient's name:  Macey Zazueta  Medical Record Number: [id number]  Patient's account/billing number: [id number]  Patient's YOB: 1950  Age: 74 y.o.  Date of Admission: 5/23/2024  3:04 AM  Date of History and Physical Examination: 5/23/2024      Primary Care Physician: Byron Castaneda MD  Attending Physician: Clarice    Code Status: Full Code    Chief complaint:  Sepsis      HISTORY OF PRESENT ILLNESS:      History was obtained from chart review.      Macey Zazueta is a 74 y.o. with PMH of stroke approximately 7 years ago.    Patient was admitted to [facility] for altered mental status, urinary obstruction and urinary tract infection.  Patient had ureteral stent placed in February for nephrolithiasis, obstructing stone.  On Tuesday, she had the with laser lithotripsy.  Patient has been on antibiotics at home for urinary tract infection.  Patient was evaluated at the emergency [facility] emergency department on 5/22, and found to be septic.  Because the patient had new onset left-sided weakness and a history of stroke, CTA was performed without any new evidence of stroke.  While in the emergency department, patient did receive fluid bolus due to concern for sepsis, she had a lactate of 6.0.  She received sepsis bundle fluid bolus, started on antibiotics.  Blood pressure did improve.    During her hospitalization at [facility], patient's troponins begin to rise from 69 on 5/21 to a peak of 133 on 5/22.  Blood pressures started to become more soft, required norepinephrine to be started.  Cardiology was consulted.  Patient diuresed due to concern for fluid overload with crackles on exam performed cardiology.  She was additionally started on BiPAP, this appeared to help her respiratory status.  Patient was transferred to [facility] ICU for close blood pressure monitoring, management of sepsis, troponin elevation, concern for CHF exacerbation as  count elevated, 24.7 from a baseline of 4.7.  Patient is currently on meropenem, infectious disease consulted, evaluated patient at [facility].  Will continue meropenem, ID consult and.  Patient is febrile,   -urine culture in progress.  2 blood cultures positive for gram-positive cocci in chains,   Hematology:  Recent Labs     05/21/24  2030 05/21/24  2345 05/22/24  0530   HGB 9.0* DISREGARD RESULT  9.3* 9.2*   Hemoglobin/hematocrit stable.    Endocrine:   glucose controlled - most recent BGL is   Recent Labs     05/21/24  1430 05/22/24  0530 05/22/24  1325   GLUCOSE 208* 96 145*   Blood glucose stable  DVT Prophylaxis  Holding DVT prophylaxis, holding anticoagulation at this time given hematuria  Discharge Needs:  PT, OT, ST, SW, and Case Management      CODE STATUS: Full Code      DISPOSITION:  [x] To remain ICU:   [] OK for out of ICU from Critical Care standpoint    Graham Smart DO  Emergency Medicine Resident  05/23/24 3:43 AM  5/23/2024 3:43 AM    Attending Physician Statement  I have discussed the care of Macey Zazueta, including pertinent history and exam findings,  with the resident. I have seen and examined the patient and the key elements of all parts of the encounter have been performed by me.  I agree with the assessment, plan and orders as documented by the resident with additions .    Sepsis enterococcus and klebsiella  , septic shock due to uti   Saman   Metabolic acidosis   Start bicarb gtt   Iv fluids   Antibiotics   Total critical care time caring for this patient with life threatening, unstable organ failure, including direct patient contact, management of life support systems, review of data including imaging and labs, discussions with other team members and physicians at least 40 Min so far today, excluding procedures.   Electronically signed by SONNY COSTELLO MD on   5/23/24 at 11:00 PM EDT    Please note that this chart was generated using voice recognition Dragon dictation software.

## 2024-05-23 NOTE — CONSULTS
VAT consulted to evaluate PICC placement/depth of PICC. PICC placed at outlying facility. VAT reviewed patient's chest x-ray. VAT recommends following radiologist recommendation if PICC needs to be retracted. Primary RN notified.

## 2024-05-23 NOTE — CONSULTS
Neri Cramer, Claudio, Summer, Delmy, Meet, Jose Antonio, & Samuel   Urology Consultation      Patient:  Macey Zazueta  MRN: [id number]  YOB: 1950    REASON FOR CONSULT:  hematuria    HISTORY OF PRESENT ILLNESS:   The patient is a 74 y.o. female who presents with POD2 from [facility], HLL and stent exchange for a 15mm proximal ureteral stone done in [place] with Dr. Lees. She originally had a large left renal stone diagnosed in February for which a stent was placed. She underwent ESWL 3/5/2024 with stent exchange due to likelihood of residual stone burden. She then returned for cystoscopy, Ureteroscopy, holmium laser lithotripsy and stent exchange on Tuesday 5/21/24. She went home with , returned to ED for Acute confusion, found to be septic and her ureteral stent was half way out, removed by ED in [place]. She had elevated trops and cardiology concerns, so she was transferred here for further evaluation. Urology consulted for hematuria, patient has garcia in place draining pink, translucent urine. She has been aferbile since transfer, but on levo for hypotension and is tachycardic. CT was done 5/21/24 post op and there is residual contrast, so residual stone burden not able to be determined; she does have left hydronephrosis and stranding. There is concern for residual stone burden/steinstrasse as her clinical status is worsening.   She is on meropenem per ID for positive enterococcus faecalis blood cultures. She also does have history of strokes, some residual aphasia per . No intervention planned per cardiology.    Patient's old records, notes and chart reviewed and summarized above.    Past Medical History:    Past Medical History:   Diagnosis Date    COVID-19 virus infection 2021    Essential hypertension     Left pontine cerebrovascular accident (HCC) 2017    Mixed hyperlipidemia     Periorbital cellulitis /  right side /  resolved with Omn[employer] 2022 12/13/2022    Type 2 diabetes mellitus

## 2024-05-23 NOTE — CONSULTS
[facility] - Initial Consult Note  Today's Date and Time: 5/23/2024, 7:04 AM    Impression :   Septicemia with Gram positive cocci in chains and pairs and Gram negative bacilli x 2 on 5-21-24  Cultures: Enterococcus faecalis and Klebsiella oxytoca  Septic shock  Lactic acidosis  Thrombocytopenia  Prior Left side hydronephrosis in Feb 2024  Prior Left renal pelvis calculus in Feb 2024  S/P left ureteral stent on 2-14-24  S/P lithotripsy on 3-5-24  S/P cystoscopy, ureteroscopy, LASER lithotripsy with Lt stent exchange 5-21-24  Essential HTN  DM type 2  Lt pontine CVA in 2017  NSTEMI  Hepatopathy    Recommendations:   Meropenem 1 gm IV q 12 hr pending further culture data     Medical Decision Making/Summary/Discussion:5/23/2024     Daily Elements of Decision Making provided by Consulting Physician:    Note: I have independently performed the steps listed below as part of the medical decision making and evaluation.    Review of current Problems:  Today I am seeing the patient for the following problems:  Septicemia with Gram positive cocci in chains and pairs and Gram negative bacilli x 2 on 5-21-24  Septic shock  Lactic acidosis  Thrombocytopenia  Prior Left side hydronephrosis in Feb 2024  Prior Left renal pelvis calculus in Feb 2024  S/P left ureteral stent on 2-14-24  S/P lithotripsy on 3-5-24  S/P cystoscopy, ureteroscopy, LASER lithotripsy with Lt stent exchange 5-21-24  Essential HTN  DM type 2  Lt pontine CVA in 2017  Evaluation of Patient:  Patient evaluated and examined in the ICU.   Previously evaluated by Telemedicine at [facility]  Transferred to [facility]  Please see daily details in Interval Changes Section  Changes in physical exam:  Hypotensive, on Levophed  Tachycardic  Please see daily details in Physical Exam section and in Interval Changes Section  Changes in ROS:  Unchanged  Please see daily details in Review of Symptoms section and in Interval Changes  turgor.No signs of peripheral arterial or venous insufficiency. No ulcerations. No open wounds.    Medical Decision Making -Laboratory:   I have independently reviewed/ordered the following labs:    CBC with Differential:   Recent Labs     05/22/24  0530 05/23/24  0427   WBC 20.7* 24.7*   HGB 9.2* 9.3*   HCT 28.5* 30.6*   * See Reflexed IPF Result   LYMPHOPCT 1* 2*   MONOPCT 2* 3   EOSPCT 0* 0*     BMP:   Recent Labs     05/22/24  0530 05/22/24  1155 05/22/24  1325 05/23/24  0427   *  --  144 147*   K 2.4*  --  4.6 4.6   *  --  119* 119*   CO2 17*  --  16* 18*   BUN 22  --  22 23   CREATININE 1.1*  --  1.2* 1.6*   MG 0.9* 2.1  --   --      Hepatic Function Panel:   Recent Labs     05/21/24  1430 05/22/24 0530   BILITOT 1.5* 1.3*   ALKPHOS 203* 80   * 244*   * 410*     No results for input(s): \"RPR\" in the last 72 hours.  No results for input(s): \"HIV\" in the last 72 hours.  No results for input(s): \"BC\" in the last 72 hours.  Lab Results   Component Value Date/Time    BACTERIA TRACE 05/21/2024 11:45 PM    MUCUS TRACE 02/16/2024 05:18 PM    RBC 3.60 05/23/2024 04:27 AM    RBC 3.84 02/26/2024 12:10 PM    TRICHOMONAS NOT REPORTED 11/04/2021 04:15 PM    WBC 24.7 05/23/2024 04:27 AM    YEAST NOT REPORTED 11/04/2021 04:15 PM    TURBIDITY Cloudy 05/21/2024 11:45 PM     Lab Results   Component Value Date/Time    CREATININE 1.6 05/23/2024 04:27 AM    GLUCOSE 116 05/23/2024 04:27 AM    GLUCOSE 300 02/26/2024 12:10 PM       Medical Decision Making-Imaging:   XR CHEST PORTABLE    Result Date: 5/22/2024  EXAMINATION: ONE XRAY VIEW OF THE CHEST 5/22/2024 1:55 pm COMPARISON: 5/21/2024 HISTORY: ORDERING SYSTEM PROVIDED HISTORY: possible fluid overload TECHNOLOGIST PROVIDED HISTORY: possible fluid overload FINDINGS: Stable right PICC.  No acute infiltrates or pleural effusions..  No significant vascular congestion.  No evidence of pneumothorax. No other significant changes are seen.     Stable chest

## 2024-05-23 NOTE — CARE COORDINATION
Case Management Assessment  Initial Evaluation    Date/Time of Evaluation: 5/23/2024 1:03 PM  Assessment Completed by: REGGIE DHILLON RN    If patient is discharged prior to next notation, then this note serves as note for discharge by case management.    Patient Name: Macey Zazueta                   YOB: 1950  Diagnosis: Sepsis (HCC) [A41.9]                   Date / Time: 5/23/2024  3:04 AM    Patient Admission Status: Inpatient   Readmission Risk (Low < 19, Mod (19-27), High > 27): Readmission Risk Score: 19.8    Current PCP: Byron Castaneda MD  PCP verified by CM? Yes (Byron Castaneda MD)    Chart Reviewed: Yes      History Provided by: Spouse (Pt's , Yan Zazueta)  Patient Orientation: Unable to Assess (pt out of room for CT)    Patient Cognition: Other (see comment) (A & O X 2-3, baseline)    Hospitalization in the last 30 days (Readmission):  Yes    If yes, Readmission Assessment in CM Navigator will be completed.    Advance Directives:      Code Status: Full Code   Patient's Primary Decision Maker is: Named in Scanned ACP Document    Primary Decision Maker: Yan Zazueta - Spouse - [phone]    Secondary Decision Maker: Kali Zazueta - Child - [phone]    Discharge Planning:    Patient lives with:   Type of Home:    Primary Care Giver: Spouse  Patient Support Systems include: Children, Family Members, Spouse/Significant Other   Current Financial resources: Medicare, Other (Comment) (BCBS- secondary, Medicare- Primary. Informed Mikey w/ Registration that Medicare is primary insurance- she will corrent this in the system.)  Current community resources: None  Current services prior to admission: Durable Medical Equipment            Current DME: Glucometer, Wheelchair            Type of Home Care services:  PT, OT, Nursing Services    ADLS  Prior functional level: Assistance with the following:, Bathing, Dressing, Toileting, Mobility  Current functional level: Assistance with  Plan? Yes    REGGIE DHILLON RN  Case Management Department  Ph: [phone] Fax: [phone]

## 2024-05-23 NOTE — PROGRESS NOTES
[facility] updated that pt was restarted on levo. States they will call back with any questions or concerns.

## 2024-05-23 NOTE — PROGRESS NOTES
Family educated on bipap use and that bipap will alarm if there is an issue. Blood noted on bed pad. Molina flushed and noted instant urine return. Hygiene care provided x2 assist. Pt now resting in bed.

## 2024-05-23 NOTE — PROGRESS NOTES
Pharmacy Note     Renal Dose Adjustment    Macey Zazueta is a 74 y.o. female. Pharmacist assessment of renally cleared medications.    Recent Labs     05/22/24  1325 05/23/24  0427   BUN 22 23       Recent Labs     05/22/24  1325 05/23/24  0427   CREATININE 1.2* 1.6*       Estimated Creatinine Clearance: 24 mL/min (A) (based on SCr of 1.6 mg/dL (H)).      Height:   Ht Readings from Last 1 Encounters:   05/22/24 1.473 m (4' 9.99\")     Weight:  Wt Readings from Last 1 Encounters:   05/23/24 48.5 kg (106 lb 14.8 oz)       The following medication dose has been adjusted based upon renal function per P&T Guidelines:             Enoxaparin 40mg daily adjusted to 30mg daily based on CrCl <30 mL/min

## 2024-05-23 NOTE — PROGRESS NOTES
[facility] requested paperwork to be faxed, informed that it had been faxed and verified. States pt can't be transported without paperwork and that box 10 needs dx and reason that pt needs to leave this hospital. NP updated and paperwork faxed again.

## 2024-05-23 NOTE — PROGRESS NOTES
[place] radiology contacted per IV team's recommendation for PICC line placement verification. Writer was informed that Dr. Avila reviewed the imaging and recommended retracting the PICC line by 6 cm. Writer informed by IV team that bedside RN can retract PICC lines. Writer retracted the PICC line by 6 cm, new current measurement from 0 on PICC line to insertion site is 9 cm. Previous measurement was noted to be 3 cm from 0 to insertion site. Follow up chest X-ray order placed per protocol. Writer notified Dr. Oneil of chest X-ray results. Per Dr. Oneil, the current PICC line placement after initial retraction is sufficient.

## 2024-05-23 NOTE — CONSULTS
[facility]    Consult / H&P               Today's Date: 5/23/2024  Patient Name: Macey Zazueta  Date of admission: 5/23/2024  3:04 AM  Patient's age: 74 y.o., 1950  Admission Dx: Sepsis (HCC) [A41.9]    Requesting Physician: Harinder Humphries MD    Cardiac Evaluation Reason: Elevated troponins    History Obtained From: patient/chart review     History of Present Illness:    This patient 74 y.o. years old female with past medical history as below.     Patient presented to [facility] facility with altered mentation.  She had a kidney stone removal with stent placement on 5/21 and was discharged without any issues.  She presented back with weakness and altered mentation.  She was admitted for sepsis.  Patient was started on antibiotics.  Patient was subsequently transferred to [place] for further management.  Cardiology has been involved for elevated troponins.  Patient denies any chest pain.  Currently she is on BiPAP.  No previous history of heart disease.  High sensitive troponins elevated and have down trended without any trend to suggest ongoing ischemia.  Patient is in septic shock and requiring Levophed.  Blood cultures positive for Enterococcus faecalis and Klebsiella.  Infectious diseases following the patient and have her on antibiotics.  EKG sinus tachycardia with nonspecific changes.    Past Medical History:   has a past medical history of COVID-19 virus infection, Essential hypertension, Left pontine cerebrovascular accident (HCC), Mixed hyperlipidemia, Periorbital cellulitis /  right side /  resolved with Omnicef 2022, and Type 2 diabetes mellitus without complication, without long-term current use of insulin ([facility]).    Past Surgical History:   has a past surgical history that includes Cataract removal (Right, 2020); Cystoscopy (Left, 2/14/2024); and Lithotripsy (Left, 3/5/2024).     Home Medications:    Prior to Admission medications    Medication Sig Start Date End Date Taking?  size. Global left ventricular systolic function  is normal. Estimated ejection fraction is 55 % .  Mildly increased wall thickness of the left ventricle.  Evidence of diastolic dysfunction.  Right Atrium  Right atrium is normal in size.  Right Ventricle  Normal right ventricular size and function.  Mitral Valve  Mildly thickened mitral valve leaflets.  Trivial-mild mitral regurgitation.  Aortic Valve  Aortic valve structure and function normal.  No aortic insufficiency.  Tricuspid Valve  Normal tricuspid valve structure and function.  Mild tricuspid regurgitation.  Estimated right ventricular systolic pressure is 30 mmHg.  Pulmonic Valve  The pulmonic valve is normal in structure.  No pulmonic insufficiency.  Pericardial Effusion  No significant pericardial effusion is seen.    Miscellaneous  Normal aortic root dimension.  E/E'' = 17.9 .    M-mode / 2D Measurements & Calculations:      LVIDd:4 cm(3.7 - 5.6 cm)        Diastolic Volume:45.9 ml   LVIDs:2.76 cm(2.2 - 4.0 cm)     Aortic Root:2.9 cm(2.0 - 3.7 cm)   IVSd:1.19 cm(0.6 - 1.1 cm)      LA Dimension: 3.14 cm(1.9 - 4.0 cm)   LVPWd:0.9 cm(0.6 - 1.1 cm)      LA volume/Index: 27 ml /18m^2   Fractional Shortenin %                                   RVDd:2.5 cm      Mitral:                                  Aortic      Valve Area (P1/2-Time): 5.5 cm^2         Peak Velocity: 1.22 m/s   Peak E-Wave: 0.74 m/s                    Peak Gradient: 5.95 mmHg   Peak A-Wave: 1.23 m/s   E/A Ratio: 0.6   Peak Gradient: 2.16 mmHg   Mean Gradient: 3 mmHg   Deceleration Time: 137 msec   P1/2t: 40 msec      Mean Velocity: 0.84 m/s      Tricuspid:                               Pulmonic:      Estimated RVSP: 30 mmHg                  Peak Velocity: 1.05 m/s   Peak TR Velocity: 2.25 m/s               Peak Gradient: 4.41 mmHg   Peak TR Gradient: 20.25 mmHg   Estimated RA Pressure: 10 mmHg                                               Estimated PASP: 30.25 mmHg     Diastology / Tissue

## 2024-05-23 NOTE — PROGRESS NOTES
NON INVASIVE VENTILATION  PROVIDE OPTIMAL VENTILATION/ACCEPTABLE SP02  IMPLEMENT NON INVASIVE VENTILATION PROTOCOL  ASSESSMENT SKIN INTEGRITY  PATIENT EDUCATION AS NEEDED  BIPAP AS NEEDED

## 2024-05-23 NOTE — CONSULTS
NEPHROLOGY     REASON FOR CONSULT:     SANDER (BUN/Creat 1.6           with baseline creatinine   0.8-1             )  Metabolic Acidosis    Risk Factors for SANDER:  #1 septic shock/UTI following urologic ESWL  2 hematuria    ASSESSMENT     Acute kidney injury secondary to ischemic ATN from circulatory septic shock following recent urologic therapeutic intervention for ureteric stone -evolving.  Baseline creatinine normal less than 1  High anion gap metabolic acidosis secondary to acute kidney injury  Bacteremia culture positive for Enterococcus faecalis and Klebsiella oxytocin  Septic shock on pressors and currently off it  History of cystoscopy/ureteroscopy/laser lithotripsy and left stent exchange on 21st May  NSTEMI type II  History of diabetes  History of CVA 7 years back with residual neurologic deficit     PLAN     Increase fluid rate  Antibiotics as per GFR less than 15  BMP Q8  Follow-up urology recommendations      HISTORY OF PRESENTING ILLNESS                 This is a 74 y.o. female who has been transferred from [facility] for management of septic shock and NSTEMI along with acute kidney injury.  Patient was diagnosed to have large left renal stone in the month of February 2024 for which stent was placed.  Subsequently in March she underwent ESWL with stent exchange due to likelihood of residual stone burden.  She then returned for cystoscopy ureteroscopy holmium laser lithotripsy and stent exchange on Tuesday 21st May.  No immediate intraoperative complications reported.  She went home with her  and return to the emergency department with acute confusion and generalized weakness.    In the ER she was found to have bleeding from urethra and the ureteral stent was alf out which was removed by emergency department intervention.  Subsequently workup showed elevated troponins and a CT scan done showed left hydronephrosis and stranding.  She also became hypotensive in the ER for which fluid  generalized weakness  Constitutional: No asthenia/weight loss/anorexia    HEENT : No epistaxis/visual blurriness/rhinorrhoea/sorethroat/trauma  Cardiovascular:No chest pain/palpitation/SOB  Respiratory: No cough/fever/SOB/Wheezing    Gastrointestinal: No abdominal pain/nausea/vomiting/diarrhoea/constipation  Genitourinary: No dysuria/pyuria/hematuria/incomplete emptying of bladder  Musculoskeletal:  No gait disturbance/weakness or joint complaints  Integumentary: No rash or pruritis.  Neurological: No headache/diplopia/change in muscle strength/numbness or tingling. No change in gait, balance, coordination, mood, affect, memory, mentation, behavior.  Psychiatric: No anxiety/depression.  Endocrine: No temperature intolerance. No excessive thirst, fluid intake, or urination. No tremor.  Hematologic/Lymphatic: No abnormal bruising or bleeding, blood clots or swolle lymph nodes.  Allergic/Immunologic: No nasal congestion or hives      PHYSICAL EXAM     Vitals:    05/23/24 0900 05/23/24 0915 05/23/24 0930 05/23/24 0945   BP: 107/61 105/71 (!) 67/53    Pulse: (!) 107 (!) 114 (!) 118 (!) 116   Resp: 21 20 (!) 33 28   Temp:       TempSrc:       SpO2: 100% 100% 98% 100%   Weight:         24HR INTAKE/OUTPUT:    Intake/Output Summary (Last 24 hours) at 5/23/2024 1118  Last data filed at 5/23/2024 0941  Gross per 24 hour   Intake 247.5 ml   Output 225 ml   Net 22.5 ml       General appearance:Awake, alert, in no acute distress  Skin: warm and dry, no rash or erythema  Eyes: conjunctivae normal and sclera anicteric  ENT: no thrush no pharyngeal congestion  orodental hygiene   Neck: No JVD, Lymphadenopathty or thyromegaly  Respiratory: vesicular breath sounds,no wheeze/crackles  Cardiovascular: S1 S2 normal,no gallop or organic murmur.No carotid bruit  Abdomen:Non tender/non distended.Bowel sounds present  Extremities: No Cyanosis or Clubbing,Lower extremity edema  Neurological: Dysarthria following stroke 7 years  back    INVESTIGATIONS      CBC:   Recent Labs     05/21/24  1430 05/21/24  2030 05/21/24  2345 05/22/24  0530 05/23/24  0427   WBC 4.7  --   --  20.7* 24.7*   RBC 4.64  --   --  3.48* 3.60*   HGB 12.0   < > DISREGARD RESULT  9.3* 9.2* 9.3*   HCT 38.9   < > DISREGARD RESULT  29.7* 28.5* 30.6*   MCV 83.8  --   --  81.9* 85.0   MCH 25.9  --   --  26.4 25.8   MCHC 30.8  --   --  32.3 30.4   RDW 16.8*  --   --  16.7* 17.1*     --   --  136* See Reflexed IPF Result   MPV 10.6  --   --  10.6  --     < > = values in this interval not displayed.      BMP:   Recent Labs     05/22/24  0530 05/22/24  1325 05/23/24  0427   * 144 147*   K 2.4* 4.6 4.6   * 119* 119*   CO2 17* 16* 18*   BUN 22 22 23   CREATININE 1.1* 1.2* 1.6*   GLUCOSE 96 145* 116*   CALCIUM 7.7* 7.3* 8.0*        Phosphorus:  No results for input(s): \"PHOS\" in the last 72 hours.  Magnesium:   Recent Labs     05/22/24  0530 05/22/24  1155   MG 0.9* 2.1     Albumin: No results for input(s): \"LABALBU\" in the last 72 hours.    Radiology:  Reviewed as available.  Thank you for the consultation.  Please do not hesitate to call with questions.    This note is created with the assistance of a speech-recognition program. While intending to generate a document that actually reflects the content of the visit, no guarantees can be provided that every mistake has been identified and corrected by editing.    Brendan Trotter MD MD, MRCP ([place]), FACP   5/23/2024 11:18 AM    [facility]

## 2024-05-23 NOTE — PROGRESS NOTES
Telephone report given to Gretchen manjarrez [facility], room 9574-12.

## 2024-05-23 NOTE — PROGRESS NOTES
PHARMACY NOTE:    The electrolyte replacement protocol for potassium/magnesium has been discontinued per P&T guidelines because the patient has reduced renal function (CrCl < 30 mL/min).      The patient's most recent potassium & magnesium levels are:  Recent Labs     05/22/24  0530 05/22/24  1155 05/22/24  1325 05/23/24  0427   K 2.4*  --  4.6 4.6   MG 0.9* 2.1  --   --      Estimated Creatinine Clearance: 24 mL/min (A) (based on SCr of 1.6 mg/dL (H)).    For patients with decreased renal function (below 30ml/min) needing potassium/magnesium supplementation, please order individual bolus doses with appropriate monitoring.      Please contact the inpatient pharmacy with any concerns.  Thank you.

## 2024-05-23 NOTE — PROGRESS NOTES
Pharmacy Note     Renal Dose Adjustment    Macey Zazueta is a 74 y.o. female. Pharmacist assessment of renally cleared medications.    Recent Labs     05/22/24  0530 05/22/24  1325   BUN 22 22       Recent Labs     05/22/24  0530 05/22/24  1325   CREATININE 1.1* 1.2*       Estimated Creatinine Clearance: 32 mL/min (A) (based on SCr of 1.2 mg/dL (H)).  Estimated CrCl using Ideal Body Weight: 30 mL/min (based on IBW 45.5 kg)    Height:   Ht Readings from Last 1 Encounters:   05/22/24 1.473 m (4' 9.99\")     Weight:  Wt Readings from Last 1 Encounters:   05/22/24 49.5 kg (109 lb 2 oz)       The following medication dose has been adjusted based upon renal function per P&T Guidelines:             Meropenem 1000 mg every 8 hours changed to 1000 mg every 12 hours infused over 180 minutes.    Villa Feldman [facility]  5/23/2024 3:37 AM

## 2024-05-23 NOTE — CARE COORDINATION
05/23/24 1125   Readmission Assessment   Number of Days since last admission? 1-7 days   Previous Disposition Home with Family   Who is being Interviewed Caregiver  ()   What was the patient's/caregiver's perception as to why they think they needed to return back to the hospital? Other (Comment)  (pt was originally dc'd from OR @ noon on 5/21, at 2pm EMS was called to pt's home- pt brought back to [facility]. Pt then dc'd to [facility])   Did you visit your Primary Care Physician after you left the hospital, before you returned this time? No   Why weren't you able to visit your PCP? Other (Comment)  (only home for 2 hrs before readmission to [facility])   Did you see a specialist, such as Cardiac, Pulmonary, Orthopedic Physician, etc. after you left the hospital? No   Who advised the patient to return to the hospital? Other (Comment)  (EMS called)   Does the patient report anything that got in the way of taking their medications? No   In our efforts to provide the best possible care to you and others like you, can you think of anything that we could have done to help you after you left the hospital the first time, so that you might not have needed to return so soon? Other (Comment)  (stated dc instructions where understood)

## 2024-05-24 LAB
ANION GAP SERPL CALCULATED.3IONS-SCNC: 8 MMOL/L (ref 9–16)
BASOPHILS # BLD: 0 K/UL (ref 0–0.2)
BASOPHILS NFR BLD: 0 % (ref 0–2)
BUN SERPL-MCNC: 18 MG/DL (ref 8–23)
CALCIUM SERPL-MCNC: 7.8 MG/DL (ref 8.6–10.4)
CHLORIDE SERPL-SCNC: 112 MMOL/L (ref 98–107)
CO2 SERPL-SCNC: 25 MMOL/L (ref 20–31)
CREAT SERPL-MCNC: 1.2 MG/DL (ref 0.5–0.9)
EOSINOPHIL # BLD: 0.18 K/UL (ref 0–0.4)
EOSINOPHILS RELATIVE PERCENT: 1 % (ref 1–4)
ERYTHROCYTE [DISTWIDTH] IN BLOOD BY AUTOMATED COUNT: 17.1 % (ref 11.8–14.4)
GFR, ESTIMATED: 48 ML/MIN/1.73M2
GLUCOSE BLD-MCNC: 138 MG/DL (ref 65–105)
GLUCOSE BLD-MCNC: 155 MG/DL (ref 65–105)
GLUCOSE BLD-MCNC: 217 MG/DL (ref 65–105)
GLUCOSE BLD-MCNC: 93 MG/DL (ref 65–105)
GLUCOSE SERPL-MCNC: 153 MG/DL (ref 74–99)
HCT VFR BLD AUTO: 25.3 % (ref 36.3–47.1)
HCT VFR BLD AUTO: 27.2 % (ref 36.3–47.1)
HCT VFR BLD AUTO: 28.4 % (ref 36.3–47.1)
HGB BLD-MCNC: 7.6 G/DL (ref 11.9–15.1)
HGB BLD-MCNC: 8.4 G/DL (ref 11.9–15.1)
HGB BLD-MCNC: 8.8 G/DL (ref 11.9–15.1)
IMM GRANULOCYTES # BLD AUTO: 0.18 K/UL (ref 0–0.3)
IMM GRANULOCYTES NFR BLD: 1 %
INR PPP: 1
LYMPHOCYTES NFR BLD: 1.1 K/UL (ref 1–4.8)
LYMPHOCYTES RELATIVE PERCENT: 6 % (ref 24–44)
MCH RBC QN AUTO: 25.9 PG (ref 25.2–33.5)
MCHC RBC AUTO-ENTMCNC: 30 G/DL (ref 28.4–34.8)
MCV RBC AUTO: 86.1 FL (ref 82.6–102.9)
MICROORGANISM SPEC CULT: ABNORMAL
MONOCYTES NFR BLD: 0.37 K/UL (ref 0.1–0.8)
MONOCYTES NFR BLD: 2 % (ref 1–7)
MORPHOLOGY: ABNORMAL
MORPHOLOGY: ABNORMAL
NEUTROPHILS NFR BLD: 90 % (ref 36–66)
NEUTS SEG NFR BLD: 16.57 K/UL (ref 1.8–7.7)
NRBC BLD-RTO: 0 PER 100 WBC
PLATELET # BLD AUTO: 103 K/UL (ref 138–453)
PLATELET, FLUORESCENCE: 103 K/UL (ref 138–453)
PMV BLD AUTO: 11.6 FL (ref 8.1–13.5)
POTASSIUM SERPL-SCNC: 3.6 MMOL/L (ref 3.7–5.3)
PROTHROMBIN TIME: 13.3 SEC (ref 11.7–14.9)
RBC # BLD AUTO: 2.94 M/UL (ref 3.95–5.11)
SERVICE CMNT-IMP: ABNORMAL
SERVICE CMNT-IMP: ABNORMAL
SODIUM SERPL-SCNC: 145 MMOL/L (ref 136–145)
SPECIMEN DESCRIPTION: ABNORMAL
SPECIMEN DESCRIPTION: ABNORMAL
WBC OTHER # BLD: 18.4 K/UL (ref 3.5–11.3)

## 2024-05-24 PROCEDURE — 6370000000 HC RX 637 (ALT 250 FOR IP)

## 2024-05-24 PROCEDURE — 2500000003 HC RX 250 WO HCPCS: Performed by: INTERNAL MEDICINE

## 2024-05-24 PROCEDURE — 85025 COMPLETE CBC W/AUTO DIFF WBC: CPT

## 2024-05-24 PROCEDURE — 6360000002 HC RX W HCPCS: Performed by: INTERNAL MEDICINE

## 2024-05-24 PROCEDURE — 94660 CPAP INITIATION&MGMT: CPT

## 2024-05-24 PROCEDURE — 85018 HEMOGLOBIN: CPT

## 2024-05-24 PROCEDURE — 2580000003 HC RX 258: Performed by: INTERNAL MEDICINE

## 2024-05-24 PROCEDURE — 99291 CRITICAL CARE FIRST HOUR: CPT | Performed by: INTERNAL MEDICINE

## 2024-05-24 PROCEDURE — 99232 SBSQ HOSP IP/OBS MODERATE 35: CPT | Performed by: INTERNAL MEDICINE

## 2024-05-24 PROCEDURE — 36415 COLL VENOUS BLD VENIPUNCTURE: CPT

## 2024-05-24 PROCEDURE — 99233 SBSQ HOSP IP/OBS HIGH 50: CPT | Performed by: INTERNAL MEDICINE

## 2024-05-24 PROCEDURE — 2580000003 HC RX 258: Performed by: STUDENT IN AN ORGANIZED HEALTH CARE EDUCATION/TRAINING PROGRAM

## 2024-05-24 PROCEDURE — 85014 HEMATOCRIT: CPT

## 2024-05-24 PROCEDURE — 2060000000 HC ICU INTERMEDIATE R&B

## 2024-05-24 PROCEDURE — 85610 PROTHROMBIN TIME: CPT

## 2024-05-24 PROCEDURE — 80048 BASIC METABOLIC PNL TOTAL CA: CPT

## 2024-05-24 PROCEDURE — 82947 ASSAY GLUCOSE BLOOD QUANT: CPT

## 2024-05-24 PROCEDURE — 6360000002 HC RX W HCPCS: Performed by: STUDENT IN AN ORGANIZED HEALTH CARE EDUCATION/TRAINING PROGRAM

## 2024-05-24 RX ORDER — FUROSEMIDE 10 MG/ML
20 INJECTION INTRAMUSCULAR; INTRAVENOUS ONCE
Status: COMPLETED | OUTPATIENT
Start: 2024-05-24 | End: 2024-05-24

## 2024-05-24 RX ORDER — PANTOPRAZOLE SODIUM 40 MG/1
40 TABLET, DELAYED RELEASE ORAL
Status: DISCONTINUED | OUTPATIENT
Start: 2024-05-24 | End: 2024-05-30 | Stop reason: HOSPADM

## 2024-05-24 RX ORDER — SODIUM CHLORIDE 450 MG/100ML
INJECTION, SOLUTION INTRAVENOUS CONTINUOUS
Status: DISCONTINUED | OUTPATIENT
Start: 2024-05-24 | End: 2024-05-26

## 2024-05-24 RX ORDER — INSULIN LISPRO 100 [IU]/ML
0-4 INJECTION, SOLUTION INTRAVENOUS; SUBCUTANEOUS
Status: DISCONTINUED | OUTPATIENT
Start: 2024-05-24 | End: 2024-05-30 | Stop reason: HOSPADM

## 2024-05-24 RX ORDER — ATORVASTATIN CALCIUM 80 MG/1
80 TABLET, FILM COATED ORAL NIGHTLY
Status: DISCONTINUED | OUTPATIENT
Start: 2024-05-24 | End: 2024-05-30 | Stop reason: HOSPADM

## 2024-05-24 RX ADMIN — MEROPENEM 1000 MG: 1 INJECTION, POWDER, FOR SOLUTION INTRAVENOUS at 05:04

## 2024-05-24 RX ADMIN — PANTOPRAZOLE SODIUM 40 MG: 40 TABLET, DELAYED RELEASE ORAL at 08:41

## 2024-05-24 RX ADMIN — SODIUM CHLORIDE: 4.5 INJECTION, SOLUTION INTRAVENOUS at 10:44

## 2024-05-24 RX ADMIN — SODIUM BICARBONATE: 84 INJECTION, SOLUTION INTRAVENOUS at 06:43

## 2024-05-24 RX ADMIN — SODIUM CHLORIDE, PRESERVATIVE FREE 10 ML: 5 INJECTION INTRAVENOUS at 08:33

## 2024-05-24 RX ADMIN — INSULIN LISPRO 1 UNITS: 100 INJECTION, SOLUTION INTRAVENOUS; SUBCUTANEOUS at 17:06

## 2024-05-24 RX ADMIN — MEROPENEM 1000 MG: 1 INJECTION, POWDER, FOR SOLUTION INTRAVENOUS at 15:59

## 2024-05-24 RX ADMIN — ENOXAPARIN SODIUM 30 MG: 100 INJECTION SUBCUTANEOUS at 08:27

## 2024-05-24 RX ADMIN — FUROSEMIDE 20 MG: 10 INJECTION, SOLUTION INTRAMUSCULAR; INTRAVENOUS at 10:45

## 2024-05-24 RX ADMIN — ATORVASTATIN CALCIUM 80 MG: 80 TABLET, FILM COATED ORAL at 20:35

## 2024-05-24 RX ADMIN — MEROPENEM 1000 MG: 1 INJECTION, POWDER, FOR SOLUTION INTRAVENOUS at 23:24

## 2024-05-24 RX ADMIN — SODIUM CHLORIDE, PRESERVATIVE FREE 10 ML: 5 INJECTION INTRAVENOUS at 20:35

## 2024-05-24 ASSESSMENT — PULMONARY FUNCTION TESTS: PIF_VALUE: 14

## 2024-05-24 ASSESSMENT — PAIN SCALES - GENERAL
PAINLEVEL_OUTOF10: 0

## 2024-05-24 NOTE — PLAN OF CARE
Problem: Safety - Adult  Goal: Free from fall injury  5/23/2024 2029 by Gretchen Piña RN  Outcome: Progressing     Problem: Chronic Conditions and Co-morbidities  Goal: Patient's chronic conditions and co-morbidity symptoms are monitored and maintained or improved  5/23/2024 2029 by Gretchen Piña RN  Outcome: Progressing     Problem: Discharge Planning  Goal: Discharge to home or other facility with appropriate resources  5/23/2024 2029 by Gretchen Piña RN  Outcome: Progressing     Problem: Skin/Tissue Integrity  Goal: Absence of new skin breakdown  Description: 1.  Monitor for areas of redness and/or skin breakdown  2.  Assess vascular access sites hourly  3.  Every 4-6 hours minimum:  Change oxygen saturation probe site  4.  Every 4-6 hours:  If on nasal continuous positive airway pressure, respiratory therapy assess nares and determine need for appliance change or resting period.  5/23/2024 2029 by Gretchen Piña RN  Outcome: Progressing     Problem: ABCDS Injury Assessment  Goal: Absence of physical injury  5/23/2024 2029 by Gretchen Piña RN  Outcome: Progressing

## 2024-05-24 NOTE — TRANSITION OF CARE
Critical care team - Resident sign-out to medicine service      Date and time: 5/24/2024 10:35 AM  Patient's name:  Macey Zazueta  Medical Record Number: [id number]  Patient's account/billing number: [id number]  Patient's YOB: 1950  Age: 74 y.o.  Date of Admission: 5/23/2024  3:04 AM  Length of stay during current admission: 1    Primary Care Physician: Byron Castaneda MD    Code Status: Full Code    Mode of physician to physician communication:        [x] Via telephone   [] In person     Date and time of sign-out: 5/24/2024 10:35 AM    Accepting Internal Medicine resident: Dr. Herzog    Accepting Medicine team: IM Team 1    Accepting team's attending: Dr. Rojas    Patient's current ICU Bed:  3018     Patient's assigned bed on floor:  427        [] Med-Surg Monitored [x] Step-down       [] Psychiatry ICU       [] Psych floor     Reason for ICU admission:     Macey Zazueta is a 74 y.o. with PMH of stroke approximately 7 years ago.     Patient was admitted to [facility] for altered mental status, urinary obstruction and urinary tract infection.  Patient had ureteral stent placed in February for nephrolithiasis, obstructing stone.  On Tuesday, she had laser lithotripsy.  Patient has been on antibiotics at home for urinary tract infection.  Patient was evaluated at the emergency [facility] emergency department on 5/22, and found to be septic.  Because the patient had new onset left-sided weakness and a history of stroke, CTA was performed without any new evidence of stroke.  While in the emergency department, patient did receive fluid bolus due to concern for sepsis, she had a lactate of 6.0.  She received sepsis bundle fluid bolus, started on antibiotics.  Blood pressure did improve.     During her hospitalization at [facility], patient's troponins begin to rise from 69 on 5/21 to a peak of 133 on 5/22.  Blood pressures started to become more soft, required norepinephrine to be started.  Cardiology was consulted.  Patient diuresed due to concern for fluid overload with crackles on exam performed cardiology.  She was additionally started on BiPAP, this appeared to help her respiratory status.  Patient was transferred to [facility] ICU for close blood pressure monitoring, management of sepsis, troponin elevation, concern for CHF exacerbation as well.  Patient additionally had elevation in her liver enzymes, markedly elevated white count from 4.7-20.7.  Molina catheter additionally started to have refugio blood output.     On my evaluation the  is at bedside, the  stated that patient began to decline on Tuesday 5/21 with worsening mental status, decline in general function, and typically feeling unwell.  At baseline she is able to ambulate with assistance however has difficulty with balance due to her stroke 7 years ago.     ICU course summary:     On 5/23 patient admitted without sedation but started on Levophed. Echo unremarkable with cardiology stating elevated BNP 2/2 sepsis and no cardiac recommendations or trending necessary aside from treating underlying condition. Patient received BiPAP at night and was found to have SANDER. Nephrology began managing fluids at this time. Urology repeated CT abdomen to evaluate for further obstruction which was negative; they stated due to stable creatinine, no need to replace stent at this time or otherwise receive intervention. Infectious disease was consulted as patient was found to have Klebsiella and Enterococcus bacteremias; patient on meropenem at this time.     On 5/24 patient was found to have significantly improved with no pressor requirement and was tolerating nasal cannula well, requesting diet. Given no urology intervention planned, diet ordered at that time. Antibiotics continuing per ID recs. Patient stable to transfer to stepdown.     Procedures during patient's ICU stay:     none    Current Vitals:     /74   Pulse (!) 116

## 2024-05-24 NOTE — PROGRESS NOTES
Samuel Cramer, Summer Brady Mostafa, Meet Snyder Jr  Urology Progress Note     Subjective:  No acute events overnight, afebrile, vital signs stable  Patient reports improvement in pain and general overall condition  Pain level: Minimal  Denies fever, chills, nausea, vomiting, chest pain, shortness of breath    UOP: 920  Morning labs are pending    Patient Vitals for the past 24 hrs:   BP Temp Temp src Pulse Resp SpO2 Height Weight   05/24/24 0600 118/81 -- -- (!) 108 20 100 % -- --   05/24/24 0501 -- -- -- -- -- -- -- 49.5 kg (109 lb 2 oz)   05/24/24 0500 113/68 -- -- (!) 103 20 98 % -- --   05/24/24 0400 113/73 97.7 °F (36.5 °C) Axillary (!) 105 20 99 % -- --   05/24/24 0332 -- -- -- (!) 104 23 100 % -- --   05/24/24 0300 105/71 -- -- (!) 101 15 99 % -- --   05/24/24 0200 105/65 -- -- (!) 102 24 99 % -- --   05/24/24 0100 97/64 -- -- (!) 101 20 99 % -- --   05/24/24 0000 94/61 98.1 °F (36.7 °C) Axillary (!) 102 23 100 % -- --   05/23/24 2339 (!) 84/57 -- -- (!) 104 22 98 % -- --   05/23/24 2238 -- -- -- (!) 109 26 99 % -- --   05/23/24 2213 -- -- -- (!) 116 27 100 % -- --   05/23/24 2130 104/70 -- -- (!) 121 27 100 % -- --   05/23/24 2100 105/63 -- -- (!) 118 26 99 % -- --   05/23/24 2030 111/66 -- -- (!) 117 27 99 % -- --   05/23/24 2000 (!) 104/56 98.1 °F (36.7 °C) Axillary (!) 116 28 99 % -- --   05/23/24 1930 107/63 -- -- (!) 115 23 96 % -- --   05/23/24 1900 104/67 -- -- (!) 116 25 98 % -- --   05/23/24 1800 98/71 -- -- (!) 114 27 100 % -- --   05/23/24 1700 103/63 -- -- (!) 114 29 100 % -- --   05/23/24 1600 106/68 98.3 °F (36.8 °C) Oral (!) 117 30 100 % -- --   05/23/24 1529 -- -- -- (!) 116 27 100 % -- --   05/23/24 1500 108/76 -- -- (!) 112 27 100 % 1.473 m (4' 10\") --   05/23/24 1400 98/78 -- -- (!) 112 29 99 % -- --   05/23/24 1300 91/61 -- -- (!) 107 22 100 % -- --   05/23/24 1207 -- -- -- (!) 115 26 94 % -- --   05/23/24 1200 112/83 97.5 °F (36.4 °C) Oral (!) 115 (!) 31 100 % -- --   05/23/24  <=1  Sensitive      trimethoprim-sulfamethoxazole <=20  Sensitive                           Blood Culture 1 [[id number]]  (Abnormal) Collected: 05/21/24 1602    Order Status: Completed Specimen: Blood Updated: 05/23/24 2318     Specimen Description .BLOOD     Special Requests LWR 10 ML     Culture POSITIVE Blood Culture      DIRECT GRAM STAIN FROM BOTTLE: GRAM POSITIVE COCCI IN CHAINS AND GRAM NEGATIVE RODS      ENTEROCOCCUS FAECALIS Identification by MALDI-TOF      KLEBSIELLA OXYTOCA Identification by MALDI-TOF For susceptibility, refer to previous culture.      (NOTE) Direct Gram Stain from bottle result called to and read back by:ULYSSES DELA CRUZ RN, T ICU 5/22/24 0745 KB            Physical Exam:  Physical Exam  HENT:      Head: Normocephalic and atraumatic.   Cardiovascular:      Rate and Rhythm: Normal rate.   Pulmonary:      Effort: Pulmonary effort is normal.   Abdominal:      General: Abdomen is flat.      Palpations: Abdomen is soft.   Genitourinary:     Comments: Molina catheter in place draining yellow urine  Skin:     General: Skin is warm.      Capillary Refill: Capillary refill takes less than 2 seconds.   Neurological:      Mental Status: She is alert and oriented to person, place, and time.         Interval Imaging Findings:  CT ABDOMEN PELVIS WO CONTRAST Additional Contrast? None    Result Date: 5/23/2024  EXAMINATION: CT OF THE ABDOMEN AND PELVIS WITHOUT CONTRAST 5/23/2024 10:22 am TECHNIQUE: CT of the abdomen and pelvis was performed without the administration of intravenous contrast. Multiplanar reformatted images are provided for review. Automated exposure control, iterative reconstruction, and/or weight based adjustment of the mA/kV was utilized to reduce the radiation dose to as low as reasonably achievable. COMPARISON: CT abdomen/pelvis dated May 21, 2024, February 12, 2024 HISTORY: ORDERING SYSTEM PROVIDED HISTORY: Left hydro, Recent lithotripsy TECHNOLOGIST PROVIDED HISTORY: Left hydro,

## 2024-05-24 NOTE — PLAN OF CARE
Problem: Safety - Adult  Goal: Free from fall injury  Outcome: Progressing     Problem: Chronic Conditions and Co-morbidities  Goal: Patient's chronic conditions and co-morbidity symptoms are monitored and maintained or improved  Outcome: Progressing  Flowsheets (Taken 5/24/2024 1454)  Care Plan - Patient's Chronic Conditions and Co-Morbidity Symptoms are Monitored and Maintained or Improved:   Monitor and assess patient's chronic conditions and comorbid symptoms for stability, deterioration, or improvement   Collaborate with multidisciplinary team to address chronic and comorbid conditions and prevent exacerbation or deterioration   Update acute care plan with appropriate goals if chronic or comorbid symptoms are exacerbated and prevent overall improvement and discharge     Problem: Discharge Planning  Goal: Discharge to home or other facility with appropriate resources  Outcome: Progressing  Flowsheets (Taken 5/24/2024 1454)  Discharge to home or other facility with appropriate resources:   Identify barriers to discharge with patient and caregiver   Arrange for needed discharge resources and transportation as appropriate   Identify discharge learning needs (meds, wound care, etc)   Refer to discharge planning if patient needs post-hospital services based on physician order or complex needs related to functional status, cognitive ability or social support system     Problem: Skin/Tissue Integrity  Goal: Absence of new skin breakdown  Description: 1.  Monitor for areas of redness and/or skin breakdown  2.  Assess vascular access sites hourly  3.  Every 4-6 hours minimum:  Change oxygen saturation probe site  4.  Every 4-6 hours:  If on nasal continuous positive airway pressure, respiratory therapy assess nares and determine need for appliance change or resting period.  Outcome: Progressing     Problem: ABCDS Injury Assessment  Goal: Absence of physical injury  Outcome: Progressing  Flowsheets (Taken 5/24/2024 0800  by Keira Rosa, RN)  Absence of Physical Injury: Implement safety measures based on patient assessment     Problem: Respiratory - Adult  Goal: Achieves optimal ventilation and oxygenation  Outcome: Progressing  Flowsheets (Taken 5/24/2024 0800 by Keira Rosa, RN)  Achieves optimal ventilation and oxygenation:   Assess for changes in respiratory status   Assess for changes in mentation and behavior     Problem: Nutrition Deficit:  Goal: Optimize nutritional status  Outcome: Progressing  Flowsheets (Taken 5/24/2024 1521 by Ruthie Sunshine, RD, LD)  Nutrient intake appropriate for improving, restoring, or maintaining nutritional needs:   Assess nutritional status and recommend course of action   Monitor oral intake, labs, and treatment plans   Recommend appropriate diets, oral nutritional supplements, and vitamin/mineral supplements

## 2024-05-24 NOTE — PROGRESS NOTES
NEPHROLOGY PROGRESS NOTE      ASSESSMENT     Acute kidney injury secondary to ischemic ATN from circulatory septic shock following recent urologic therapeutic intervention for ureteric stone -improving  Baseline creatinine normal less than 1  High anion gap metabolic acidosis secondary to acute kidney injury  Bacteremia culture positive for Enterococcus faecalis and Klebsiella oxytocin  Septic shock on pressors and currently off it  History of cystoscopy/ureteroscopy/laser lithotripsy and left stent exchange on 21st May  NSTEMI type II  History of diabetes  History of CVA 7 years back with residual neurologic deficit    PLAN     Switch to isotonic saline  1 dose of IV Lasix  Antibiotics as per estimated GFR  We will sign off      SUBJECTIVE   Presented to the [facility] ER with generalized weakness and mentation alteration following neurology therapeutic procedure ureteroscopy holmium laser lithotripsy and stent exchange a day prior.  In the ER she was found to have bleeding from urethra and ureteral stent was felt she was removed by emergency department intervention.  CT scan showed left hydronephrosis with further workup showing hypotension and bacteremia    Urine output decent after initiation of IV fluids from yesterday which contain bicarb.  Renal function is pretty much at baseline now.  No acute hemodynamic issues overnight mild shortness of breath noted.  Blood pressure stable.    OBJECTIVE     Vitals:    05/24/24 0800 05/24/24 0823 05/24/24 0900 05/24/24 1200   BP: 114/87  112/74 118/75   Pulse: (!) 105  (!) 116 (!) 114   Resp: 24  19 (!) 33   Temp: 97.9 °F (36.6 °C)   98.9 °F (37.2 °C)   TempSrc: Oral   Oral   SpO2: 99% 99% 97% 93%   Weight:       Height:         24HR INTAKE/OUTPUT:    Intake/Output Summary (Last 24 hours) at 5/24/2024 1343  Last data filed at 5/24/2024 1300  Gross per 24 hour   Intake 2854.39 ml   Output 2621 ml   Net 233.39 ml       General appearance:Awake, alert, in no acute

## 2024-05-24 NOTE — PLAN OF CARE
Problem: Respiratory - Adult  Goal: Achieves optimal ventilation and oxygenation  Flowsheets (Taken 5/23/2024 2215)  Achieves optimal ventilation and oxygenation:   Respiratory therapy support as indicated   Assess for changes in respiratory status   Assess for changes in mentation and behavior   Oxygen supplementation based on oxygen saturation or arterial blood gases   Encourage broncho-pulmonary hygiene including cough, deep breathe, incentive spirometry   Assess and instruct to report shortness of breath or any respiratory difficulty

## 2024-05-24 NOTE — PROGRESS NOTES
[facility]  PROGRESS NOTE    Shift date: 5/24/24  Shift day: Friday   Shift # 1    Room # 3018/3018-01   Name: Macey Zazueta                Rastafarian:  [place]  [place] [facility] [employer] day:     Referral: Routine Visit    Admit Date & Time: 5/23/2024  3:04 AM    Assessment:  Macey Zazueta is a 74 y.o. female in the hospital. Upon entering the room writer observes pt in bed awake. , \"Yan,\" was sitting at bedside.  was answering a Spiritual Care consult request. Pt and  were open to  presence and visit.      Intervention:  Writer introduced self and title as . Pt's  shared reason for being in the hospital.  appeared hopeful, stating wife was \"doing better.\" Pt's  talked about frandy tradition and beliefs with .  provided a ministry of presence, active listening, and offered a prayer, which pt and  were open to.     Outcome:  Pt and Pt's  were grateful for  visit.     Plan:  Chaplains will remain available to offer spiritual and emotional support as needed.      Electronically signed by Chaplain REANNA, on 5/24/2024 at 11:28 AM.  [facility]  [phone]       05/24/24 1125   Encounter Summary   Encounter Overview/Reason Initial Encounter   Service Provided For Patient and family together   Referral/Consult From Multi-disciplinary team   Support System Family members   Last Encounter  05/24/24   Complexity of Encounter Low   Begin Time 0928   End Time  0942   Total Time Calculated 14 min   Assessment/Intervention/Outcome   Assessment Calm;Coping;Hopeful   Intervention Active listening;Sustaining Presence/Ministry of presence;Prayer (assurance of)/Robbins;Discussed relationship with God;Discussed belief system/Sikhism practices/frandy;Discussed illness injury and it’s impact   Outcome Comfort;Engaged in conversation;Expressed feelings, needs, and concerns;Expressed Gratitude      Electronically signed by LUIS CARLOS SONI on 5/24/2024 at 11:30 AM

## 2024-05-24 NOTE — FLOWSHEET NOTE
Reached out to Dr. Aguila riggins/ urology regarding an increase in bloody urine output since giving lasix at 1045. Per the service this is ok unless garcia becomes occluded by clots.

## 2024-05-24 NOTE — FLOWSHEET NOTE
1425 pt transported by RN via wheelchair to . AUSTIN Rodríguez at bedside to receive pt. All questions answered. All belongings collected by pt's  and transported w/ pt.

## 2024-05-24 NOTE — PROGRESS NOTES
Critical Care Team - Daily Progress Note      Date and time: 5/24/2024 10:31 AM  Patient's name:  Macey Zazueta  Medical Record Number: [id number]  Patient's account/billing number: [id number]  Patient's YOB: 1950  Age: 74 y.o.  Date of Admission: 5/23/2024  3:04 AM  Length of stay during current admission: 1      Primary Care Physician: Byron Castaneda MD  ICU Attending Physician: Dr. Oneil    Code Status: Full Code    Reason for ICU admission:sepsis   SUBJECTIVE:     OVERNIGHT EVENTS:       No acute events overnight.    AWAKE & FOLLOWING COMMANDS:  [] No   [x] Yes    CURRENT VENTILATION STATUS:     [] Ventilator  [x] BIPAP  [] Nasal Cannula [] Room Air      IF INTUBATED, ET TUBE MARKING AT LOWER LIP:       cms    SECRETIONS Amount:  [] Small [] Moderate  [] Large  [x] None  Color:     [] White [] Colored  [] Bloody    SEDATION:  RAAS Score:  [] Propofol gtt  [] Versed gtt  [] Ativan gtt   [x] No Sedation    PARALYZED:  [x] No    [] Yes    DIARRHEA:                [x] No                [] Yes  (C. Difficile status: [] positive                                                                                                                       [] negative                                                                                                                     [] pending)    VASOPRESSORS:  [x] No    [] Yes    If yes -   [] Levophed       [] Dopamine     [] Vasopressin       [] Dobutamine  [] Phenylephrine         [] Epinephrine    CENTRAL LINES:     [x] No   [] Yes   (Date of Insertion:   )           If yes -     [] Right IJ     [] Left IJ [] Right Femoral [] Left Femoral                   [] Right Subclavian [] Left Subclavian       RIVERA'S CATHETER:   [] No   [x] Yes  (Date of Insertion: 5/22/24 )     URINE OUTPUT:            [x] Good   [] Low              [] Anuric      OBJECTIVE:     VITAL SIGNS:  /74   Pulse (!) 116   Temp 97.9 °F (36.6 °C) (Oral)   Resp 19   Ht 1.473 m  murmur noted and 2+ pulses throughout  Abdomen: soft, nontender, nondistended, no masses or organomegaly  NEUROLOGIC: Awake, alert, oriented to name, place and time. Cranial nerves II-XII are grossly intact. Motor is 5 out of 5 bilaterally. Sensory is intact.  Extremities:  peripheral pulses normal, no pedal edema, no clubbing or cyanosis  SKIN: normal coloration and turgor    Any additional physical findings:      MEDICATIONS:  Scheduled Meds:   pantoprazole  40 mg Oral QAM AC    insulin lispro  0-4 Units SubCUTAneous 4x Daily AC & HS    furosemide  20 mg IntraVENous Once    sodium chloride flush  5-40 mL IntraVENous 2 times per day    meropenem  1,000 mg IntraVENous Q12H    enoxaparin  30 mg SubCUTAneous Daily     Continuous Infusions:   sodium chloride      sodium chloride      dextrose       PRN Meds:   sodium chloride flush, 5-40 mL, PRN  sodium chloride, , PRN  ondansetron, 4 mg, Q8H PRN   Or  ondansetron, 4 mg, Q6H PRN  polyethylene glycol, 17 g, Daily PRN  acetaminophen, 650 mg, Q6H PRN   Or  acetaminophen, 650 mg, Q6H PRN  glucose, 4 tablet, PRN  dextrose bolus, 125 mL, PRN   Or  dextrose bolus, 250 mL, PRN  glucagon (rDNA), 1 mg, PRN  dextrose, , Continuous PRN        SUPPORT DEVICES: [] Ventilator [x] BIPAP  [] Nasal Cannula [] Room Air    VENT SETTINGS (Comprehensive) (if applicable):  BiPAP mode, FiO2 30%, PEEP 6, Respiratory Rate 14, PS over PEEP 6  Vent Information  Vent Mode: NIV/PC  Additional Respiratory Assessments  Pulse: (!) 116  Respirations: 19  SpO2: 97 %    ABGs:   Arterial Blood Gas result not available   Lab Results   Component Value Date/Time    FIO2 INFORMATION NOT PROVIDED 05/23/2024 03:47 PM     Lactic Acid:   Lab Results   Component Value Date/Time    LACTA 1.7 05/22/2024 09:30 AM    LACTA 2.7 05/22/2024 05:30 AM    LACTA 4.5 05/21/2024 11:45 PM         DATA:  Complete Blood Count:   Recent Labs     05/21/24  1430 05/21/24  2030 05/22/24  0530 05/23/24  0427 05/24/24  0649   WBC 4.7   for input(s): \"BNP\" in the last 72 hours.    LFTS  Recent Labs     05/21/24  1430 05/22/24  0530 05/23/24  0427 05/23/24  1547   ALKPHOS 203* 80 116* 113*   * 244* 146* 114*   * 410* 205* 148*   BILITOT 1.5* 1.3* 1.8* 1.8*   BILIDIR  --   --  0.3  --        AMYLASE/LIPASE/AMMONIA  No results for input(s): \"AMYLASE\", \"LIPASE\", \"AMMONIA\" in the last 72 hours.    Last 3 Blood Glucose:   Recent Labs     05/21/24  1430 05/22/24  0530 05/22/24  1325 05/23/24  0427 05/23/24  1153 05/23/24  1547 05/23/24  2137 05/24/24  0649   GLUCOSE 208* 96 145* 116* 99 117* 114* 153*      HgBA1c:    Lab Results   Component Value Date/Time    LABA1C 6.0 05/23/2024 03:47 PM         TSH:    Lab Results   Component Value Date/Time    TSH 4.05 05/23/2024 03:47 PM     ANEMIA STUDIES  No results for input(s): \"TIBC\", \"FERRITIN\", \"WPXYFMJN31\", \"FOLATE\", \"OCCULTBLD\" in the last 72 hours.    Invalid input(s): \"LABIRON\"      Cultures during this admission:     Blood cultures:    [] None drawn      [] Negative             [x]  Positive (Details: Klebsiella oxytoca and E. faecalis )  Urine Culture:     [] None drawn      [x] Negative             []  Positive (Details: )  Sputum Culture: [x] None drawn      [] Negative             []  Positive (Details:  )   Endotracheal aspirate:[x] None drawn []Negative              []  Positive (Details:  )       Chest Xray (5/24/2024):   XR CHEST PORTABLE   Final Result   1. Right-sided PICC tip position now upper SVC; advancement 2 cm suggested,   if possible.   2. Otherwise no significant interval change.         CT ABDOMEN PELVIS WO CONTRAST Additional Contrast? None   Final Result   1. There is mild to moderate left-sided hydronephrosis with left-sided renal   stones as described above.  However, no obstructing ureteral stone is seen.   However, there is dilatation of the left ureter and surrounding inflammatory   change which may represent a urinary tract infection.  Correlate clinically      2.

## 2024-05-24 NOTE — PROGRESS NOTES
(HCC)    Renal calculus    Complicated UTI (urinary tract infection)    SANDER (acute kidney injury) (HCC)    Acidosis  Resolved Problems:    * No resolved hospital problems. *      #Septicemia:  #Complicated UTI.  Sepsis with shock off pressors:  Patient was initially in sepsis with shock requiring vasopressor support.  Empirically on meropenem.  Blood culture growing  Enterococcus faecalis and Klebsiella oxytoca   Currently hemodynamically stable off vasopressor support.  Prior Left side hydronephrosis in Feb 2024  Prior Left renal pelvis calculus in Feb 2024  S/P left ureteral stent on 2-14-24  S/P lithotripsy on 3-5-24  S/P cystoscopy, ureteroscopy, LASER lithotripsy with Lt stent exchange 5-21-24  CT abdomen pelvis from 5/23/2024 shows mild to moderate left hydronephrosis with left-sided renal stone.  ID on board.  Appreciate recommendations.  Continue meropenem.  Follow-up blood culture sensitivity.  Urology on board.  Appreciate recommendations.     #Nonoliguric SANDER:  Creatinine seems to have peaked at 1.6.  Currently 1.2.  Likely ATN prerenal leading to ATN due to sepsis but shock.  Baseline creatinine less than 1.  Monitor urine output.  Daily weights.  Monitor BMP.  Avoid nephrotoxic agents.  Continue IV fluids per nephrology recommendations.  1 dose of IV Lasix.  Renally dose antibiotics.    #Acute anemia:  #Hematuria:  Anemia likely secondary to blood loss.  Monitor hemoglobin.  Avoid anticoagulation and antiplatelets.  Patient has [facility]  Urology on board.  Appreciate recommendations.      #Transaminitis:  Likely secondary to shock liver from sepsis  CT abdomen shows unremarkable liver  Trend LFTs.  Avoid hepatotoxins.  Follow-up acute viral hepatitis panel.    #NSTEMI:  Likely type II secondary to sepsis with shock.  Cardiology consulted.  TTE from 5/20/2024 showed preserved EF.  No active intervention planned.  Plan is to follow-up outpatient with patient's established cardiologist in [place].     #Acute  hypoxic respiratory failure:  Likely secondary to pulmonary edema from sepsis  Wean down oxygen support as appropriate.  Currently maintaining saturation on 2 L O2 through nasal cannula     #Essential hypertension:  Patient apparently is on amlodipine 2.5, Lopressor 25 Mg at home.  Currently patient normotensive.  Recently out of shock on vasopressor support.  Monitor blood pressure.  Will resume antihypertensives if needed.    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  Hold ASA due to hematuria.    #Type 2 diabetes mellitus:  Patient on metformin at home.  POCT glucose monitoring.  Low-dose sliding scale.  Hypoglycemia protocol in place.      DVT prophylaxis: EPC cuffs.  Anticoagulation held due to hematuria.  GI prophylaxis not indicated.   Diabetic diet.  PT OT on board.  Case management on board.  Disposition: Monitor inpatient.    Alvarez Yen MD             Department of Internal Medicine  [facility] [place]           5/24/2024, 4:29 PM

## 2024-05-24 NOTE — PROGRESS NOTES
[facility]  PROGRESS NOTE    Shift date: 05/23/2024  Shift day: Thursday   Shift # 2    Room # 3018/3018-01   Name: Macey Zazueta                Mu-ism:    Place of Mosque:     Referral: Routine Visit    Admit Date & Time: 5/23/2024  3:04 AM    Assessment:  Macey Zazueta is a 74 y.o. female in the hospital . Patient appeared calm and coping during  visit and had family present.      Intervention:  Writer introduced self and title as . Patient did not appear to mind  presence.  provided a supportive presence through active listening and words of affirmation.    Outcome:  Patient and family appeared receptive to  visit.    Plan:  Chaplains will remain available to offer spiritual and emotional support as needed.      Electronically signed by Chaplain Armando, on 5/23/2024 at 8:38 PM.  [facility]  [facility]  [phone]

## 2024-05-24 NOTE — CARE COORDINATION
Transitional planning. 2L NC/ BiPAP as needed,  A & O X 2-3 (baseline) PT/OT ordered. ID, Urology following. Referral faxed to [facility]. [facility] is HC choice.       Called [facility] [phone], to inform of referral. [facility] is off until Tues, 5/28, spoke to Joy FAJARDO, she will review referral when she receives it and call CM.     1420 Joy riggins/ [facility], clinically approved pending therapy notes.   Call [phone] if pt has therapy notes placed and is ready for dc.

## 2024-05-24 NOTE — PROGRESS NOTES
[facility] - Progress Note    Today's Date and Time: 5/24/2024, 7:18 AM    Impression :   Septicemia with Gram positive cocci in chains and pairs and Gram negative bacilli x 2 on 5-21-24  Cultures: Enterococcus faecalis and Klebsiella oxytoca  Septic shock  Resolved  Lactic acidosis  Thrombocytopenia  Prior Left side hydronephrosis in Feb 2024  Prior Left renal pelvis calculus in Feb 2024  S/P left ureteral stent on 2-14-24  S/P lithotripsy on 3-5-24  S/P cystoscopy, ureteroscopy, LASER lithotripsy with Lt stent exchange 5-21-24  Essential HTN  DM type 2  Lt pontine CVA in 2017  NSTEMI  Hepatopathy    Recommendations:   Meropenem 1 gm IV q 8 hr to treat Enterococcus and Klebsiella.    Medical Decision Making/Summary/Discussion:5/24/2024     Daily Elements of Decision Making provided by Consulting Physician:    Note: I have independently performed the steps listed below as part of the medical decision making and evaluation.    Review of current Problems:  Today I am seeing the patient for the following problems:  Septicemia with Gram positive cocci in chains and pairs and Gram negative bacilli x 2 on 5-21-24  Septic shock  Lactic acidosis  Thrombocytopenia  Prior Left side hydronephrosis in Feb 2024  Prior Left renal pelvis calculus in Feb 2024  S/P left ureteral stent on 2-14-24  S/P lithotripsy on 3-5-24  S/P cystoscopy, ureteroscopy, LASER lithotripsy with Lt stent exchange 5-21-24  Essential HTN  DM type 2  Lt pontine CVA in 2017  Evaluation of Patient:  Patient evaluated and examined in the ICU.   Previously evaluated by Telemedicine at [facility]  Transferred to [facility]  Patient improved with control of septic shock  Will transfer to step down unit  Please see daily details in Interval Changes Section  Changes in physical exam:  Hypotension resolved, Off vasopressors  Tachycardic  Improved overall  Please see daily details in Physical Exam section and in Interval  obstruction or abnormal wall thickening.  No focal inflammatory bowel changes are identified. PELVIS: The bladder is opacified with contrast.  Enlarged uterus with large calcified fibroid. PERITONEUM/RETROPERITONEUM: No evidence of intraperitoneal free air or ascites.  Multiple enlarged retroperitoneal and right pelvic sidewall lymph nodes, unchanged. BONES/SOFT TISSUES: Multilevel degenerative changes of the thoracolumbar spine again noted.  Chronic changes of the right femoral head, with moderate to severe bilateral hip osteoarthritis.  Unremarkable abdominal soft tissues.     1. Mild left hydronephrosis, with diffuse left periureteral inflammatory stranding and scattered small foci of intraureteral air.  The renal collecting system is opacified with contrast, without definite evidence of obstruction.  Overall, these findings are concerning for left-sided urinary tract infection.  Correlation with urinalysis is recommended. 2. Bilateral adrenal thickening with 2.0 cm left adrenal nodule, stable from previous exam dated 02/12/2024.  1 year follow-up adrenal CT is recommended. 3. Enlarged fibroid uterus. 4. Cholelithiasis. 5. Additional chronic findings, as above.     XR CHEST (2 VW)    Result Date: 5/21/2024  EXAMINATION: TWO XRAY VIEWS OF THE CHEST 5/21/2024 4:20 pm COMPARISON: None. HISTORY: ORDERING SYSTEM PROVIDED HISTORY: Shortness of Breath TECHNOLOGIST PROVIDED HISTORY: Shortness of Breath altered mental status FINDINGS: The lungs are without acute focal process.  There is no effusion or pneumothorax. The cardiomediastinal silhouette is without acute process. The osseous structures are without acute process.  There is osteoarthritis of the right shoulder.  There is a thoracolumbar scoliosis, convex to the right.     No acute process.     CTA HEAD NECK W CONTRAST    Result Date: 5/21/2024  EXAMINATION: CTA OF THE HEAD AND NECK WITH CONTRAST 5/21/2024 1:50 pm: TECHNIQUE: CTA of the head and neck was performed  Klebsiella oxytoca      BACTERIAL SUSCEPTIBILITY PANEL DAVID (Preliminary)      ampicillin >=32  Resistant      ceFAZolin <=4  Sensitive      cefTRIAXone <=0.25  Sensitive      Confirmatory Extended Spectrum Beta-Lactamase NEGATIVE  Sensitive      gentamicin <=1  Sensitive      levofloxacin <=0.12  Sensitive      piperacillin-tazobactam <=4  Sensitive      tobramycin <=1  Sensitive      trimethoprim-sulfamethoxazole <=20  Sensitive                           Blood Culture 1 [[id number]]  (Abnormal) Collected: 05/21/24 1602    Order Status: Completed Specimen: Blood Updated: 05/23/24 7593     Specimen Description .BLOOD     Special Requests LWR 10 ML     Culture POSITIVE Blood Culture      DIRECT GRAM STAIN FROM BOTTLE: GRAM POSITIVE COCCI IN CHAINS AND GRAM NEGATIVE RODS      ENTEROCOCCUS FAECALIS Identification by MALDI-TOF      KLEBSIELLA OXYTOCA Identification by MALDI-TOF For susceptibility, refer to previous culture.      (NOTE) Direct Gram Stain from bottle result called to and read back by:ULYSSES DELA CRUZ RN, [facility] ICU 5/22/24 0745 KB              Medical Decision Making-Other:     Note:    Thank you for allowing us to participate in the care of this patient. Please call with questions.    Myron Nicholson MD  Office: [phone]

## 2024-05-25 PROBLEM — A41.59 KLEBSIELLA SEPSIS (HCC): Status: ACTIVE | Noted: 2024-05-25

## 2024-05-25 PROBLEM — A41.50 GRAM NEGATIVE SEPTICEMIA (HCC): Status: ACTIVE | Noted: 2024-05-25

## 2024-05-25 PROBLEM — A49.8 ENTEROCOCCUS FAECALIS INFECTION: Status: ACTIVE | Noted: 2024-05-25

## 2024-05-25 PROBLEM — J96.01 ACUTE HYPOXIC RESPIRATORY FAILURE (HCC): Status: ACTIVE | Noted: 2024-05-25

## 2024-05-25 PROBLEM — E87.20 LACTIC ACIDOSIS: Status: ACTIVE | Noted: 2024-05-25

## 2024-05-25 LAB
ALBUMIN SERPL-MCNC: 2.5 G/DL (ref 3.5–5.2)
ALBUMIN/GLOB SERPL: 1 {RATIO} (ref 1–2.5)
ALP SERPL-CCNC: 117 U/L (ref 35–104)
ALT SERPL-CCNC: 74 U/L (ref 10–35)
ANION GAP SERPL CALCULATED.3IONS-SCNC: 8 MMOL/L (ref 9–16)
AST SERPL-CCNC: 76 U/L (ref 10–35)
BASOPHILS # BLD: 0 K/UL (ref 0–0.2)
BASOPHILS NFR BLD: 0 % (ref 0–2)
BILIRUB DIRECT SERPL-MCNC: 0.3 MG/DL (ref 0–0.3)
BILIRUB INDIRECT SERPL-MCNC: 1.7 MG/DL (ref 0–1)
BILIRUB SERPL-MCNC: 2 MG/DL (ref 0–1.2)
BUN SERPL-MCNC: 13 MG/DL (ref 8–23)
CALCIUM SERPL-MCNC: 7.2 MG/DL (ref 8.6–10.4)
CHLORIDE SERPL-SCNC: 108 MMOL/L (ref 98–107)
CO2 SERPL-SCNC: 23 MMOL/L (ref 20–31)
CREAT SERPL-MCNC: 0.9 MG/DL (ref 0.5–0.9)
EKG ATRIAL RATE: 120 BPM
EKG P-R INTERVAL: 112 MS
EKG Q-T INTERVAL: 316 MS
EKG QRS DURATION: 72 MS
EKG QTC CALCULATION (BAZETT): 446 MS
EKG R AXIS: 54 DEGREES
EKG T AXIS: 46 DEGREES
EKG VENTRICULAR RATE: 120 BPM
EOSINOPHIL # BLD: 0.38 K/UL (ref 0–0.4)
EOSINOPHILS RELATIVE PERCENT: 3 % (ref 1–4)
ERYTHROCYTE [DISTWIDTH] IN BLOOD BY AUTOMATED COUNT: 17.2 % (ref 11.8–14.4)
GFR, ESTIMATED: 72 ML/MIN/1.73M2
GLOBULIN SER CALC-MCNC: 1.8 G/DL
GLUCOSE BLD-MCNC: 115 MG/DL (ref 65–105)
GLUCOSE BLD-MCNC: 144 MG/DL (ref 65–105)
GLUCOSE BLD-MCNC: 150 MG/DL (ref 65–105)
GLUCOSE BLD-MCNC: 155 MG/DL (ref 65–105)
GLUCOSE SERPL-MCNC: 100 MG/DL (ref 74–99)
HAV IGM SERPL QL IA: NONREACTIVE
HBV CORE IGM SERPL QL IA: NONREACTIVE
HBV SURFACE AG SERPL QL IA: NONREACTIVE
HCT VFR BLD AUTO: 24.4 % (ref 36.3–47.1)
HCT VFR BLD AUTO: 25.8 % (ref 36.3–47.1)
HCT VFR BLD AUTO: 25.8 % (ref 36.3–47.1)
HCV AB SERPL QL IA: NONREACTIVE
HGB BLD-MCNC: 7.7 G/DL (ref 11.9–15.1)
HGB BLD-MCNC: 8.1 G/DL (ref 11.9–15.1)
HGB BLD-MCNC: 8.1 G/DL (ref 11.9–15.1)
IMM GRANULOCYTES # BLD AUTO: 0 K/UL (ref 0–0.3)
IMM GRANULOCYTES NFR BLD: 0 %
LYMPHOCYTES NFR BLD: 1.4 K/UL (ref 1–4.8)
LYMPHOCYTES RELATIVE PERCENT: 11 % (ref 24–44)
MAGNESIUM SERPL-MCNC: 1.5 MG/DL (ref 1.6–2.4)
MCH RBC QN AUTO: 26.3 PG (ref 25.2–33.5)
MCHC RBC AUTO-ENTMCNC: 31.6 G/DL (ref 28.4–34.8)
MCV RBC AUTO: 83.3 FL (ref 82.6–102.9)
MONOCYTES NFR BLD: 0.38 K/UL (ref 0.1–0.8)
MONOCYTES NFR BLD: 3 % (ref 1–7)
MORPHOLOGY: ABNORMAL
MORPHOLOGY: ABNORMAL
NEUTROPHILS NFR BLD: 83 % (ref 36–66)
NEUTS SEG NFR BLD: 10.54 K/UL (ref 1.8–7.7)
NRBC BLD-RTO: 0 PER 100 WBC
PLATELET # BLD AUTO: 108 K/UL (ref 138–453)
PMV BLD AUTO: 11.6 FL (ref 8.1–13.5)
POTASSIUM SERPL-SCNC: 3.5 MMOL/L (ref 3.7–5.3)
PROT SERPL-MCNC: 4.3 G/DL (ref 6.6–8.7)
RBC # BLD AUTO: 2.93 M/UL (ref 3.95–5.11)
SODIUM SERPL-SCNC: 139 MMOL/L (ref 136–145)
WBC OTHER # BLD: 12.7 K/UL (ref 3.5–11.3)

## 2024-05-25 PROCEDURE — 6370000000 HC RX 637 (ALT 250 FOR IP)

## 2024-05-25 PROCEDURE — 80048 BASIC METABOLIC PNL TOTAL CA: CPT

## 2024-05-25 PROCEDURE — 80076 HEPATIC FUNCTION PANEL: CPT

## 2024-05-25 PROCEDURE — 6360000002 HC RX W HCPCS: Performed by: INTERNAL MEDICINE

## 2024-05-25 PROCEDURE — 83735 ASSAY OF MAGNESIUM: CPT

## 2024-05-25 PROCEDURE — 2060000000 HC ICU INTERMEDIATE R&B

## 2024-05-25 PROCEDURE — 85014 HEMATOCRIT: CPT

## 2024-05-25 PROCEDURE — 99232 SBSQ HOSP IP/OBS MODERATE 35: CPT | Performed by: INTERNAL MEDICINE

## 2024-05-25 PROCEDURE — 36415 COLL VENOUS BLD VENIPUNCTURE: CPT

## 2024-05-25 PROCEDURE — 2580000003 HC RX 258: Performed by: STUDENT IN AN ORGANIZED HEALTH CARE EDUCATION/TRAINING PROGRAM

## 2024-05-25 PROCEDURE — 99233 SBSQ HOSP IP/OBS HIGH 50: CPT | Performed by: INTERNAL MEDICINE

## 2024-05-25 PROCEDURE — 82947 ASSAY GLUCOSE BLOOD QUANT: CPT

## 2024-05-25 PROCEDURE — 85025 COMPLETE CBC W/AUTO DIFF WBC: CPT

## 2024-05-25 PROCEDURE — 99233 SBSQ HOSP IP/OBS HIGH 50: CPT | Performed by: HOSPITALIST

## 2024-05-25 PROCEDURE — 93010 ELECTROCARDIOGRAM REPORT: CPT | Performed by: INTERNAL MEDICINE

## 2024-05-25 PROCEDURE — 94660 CPAP INITIATION&MGMT: CPT

## 2024-05-25 PROCEDURE — 2580000003 HC RX 258: Performed by: INTERNAL MEDICINE

## 2024-05-25 PROCEDURE — 80074 ACUTE HEPATITIS PANEL: CPT

## 2024-05-25 PROCEDURE — 6360000002 HC RX W HCPCS

## 2024-05-25 PROCEDURE — 87040 BLOOD CULTURE FOR BACTERIA: CPT

## 2024-05-25 PROCEDURE — 85018 HEMOGLOBIN: CPT

## 2024-05-25 RX ORDER — MAGNESIUM SULFATE IN WATER 40 MG/ML
2000 INJECTION, SOLUTION INTRAVENOUS ONCE
Status: COMPLETED | OUTPATIENT
Start: 2024-05-25 | End: 2024-05-25

## 2024-05-25 RX ADMIN — SODIUM CHLORIDE: 4.5 INJECTION, SOLUTION INTRAVENOUS at 01:07

## 2024-05-25 RX ADMIN — MAGNESIUM SULFATE HEPTAHYDRATE 2000 MG: 40 INJECTION, SOLUTION INTRAVENOUS at 05:43

## 2024-05-25 RX ADMIN — PANTOPRAZOLE SODIUM 40 MG: 40 TABLET, DELAYED RELEASE ORAL at 05:40

## 2024-05-25 RX ADMIN — ATORVASTATIN CALCIUM 80 MG: 80 TABLET, FILM COATED ORAL at 21:45

## 2024-05-25 RX ADMIN — SODIUM CHLORIDE, PRESERVATIVE FREE 10 ML: 5 INJECTION INTRAVENOUS at 21:45

## 2024-05-25 RX ADMIN — MEROPENEM 1000 MG: 1 INJECTION, POWDER, FOR SOLUTION INTRAVENOUS at 15:29

## 2024-05-25 RX ADMIN — POTASSIUM BICARBONATE 40 MEQ: 782 TABLET, EFFERVESCENT ORAL at 05:40

## 2024-05-25 RX ADMIN — MEROPENEM 1000 MG: 1 INJECTION, POWDER, FOR SOLUTION INTRAVENOUS at 23:31

## 2024-05-25 RX ADMIN — METOPROLOL TARTRATE 25 MG: 25 TABLET, FILM COATED ORAL at 21:45

## 2024-05-25 RX ADMIN — MEROPENEM 1000 MG: 1 INJECTION, POWDER, FOR SOLUTION INTRAVENOUS at 08:45

## 2024-05-25 ASSESSMENT — PAIN SCALES - GENERAL: PAINLEVEL_OUTOF10: 0

## 2024-05-25 NOTE — PROGRESS NOTES
PULMONARY & CRITICAL CARE MEDICINE PROGRESS NOTE     Patient:  Macey Zazueta  MRN: [id number]  Admit date: 5/23/2024  Primary Care Physician: Byron Castaneda MD  Consulting Physician: Nacho Gordon*  CODE Status: Full Code  LOS: 2     SUBJECTIVE     CHIEF COMPLAINT/REASON FOR INITIAL CONSULT:No chief complaint on file.    BRIEF HOSPITAL COURSE:  The patient is a 74 y.o. female with past medical history significant for hypertension, type 2 diabetes mellitus, ischemic stroke, renal calculus initially presented to [facility] for altered mental status, found to have UTI with urinary obstruction.  Patient had stents placed for hydronephrosis in the recent past.  Initial evaluation in the treatment ED on 5/12/2024 showed that patient was not sepsis with shock lactate elevated at 6, started on empiric antibiotics, started on vasopressors for hypotension.  Patient also found to have elevated troponin which initially trended up.  Patient was also found to have acute hypoxic respiratory failure and started on BiPAP, eventually transferred to [facility] ICU.  Cardiology evaluated the patient and echo was done which was unremarkable.  Urology was consulted for hematuria and hydronephrosis.     Patient was initially weaned off of vasopressor support, was maintaining saturation on nasal cannula.  Patient was empirically continued on meropenem found to have positive blood culture for which infectious disease were consulted.    INTERVAL HISTORY:  05/25/24  Patient is currently on supplemental oxygen with nasal cannula@2 L/min  Used BiPAP overnight  Remains hemodynamically stable  White count is 12.7, Tmax is 99.2  He remains on meropenem  Urine output was 3.1 L yesterday    REVIEW OF SYSTEMS:  Review of Systems   Constitutional:  Positive for fatigue. Negative for fever.   HENT:  Negative for voice change.    Eyes:  Negative for visual disturbance.   Respiratory:  Positive for cough and shortness of  bronchodilators  Antimicrobials reviewed; continue meropenem  Monitor I/O, electrolytes with a goal of even/negative fluid balance  DVT and stress ulcer prophylaxis  Physical/occupational therapy    It was my pleasure to evaluate Macey Zazueta today. I would like to thank you for allowing me to participate in the care of this patient.  Please feel free to call with any further questions or concerns. We will continue to follow.     Harinder Humphries MD  Pulmonary and Critical Care Medicine  5/25/2024, 9:32 AM         This note is created with the assistance of a speech recognition program.  While intending to generate a document that actually reflects the content of the visit, the document can still have some errors including those of syntax and sound-alike substitutions which may escape proof reading.  It such instances, actual meaning can be extrapolated by contextual diversion.

## 2024-05-25 NOTE — PLAN OF CARE
Problem: Safety - Adult  Goal: Free from fall injury  Outcome: Progressing     Problem: Chronic Conditions and Co-morbidities  Goal: Patient's chronic conditions and co-morbidity symptoms are monitored and maintained or improved  Outcome: Progressing     Problem: Discharge Planning  Goal: Discharge to home or other facility with appropriate resources  Outcome: Progressing     Problem: Skin/Tissue Integrity  Goal: Absence of new skin breakdown  Description: 1.  Monitor for areas of redness and/or skin breakdown  2.  Assess vascular access sites hourly  3.  Every 4-6 hours minimum:  Change oxygen saturation probe site  4.  Every 4-6 hours:  If on nasal continuous positive airway pressure, respiratory therapy assess nares and determine need for appliance change or resting period.  Outcome: Progressing     Problem: ABCDS Injury Assessment  Goal: Absence of physical injury  Outcome: Progressing     Problem: Respiratory - Adult  Goal: Achieves optimal ventilation and oxygenation  Outcome: Progressing     Problem: Nutrition Deficit:  Goal: Optimize nutritional status  Outcome: Progressing

## 2024-05-25 NOTE — PROGRESS NOTES
[facility]  Internal Medicine Teaching Residency Program  Inpatient Daily Progress Note  ______________________________________________________________________________    Patient: Macey Zazueta  YOB: 1950   MRN:[id number]    Acct: [id number]     Room: 0427/0427-02  Admit date: 5/23/2024  Today's date: 05/25/24  Number of days in the hospital: 2    SUBJECTIVE   CC: No chief complaint on file.    Pt examined at bedside. Chart & results reviewed.   -VSS, pt is saturating well on 2 L nasal cannula  -No specific complaint  -labs reviewed   -no acute events overnight.   -Patient denies headache, vision problems, chest pain  , SOB, wheezing, abdominal pain, changes in bowel habits, constipation, changes in urinary habits, lower extremity swelling, nausea , vomiting, and diarrhea     ROS:  General ROS: Completed and except as mentioned above were negative   HEENT ROS: Completed and except as mentioned above were negative   Allergy and Immunology ROS:  Completed and except as mentioned above were negative  Hematological and Lymphatic ROS:  Completed and except as mentioned above were negative  Respiratory ROS:  Completed and except as mentioned above were negative  Cardiovascular ROS:  Completed and except as mentioned above were negative  Gastrointestinal ROS: Completed and except as mentioned above were negative  Genito-Urinary ROS:  Completed and except as mentioned above were negative  Musculoskeletal ROS:  Completed and except as mentioned above were negative  Neurological ROS:  Completed and except as mentioned above were negative  Skin & Dermatological ROS:  Completed and except as mentioned above were negative  Psychological ROS:  Completed and except as mentioned above were negative  BRIEF HISTORY   Patient is a 74-year-old female with past medical history significant for hypertension, type 2 diabetes mellitus, ischemic stroke, renal calculus initially  Cranial Nerves: Cranial nerve deficit (Dysarthria-at baseline) present.           Medications:  Scheduled Medications:    pantoprazole  40 mg Oral QAM AC    insulin lispro  0-4 Units SubCUTAneous 4x Daily AC & HS    meropenem  1,000 mg IntraVENous Q8H    atorvastatin  80 mg Oral Nightly    sodium chloride flush  5-40 mL IntraVENous 2 times per day    [Held by provider] enoxaparin  30 mg SubCUTAneous Daily     Continuous Infusions:    sodium chloride 75 mL/hr at 05/25/24 0107    sodium chloride      dextrose       PRN Medicationssodium chloride flush, 5-40 mL, PRN  sodium chloride, , PRN  ondansetron, 4 mg, Q8H PRN   Or  ondansetron, 4 mg, Q6H PRN  polyethylene glycol, 17 g, Daily PRN  acetaminophen, 650 mg, Q6H PRN   Or  acetaminophen, 650 mg, Q6H PRN  glucose, 4 tablet, PRN  dextrose bolus, 125 mL, PRN   Or  dextrose bolus, 250 mL, PRN  glucagon (rDNA), 1 mg, PRN  dextrose, , Continuous PRN        Diagnostic Labs:  CBC:   Recent Labs     05/23/24  0427 05/24/24  0649 05/24/24  1253 05/24/24  1844 05/25/24  0401   WBC 24.7* 18.4*  --   --  12.7*   RBC 3.60* 2.94*  --   --  2.93*   HGB 9.3* 7.6* 8.4* 8.8* 7.7*   HCT 30.6* 25.3* 27.2* 28.4* 24.4*   MCV 85.0 86.1  --   --  83.3   RDW 17.1* 17.1*  --   --  17.2*   PLT See Reflexed IPF Result 103*  --   --  108*     BMP:   Recent Labs     05/23/24  2137 05/24/24  0649 05/25/24  0401   * 145 139   K 3.7 3.6* 3.5*   * 112* 108*   CO2 22 25 23   BUN 21 18 13   CREATININE 1.4* 1.2* 0.9     BNP: No results for input(s): \"BNP\" in the last 72 hours.  PT/INR:   Recent Labs     05/24/24  1844   PROTIME 13.3   INR 1.0     APTT: No results for input(s): \"APTT\" in the last 72 hours.  CARDIAC ENZYMES: No results for input(s): \"CKMB\", \"CKMBINDEX\", \"TROPONINI\" in the last 72 hours.    Invalid input(s): \"CKTOTAL;3\"  FASTING LIPID PANEL:  Lab Results   Component Value Date    CHOL 119 09/20/2023    HDL 43 09/20/2023    TRIG 89 09/20/2023     LIVER PROFILE:   Recent Labs      2024  S/P left ureteral stent on 2-14-24  S/P lithotripsy on 3-5-24  S/P cystoscopy, ureteroscopy, LASER lithotripsy with Lt stent exchange 5-21-24  CT abdomen pelvis from 5/23/2024 shows mild to moderate left hydronephrosis with left-sided renal stone.  ID on board.  Appreciate recommendations.  Continue meropenem.  Follow-up blood culture sensitivity.  Urology on board.  Appreciate recommendations.     #Nonoliguric SANDER:  Creatinine seems to have peaked at 1.6.  Currently 1.2.  Likely ATN prerenal leading to ATN due to sepsis but shock.  Baseline creatinine less than 1.  Monitor urine output.  Daily weights.  Monitor BMP.  Avoid nephrotoxic agents.  Continue IV fluids per nephrology recommendations.  1 dose of IV Lasix.  Renally dose antibiotics.     #Acute anemia:  #Hematuria:  Anemia likely secondary to blood loss.  Monitor hemoglobin.  Avoid anticoagulation and antiplatelets.  Patient has [facility] on board.  Appreciate recommendations.        #Transaminitis:  Likely secondary to shock liver from sepsis  CT abdomen shows unremarkable liver  Trend LFTs.  Avoid hepatotoxins.  Follow-up acute viral hepatitis panel.     #NSTEMI:  Likely type II secondary to sepsis with shock.  Cardiology consulted.  TTE from 5/20/2024 showed preserved EF.  No active intervention planned.  Plan is to follow-up outpatient with patient's established cardiologist in [place].     #Acute hypoxic respiratory failure:  Likely secondary to pulmonary edema from sepsis  Wean down oxygen support as appropriate.  Currently maintaining saturation on 2 L O2 through nasal cannula     #Essential hypertension:  Patient apparently is on amlodipine 2.5, Lopressor 25 Mg at home.  Currently patient normotensive.  Recently out of shock on vasopressor support.  Monitor blood pressure.  Will resume antihypertensives if needed.     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  Hold ASA due to hematuria.     #Type 2

## 2024-05-25 NOTE — PROGRESS NOTES
[facility] - Progress Note    Today's Date and Time: 5/25/2024, 8:02 AM    Impression :   Septicemia with Gram positive cocci in chains and pairs and Gram negative bacilli x 2 on 5-21-24  Cultures: Enterococcus faecalis and Klebsiella oxytoca  Septic shock  Resolved  Lactic acidosis  Thrombocytopenia  Prior Left side hydronephrosis in Feb 2024  Prior Left renal pelvis calculus in Feb 2024  S/P left ureteral stent on 2-14-24  S/P lithotripsy on 3-5-24  S/P cystoscopy, ureteroscopy, LASER lithotripsy with Lt stent exchange 5-21-24  Essential HTN  DM type 2  Lt pontine CVA in 2017  NSTEMI  Hepatopathy    Recommendations:   Meropenem 1 gm IV q 8 hr to treat Enterococcus and Klebsiella.    Medical Decision Making/Summary/Discussion:5/25/2024     Daily Elements of Decision Making provided by Consulting Physician:    Note: I have independently performed the steps listed below as part of the medical decision making and evaluation.    Review of current Problems:  Today I am seeing the patient for the following problems:  Septicemia with Gram positive cocci in chains and pairs and Gram negative bacilli x 2 on 5-21-24  Septic shock  Lactic acidosis  Thrombocytopenia  Prior Left side hydronephrosis in Feb 2024  Prior Left renal pelvis calculus in Feb 2024  S/P left ureteral stent on 2-14-24  S/P lithotripsy on 3-5-24  S/P cystoscopy, ureteroscopy, LASER lithotripsy with Lt stent exchange 5-21-24  Essential HTN  DM type 2  Lt pontine CVA in 2017  Evaluation of Patient:  Patient evaluated and examined in the ICU.   Previously evaluated by Telemedicine at [facility]  Transferred to [facility]  Patient improved with control of septic shock  Will transfer to step down unit  Please see daily details in Interval Changes Section  Changes in physical exam:  Hypotension resolved, Off vasopressors  Tachycardic  Improved overall  Please see daily details in Physical Exam section and in Interval  Changes Section  Changes in ROS:  Unchanged  Please see daily details in Review of Symptoms section and in Interval Changes Section  Discussion with Referring Physician or Service  Dr. Humphries  Laboratory and Radiologic Studies with personal review of radiologic studies  Laboratory  Radiology  Microbiology  Blood: Gram positive cocci in chains and pairs and Gram negative bacilli   Enterococcus faecalis and Klebsiella oxytoca  Please see Details in daily Interval Changes Section devoted to Lab, Micro and Radiology and in Medical Decision Laboratory, Imaging and Cultures sections.  Review of Infection Control and Prevention measures:  Universal precautions   Please see daily details in Infection Control Recommendations section  Administer medications as ordered:  Meropenem  Review of Antimicrobial Stewardship Measures:  Targeted therapy  PK dosing  Please see daily details in Antimicrobial Stewardship Recommendations section   Prognosis:  Guarded  Review of Discharge Planning:  Please see daily details in Coordination of Outpatient Care section  Review of need for outpatient follow up.    Myron Nicholson MD 5/25/2024       Infection Control Recommendations   [place] Precautions    Antimicrobial Stewardship Recommendations     Simplification of therapy  Targeted therapy  PK dosing    Coordination of Outpatient Care:   Estimated Length of IV antimicrobials:TBD  Patient will need Midline Catheter Insertion: TBD  Patient will need PICC line Insertion:TBD  Patient will need: Home IV , Infusion Center,  SNF,  LTAC: TBD  Patient will need outpatient wound care: No    Chief complaint/reason for consultation:   Septicemia  Septic shock      History of Present Illness:   Macey Zazueta is a 74 y.o.-year-old female who was initially admitted on 5/23/2024. Patient seen at the request of .    INITIAL HISTORY:    Patient with initial Left side hydronephrosis secondary to Left renal pelvis calculus in February 2024.     She  Spectrum Beta-Lactamase NEGATIVE  Sensitive      gentamicin <=1  Sensitive      levofloxacin <=0.12  Sensitive      piperacillin-tazobactam <=4  Sensitive      tobramycin <=1  Sensitive      trimethoprim-sulfamethoxazole <=20  Sensitive                       Susceptibility        Enterococcus faecalis      BACTERIAL SUSCEPTIBILITY PANEL DAVID      ampicillin <=2  Sensitive      Gentamicin, High Level  Sensitive      Streptomycin, Hi Level  Sensitive      vancomycin 2  Sensitive                           Blood Culture 1 [[id number]]  (Abnormal) Collected: 05/21/24 1602    Order Status: Completed Specimen: Blood Updated: 05/24/24 1441     Specimen Description .BLOOD     Special Requests          Culture POSITIVE Blood Culture      DIRECT GRAM STAIN FROM BOTTLE: GRAM POSITIVE COCCI IN CHAINS AND GRAM NEGATIVE RODS      ENTEROCOCCUS FAECALIS Identification by MALDI-TOF For susceptibility, refer to previous culture.      KLEBSIELLA OXYTOCA Identification by MALDI-TOF For susceptibility, refer to previous culture.      (NOTE) Direct Gram Stain from bottle result called to and read back by:ULYSSES DELA CRUZ RN, [facility] 5/22/24 0745 KB              Medical Decision Making-Other:     Note:    Thank you for allowing us to participate in the care of this patient. Please call with questions.    yMron Nicholson MD  Office: [phone]

## 2024-05-25 NOTE — PROGRESS NOTES
1350: Writer notified internal med resident of tachycardia and tachypnea. -130 and RR around 30. Awaiting orders.

## 2024-05-25 NOTE — PROGRESS NOTES
Samuel Cramer, Summer Brady Mostafa, Meet Snyder Jr  Urology Progress Note     Subjective:  No acute events overnight, afebrile, vital signs stable  No abdominal or flank pain  No fevers, chills, nausea, vomiting    Cr 0.9 (1.2)  WBC 12.7 (18.4)    [facility] 3176 mL/24h    Patient Vitals for the past 24 hrs:   BP Temp Temp src Pulse Resp SpO2 Height   05/25/24 0420 107/84 98 °F (36.7 °C) Oral (!) 103 15 100 % --   05/25/24 0334 -- -- -- 100 18 100 % --   05/24/24 2329 -- -- -- (!) 113 17 100 % --   05/24/24 2320 119/82 98.1 °F (36.7 °C) Oral (!) 114 28 93 % --   05/24/24 2003 115/76 99.2 °F (37.3 °C) Oral (!) 114 19 99 % --   05/24/24 2000 -- -- -- (!) 114 -- -- --   05/24/24 1521 -- -- -- -- -- -- 1.473 m (4' 9.99\")   05/24/24 1454 119/73 98.2 °F (36.8 °C) Oral (!) 123 26 97 % --   05/24/24 1200 118/75 98.9 °F (37.2 °C) Oral (!) 114 (!) 33 93 % --   05/24/24 0900 112/74 -- -- (!) 116 19 97 % --   05/24/24 0823 -- -- -- -- -- 99 % --         Intake/Output Summary (Last 24 hours) at 5/25/2024 0801  Last data filed at 5/25/2024 0400  Gross per 24 hour   Intake 697.04 ml   Output 3176 ml   Net -2478.96 ml         Recent Labs     05/23/24  0427 05/24/24  0649 05/24/24  1253 05/24/24  1844 05/25/24  0401   WBC 24.7* 18.4*  --   --  12.7*   HGB 9.3* 7.6* 8.4* 8.8* 7.7*   HCT 30.6* 25.3* 27.2* 28.4* 24.4*   MCV 85.0 86.1  --   --  83.3   PLT See Reflexed IPF Result 103*  --   --  108*       Recent Labs     05/23/24  2137 05/24/24  0649 05/25/24  0401   * 145 139   K 3.7 3.6* 3.5*   * 112* 108*   CO2 22 25 23   BUN 21 18 13   CREATININE 1.4* 1.2* 0.9         No results for input(s): \"COLORU\", \"PHUR\", \"LABCAST\", \"WBCUA\", \"RBCUA\", \"MUCUS\", \"TRICHOMONAS\", \"YEAST\", \"BACTERIA\", \"CLARITYU\", \"SPECGRAV\", \"LEUKOCYTESUR\", \"UROBILINOGEN\", \"BILIRUBINUR\", \"BLOODU\" in the last 72 hours.    Invalid input(s): \"NITRATE\", \"GLUCOSEUKETONESUAMORPHOUS\"      Additional Lab/culture results:  Results       Procedure Component  Value Units Date/Time    Infectious Disease Intervention [[id number]]     Order Status: Sent     MRSA DNA Probe, Nasal [[id number]] Collected: 05/23/24 0430    Order Status: Completed Specimen: Nasal Updated: 05/23/24 1025     Specimen Description .NASAL SWAB     MRSA, DNA, Nasal NEGATIVE     Comment: NEGATIVE:  MRSA DNA not detected by nucleic acid amplification.                                                    Results should be used as an adjunct to nosocomial control efforts to identify patients   needing enhanced precautions.    The test is not intended to identify patients with staphylococcal infections.  Results   should not be used to guide or monitor treatment for MRSA infections.         Culture, Urine [[id number]] Collected: 05/21/24 2345    Order Status: Completed Specimen: Urine, clean catch Updated: 05/23/24 1300     Specimen Description .CLEAN CATCH URINE     Culture NO GROWTH    Clostridium Difficile Toxin/Antigen [[id number]]     Order Status: Canceled Specimen: Stool     Culture, Blood 2 [[id number]]  (Abnormal)  (Susceptibility) Collected: 05/21/24 1608    Order Status: Completed Specimen: Blood Updated: 05/24/24 1430     Specimen Description .BLOOD     Special Requests LFA 10 ML     Culture POSITIVE Blood Culture      DIRECT GRAM STAIN FROM BOTTLE: GRAM POSITIVE COCCI IN CHAINS AND GRAM NEGATIVE RODS      Enterococcus faecalis Detected: Methodology- Polymerase Chain Reaction (PCR)      Klebsiella oxytoca Detected: Methodology- Polymerase Chain Reaction (PCR)      KLEBSIELLA OXYTOCA      ENTEROCOCCUS FAECALIS      (NOTE) Direct Gram Stain from bottle result called to and read back by:WAGNER DELA CRUZ RN [facility] ICU 05/22/24 0745 KB    Susceptibility        Klebsiella oxytoca      BACTERIAL SUSCEPTIBILITY PANEL DAVID      ampicillin >=32  Resistant      ceFAZolin <=4  Sensitive      cefTRIAXone <=0.25  Sensitive      Confirmatory Extended Spectrum Beta-Lactamase NEGATIVE  Sensitive      gentamicin <=1

## 2024-05-25 NOTE — PLAN OF CARE
Problem: Safety - Adult  Goal: Free from fall injury  5/24/2024 2115 by Gayatri Law RN  Outcome: Progressing  5/24/2024 1821 by Anne Douglas RN  Outcome: Progressing     Problem: Chronic Conditions and Co-morbidities  Goal: Patient's chronic conditions and co-morbidity symptoms are monitored and maintained or improved  5/24/2024 2115 by Gayatri Law RN  Outcome: Progressing  5/24/2024 1821 by Anne Douglas RN  Outcome: Progressing  Flowsheets (Taken 5/24/2024 1454)  Care Plan - Patient's Chronic Conditions and Co-Morbidity Symptoms are Monitored and Maintained or Improved:   Monitor and assess patient's chronic conditions and comorbid symptoms for stability, deterioration, or improvement   Collaborate with multidisciplinary team to address chronic and comorbid conditions and prevent exacerbation or deterioration   Update acute care plan with appropriate goals if chronic or comorbid symptoms are exacerbated and prevent overall improvement and discharge     Problem: Discharge Planning  Goal: Discharge to home or other facility with appropriate resources  5/24/2024 2115 by Gayatri Law RN  Outcome: Progressing  5/24/2024 1821 by Anne Douglas RN  Outcome: Progressing  Flowsheets (Taken 5/24/2024 1454)  Discharge to home or other facility with appropriate resources:   Identify barriers to discharge with patient and caregiver   Arrange for needed discharge resources and transportation as appropriate   Identify discharge learning needs (meds, wound care, etc)   Refer to discharge planning if patient needs post-hospital services based on physician order or complex needs related to functional status, cognitive ability or social support system     Problem: Skin/Tissue Integrity  Goal: Absence of new skin breakdown  Description: 1.  Monitor for areas of redness and/or skin breakdown  2.  Assess vascular access sites hourly  3.  Every 4-6 hours minimum:  Change oxygen saturation probe site  4.   Every 4-6 hours:  If on nasal continuous positive airway pressure, respiratory therapy assess nares and determine need for appliance change or resting period.  5/24/2024 2115 by Gayatri Law RN  Outcome: Progressing  5/24/2024 1821 by Anne Douglas RN  Outcome: Progressing     Problem: ABCDS Injury Assessment  Goal: Absence of physical injury  5/24/2024 2115 by Gayatri Law RN  Outcome: Progressing  5/24/2024 1821 by Anne Douglas RN  Outcome: Progressing  Flowsheets (Taken 5/24/2024 0800 by Keira Rosa RN)  Absence of Physical Injury: Implement safety measures based on patient assessment     Problem: Respiratory - Adult  Goal: Achieves optimal ventilation and oxygenation  5/24/2024 2115 by Gayatri Law RN  Outcome: Progressing  5/24/2024 1821 by Anne Douglas RN  Outcome: Progressing  Flowsheets (Taken 5/24/2024 0800 by Keira Rosa RN)  Achieves optimal ventilation and oxygenation:   Assess for changes in respiratory status   Assess for changes in mentation and behavior     Problem: Nutrition Deficit:  Goal: Optimize nutritional status  5/24/2024 2115 by Gayatri Law RN  Outcome: Progressing  5/24/2024 1821 by Anne Douglas RN  Outcome: Progressing  Flowsheets (Taken 5/24/2024 1521 by Ruthie Sunshine, RD, LD)  Nutrient intake appropriate for improving, restoring, or maintaining nutritional needs:   Assess nutritional status and recommend course of action   Monitor oral intake, labs, and treatment plans   Recommend appropriate diets, oral nutritional supplements, and vitamin/mineral supplements

## 2024-05-26 ENCOUNTER — APPOINTMENT (OUTPATIENT)
Dept: GENERAL RADIOLOGY | Age: 74
DRG: 871 | End: 2024-05-26
Attending: INTERNAL MEDICINE
Payer: MEDICARE

## 2024-05-26 LAB
ALBUMIN SERPL-MCNC: 2.6 G/DL (ref 3.5–5.2)
ALBUMIN/GLOB SERPL: 1 {RATIO} (ref 1–2.5)
ALP SERPL-CCNC: 105 U/L (ref 35–104)
ALT SERPL-CCNC: 62 U/L (ref 10–35)
ANION GAP SERPL CALCULATED.3IONS-SCNC: 7 MMOL/L (ref 9–16)
AST SERPL-CCNC: 58 U/L (ref 10–35)
BASOPHILS # BLD: 0.03 K/UL (ref 0–0.2)
BASOPHILS NFR BLD: 0 % (ref 0–2)
BILIRUB DIRECT SERPL-MCNC: 0.4 MG/DL (ref 0–0.3)
BILIRUB INDIRECT SERPL-MCNC: 1.3 MG/DL (ref 0–1)
BILIRUB SERPL-MCNC: 1.6 MG/DL (ref 0–1.2)
BNP SERPL-MCNC: 1429 PG/ML (ref 0–300)
BUN SERPL-MCNC: 10 MG/DL (ref 8–23)
CALCIUM SERPL-MCNC: 7.7 MG/DL (ref 8.6–10.4)
CHLORIDE SERPL-SCNC: 109 MMOL/L (ref 98–107)
CO2 SERPL-SCNC: 23 MMOL/L (ref 20–31)
CREAT SERPL-MCNC: 0.7 MG/DL (ref 0.5–0.9)
EOSINOPHIL # BLD: 0.33 K/UL (ref 0–0.44)
EOSINOPHILS RELATIVE PERCENT: 4 % (ref 1–4)
ERYTHROCYTE [DISTWIDTH] IN BLOOD BY AUTOMATED COUNT: 17.3 % (ref 11.8–14.4)
GFR, ESTIMATED: 86 ML/MIN/1.73M2
GLOBULIN SER CALC-MCNC: 1.9 G/DL
GLUCOSE BLD-MCNC: 146 MG/DL (ref 65–105)
GLUCOSE BLD-MCNC: 149 MG/DL (ref 65–105)
GLUCOSE BLD-MCNC: 154 MG/DL (ref 65–105)
GLUCOSE BLD-MCNC: 170 MG/DL (ref 65–105)
GLUCOSE BLD-MCNC: 173 MG/DL (ref 65–105)
GLUCOSE SERPL-MCNC: 147 MG/DL (ref 74–99)
HCT VFR BLD AUTO: 24 % (ref 36.3–47.1)
HCT VFR BLD AUTO: 24.1 % (ref 36.3–47.1)
HCT VFR BLD AUTO: 26 % (ref 36.3–47.1)
HCT VFR BLD AUTO: 26.3 % (ref 36.3–47.1)
HGB BLD-MCNC: 7.5 G/DL (ref 11.9–15.1)
HGB BLD-MCNC: 7.8 G/DL (ref 11.9–15.1)
HGB BLD-MCNC: 7.8 G/DL (ref 11.9–15.1)
HGB BLD-MCNC: 7.9 G/DL (ref 11.9–15.1)
IMM GRANULOCYTES # BLD AUTO: 0.15 K/UL (ref 0–0.3)
IMM GRANULOCYTES NFR BLD: 2 %
LYMPHOCYTES NFR BLD: 1.42 K/UL (ref 1.1–3.7)
LYMPHOCYTES RELATIVE PERCENT: 16 % (ref 24–43)
MCH RBC QN AUTO: 25.5 PG (ref 25.2–33.5)
MCHC RBC AUTO-ENTMCNC: 29.7 G/DL (ref 28.4–34.8)
MCV RBC AUTO: 85.9 FL (ref 82.6–102.9)
MONOCYTES NFR BLD: 0.62 K/UL (ref 0.1–1.2)
MONOCYTES NFR BLD: 7 % (ref 3–12)
NEUTROPHILS NFR BLD: 72 % (ref 36–65)
NEUTS SEG NFR BLD: 6.43 K/UL (ref 1.5–8.1)
NRBC BLD-RTO: 0 PER 100 WBC
PLATELET # BLD AUTO: 118 K/UL (ref 138–453)
PMV BLD AUTO: 10.9 FL (ref 8.1–13.5)
POTASSIUM SERPL-SCNC: 4 MMOL/L (ref 3.7–5.3)
PROT SERPL-MCNC: 4.5 G/DL (ref 6.6–8.7)
RBC # BLD AUTO: 3.06 M/UL (ref 3.95–5.11)
RBC # BLD: ABNORMAL 10*6/UL
SODIUM SERPL-SCNC: 139 MMOL/L (ref 136–145)
TROPONIN I SERPL HS-MCNC: 82 NG/L (ref 0–14)
WBC OTHER # BLD: 9 K/UL (ref 3.5–11.3)

## 2024-05-26 PROCEDURE — 99232 SBSQ HOSP IP/OBS MODERATE 35: CPT | Performed by: INTERNAL MEDICINE

## 2024-05-26 PROCEDURE — 6370000000 HC RX 637 (ALT 250 FOR IP)

## 2024-05-26 PROCEDURE — 85014 HEMATOCRIT: CPT

## 2024-05-26 PROCEDURE — 83880 ASSAY OF NATRIURETIC PEPTIDE: CPT

## 2024-05-26 PROCEDURE — 84484 ASSAY OF TROPONIN QUANT: CPT

## 2024-05-26 PROCEDURE — 2580000003 HC RX 258: Performed by: INTERNAL MEDICINE

## 2024-05-26 PROCEDURE — 97535 SELF CARE MNGMENT TRAINING: CPT

## 2024-05-26 PROCEDURE — 85025 COMPLETE CBC W/AUTO DIFF WBC: CPT

## 2024-05-26 PROCEDURE — 97166 OT EVAL MOD COMPLEX 45 MIN: CPT

## 2024-05-26 PROCEDURE — 97162 PT EVAL MOD COMPLEX 30 MIN: CPT

## 2024-05-26 PROCEDURE — 71045 X-RAY EXAM CHEST 1 VIEW: CPT

## 2024-05-26 PROCEDURE — 99232 SBSQ HOSP IP/OBS MODERATE 35: CPT | Performed by: HOSPITALIST

## 2024-05-26 PROCEDURE — 2060000000 HC ICU INTERMEDIATE R&B

## 2024-05-26 PROCEDURE — 85018 HEMOGLOBIN: CPT

## 2024-05-26 PROCEDURE — 80076 HEPATIC FUNCTION PANEL: CPT

## 2024-05-26 PROCEDURE — 94660 CPAP INITIATION&MGMT: CPT

## 2024-05-26 PROCEDURE — 6360000002 HC RX W HCPCS: Performed by: INTERNAL MEDICINE

## 2024-05-26 PROCEDURE — 6360000002 HC RX W HCPCS: Performed by: STUDENT IN AN ORGANIZED HEALTH CARE EDUCATION/TRAINING PROGRAM

## 2024-05-26 PROCEDURE — 2580000003 HC RX 258: Performed by: STUDENT IN AN ORGANIZED HEALTH CARE EDUCATION/TRAINING PROGRAM

## 2024-05-26 PROCEDURE — 36415 COLL VENOUS BLD VENIPUNCTURE: CPT

## 2024-05-26 PROCEDURE — 80048 BASIC METABOLIC PNL TOTAL CA: CPT

## 2024-05-26 PROCEDURE — 97530 THERAPEUTIC ACTIVITIES: CPT

## 2024-05-26 PROCEDURE — 94761 N-INVAS EAR/PLS OXIMETRY MLT: CPT

## 2024-05-26 PROCEDURE — 51798 US URINE CAPACITY MEASURE: CPT

## 2024-05-26 PROCEDURE — 82947 ASSAY GLUCOSE BLOOD QUANT: CPT

## 2024-05-26 RX ORDER — ASPIRIN 81 MG/1
81 TABLET ORAL DAILY
Status: DISCONTINUED | OUTPATIENT
Start: 2024-05-26 | End: 2024-05-30 | Stop reason: HOSPADM

## 2024-05-26 RX ORDER — ENOXAPARIN SODIUM 100 MG/ML
40 INJECTION SUBCUTANEOUS DAILY
Status: DISCONTINUED | OUTPATIENT
Start: 2024-05-27 | End: 2024-05-30 | Stop reason: HOSPADM

## 2024-05-26 RX ORDER — LANOLIN ALCOHOL/MO/W.PET/CERES
3 CREAM (GRAM) TOPICAL NIGHTLY PRN
Status: DISCONTINUED | OUTPATIENT
Start: 2024-05-26 | End: 2024-05-30 | Stop reason: HOSPADM

## 2024-05-26 RX ADMIN — ATORVASTATIN CALCIUM 80 MG: 80 TABLET, FILM COATED ORAL at 20:25

## 2024-05-26 RX ADMIN — MEROPENEM 1000 MG: 1 INJECTION, POWDER, FOR SOLUTION INTRAVENOUS at 15:49

## 2024-05-26 RX ADMIN — METOPROLOL TARTRATE 25 MG: 25 TABLET, FILM COATED ORAL at 20:25

## 2024-05-26 RX ADMIN — SODIUM CHLORIDE, PRESERVATIVE FREE 10 ML: 5 INJECTION INTRAVENOUS at 08:15

## 2024-05-26 RX ADMIN — MEROPENEM 1000 MG: 1 INJECTION, POWDER, FOR SOLUTION INTRAVENOUS at 08:21

## 2024-05-26 RX ADMIN — PANTOPRAZOLE SODIUM 40 MG: 40 TABLET, DELAYED RELEASE ORAL at 06:46

## 2024-05-26 RX ADMIN — SODIUM CHLORIDE: 4.5 INJECTION, SOLUTION INTRAVENOUS at 04:33

## 2024-05-26 RX ADMIN — ASPIRIN 81 MG: 81 TABLET, COATED ORAL at 15:45

## 2024-05-26 RX ADMIN — METOPROLOL TARTRATE 25 MG: 25 TABLET, FILM COATED ORAL at 08:15

## 2024-05-26 RX ADMIN — ENOXAPARIN SODIUM 30 MG: 100 INJECTION SUBCUTANEOUS at 08:15

## 2024-05-26 RX ADMIN — MEROPENEM 1000 MG: 1 INJECTION, POWDER, FOR SOLUTION INTRAVENOUS at 23:31

## 2024-05-26 ASSESSMENT — PAIN SCALES - GENERAL
PAINLEVEL_OUTOF10: 0
PAINLEVEL_OUTOF10: 0

## 2024-05-26 NOTE — PROGRESS NOTES
Pharmacy Note     Renal Dose Adjustment    Macey Zazueta is a 74 y.o. female. Pharmacist assessment of renally cleared medications.    Recent Labs     05/25/24  0401 05/26/24  0248   BUN 13 10       Recent Labs     05/25/24  0401 05/26/24  0248   CREATININE 0.9 0.7       Estimated Creatinine Clearance: 54 mL/min (based on SCr of 0.7 mg/dL).      Height:   Ht Readings from Last 1 Encounters:   05/24/24 1.473 m (4' 9.99\")     Weight:  Wt Readings from Last 1 Encounters:   05/26/24 51 kg (112 lb 7 oz)       The following medication dose has been adjusted based upon renal function per P&T Guidelines:             Lovenox 30 mg daily increased to 40 mg daily.    Jossy Leo, Pharm.D.  5/26/2024  12:27 PM

## 2024-05-26 NOTE — PROGRESS NOTES
[facility] [place]  Urology Progress Note   Reji Drake MD    Subjective:  No acute events overnight, afebrile, vital signs stable  No abdominal or flank pain  No fevers, chills, nausea, vomiting    Cr 0.7 (0.9)  WBC 9.0 (12.7)     [facility] UOP 2310 mL/24h yellow    Patient Vitals for the past 24 hrs:   BP Temp Temp src Pulse Resp SpO2 Weight   05/26/24 0815 124/78 -- -- (!) 108 -- -- --   05/26/24 0600 -- -- -- -- -- -- 51 kg (112 lb 7 oz)   05/26/24 0351 -- -- -- (!) 103 26 -- --   05/26/24 0317 116/84 99 °F (37.2 °C) Axillary 99 21 97 % --   05/26/24 0017 124/81 99.1 °F (37.3 °C) Axillary 97 24 100 % --   05/25/24 2323 -- -- -- 96 27 100 % --   05/25/24 2059 -- -- -- (!) 120 29 -- --   05/25/24 2056 (!) 133/94 97.5 °F (36.4 °C) Axillary (!) 120 29 94 % --   05/25/24 1522 128/79 99 °F (37.2 °C) Oral (!) 119 25 93 % --   05/25/24 1120 121/86 98.8 °F (37.1 °C) Temporal (!) 113 25 93 % --         Intake/Output Summary (Last 24 hours) at 5/26/2024 0908  Last data filed at 5/26/2024 0614  Gross per 24 hour   Intake 2909.94 ml   Output 2310 ml   Net 599.94 ml         Recent Labs     05/24/24  0649 05/24/24  1253 05/25/24  0401 05/25/24  1015 05/25/24  1751 05/26/24  0127 05/26/24  0248   WBC 18.4*  --  12.7*  --   --   --  9.0   HGB 7.6*   < > 7.7*   < > 8.1* 7.5* 7.8*   HCT 25.3*   < > 24.4*   < > 25.8* 24.1* 26.3*   MCV 86.1  --  83.3  --   --   --  85.9   *  --  108*  --   --   --  118*    < > = values in this interval not displayed.       Recent Labs     05/24/24  0649 05/25/24  0401 05/26/24  0248    139 139   K 3.6* 3.5* 4.0   * 108* 109*   CO2 25 23 23   BUN 18 13 10   CREATININE 1.2* 0.9 0.7         No results for input(s): \"COLORU\", \"PHUR\", \"LABCAST\", \"WBCUA\", \"RBCUA\", \"MUCUS\", \"TRICHOMONAS\", \"YEAST\", \"BACTERIA\", \"CLARITYU\", \"SPECGRAV\", \"LEUKOCYTESUR\", \"UROBILINOGEN\", \"BILIRUBINUR\", \"BLOODU\" in the last 72 hours.    Invalid input(s): \"NITRATE\", \"GLUCOSEUKETONESUAMORPHOUS\"      Additional  Methodology- Polymerase Chain Reaction (PCR)      KLEBSIELLA OXYTOCA      ENTEROCOCCUS FAECALIS      (NOTE) Direct Gram Stain from bottle result called to and read back by:WAGNER DELA CRUZ RN [facility] ICU 05/22/24 0745 KB    Susceptibility        Klebsiella oxytoca      BACTERIAL SUSCEPTIBILITY PANEL DAVID      ampicillin >=32  Resistant      ceFAZolin <=4  Sensitive      cefTRIAXone <=0.25  Sensitive      Confirmatory Extended Spectrum Beta-Lactamase NEGATIVE  Sensitive      gentamicin <=1  Sensitive      levofloxacin <=0.12  Sensitive      piperacillin-tazobactam <=4  Sensitive      tobramycin <=1  Sensitive      trimethoprim-sulfamethoxazole <=20  Sensitive                       Susceptibility        Enterococcus faecalis      BACTERIAL SUSCEPTIBILITY PANEL DAVID      ampicillin <=2  Sensitive      Gentamicin, High Level  Sensitive      Streptomycin, Hi Level  Sensitive      vancomycin 2  Sensitive                           Blood Culture 1 [[id number]]  (Abnormal) Collected: 05/21/24 1602    Order Status: Completed Specimen: Blood Updated: 05/24/24 1441     Specimen Description .BLOOD     Special Requests          Culture POSITIVE Blood Culture      DIRECT GRAM STAIN FROM BOTTLE: GRAM POSITIVE COCCI IN CHAINS AND GRAM NEGATIVE RODS      ENTEROCOCCUS FAECALIS Identification by MALDI-TOF For susceptibility, refer to previous culture.      KLEBSIELLA OXYTOCA Identification by MALDI-TOF For susceptibility, refer to previous culture.      (NOTE) Direct Gram Stain from bottle result called to and read back by:ULYSSES DELA CRUZ RN, [facility] 5/22/24 0745 KB            Physical Exam:  Physical Exam  HENT:      Head: Normocephalic and atraumatic.   Cardiovascular:      Rate and Rhythm: Normal rate.   Pulmonary:      Effort: Pulmonary effort is normal.   Abdominal:      General: Abdomen is flat.      Palpations: Abdomen is soft.   Genitourinary:     Comments: Molina catheter in place draining yellow urine  Skin:     General: Skin is

## 2024-05-26 NOTE — PLAN OF CARE
Problem: Safety - Adult  Goal: Free from fall injury  5/25/2024 2038 by Sandrita Acosta RN  Outcome: Progressing  5/25/2024 1847 by Tabatha Alvarez RN  Outcome: Progressing     Problem: Chronic Conditions and Co-morbidities  Goal: Patient's chronic conditions and co-morbidity symptoms are monitored and maintained or improved  5/25/2024 2038 by Sandrita Acosta RN  Outcome: Progressing  5/25/2024 1847 by Tabatha Alvarez RN  Outcome: Progressing     Problem: Discharge Planning  Goal: Discharge to home or other facility with appropriate resources  5/25/2024 2038 by Sandrita Acosta RN  Outcome: Progressing  5/25/2024 1847 by Tabatha Alvarez RN  Outcome: Progressing     Problem: Skin/Tissue Integrity  Goal: Absence of new skin breakdown  Description: 1.  Monitor for areas of redness and/or skin breakdown  2.  Assess vascular access sites hourly  3.  Every 4-6 hours minimum:  Change oxygen saturation probe site  4.  Every 4-6 hours:  If on nasal continuous positive airway pressure, respiratory therapy assess nares and determine need for appliance change or resting period.  5/25/2024 2038 by Sandrita Acosta RN  Outcome: Progressing  5/25/2024 1847 by Tabatha Alvarez RN  Outcome: Progressing     Problem: ABCDS Injury Assessment  Goal: Absence of physical injury  5/25/2024 2038 by Sandrita Acosta RN  Outcome: Progressing  5/25/2024 1847 by Tabatha Alvarez RN  Outcome: Progressing     Problem: Respiratory - Adult  Goal: Achieves optimal ventilation and oxygenation  5/25/2024 2038 by Sandrita Acosta RN  Outcome: Progressing  5/25/2024 1847 by Tabatha Alvarez RN  Outcome: Progressing     Problem: Nutrition Deficit:  Goal: Optimize nutritional status  5/25/2024 2038 by Sandrita Acosta RN  Outcome: Progressing  5/25/2024 1847 by Tabatha Alvarez RN  Outcome: Progressing

## 2024-05-26 NOTE — PROGRESS NOTES
[facility] - Progress Note    Today's Date and Time: 5/26/2024, 5:28 PM    Impression :   Septicemia with Gram positive cocci in chains and pairs and Gram negative bacilli x 2 on 5-21-24  Cultures: Enterococcus faecalis and Klebsiella oxytoca  Septic shock  Resolved  Lactic acidosis  Thrombocytopenia  Prior Left side hydronephrosis in Feb 2024  Prior Left renal pelvis calculus in Feb 2024  S/P left ureteral stent on 2-14-24  S/P lithotripsy on 3-5-24  S/P cystoscopy, ureteroscopy, LASER lithotripsy with Lt stent exchange 5-21-24  Essential HTN  DM type 2  Lt pontine CVA in 2017  NSTEMI  Hepatopathy    Recommendations:   Meropenem 1 gm IV q 8 hr to treat Enterococcus and Klebsiella.    Medical Decision Making/Summary/Discussion:5/26/2024     Daily Elements of Decision Making provided by Consulting Physician:    Note: I have independently performed the steps listed below as part of the medical decision making and evaluation.    Review of current Problems:  Today I am seeing the patient for the following problems:  Septicemia with Gram positive cocci in chains and pairs and Gram negative bacilli x 2 on 5-21-24  Septic shock  Lactic acidosis  Thrombocytopenia  Prior Left side hydronephrosis in Feb 2024  Prior Left renal pelvis calculus in Feb 2024  S/P left ureteral stent on 2-14-24  S/P lithotripsy on 3-5-24  S/P cystoscopy, ureteroscopy, LASER lithotripsy with Lt stent exchange 5-21-24  Essential HTN  DM type 2  Lt pontine CVA in 2017  Evaluation of Patient:  Patient evaluated and examined in the ICU.   Previously evaluated by Telemedicine at [facility]  Transferred to [facility]  Patient improved with control of septic shock  Will transfer to step down unit  Please see daily details in Interval Changes Section  Changes in physical exam:  Hypotension resolved, Off vasopressors  Tachycardic  Improved overall  Please see daily details in Physical Exam section and in Interval  Component Value Units Date/Time    Culture, Blood 1 [[id number]] Collected: 05/25/24 1015    Order Status: Completed Specimen: Blood Updated: 05/26/24 1045     Specimen Description .BLOOD     Special Requests          Culture NO GROWTH 1 DAY    Culture, Blood 1 [[id number]] Collected: 05/25/24 1015    Order Status: Completed Specimen: Blood Updated: 05/26/24 1047     Specimen Description .BLOOD     Special Requests          Culture NO GROWTH 1 DAY    Infectious Disease Intervention [[id number]]     Order Status: Sent     MRSA DNA Probe, Nasal [[id number]] Collected: 05/23/24 0430    Order Status: Completed Specimen: Nasal Updated: 05/23/24 1025     Specimen Description .NASAL SWAB     MRSA, DNA, Nasal NEGATIVE     Comment: NEGATIVE:  MRSA DNA not detected by nucleic acid amplification.                                                    Results should be used as an adjunct to nosocomial control efforts to identify patients   needing enhanced precautions.    The test is not intended to identify patients with staphylococcal infections.  Results   should not be used to guide or monitor treatment for MRSA infections.         Culture, Urine [[id number]] Collected: 05/21/24 2345    Order Status: Completed Specimen: Urine, clean catch Updated: 05/23/24 1300     Specimen Description .CLEAN CATCH URINE     Culture NO GROWTH    Clostridium Difficile Toxin/Antigen [[id number]]     Order Status: Canceled Specimen: Stool     Culture, Blood 2 [[id number]]  (Abnormal)  (Susceptibility) Collected: 05/21/24 1608    Order Status: Completed Specimen: Blood Updated: 05/24/24 1430     Specimen Description .BLOOD     Special Requests LFA 10 ML     Culture POSITIVE Blood Culture      DIRECT GRAM STAIN FROM BOTTLE: GRAM POSITIVE COCCI IN CHAINS AND GRAM NEGATIVE RODS      Enterococcus faecalis Detected: Methodology- Polymerase Chain Reaction (PCR)      Klebsiella oxytoca Detected: Methodology- Polymerase Chain Reaction (PCR)      KLEBSIELLA

## 2024-05-26 NOTE — PROGRESS NOTES
PULMONARY & CRITICAL CARE MEDICINE PROGRESS NOTE     Patient:  Macey Zazueta  MRN: [id number]  Admit date: 5/23/2024  Primary Care Physician: Byron Castaneda MD  Consulting Physician: Nacho Gordon*  CODE Status: Full Code  LOS: 3     SUBJECTIVE     CHIEF COMPLAINT/REASON FOR INITIAL CONSULT:No chief complaint on file.    BRIEF HOSPITAL COURSE:  The patient is a 74 y.o. female with past medical history significant for hypertension, type 2 diabetes mellitus, ischemic stroke, renal calculus initially presented to [facility] for altered mental status, found to have UTI with urinary obstruction.  Patient had stents placed for hydronephrosis in the recent past.  Initial evaluation in the treatment ED on 5/12/2024 showed that patient was not sepsis with shock lactate elevated at 6, started on empiric antibiotics, started on vasopressors for hypotension.  Patient also found to have elevated troponin which initially trended up.  Patient was also found to have acute hypoxic respiratory failure and started on BiPAP, eventually transferred to [facility] ICU.  Cardiology evaluated the patient and echo was done which was unremarkable.  Urology was consulted for hematuria and hydronephrosis.     Patient was initially weaned off of vasopressor support, was maintaining saturation on nasal cannula.  Patient was empirically continued on meropenem found to have positive blood culture for which infectious disease were consulted.    INTERVAL HISTORY:  05/26/24  Patient is currently on supplemental oxygen with nasal cannula@2 L/min  Used BiPAP overnight  Remains hemodynamically stable  Tmax 99.1,, white count is 9  He remains on meropenem  Urine output was 2.3 L yesterday    REVIEW OF SYSTEMS:  Review of Systems   Constitutional:  Positive for fatigue. Negative for fever.   HENT:  Negative for voice change.    Eyes:  Negative for visual disturbance.   Respiratory:  Positive for cough and shortness of  ureter and surrounding inflammatory   change which may represent a urinary tract infection.  Correlate clinically      2.  Trace bilateral pleural effusions and trace pericardial effusion.         XR CHEST PORTABLE   Final Result   No radiographic evidence of pulmonary edema or acute pulmonary disease.              ECHOCARDIOGRAM:   Results for orders placed during the hospital encounter of 05/21/24    Echo (TTE) complete (PRN contrast/bubble/strain/3D)    Interpretation Summary    Left Ventricle: Normal left ventricular systolic function with a visually estimated EF of 60 - 65%. Left ventricle size is normal. Normal wall thickness. Normal wall motion. Grade I diastolic dysfunction with normal LAP.    Mitral Valve: Mild annular calcification of the mitral valve.    Tricuspid Valve: Mild regurgitation.    Compared to the previous study on 3/6/17, no significant change was seen.       ASSESSMENT AND PLAN     PROBLEM LIST:    Patient Active Problem List   Diagnosis    Left pontine cerebrovascular accident (HCC) /  2017    Type 2 diabetes mellitus without complication, with long-term current use of insulin ([facility]) /  Metformin ER    Essential hypertension    Mixed hyperlipidemia    Sepsis due to urinary tract infection ([facility])    Renal calculus    Complicated UTI (urinary tract infection)    Septic shock ([facility])    Altered mental status    Elevated troponin    Sinus tachycardia    Sepsis ([facility])    NSTEMI (non-ST elevated myocardial infarction) ([facility])    SANDER (acute kidney injury) ([facility])    Acidosis    Gram negative septicemia ([facility])    Acute hypoxic respiratory failure ([facility])    Klebsiella sepsis ([facility])    Enterococcus faecalis infection    Lactic acidosis       ASSESSMENT:    Acute hypoxic respiratory failure  UTI/complicated pyelonephritis  Klebsiella and Enterobacter bacteremia  Septic shock, resolved  NSTEMI  Elevated transaminases  Leukocytosis resolved  SANDER, resolved  Hypokalemia, improved  Essential hypertension  Type 2

## 2024-05-26 NOTE — PROGRESS NOTES
Physical Therapy  Facility/Department: [address] STEPDOWN  Physical Therapy Initial Assessment    Name: Macey Zazueta  : 1950  MRN: [id number]  Date of Service: 2024    \"Patient is a 74-year-old female with past medical history significant for hypertension, type 2 diabetes mellitus, ischemic stroke, renal calculus initially presented to [facility] for altered mental status, found to have UTI with urinary obstruction. Patient had stents placed for hydronephrosis in the recent past. Initial evaluation in the treatment ED on 2024 showed that patient was not sepsis with shock lactate elevated at 6, started on empiric antibiotics, started on vasopressors for hypotension. Patient also found to have elevated troponin which initially trended up. Patient was also found to have acute hypoxic respiratory failure and started on BiPAP, eventually transferred to [facility] ICU. Cardiology evaluated the patient and echo was done which was unremarkable. Urology was consulted for hematuria and hydronephrosis.\"    Discharge Recommendations:   Further therapy recommended at discharge.     PT Equipment Recommendations  Equipment Needed: No      Patient Diagnosis(es): There were no encounter diagnoses.  Past Medical History:  has a past medical history of COVID-19 virus infection, Essential hypertension, Left pontine cerebrovascular accident (HCC), Mixed hyperlipidemia, Periorbital cellulitis /  right side /  resolved with Omnicef , and Type 2 diabetes mellitus without complication, without long-term current use of insulin (HCC).  Past Surgical History:  has a past surgical history that includes Cataract removal (Right, ); Cystoscopy (Left, 2024); and Lithotripsy (Left, 3/5/2024).    Assessment   Body Structures, Functions, Activity Limitations Requiring Skilled Therapeutic Intervention: Decreased functional mobility ;Decreased strength;Decreased posture;Decreased ADL status;Decreased safe

## 2024-05-26 NOTE — PROGRESS NOTES
Occupational Therapy  Facility/Department: [address]  Occupational Therapy Initial Assessment    Name: Macey Zazueta  : 1950  MRN: [id number]  Date of Service: 2024    Discharge Recommendations:  Patient would benefit from continued therapy after discharge    Patient Diagnosis(es): There were no encounter diagnoses.  Past Medical History:  has a past medical history of COVID-19 virus infection, Essential hypertension, Left pontine cerebrovascular accident (HCC), Mixed hyperlipidemia, Periorbital cellulitis /  right side /  resolved with Omnicef , and Type 2 diabetes mellitus without complication, without long-term current use of insulin (HCC).  Past Surgical History:  has a past surgical history that includes Cataract removal (Right, ); Cystoscopy (Left, 2024); and Lithotripsy (Left, 3/5/2024).    Assessment   Performance deficits / Impairments: Decreased functional mobility ;Decreased ADL status;Decreased ROM;Decreased strength;Decreased high-level IADLs;Decreased endurance;Decreased posture  Prognosis: Good  Decision Making: Medium Complexity  REQUIRES OT FOLLOW-UP: Yes  Activity Tolerance  Activity Tolerance: Patient limited by fatigue        Plan   Occupational Therapy Plan  Times Per Week: 3-5 x/week    Current Treatment Recommendations: Strengthening, ROM, Balance training, Functional mobility training, Safety education & training, Self-Care / ADL, Patient/Caregiver education & training, Equipment evaluation, education, & procurement     Restrictions  Restrictions/Precautions  Restrictions/Precautions: General Precautions, Fall Risk  Position Activity Restriction  Other position/activity restrictions: Up w/assist    Subjective   General  Patient assessed for rehabilitation services?: Yes  Family / Caregiver Present: Yes (Spouse present)  Subjective  Subjective: RN approved therapy session, patient states pain at 0/10 at this time, no NT reported, pt and spouse agreeable to session  requiring assist x2 for unsteadiness and weakness, pt completed side-step to R and transferred stand>sit, pt transferred sit>supine requiring assist x2 for BLE progression and trunk control, pt retired supine in bed    Vision  Vision: Impaired  Vision Exceptions: Wears glasses at all times (Has artificial lens in R eye and is supposed to wear glasses at all times but does not)  Hearing  Hearing: Within functional limits  Cognition  Overall Cognitive Status: Exceptions  Arousal/Alertness: Delayed responses to stimuli  Following Commands: Follows multistep commands with increased time;Follows multistep commands with repitition  Attention Span: Attends with cues to redirect  Safety Judgement: Decreased awareness of need for safety;Decreased awareness of need for assistance  Problem Solving: Assistance required to implement solutions  Insights: Decreased awareness of deficits  Initiation: Requires cues for some  Sequencing: Requires cues for some  Orientation  Overall Orientation Status: Within Functional Limits  Orientation Level: Oriented X4    Education Given To: Patient;Family  Education Provided: Role of Therapy;Plan of Care  Education Method: Verbal  Barriers to Learning: None  Education Outcome: Verbalized understanding;Continued education needed    Hand Dominance: Right (Pt has R-sided weakness but still uses R hand as much as possible)    AM-PAC - ADL  AM-PAC Daily Activity - Inpatient   How much help is needed for putting on and taking off regular lower body clothing?: A Lot  How much help is needed for bathing (which includes washing, rinsing, drying)?: A Lot  How much help is needed for toileting (which includes using toilet, bedpan, or urinal)?: A Lot  How much help is needed for putting on and taking off regular upper body clothing?: A Lot  How much help is needed for taking care of personal grooming?: A Little  How much help for eating meals?: A Little  AM-[facility] Inpatient Daily Activity Raw Score:  14  AM-PAC Inpatient ADL T-Scale Score : 33.39  ADL Inpatient CMS 0-100% Score: 59.67  ADL Inpatient CMS G-Code Modifier : CK    Goals  Short Term Goals  Time Frame for Short Term Goals: Pt will by d/c  Short Term Goal 1: Demo UB ADL activity at min A  Short Term Goal 2: Demo LB ADL activity at min A using AE PRN  Short Term Goal 3: Demo safe bed mob using bedrails, including rolling, at CGA  Short Term Goal 4: Demo dynamic sitting during func activity for 15 min+ at min A using LRAD PRN  Short Term Goal 5: Demo standing during func activity for 5 min+ at min A  using LRAD PRN       Therapy Time   Individual Concurrent Group Co-treatment   Time In 1326         Time Out 1410         Minutes 44         Timed Code Treatment Minutes: 23 Minutes     Co-treatment with PT warranted secondary to patients decreased functional independence and safety requiring two skilled therapy professionals to address individual discipline's goals. OT addressing sitting balance for increased ADL performance, and preparation for ADL transfers/mobility to maximize independence.    CHLOE BONILLA, S/OT

## 2024-05-26 NOTE — PROGRESS NOTES
\"CKTOTAL;3\"  FASTING LIPID PANEL:  Lab Results   Component Value Date    CHOL 119 09/20/2023    HDL 43 09/20/2023    TRIG 89 09/20/2023     LIVER PROFILE:   Recent Labs     05/23/24  1547 05/25/24  0401 05/26/24  0248   * 76* 58*   * 74* 62*   BILIDIR  --  0.3 0.4*   BILITOT 1.8* 2.0* 1.6*   ALKPHOS 113* 117* 105*        MICROBIOLOGY:   Lab Results   Component Value Date/Time    CULTURE NO GROWTH 12 HOURS 05/25/2024 10:15 AM    CULTURE NO GROWTH 12 HOURS 05/25/2024 10:15 AM       Imaging:    CT ABDOMEN PELVIS WO CONTRAST Additional Contrast? None    Result Date: 5/23/2024  1. There is mild to moderate left-sided hydronephrosis with left-sided renal stones as described above.  However, no obstructing ureteral stone is seen. However, there is dilatation of the left ureter and surrounding inflammatory change which may represent a urinary tract infection.  Correlate clinically 2.  Trace bilateral pleural effusions and trace pericardial effusion.     XR CHEST PORTABLE    Result Date: 5/23/2024  1. Right-sided PICC tip position now upper SVC; advancement 2 cm suggested, if possible. 2. Otherwise no significant interval change.     XR CHEST PORTABLE    Result Date: 5/23/2024  No radiographic evidence of pulmonary edema or acute pulmonary disease.     XR CHEST PORTABLE    Result Date: 5/22/2024  Stable chest when compared to the prior exam     US RENAL COMPLETE    Result Date: 5/22/2024  1. Moderate left-sided hydronephrosis. 2. Midpole right renal cyst measures up to 2.3 cm with thin septation likely a Bosniak type 2 renal cyst. 3. Bilateral nonobstructing renal calculi.  No right-sided hydronephrosis. The findings were sent to the Radiology Results Communication Center at 11:46 am on 5/22/2024 to be communicated to a licensed caregiver.     XR CHEST (SINGLE VIEW FRONTAL)    Result Date: 5/21/2024  Right-sided PICC in satisfactory position.     CT ABDOMEN PELVIS WO CONTRAST Additional Contrast? None    Result  Date: 5/21/2024  1. Mild left hydronephrosis, with diffuse left periureteral inflammatory stranding and scattered small foci of intraureteral air.  The renal collecting system is opacified with contrast, without definite evidence of obstruction.  Overall, these findings are concerning for left-sided urinary tract infection.  Correlation with urinalysis is recommended. 2. Bilateral adrenal thickening with 2.0 cm left adrenal nodule, stable from previous exam dated 02/12/2024.  1 year follow-up adrenal CT is recommended. 3. Enlarged fibroid uterus. 4. Cholelithiasis. 5. Additional chronic findings, as above.     XR CHEST (2 VW)    Result Date: 5/21/2024  No acute process.     CTA HEAD NECK W CONTRAST    Result Date: 5/21/2024  1. No large vessel occlusion of the head or neck. 2. Moderate to severe stenosis of the intradural left vertebral artery and moderate stenosis of the intradural right vertebral artery, similar to prior. 3. Mild narrowing of the bilateral supraclinoid ICAs.     CT HEAD WO CONTRAST    Result Date: 5/21/2024  No acute intracranial abnormality. Old lacunar infarct in the right thalamus. Mild parenchymal volume loss. Moderate ventriculomegaly, disproportionate to the degree of volume loss, likely with superimposed mild communicating hydrocephalus, stable. Moderate chronic microvascular disease.       ASSESSMENT & PLAN     Principal Problem:    Sepsis (HCC)  Active Problems:    NSTEMI (non-ST elevated myocardial infarction) ([facility])    Type 2 diabetes mellitus without complication, with long-term current use of insulin (HCC) /  Metformin ER    Sepsis due to urinary tract infection (HCC)    Renal calculus    Complicated UTI (urinary tract infection)    SANDER (acute kidney injury) ([facility])    Acidosis    Gram negative septicemia (HCC)    Acute hypoxic respiratory failure (HCC)    Klebsiella sepsis (HCC)    Enterococcus faecalis infection    Lactic acidosis  Resolved Problems:    * No resolved hospital problems.  *      #Septicemia:  #Complicated UTI.  Sepsis with shock off pressors:  Patient was initially in sepsis with shock requiring vasopressor support.  Empirically on meropenem.  Blood culture growing  Enterococcus faecalis and Klebsiella oxytoca   Currently hemodynamically stable off vasopressor support.  Prior Left side hydronephrosis in Feb 2024  Prior Left renal pelvis calculus in Feb 2024  S/P left ureteral stent on 2-14-24  S/P lithotripsy on 3-5-24  S/P cystoscopy, ureteroscopy, LASER lithotripsy with Lt stent exchange 5-21-24  CT abdomen pelvis from 5/23/2024 shows mild to moderate left hydronephrosis with left-sided renal stone.  ID on board.  Appreciate recommendations.  Continue meropenem.  Follow-up blood culture sensitivity.  Urology on board.  Appreciate recommendations.     #Nonoliguric SANDER:  Resolved.  Creatinine back to baseline.  Likely ATN prerenal leading to ATN due to sepsis but shock.  Baseline creatinine less than 1.  Monitor urine output.  Daily weights.  Monitor BMP.  Avoid nephrotoxic agents.       #Acute anemia:  #Hematuria:  Resolved.  Anemia likely secondary to blood loss.  Monitor hemoglobin.  Avoid anticoagulation and antiplatelets.  Patient has [facility] on board.  Appreciate recommendations.        #Transaminitis:  Likely secondary to shock liver from sepsis  CT abdomen shows unremarkable liver  Trend LFTs.  Avoid hepatotoxins.  Follow-up acute viral hepatitis panel.     #NSTEMI:  Likely type II secondary to sepsis with shock.  Cardiology consulted.  TTE from 5/20/2024 showed preserved EF.  No active intervention planned.  Plan is to follow-up outpatient with patient's established cardiologist in [place].     #Acute hypoxic respiratory failure:  Likely secondary to pulmonary edema from sepsis  Wean down oxygen support as appropriate.  Currently maintaining saturation on room air.     #Essential hypertension:  Patient apparently is on amlodipine 2.5, Lopressor 25 Mg at

## 2024-05-27 LAB
ANION GAP SERPL CALCULATED.3IONS-SCNC: 7 MMOL/L (ref 9–16)
BASOPHILS # BLD: 0.03 K/UL (ref 0–0.2)
BASOPHILS NFR BLD: 0 % (ref 0–2)
BUN SERPL-MCNC: 10 MG/DL (ref 8–23)
CALCIUM SERPL-MCNC: 7.9 MG/DL (ref 8.6–10.4)
CHLORIDE SERPL-SCNC: 110 MMOL/L (ref 98–107)
CO2 SERPL-SCNC: 23 MMOL/L (ref 20–31)
CREAT SERPL-MCNC: 0.6 MG/DL (ref 0.5–0.9)
EOSINOPHIL # BLD: 0.28 K/UL (ref 0–0.44)
EOSINOPHILS RELATIVE PERCENT: 3 % (ref 1–4)
ERYTHROCYTE [DISTWIDTH] IN BLOOD BY AUTOMATED COUNT: 17.7 % (ref 11.8–14.4)
FERRITIN SERPL-MCNC: 362 NG/ML (ref 13–150)
GFR, ESTIMATED: >90 ML/MIN/1.73M2
GLUCOSE BLD-MCNC: 129 MG/DL (ref 65–105)
GLUCOSE BLD-MCNC: 174 MG/DL (ref 65–105)
GLUCOSE BLD-MCNC: 199 MG/DL (ref 65–105)
GLUCOSE BLD-MCNC: 205 MG/DL (ref 65–105)
GLUCOSE SERPL-MCNC: 137 MG/DL (ref 74–99)
HCT VFR BLD AUTO: 24.1 % (ref 36.3–47.1)
HCT VFR BLD AUTO: 24.7 % (ref 36.3–47.1)
HCT VFR BLD AUTO: 27 % (ref 36.3–47.1)
HGB BLD-MCNC: 7.4 G/DL (ref 11.9–15.1)
HGB BLD-MCNC: 7.7 G/DL (ref 11.9–15.1)
HGB BLD-MCNC: 8.2 G/DL (ref 11.9–15.1)
IMM GRANULOCYTES # BLD AUTO: 0.42 K/UL (ref 0–0.3)
IMM GRANULOCYTES NFR BLD: 4 %
IRON SATN MFR SERPL: 14 % (ref 20–55)
IRON SERPL-MCNC: 25 UG/DL (ref 37–145)
LYMPHOCYTES NFR BLD: 1.77 K/UL (ref 1.1–3.7)
LYMPHOCYTES RELATIVE PERCENT: 17 % (ref 24–43)
MAGNESIUM SERPL-MCNC: 1.5 MG/DL (ref 1.6–2.4)
MCH RBC QN AUTO: 26 PG (ref 25.2–33.5)
MCHC RBC AUTO-ENTMCNC: 30.7 G/DL (ref 28.4–34.8)
MCV RBC AUTO: 84.6 FL (ref 82.6–102.9)
MONOCYTES NFR BLD: 0.63 K/UL (ref 0.1–1.2)
MONOCYTES NFR BLD: 6 % (ref 3–12)
NEUTROPHILS NFR BLD: 70 % (ref 36–65)
NEUTS SEG NFR BLD: 7.15 K/UL (ref 1.5–8.1)
NRBC BLD-RTO: 0 PER 100 WBC
PLATELET # BLD AUTO: 152 K/UL (ref 138–453)
PMV BLD AUTO: 11.8 FL (ref 8.1–13.5)
POTASSIUM SERPL-SCNC: 3.4 MMOL/L (ref 3.7–5.3)
RBC # BLD AUTO: 2.85 M/UL (ref 3.95–5.11)
RBC # BLD: ABNORMAL 10*6/UL
SODIUM SERPL-SCNC: 140 MMOL/L (ref 136–145)
TIBC SERPL-MCNC: 178 UG/DL (ref 250–450)
UNSATURATED IRON BINDING CAPACITY: 153 UG/DL (ref 112–347)
WBC OTHER # BLD: 10.3 K/UL (ref 3.5–11.3)

## 2024-05-27 PROCEDURE — 2580000003 HC RX 258: Performed by: INTERNAL MEDICINE

## 2024-05-27 PROCEDURE — 99233 SBSQ HOSP IP/OBS HIGH 50: CPT | Performed by: INTERNAL MEDICINE

## 2024-05-27 PROCEDURE — 2580000003 HC RX 258: Performed by: STUDENT IN AN ORGANIZED HEALTH CARE EDUCATION/TRAINING PROGRAM

## 2024-05-27 PROCEDURE — 6370000000 HC RX 637 (ALT 250 FOR IP)

## 2024-05-27 PROCEDURE — 6360000002 HC RX W HCPCS: Performed by: INTERNAL MEDICINE

## 2024-05-27 PROCEDURE — 2060000000 HC ICU INTERMEDIATE R&B

## 2024-05-27 PROCEDURE — 97530 THERAPEUTIC ACTIVITIES: CPT

## 2024-05-27 PROCEDURE — 82728 ASSAY OF FERRITIN: CPT

## 2024-05-27 PROCEDURE — 85025 COMPLETE CBC W/AUTO DIFF WBC: CPT

## 2024-05-27 PROCEDURE — 82947 ASSAY GLUCOSE BLOOD QUANT: CPT

## 2024-05-27 PROCEDURE — 80048 BASIC METABOLIC PNL TOTAL CA: CPT

## 2024-05-27 PROCEDURE — 99232 SBSQ HOSP IP/OBS MODERATE 35: CPT | Performed by: INTERNAL MEDICINE

## 2024-05-27 PROCEDURE — 97535 SELF CARE MNGMENT TRAINING: CPT

## 2024-05-27 PROCEDURE — 83550 IRON BINDING TEST: CPT

## 2024-05-27 PROCEDURE — 85014 HEMATOCRIT: CPT

## 2024-05-27 PROCEDURE — 2580000003 HC RX 258

## 2024-05-27 PROCEDURE — 6360000002 HC RX W HCPCS

## 2024-05-27 PROCEDURE — 6360000002 HC RX W HCPCS: Performed by: STUDENT IN AN ORGANIZED HEALTH CARE EDUCATION/TRAINING PROGRAM

## 2024-05-27 PROCEDURE — 36415 COLL VENOUS BLD VENIPUNCTURE: CPT

## 2024-05-27 PROCEDURE — 85018 HEMOGLOBIN: CPT

## 2024-05-27 PROCEDURE — 83540 ASSAY OF IRON: CPT

## 2024-05-27 PROCEDURE — 83735 ASSAY OF MAGNESIUM: CPT

## 2024-05-27 RX ORDER — MAGNESIUM SULFATE HEPTAHYDRATE 40 MG/ML
4000 INJECTION, SOLUTION INTRAVENOUS ONCE
Status: COMPLETED | OUTPATIENT
Start: 2024-05-27 | End: 2024-05-27

## 2024-05-27 RX ADMIN — METOPROLOL TARTRATE 25 MG: 25 TABLET, FILM COATED ORAL at 08:51

## 2024-05-27 RX ADMIN — ENOXAPARIN SODIUM 40 MG: 100 INJECTION SUBCUTANEOUS at 08:33

## 2024-05-27 RX ADMIN — PANTOPRAZOLE SODIUM 40 MG: 40 TABLET, DELAYED RELEASE ORAL at 08:33

## 2024-05-27 RX ADMIN — INSULIN LISPRO 1 UNITS: 100 INJECTION, SOLUTION INTRAVENOUS; SUBCUTANEOUS at 12:05

## 2024-05-27 RX ADMIN — MAGNESIUM SULFATE HEPTAHYDRATE 4000 MG: 40 INJECTION, SOLUTION INTRAVENOUS at 16:28

## 2024-05-27 RX ADMIN — SODIUM CHLORIDE, PRESERVATIVE FREE 10 ML: 5 INJECTION INTRAVENOUS at 08:32

## 2024-05-27 RX ADMIN — POTASSIUM BICARBONATE 40 MEQ: 782 TABLET, EFFERVESCENT ORAL at 11:05

## 2024-05-27 RX ADMIN — MEROPENEM 1000 MG: 1 INJECTION, POWDER, FOR SOLUTION INTRAVENOUS at 23:05

## 2024-05-27 RX ADMIN — ATORVASTATIN CALCIUM 80 MG: 80 TABLET, FILM COATED ORAL at 21:03

## 2024-05-27 RX ADMIN — METOPROLOL TARTRATE 25 MG: 25 TABLET, FILM COATED ORAL at 21:03

## 2024-05-27 RX ADMIN — ASPIRIN 81 MG: 81 TABLET, COATED ORAL at 08:33

## 2024-05-27 RX ADMIN — MEROPENEM 1000 MG: 1 INJECTION, POWDER, FOR SOLUTION INTRAVENOUS at 08:53

## 2024-05-27 RX ADMIN — POTASSIUM BICARBONATE 40 MEQ: 782 TABLET, EFFERVESCENT ORAL at 08:32

## 2024-05-27 RX ADMIN — SODIUM CHLORIDE 200 MG: 9 INJECTION, SOLUTION INTRAVENOUS at 14:00

## 2024-05-27 ASSESSMENT — ENCOUNTER SYMPTOMS
DIARRHEA: 0
COUGH: 1
CHEST TIGHTNESS: 0
VOMITING: 0
VOICE CHANGE: 0
ABDOMINAL PAIN: 0
SHORTNESS OF BREATH: 1

## 2024-05-27 ASSESSMENT — PAIN SCALES - GENERAL: PAINLEVEL_OUTOF10: 0

## 2024-05-27 NOTE — PROGRESS NOTES
[facility]  Internal Medicine Teaching Residency Program  Inpatient Daily Progress Note  ______________________________________________________________________________    Patient: Macey Zazueta  YOB: 1950   MRN:[id number]    Acct: [id number]     Room: 0427/0427-02  Admit date: 5/23/2024  Today's date: 05/27/24  Number of days in the hospital: 4    SUBJECTIVE   CC: No chief complaint on file.    Pt examined at bedside. Chart & results reviewed.   - Overnight: no acute events, slightly tachycardic, 103-112.  - Patient is doing well, laying in bed at time of exam, no acute complaints this AM.  - Denied fevers, chills, nausea, vomiting, constipation, diarrhea, abdominal pain, chest pain, or SOB.  - Afebrile, hemodynamically stable, SPO2 at goal on RA at time of exam.        BRIEF HISTORY   Patient is a 74-year-old female with past medical history significant for hypertension, type 2 diabetes mellitus, ischemic stroke, renal calculus initially presented to [facility] for altered mental status, found to have UTI with urinary obstruction.  Patient had stents placed for hydronephrosis in the recent past.  Initial evaluation in the treatment ED on 5/12/2024 showed that patient was not sepsis with shock lactate elevated at 6, started on empiric antibiotics, started on vasopressors for hypotension.  Patient also found to have elevated troponin which initially trended up.  Patient was also found to have acute hypoxic respiratory failure and started on BiPAP, eventually transferred to [facility] ICU.  Cardiology evaluated the patient and echo was done which was unremarkable.  Urology was consulted for hematuria and hydronephrosis.     Patient was initially weaned off of vasopressor support, was maintaining saturation on nasal cannula.  Patient was empirically continued on meropenem found to have positive blood culture for which infectious disease were  PRN        Diagnostic Labs:  CBC:   Recent Labs     05/25/24  0401 05/25/24  1015 05/26/24  0248 05/26/24  0945 05/26/24  1900 05/27/24  0301   WBC 12.7*  --  9.0  --   --  10.3   RBC 2.93*  --  3.06*  --   --  2.85*   HGB 7.7*   < > 7.8* 7.9* 7.8* 7.4*   HCT 24.4*   < > 26.3* 26.0* 24.0* 24.1*   MCV 83.3  --  85.9  --   --  84.6   RDW 17.2*  --  17.3*  --   --  17.7*   *  --  118*  --   --  152    < > = values in this interval not displayed.       BMP:   Recent Labs     05/25/24  0401 05/26/24  0248 05/27/24  0301    139 140   K 3.5* 4.0 3.4*   * 109* 110*   CO2 23 23 23   BUN 13 10 10   CREATININE 0.9 0.7 0.6       BNP: No results for input(s): \"BNP\" in the last 72 hours.  PT/INR:   Recent Labs     05/24/24  1844   PROTIME 13.3   INR 1.0       APTT: No results for input(s): \"APTT\" in the last 72 hours.  CARDIAC ENZYMES: No results for input(s): \"CKMB\", \"CKMBINDEX\", \"TROPONINI\" in the last 72 hours.    Invalid input(s): \"CKTOTAL;3\"  FASTING LIPID PANEL:  Lab Results   Component Value Date    CHOL 119 09/20/2023    HDL 43 09/20/2023    TRIG 89 09/20/2023     LIVER PROFILE:   Recent Labs     05/25/24  0401 05/26/24  0248   AST 76* 58*   ALT 74* 62*   BILIDIR 0.3 0.4*   BILITOT 2.0* 1.6*   ALKPHOS 117* 105*        MICROBIOLOGY:   Lab Results   Component Value Date/Time    CULTURE NO GROWTH 1 DAY 05/25/2024 10:15 AM    CULTURE NO GROWTH 1 DAY 05/25/2024 10:15 AM       Imaging:    CT ABDOMEN PELVIS WO CONTRAST Additional Contrast? None    Result Date: 5/23/2024  1. There is mild to moderate left-sided hydronephrosis with left-sided renal stones as described above.  However, no obstructing ureteral stone is seen. However, there is dilatation of the left ureter and surrounding inflammatory change which may represent a urinary tract infection.  Correlate clinically 2.  Trace bilateral pleural effusions and trace pericardial effusion.     XR CHEST PORTABLE    Result Date: 5/23/2024  1. Right-sided PICC

## 2024-05-27 NOTE — PLAN OF CARE
Problem: Safety - Adult  Goal: Free from fall injury  Outcome: Progressing  Flowsheets (Taken 5/27/2024 1005)  Free From Fall Injury: Instruct family/caregiver on patient safety     Problem: Chronic Conditions and Co-morbidities  Goal: Patient's chronic conditions and co-morbidity symptoms are monitored and maintained or improved  Outcome: Progressing  Flowsheets (Taken 5/27/2024 0800)  Care Plan - Patient's Chronic Conditions and Co-Morbidity Symptoms are Monitored and Maintained or Improved: Monitor and assess patient's chronic conditions and comorbid symptoms for stability, deterioration, or improvement     Problem: Discharge Planning  Goal: Discharge to home or other facility with appropriate resources  Outcome: Progressing  Flowsheets (Taken 5/27/2024 0800)  Discharge to home or other facility with appropriate resources: Identify barriers to discharge with patient and caregiver     Problem: Skin/Tissue Integrity  Goal: Absence of new skin breakdown  Description: 1.  Monitor for areas of redness and/or skin breakdown  2.  Assess vascular access sites hourly  3.  Every 4-6 hours minimum:  Change oxygen saturation probe site  4.  Every 4-6 hours:  If on nasal continuous positive airway pressure, respiratory therapy assess nares and determine need for appliance change or resting period.  Outcome: Progressing     Problem: ABCDS Injury Assessment  Goal: Absence of physical injury  Outcome: Progressing  Flowsheets (Taken 5/27/2024 1005)  Absence of Physical Injury: Implement safety measures based on patient assessment     Problem: Respiratory - Adult  Goal: Achieves optimal ventilation and oxygenation  Outcome: Progressing  Flowsheets (Taken 5/27/2024 0800)  Achieves optimal ventilation and oxygenation:   Assess for changes in respiratory status   Assess for changes in mentation and behavior     Problem: Nutrition Deficit:  Goal: Optimize nutritional status  Outcome: Progressing

## 2024-05-27 NOTE — PROGRESS NOTES
OF THE ABDOMEN AND PELVIS WITHOUT CONTRAST 5/21/2024 3:57 pm TECHNIQUE: CT of the abdomen and pelvis was performed without the administration of intravenous contrast. Multiplanar reformatted images are provided for review. Automated exposure control, iterative reconstruction, and/or weight based adjustment of the mA/kV was utilized to reduce the radiation dose to as low as reasonably achievable. COMPARISON: CT abdomen pelvis 02/12/2024 HISTORY: ORDERING SYSTEM PROVIDED HISTORY: stool TECHNOLOGIST PROVIDED HISTORY: stool Decision Support Exception - unselect if not a suspected or confirmed emergency medical condition->Emergency Medical Condition (MA) FINDINGS: LOWER CHEST:  Visualized portion of the lower chest demonstrates no acute abnormality.  Trace pericardial effusion partially visualized. KIDNEYS AND URINARY TRACT: There is mild left hydronephrosis, without definite obstructing stone.  However, evaluation is limited due to contrast opacification of the urinary extra linda system.  There is however diffuse left periureteral inflammatory stranding, including multiple foci of air within the left ureter and bladder.  Unremarkable right kidney, with stable simple cyst. ORGANS: Visualized portions of the unenhanced liver, spleen, and pancreas demonstrate no acute abnormality.  There is bilateral adrenal thickening, with 2.0 cm left-sided nodule, measuring approximately 38 Hounsfield units. Again noted is a 9 mm gallbladder stone. GI/BOWEL: No evidence of bowel obstruction or abnormal wall thickening.  No focal inflammatory bowel changes are identified. PELVIS: The bladder is opacified with contrast.  Enlarged uterus with large calcified fibroid. PERITONEUM/RETROPERITONEUM: No evidence of intraperitoneal free air or ascites.  Multiple enlarged retroperitoneal and right pelvic sidewall lymph nodes, unchanged. BONES/SOFT TISSUES: Multilevel degenerative changes of the thoracolumbar spine again noted.  Chronic changes of  Susceptibility        Klebsiella oxytoca      BACTERIAL SUSCEPTIBILITY PANEL DAVID      ampicillin >=32  Resistant      ceFAZolin <=4  Sensitive      cefTRIAXone <=0.25  Sensitive      Confirmatory Extended Spectrum Beta-Lactamase NEGATIVE  Sensitive      gentamicin <=1  Sensitive      levofloxacin <=0.12  Sensitive      piperacillin-tazobactam <=4  Sensitive      tobramycin <=1  Sensitive      trimethoprim-sulfamethoxazole <=20  Sensitive                       Susceptibility        Enterococcus faecalis      BACTERIAL SUSCEPTIBILITY PANEL DAVID      ampicillin <=2  Sensitive      Gentamicin, High Level  Sensitive      Streptomycin, Hi Level  Sensitive      vancomycin 2  Sensitive                           Blood Culture 1 [[id number]]  (Abnormal) Collected: 05/21/24 1602    Order Status: Completed Specimen: Blood Updated: 05/24/24 1441     Specimen Description .BLOOD     Special Requests          Culture POSITIVE Blood Culture      DIRECT GRAM STAIN FROM BOTTLE: GRAM POSITIVE COCCI IN CHAINS AND GRAM NEGATIVE RODS      ENTEROCOCCUS FAECALIS Identification by MALDI-TOF For susceptibility, refer to previous culture.      KLEBSIELLA OXYTOCA Identification by MALDI-TOF For susceptibility, refer to previous culture.      (NOTE) Direct Gram Stain from bottle result called to and read back by:ULYSSES DELA CRUZ RN, [facility] 5/22/24 0745 KB              Medical Decision Making-Other:     Note:    Thank you for allowing us to participate in the care of this patient. Please call with questions.    MD Myron Díaz MD Heather Helweg, APRN    ATTESTATION:    I have discussed the case, including pertinent history and exam findings with the APRN. I have evaluated the  History, physical findings and pictures of the patient and the key elements of the encounter have been performed by me. I have reviewed the laboratory data, other diagnostic studies and discussed them with the APRN. I have updated the medical

## 2024-05-27 NOTE — PROGRESS NOTES
Occupational Therapy  [facility] [address] [facility]  [facility] Daily Treatment Note  Name: Macey Zazueta  : 1950  MRN: [id number]  Date of Service: 2024    Discharge Recommendations:  Patient would benefit from continued therapy after discharge in order to increase pt balance, strength and independence. Discussed with OTR to update POC          Patient Diagnosis(es): There were no encounter diagnoses.  Past Medical History:  has a past medical history of COVID-19 virus infection, Essential hypertension, Left pontine cerebrovascular accident (HCC), Mixed hyperlipidemia, Periorbital cellulitis /  right side /  resolved with Omnicef , and Type 2 diabetes mellitus without complication, without long-term current use of insulin (HCC).  Past Surgical History:  has a past surgical history that includes Cataract removal (Right, ); Cystoscopy (Left, 2024); and Lithotripsy (Left, 3/5/2024).           Assessment   Performance deficits / Impairments: Decreased functional mobility ;Decreased ADL status;Decreased ROM;Decreased strength;Decreased high-level IADLs;Decreased endurance;Decreased posture;Decreased coordination;Decreased balance;Decreased safe awareness;Decreased fine motor control;Decreased cognition  Prognosis: Good  REQUIRES OT FOLLOW-UP: Yes  Activity Tolerance  Activity Tolerance: Patient limited by fatigue;Treatment limited secondary to decreased cognition        Plan   Occupational Therapy Plan  Times Per Week: 3-5 x/week  Current Treatment Recommendations: Strengthening, ROM, Balance training, Functional mobility training, Safety education & training, Self-Care / ADL, Patient/Caregiver education & training, Equipment evaluation, education, & procurement     Restrictions  Restrictions/Precautions  Restrictions/Precautions: General Precautions, Fall Risk  Required Braces or Orthoses?: No  Position Activity Restriction  Other position/activity restrictions: Up w/ assist,  5/21: Cystoscopy, left ureteroscopy, left holmium laser lithotripsy, left ureteral stent exchange.    Subjective   General  Chart Reviewed: Yes  Patient assessed for rehabilitation services?: Yes  Family / Caregiver Present: Yes (spouse)  General Comment  Comments: Pt and RN agreeable to therapy this day. Pt supine in bed at start of session and retired to seated in chair at session end with chair alarm on, call light in reach, BLE elevated and RN notified. Pt denied pain, no facial grimacing or groaning noted         Objective        Safety Devices  Type of Devices: Gait belt;Call light within reach;Patient at risk for falls;Nurse notified;Left in chair;Chair alarm in place  Restraints  Restraints Initially in Place: No  Balance  Sitting: With support (Max A for static sitting unsupported at EOB demo strong post lean increasing to Min A 1-2 hand support tolerating approx 8 min)  Standing: With support (Static standing with RW demo post lean tolerating approx 2-3 min Max A increasing to intermitent Mod A)  Gait  Gait Training: Yes (EOB>chair utilizing HHA per pt S/O request. Pt  noted utilizes HHA at baseline BANEGAS attempted however pt would benefit a pediatric RW. Increased difficluty noted progressing BLE)  Overall Level of Assistance: Maximum assistance        ADL  UE Dressing: Maximum assistance;Setup;Verbal cueing;Increased time to complete  UE Dressing Skilled Clinical Factors: to doff/don gown seated in chair requiring increased assist for threading d/t decreased cognition  Toileting: Maximum assistance;Setup;Increased time to complete  Toileting Skilled Clinical Factors: Max A for standing balance with RW, Max A for personal hygiene and brief managment seated/standing  Additional Comments: Throughout session pt limited per decreased cognition, decreased endurance and decreased strength. Further ADLs not attempted d/t pt decreased cognition        Bed mobility  Supine to Sit: Maximum

## 2024-05-27 NOTE — PROGRESS NOTES
PULMONARY & CRITICAL CARE MEDICINE PROGRESS NOTE     Patient:  Macey Zazueta  MRN: [id number]  Admit date: 5/23/2024  Primary Care Physician: Byron Castaneda MD  Consulting Physician: Rakel Oneil MD  CODE Status: Full Code  LOS: 4     SUBJECTIVE     CHIEF COMPLAINT/REASON FOR INITIAL CONSULT:No chief complaint on file.    BRIEF HOSPITAL COURSE:  The patient is a 74 y.o. female with past medical history significant for hypertension, type 2 diabetes mellitus, ischemic stroke, renal calculus initially presented to [facility] for altered mental status, found to have UTI with urinary obstruction.  Patient had stents placed for hydronephrosis in the recent past.  Initial evaluation in the treatment ED on 5/12/2024 showed that patient was not sepsis with shock lactate elevated at 6, started on empiric antibiotics, started on vasopressors for hypotension.  Patient also found to have elevated troponin which initially trended up.  Patient was also found to have acute hypoxic respiratory failure and started on BiPAP, eventually transferred to [facility] ICU.  Cardiology evaluated the patient and echo was done which was unremarkable.  Urology was consulted for hematuria and hydronephrosis.     Patient was initially weaned off of vasopressor support, was maintaining saturation on nasal cannula.  Patient was empirically continued on meropenem found to have positive blood culture for which infectious disease were consulted.    INTERVAL HISTORY:  05/27/24  Patient is currently on room air, did not overnight  Remains hemodynamically stable  Tmax 99, white count is 10.3  He remains on meropenem  Urine output was 2.3 L yesterday    REVIEW OF SYSTEMS:  Review of Systems   Constitutional:  Positive for fatigue. Negative for fever.   HENT:  Negative for voice change.    Eyes:  Negative for visual disturbance.   Respiratory:  Positive for cough and shortness of breath. Negative for chest tightness.   MEDICATIONS:  Scheduled Meds:   magnesium sulfate  4,000 mg IntraVENous Once    potassium bicarb-citric acid  40 mEq Oral Q2H    enoxaparin  40 mg SubCUTAneous Daily    aspirin  81 mg Oral Daily    metoprolol tartrate  25 mg Oral BID    pantoprazole  40 mg Oral QAM AC    insulin lispro  0-4 Units SubCUTAneous 4x Daily AC & HS    meropenem  1,000 mg IntraVENous Q8H    atorvastatin  80 mg Oral Nightly    sodium chloride flush  5-40 mL IntraVENous 2 times per day     Continuous Infusions:   sodium chloride      dextrose         INPUT/OUTPUT:  In: 1148.6 [P.O.:900; I.V.:168.6]  Out: 2300 [Urine:2300]  Date 05/27/24 0000 - 05/27/24 2359   Shift 4036-0641 9147-1866 2027-3891 24 Hour Total   INTAKE   Shift Total(mL/kg)       OUTPUT   Urine(mL/kg/hr) 900(2.2)   900   Shift Total(mL/kg) 900(17.6)   900(17.6)   Weight (kg) 51 51 51 51          LABORATORY RESULTS:  BLOOD GASES:   No results for input(s): \"POCPH\", \"POCPCO2\", \"POCPO2\", \"POCHCO3\", \"HGBQ9WQA\" in the last 72 hours.  COMPLETE BLOOD COUNTS:   Recent Labs     05/25/24  0401 05/25/24  1015 05/26/24  0248 05/26/24  0945 05/26/24  1900 05/27/24  0301 05/27/24  0928   WBC 12.7*  --  9.0  --   --  10.3  --    HGB 7.7*   < > 7.8* 7.9* 7.8* 7.4* 8.2*   HCT 24.4*   < > 26.3* 26.0* 24.0* 24.1* 27.0*   MCV 83.3  --  85.9  --   --  84.6  --    *  --  118*  --   --  152  --    LYMPHOPCT 11*  --  16*  --   --  17*  --    RBC 2.93*  --  3.06*  --   --  2.85*  --    MCH 26.3  --  25.5  --   --  26.0  --    MCHC 31.6  --  29.7  --   --  30.7  --    RDW 17.2*  --  17.3*  --   --  17.7*  --     < > = values in this interval not displayed.       C-REACTIVE PROTEIN:   No results for input(s): \"CRP\" in the last 72 hours.  LACTATE DEHYDROGENASE:   No results for input(s): \"LDH\" in the last 72 hours.  BASIC METABOLIC PROFILE:   Recent Labs     05/25/24  0401 05/26/24  0248 05/27/24  0301    139 140   K 3.5* 4.0 3.4*   * 109* 110*   CO2 23 23 23   BUN 13 10 10   CREATININE    Final Result   No radiographic evidence of pulmonary edema or acute pulmonary disease.              ECHOCARDIOGRAM:   Results for orders placed during the hospital encounter of 05/21/24    Echo (TTE) complete (PRN contrast/bubble/strain/3D)    Interpretation Summary    Left Ventricle: Normal left ventricular systolic function with a visually estimated EF of 60 - 65%. Left ventricle size is normal. Normal wall thickness. Normal wall motion. Grade I diastolic dysfunction with normal LAP.    Mitral Valve: Mild annular calcification of the mitral valve.    Tricuspid Valve: Mild regurgitation.    Compared to the previous study on 3/6/17, no significant change was seen.       ASSESSMENT AND PLAN     PROBLEM LIST:    Patient Active Problem List   Diagnosis    Left pontine cerebrovascular accident (HCC) /  2017    Type 2 diabetes mellitus without complication, with long-term current use of insulin ([facility]) /  Metformin ER    Essential hypertension    Mixed hyperlipidemia    Sepsis due to urinary tract infection ([facility])    Renal calculus    Complicated UTI (urinary tract infection)    Septic shock ([facility])    Altered mental status    Elevated troponin    Sinus tachycardia    Sepsis ([facility])    NSTEMI (non-ST elevated myocardial infarction) ([facility])    SANDER (acute kidney injury) ([facility])    Acidosis    Gram negative septicemia (HCC)    Acute hypoxic respiratory failure ([facility])    Klebsiella sepsis ([facility])    Enterococcus faecalis infection    Lactic acidosis       ASSESSMENT:    Acute hypoxic respiratory failure  UTI/complicated pyelonephritis  Klebsiella and Enterobacter bacteremia  Septic shock, resolved  NSTEMI  Elevated transaminases  Leukocytosis resolved  SANDER, resolved  Hypokalemia, improved  Essential hypertension  Type 2 diabetes  Hyperlipidemia    PLAN:    I personally interviewed/examined the patient; reviewed interval history, interpreted all available radiographic and laboratory data at the time of service.    Hemodynamically

## 2024-05-28 LAB
ANION GAP SERPL CALCULATED.3IONS-SCNC: 7 MMOL/L (ref 9–16)
BASOPHILS # BLD: 0 K/UL (ref 0–0.2)
BASOPHILS NFR BLD: 0 % (ref 0–2)
BUN SERPL-MCNC: 10 MG/DL (ref 8–23)
CALCIUM SERPL-MCNC: 7.9 MG/DL (ref 8.6–10.4)
CHLORIDE SERPL-SCNC: 110 MMOL/L (ref 98–107)
CO2 SERPL-SCNC: 25 MMOL/L (ref 20–31)
CREAT SERPL-MCNC: 0.7 MG/DL (ref 0.5–0.9)
EOSINOPHIL # BLD: 0.13 K/UL (ref 0–0.4)
EOSINOPHILS RELATIVE PERCENT: 1 % (ref 1–4)
ERYTHROCYTE [DISTWIDTH] IN BLOOD BY AUTOMATED COUNT: 18.1 % (ref 11.8–14.4)
GFR, ESTIMATED: >90 ML/MIN/1.73M2
GLUCOSE BLD-MCNC: 147 MG/DL (ref 65–105)
GLUCOSE BLD-MCNC: 150 MG/DL (ref 65–105)
GLUCOSE BLD-MCNC: 158 MG/DL (ref 65–105)
GLUCOSE BLD-MCNC: 176 MG/DL (ref 65–105)
GLUCOSE BLD-MCNC: 237 MG/DL (ref 65–105)
GLUCOSE SERPL-MCNC: 162 MG/DL (ref 74–99)
HCT VFR BLD AUTO: 24.3 % (ref 36.3–47.1)
HGB BLD-MCNC: 7.3 G/DL (ref 11.9–15.1)
IMM GRANULOCYTES # BLD AUTO: 0.25 K/UL (ref 0–0.3)
IMM GRANULOCYTES NFR BLD: 2 %
INTERVENTION: NORMAL
LYMPHOCYTES NFR BLD: 1.65 K/UL (ref 1–4.8)
LYMPHOCYTES RELATIVE PERCENT: 13 % (ref 24–44)
MCH RBC QN AUTO: 25.8 PG (ref 25.2–33.5)
MCHC RBC AUTO-ENTMCNC: 30 G/DL (ref 28.4–34.8)
MCV RBC AUTO: 85.9 FL (ref 82.6–102.9)
MONOCYTES NFR BLD: 1.14 K/UL (ref 0.1–0.8)
MONOCYTES NFR BLD: 9 % (ref 1–7)
MORPHOLOGY: ABNORMAL
NEUTROPHILS NFR BLD: 75 % (ref 36–66)
NEUTS SEG NFR BLD: 9.53 K/UL (ref 1.8–7.7)
NRBC BLD-RTO: 0 PER 100 WBC
NUCLEATED RED BLOOD CELLS: 1 PER 100 WBC
PLATELET # BLD AUTO: 208 K/UL (ref 138–453)
PMV BLD AUTO: 11.9 FL (ref 8.1–13.5)
POTASSIUM SERPL-SCNC: 4 MMOL/L (ref 3.7–5.3)
RBC # BLD AUTO: 2.83 M/UL (ref 3.95–5.11)
SODIUM SERPL-SCNC: 142 MMOL/L (ref 136–145)
WBC OTHER # BLD: 12.7 K/UL (ref 3.5–11.3)

## 2024-05-28 PROCEDURE — 6370000000 HC RX 637 (ALT 250 FOR IP)

## 2024-05-28 PROCEDURE — 6360000002 HC RX W HCPCS: Performed by: INTERNAL MEDICINE

## 2024-05-28 PROCEDURE — 2580000003 HC RX 258: Performed by: INTERNAL MEDICINE

## 2024-05-28 PROCEDURE — 99232 SBSQ HOSP IP/OBS MODERATE 35: CPT | Performed by: INTERNAL MEDICINE

## 2024-05-28 PROCEDURE — 97530 THERAPEUTIC ACTIVITIES: CPT

## 2024-05-28 PROCEDURE — 99233 SBSQ HOSP IP/OBS HIGH 50: CPT | Performed by: INTERNAL MEDICINE

## 2024-05-28 PROCEDURE — 80048 BASIC METABOLIC PNL TOTAL CA: CPT

## 2024-05-28 PROCEDURE — 2580000003 HC RX 258: Performed by: STUDENT IN AN ORGANIZED HEALTH CARE EDUCATION/TRAINING PROGRAM

## 2024-05-28 PROCEDURE — 6360000002 HC RX W HCPCS: Performed by: STUDENT IN AN ORGANIZED HEALTH CARE EDUCATION/TRAINING PROGRAM

## 2024-05-28 PROCEDURE — 2060000000 HC ICU INTERMEDIATE R&B

## 2024-05-28 PROCEDURE — 97110 THERAPEUTIC EXERCISES: CPT

## 2024-05-28 PROCEDURE — 51798 US URINE CAPACITY MEASURE: CPT

## 2024-05-28 PROCEDURE — 85025 COMPLETE CBC W/AUTO DIFF WBC: CPT

## 2024-05-28 PROCEDURE — 36415 COLL VENOUS BLD VENIPUNCTURE: CPT

## 2024-05-28 PROCEDURE — 82947 ASSAY GLUCOSE BLOOD QUANT: CPT

## 2024-05-28 RX ORDER — UREA 10 %
325 LOTION (ML) TOPICAL
Status: DISCONTINUED | OUTPATIENT
Start: 2024-05-28 | End: 2024-05-30 | Stop reason: HOSPADM

## 2024-05-28 RX ADMIN — MEROPENEM 1000 MG: 1 INJECTION, POWDER, FOR SOLUTION INTRAVENOUS at 23:12

## 2024-05-28 RX ADMIN — ASPIRIN 81 MG: 81 TABLET, COATED ORAL at 08:49

## 2024-05-28 RX ADMIN — SODIUM CHLORIDE, PRESERVATIVE FREE 10 ML: 5 INJECTION INTRAVENOUS at 08:49

## 2024-05-28 RX ADMIN — INSULIN LISPRO 1 UNITS: 100 INJECTION, SOLUTION INTRAVENOUS; SUBCUTANEOUS at 11:23

## 2024-05-28 RX ADMIN — MEROPENEM 1000 MG: 1 INJECTION, POWDER, FOR SOLUTION INTRAVENOUS at 08:49

## 2024-05-28 RX ADMIN — ATORVASTATIN CALCIUM 80 MG: 80 TABLET, FILM COATED ORAL at 21:11

## 2024-05-28 RX ADMIN — METOPROLOL TARTRATE 25 MG: 25 TABLET, FILM COATED ORAL at 08:49

## 2024-05-28 RX ADMIN — ENOXAPARIN SODIUM 40 MG: 100 INJECTION SUBCUTANEOUS at 08:49

## 2024-05-28 RX ADMIN — MEROPENEM 1000 MG: 1 INJECTION, POWDER, FOR SOLUTION INTRAVENOUS at 14:46

## 2024-05-28 RX ADMIN — METOPROLOL TARTRATE 25 MG: 25 TABLET, FILM COATED ORAL at 21:06

## 2024-05-28 RX ADMIN — PANTOPRAZOLE SODIUM 40 MG: 40 TABLET, DELAYED RELEASE ORAL at 08:49

## 2024-05-28 RX ADMIN — FERROUS SULFATE TAB EC 325 MG (65 MG FE EQUIVALENT) 325 MG: 325 (65 FE) TABLET DELAYED RESPONSE at 09:21

## 2024-05-28 RX ADMIN — SODIUM CHLORIDE, PRESERVATIVE FREE 10 ML: 5 INJECTION INTRAVENOUS at 21:12

## 2024-05-28 ASSESSMENT — ENCOUNTER SYMPTOMS
ABDOMINAL PAIN: 0
CHEST TIGHTNESS: 0
SHORTNESS OF BREATH: 1
DIARRHEA: 0
VOICE CHANGE: 0
COUGH: 1
VOMITING: 0

## 2024-05-28 NOTE — PROCEDURES
PROCEDURE NOTE  Date: 5/28/2024   Name: Macey Zazueta  YOB: 1950    Procedures        Please complete the MRI screening form.

## 2024-05-28 NOTE — PROGRESS NOTES
Occupational Therapy  Facility/Department: [address] STEPDOWN  Occupational Therapy Daily Treatment Note    Name: Macey Zazueta  : 1950  MRN: [id number]  Date of Service: 2024    Discharge Recommendations:  Patient would benefit from continued therapy after discharge  OT Equipment Recommendations  Equipment Needed: Yes  Other: CTA as pt progresses     Patient Diagnosis(es): There were no encounter diagnoses.  Past Medical History:  has a past medical history of COVID-19 virus infection, Essential hypertension, Left pontine cerebrovascular accident (HCC), Mixed hyperlipidemia, Periorbital cellulitis /  right side /  resolved with Omnicef , and Type 2 diabetes mellitus without complication, without long-term current use of insulin (HCC).  Past Surgical History:  has a past surgical history that includes Cataract removal (Right, ); Cystoscopy (Left, 2024); and Lithotripsy (Left, 3/5/2024).    Assessment   Performance deficits / Impairments: Decreased functional mobility ;Decreased ADL status;Decreased ROM;Decreased strength;Decreased high-level IADLs;Decreased endurance;Decreased posture;Decreased coordination;Decreased balance;Decreased safe awareness;Decreased fine motor control;Decreased cognition  Assessment: Pt would benefit from continued skilled OT to address above deficits to increase independence with ADL/IADLs and to promote overall occupational performance.   Prognosis: Good  REQUIRES OT FOLLOW-UP: Yes  Activity Tolerance  Activity Tolerance: Patient limited by fatigue;Treatment limited secondary to decreased cognition        Plan   Occupational Therapy Plan  Times Per Week: 3-5 x/week  Current Treatment Recommendations: Strengthening, ROM, Balance training, Functional mobility training, Safety education & training, Self-Care / ADL, Patient/Caregiver education & training, Equipment evaluation, education, & procurement     Restrictions  Restrictions/Precautions  Restrictions/Precautions:  General Precautions, Fall Risk  Required Braces or Orthoses?: No  Position Activity Restriction  Other position/activity restrictions: Up w/ assist, 5/21: Cystoscopy, left ureteroscopy, left holmium laser lithotripsy, left ureteral stent exchange.    Subjective   General  Chart Reviewed: Yes  Patient assessed for rehabilitation services?: Yes  Response to previous treatment: Patient with no complaints from previous session  Family / Caregiver Present:   Subjective  Subjective: .  General Comment  Comments: Nursing OK'd for OT tx this date. Pt agreeable to therapy and pleasant/cooperative throughout. Pt denied pain throughout session this date.    Objective   Safety Devices  Type of Devices: Gait belt;Call light within reach;Patient at risk for falls;Nurse notified;Left in chair;All fall risk precautions in place  Restraints  Restraints Initially in Place: No  Balance  Sitting: Without support (Pt seated EOB in preparation of functional transfer with MOD A to remain upright d/t L lean and overall decreased strength. Pt demos swaying while EOB however no true LOB noted. Overall sitting ~6 minutes.)  Standing: With support (Pt stand for mobility EOB>recliner with no use of AD, hand held assist and MAX Ax2 to remain upright d/t decreased strength/balance and cognitive status. Overall standing ~5 minutes.)  Transfer Training  Transfer Training: Yes  Overall Level of Assistance: Assist X2;Maximum assistance;Additional time (Pt stand from EOB and sit onto recliner with no use of AD and MAX Ax2 for upright position and safe descent.)  Interventions: Safety awareness training  Sit to Stand: Assist X2;Maximum assistance;Additional time  Stand to Sit: Assist X2;Maximum assistance;Additional time    ADL  UE Dressing: Maximum assistance;Setup;Verbal cueing;Increased time to complete  UE Dressing Skilled Clinical Factors: Pt completed hospital gown change while seated in recliner with MAX A to unthread/thread LUE d/t  time  Attention Span: Attends with cues to redirect;Difficulty dividing attention  Memory: Impaired  Safety Judgement: Decreased awareness of need for assistance;Decreased awareness of need for safety  Problem Solving: Assistance required to identify errors made;Assistance required to correct errors made;Decreased awareness of errors  Insights: Decreased awareness of deficits  Initiation: Requires cues for all  Sequencing: Requires cues for all  Orientation  Overall Orientation Status: Impaired  Orientation Level: Oriented to person    Education Given To: Patient  Education Provided: Role of Therapy;Transfer Training;ADL Adaptive Strategies;Fall Prevention Strategies  Education Provided Comments: Educated on proper hand and foot placement for safe transfer technique; balance eminence; bed mobility techniques: poor return  Education Method: Verbal;Demonstration  Barriers to Learning: Cognition  Education Outcome: Continued education needed    AM-PAC - ADL  AM-PAC Daily Activity - Inpatient   How much help is needed for putting on and taking off regular lower body clothing?: A Lot  How much help is needed for bathing (which includes washing, rinsing, drying)?: A Lot  How much help is needed for toileting (which includes using toilet, bedpan, or urinal)?: A Lot  How much help is needed for putting on and taking off regular upper body clothing?: A Lot  How much help is needed for taking care of personal grooming?: A Little  How much help for eating meals?: A Little  AM-Merged with [facility] Inpatient Daily Activity Raw Score: 14  AM-PAC Inpatient ADL T-Scale Score : 33.39  ADL Inpatient CMS 0-100% Score: 59.67  ADL Inpatient CMS G-Code Modifier : CK    Goals  Short Term Goals  Time Frame for Short Term Goals: Pt will by d/c  Short Term Goal 1: Demo UB ADL activity at min A  Short Term Goal 2: Demo LB ADL activity at min A using AE PRN  Short Term Goal 3: Demo safe bed mob using bedrails, including rolling, at CGA  Short Term Goal 4: Demo

## 2024-05-28 NOTE — PLAN OF CARE
Problem: Safety - Adult  Goal: Free from fall injury  5/28/2024 0039 by Aman Swanson, RN  Outcome: Progressing     Problem: Chronic Conditions and Co-morbidities  Goal: Patient's chronic conditions and co-morbidity symptoms are monitored and maintained or improved  5/28/2024 0039 by Aman Swanson, RN  Outcome: Progressing     Problem: Discharge Planning  Goal: Discharge to home or other facility with appropriate resources  5/28/2024 0039 by Aman Swanson, RN  Outcome: Progressing

## 2024-05-28 NOTE — CARE COORDINATION
Met with patient and spouse to discuss transitional planning. Plan is [facility]. Spoke with fany in admissions at [facility] who confirmed they are able to accept when patient is ready for discharge.

## 2024-05-28 NOTE — PROGRESS NOTES
PULMONARY & CRITICAL CARE MEDICINE PROGRESS NOTE     Patient:  Macey Zazueta  MRN: [id number]  Admit date: 5/23/2024  Primary Care Physician: Byron Castaneda MD  Consulting Physician: Rakel Oneil MD  CODE Status: Full Code  LOS: 5     SUBJECTIVE     CHIEF COMPLAINT/REASON FOR INITIAL CONSULT:No chief complaint on file.    BRIEF HOSPITAL COURSE:  The patient is a 74 y.o. female with past medical history significant for hypertension, type 2 diabetes mellitus, ischemic stroke, renal calculus initially presented to [facility] for altered mental status, found to have UTI with urinary obstruction.  Patient had stents placed for hydronephrosis in the recent past.  Initial evaluation in the treatment ED on 5/12/2024 showed that patient was not sepsis with shock lactate elevated at 6, started on empiric antibiotics, started on vasopressors for hypotension.  Patient also found to have elevated troponin which initially trended up.  Patient was also found to have acute hypoxic respiratory failure and started on BiPAP, eventually transferred to [facility] ICU.  Cardiology evaluated the patient and echo was done which was unremarkable.  Urology was consulted for hematuria and hydronephrosis.     Patient was initially weaned off of vasopressor support, was maintaining saturation on nasal cannula.  Patient was empirically continued on meropenem found to have positive blood culture for which infectious disease were consulted.    INTERVAL HISTORY:  05/28/24  Patient is currently on room air, did not use BiPAP overnight  Remains hemodynamically stable  Tmax 99.3, white count is 12.7  He remains on meropenem      REVIEW OF SYSTEMS:  Review of Systems   Constitutional:  Positive for fatigue. Negative for fever.   HENT:  Negative for voice change.    Eyes:  Negative for visual disturbance.   Respiratory:  Positive for cough and shortness of breath. Negative for chest tightness.    Gastrointestinal:  Negative for  Labs     05/26/24  0248   ALT 62*   AST 58*   ALKPHOS 105*   BILITOT 1.6*       COAGULATION PROFILE:   No results for input(s): \"INR\", \"PROTIME\", \"APTT\" in the last 72 hours.    D-DIMER:  No results for input(s): \"DDIMER\" in the last 72 hours.  LACTIC ACID:  No results for input(s): \"LACTA\" in the last 72 hours.  CARDIAC ENZYMES:  No results for input(s): \"CKTOTAL\", \"CKMB\", \"CKMBINDEX\", \"TROPONINI\" in the last 72 hours.    Invalid input(s): \"TROPONIN\", \"HSTROP\"  BRAIN NATRIURETIC PEPTIDE/PRO-BRAIN NATRURETIC PEPTIDE:   Recent Labs     05/26/24  0248   PROBNP 1,429*       TRIGLYCERIDES:  No results for input(s): \"TRIG\" in the last 72 hours.     MICROBIOLOGY RESULTS:  URINE CULTURE: No components found for: \"CURINE\"  BLOOD CULTURE: No components found for: \"CBLOOD\", \"CFUNGUSBL\"  SPUTUM CULTURE: No components found for: \"CSPUTUM\"    No results for input(s): \"SPUTUM\", \"SPECDESC\", \"SPECIAL\", \"CULTURE\", \"STATUS\", \"ORG\", \"CDIFFTOXPCR\", \"MPNEUM\", \"MPNEUG\", \"CAMPYLOBPCR\", \"SALMONELLAPC\", \"SHIGAPCR\", \"SHIGELLAPCR\", \"LACTOQL\" in the last 72 hours.    Invalid input(s): \"CURINE\", \"CBLOOD\", \"CFUNGUSBL\"       PATHOLOGY RESULTS:    RADIOLOGY REPORTS:  XR CHEST PORTABLE   Final Result   Lines and tubes as above.      Low lung volumes with bronchovascular crowding.  No focal consolidation         XR CHEST PORTABLE   Final Result   1. Right-sided PICC tip position now upper SVC; advancement 2 cm suggested,   if possible.   2. Otherwise no significant interval change.         CT ABDOMEN PELVIS WO CONTRAST Additional Contrast? None   Final Result   1. There is mild to moderate left-sided hydronephrosis with left-sided renal   stones as described above.  However, no obstructing ureteral stone is seen.   However, there is dilatation of the left ureter and surrounding inflammatory   change which may represent a urinary tract infection.  Correlate clinically      2.  Trace bilateral pleural effusions and trace pericardial effusion.        all available radiographic and laboratory data at the time of service.    Hemodynamically stable  On room air  Keep oxygen saturation >90%  Continue nocturnal BiPAP  Encourage incentive spirometry/Acapella  Maintain bronchopulmonary hygiene  Aspiration precautions  Continue bronchodilators  Antimicrobials reviewed; continue meropenem as per ID recommendations  Monitor I/O, electrolytes with a goal of even/negative fluid balance  DVT and stress ulcer prophylaxis  Physical/occupational therapy    It was my pleasure to evaluate Macey Zazueta today. I would like to thank you for allowing me to participate in the care of this patient.  Please feel free to call with any further questions or concerns. We will continue to follow.     Harinder Humphries MD  Pulmonary and Critical Care Medicine  5/28/2024, 5:12 PM         This note is created with the assistance of a speech recognition program.  While intending to generate a document that actually reflects the content of the visit, the document can still have some errors including those of syntax and sound-alike substitutions which may escape proof reading.  It such instances, actual meaning can be extrapolated by contextual diversion.

## 2024-05-28 NOTE — PROGRESS NOTES
PATIENT REFUSES TO WEAR BIPAP     [x] Risks and benefits explained to patient   [x] Patient refuses to wear Bipap stating no  [x] Patient verbalizes understanding of information presented.

## 2024-05-28 NOTE — PROGRESS NOTES
[facility] - Progress Note    Today's Date and Time: 5/28/2024, 7:31 AM    Impression :   Septicemia with Gram positive cocci in chains and pairs and Gram negative bacilli x 2 on 5-21-24  Cultures: Enterococcus faecalis and Klebsiella oxytoca  Septic shock  Resolved  Lactic acidosis  Thrombocytopenia  Prior Left side hydronephrosis in Feb 2024  Prior Left renal pelvis calculus in Feb 2024  S/P left ureteral stent on 2-14-24  S/P lithotripsy on 3-5-24  S/P cystoscopy, ureteroscopy, LASER lithotripsy with Lt stent exchange 5-21-24  Essential HTN  DM type 2  Lt pontine CVA in 2017  NSTEMI  Hepatopathy    Recommendations:   5/21/24: Meropenem 1 gm IV q 8 hr to treat Enterococcus and Klebsiella-end date 6/4/24  Ok for midline vs PICC when ready for discharge  Echo with no evidence of vegetation    Medical Decision Making/Summary/Discussion:5/28/2024     Daily Elements of Decision Making provided by Consulting Physician:    Note: I have independently performed the steps listed below as part of the medical decision making and evaluation.    Review of current Problems:  Today I am seeing the patient for the following problems:  Septicemia with Gram positive cocci in chains and pairs and Gram negative bacilli x 2 on 5-21-24  Septic shock  Lactic acidosis  Thrombocytopenia  Prior Left side hydronephrosis in Feb 2024  Prior Left renal pelvis calculus in Feb 2024  S/P left ureteral stent on 2-14-24  S/P lithotripsy on 3-5-24  S/P cystoscopy, ureteroscopy, LASER lithotripsy with Lt stent exchange 5-21-24  Essential HTN  DM type 2  Lt pontine CVA in 2017  Evaluation of Patient:  Patient evaluated and examined in the ICU.   Previously evaluated by Telemedicine at [facility]  Transferred to [facility]  Patient improved with control of septic shock  Will transfer to step down unit  Please see daily details in Interval Changes Section  Changes in physical exam:  Hypotension resolved, Off  and pictures of the patient and the key elements of the encounter have been performed by me. I have reviewed the laboratory data, other diagnostic studies and discussed them with the APRN. I have updated the medical record where necessary.    I agree with the assessment, plan and orders as documented by the APRN.    In addition diagnostic and decision making elements, performed by the Attending Physician, are included in the Diagnostic and Decision Making Section of the text.    Myron Nicholson MD               Office: [phone]

## 2024-05-28 NOTE — PROGRESS NOTES
Physical Therapy  Facility/Department: [address] STEPDOWN  Physical Therapy Daily Treatment Note    Name: Macey Zazueta  : 1950  MRN: [id number]  Date of Service: 2024    Discharge Recommendations:  Patient would benefit from continued therapy after discharge   PT Equipment Recommendations  Equipment Needed: No      Patient Diagnosis(es): There were no encounter diagnoses.  Past Medical History:  has a past medical history of COVID-19 virus infection, Essential hypertension, Left pontine cerebrovascular accident (HCC), Mixed hyperlipidemia, Periorbital cellulitis /  right side /  resolved with Omnicef , and Type 2 diabetes mellitus without complication, without long-term current use of insulin (HCC).  Past Surgical History:  has a past surgical history that includes Cataract removal (Right, ); Cystoscopy (Left, 2024); and Lithotripsy (Left, 3/5/2024).    Assessment   Body Structures, Functions, Activity Limitations Requiring Skilled Therapeutic Intervention: Decreased functional mobility ;Decreased strength;Decreased posture;Decreased ADL status;Decreased safe awareness;Decreased high-level IADLs;Decreased balance;Decreased tolerance to work activity;Decreased endurance  Assessment: Pt required maxA x2 to perform bed mobility and maxA x2 to perform transfers. Pt was able to take a few forwards steps with B HHA and maxA x2. She is most limited by significant weakness in all extremities with increased weakness on R side from previous CVA.  Therapy Prognosis: Good  Requires PT Follow-Up: Yes  Activity Tolerance  Activity Tolerance: Patient limited by fatigue;Patient limited by endurance     Plan   Physical Therapy Plan  General Plan:  (6x/week)  Current Treatment Recommendations: Strengthening, Balance training, Transfer training, Gait training, Neuromuscular re-education, Home exercise program, Positioning, Therapeutic activities, Patient/Caregiver education & training, Safety education & training,  side while in a flat bed without using bedrails?: A Lot  How much help is needed moving from lying on your back to sitting on the side of a flat bed without using bedrails?: Total  How much help is needed moving to and from a bed to a chair?: Total  How much help is needed standing up from a chair using your arms?: Total  How much help is needed walking in hospital room?: Total  How much help is needed climbing 3-5 steps with a railing?: Total  AM-PAC Inpatient Mobility Raw Score : 7  AM-PAC Inpatient T-Scale Score : 26.42  Mobility Inpatient CMS 0-100% Score: 92.36  Mobility Inpatient CMS G-Code Modifier : CM      Goals  Short Term Goals  Time Frame for Short Term Goals: 14 visits  Short Term Goal 1: Pt will be Sanchez in all bed mobility tasks  Short Term Goal 2: Pt will be Sanchez in transfers  Short Term Goal 3: Pt will amb 25' CGA w/ RW  Short Term Goal 4: Pt will negotiate 4 steps Alessio w/ BUE assist  Additional Goals?: No       Education  Patient Education  Education Given To: Patient;Family  Education Provided: Role of Therapy;Plan of Care;Transfer Training  Education Provided Comments: importance of mobility and exercises to improve strength.  Education Method: Demonstration;Verbal  Barriers to Learning: Cognition  Education Outcome: Continued education needed;Verbalized understanding      Therapy Time   Individual Concurrent Group Co-treatment   Time In 1350         Time Out 1440         Minutes 50         Timed Code Treatment Minutes: 38 Minutes    Co- treatment with OT warranted secondary to decreased patient safety and independence with functional mobility requiring skilled physical assistance of two professionals to simultaneously address individualized discipline goals. PT is addressing sitting/standing balance, transfers, ambulation, exercises, while OT is addressing their individualized functional mobility/self-care task.      Tabitha Rodney, PTA

## 2024-05-28 NOTE — PROGRESS NOTES
[facility]  Internal Medicine Teaching Residency Program  Inpatient Daily Progress Note  ______________________________________________________________________________    Patient: Macey Zazueta  YOB: 1950   MRN:[id number]    Acct: [id number]     Room: 0427/0427-02  Admit date: 5/23/2024  Today's date: 05/28/24  Number of days in the hospital: 5    SUBJECTIVE   CC: No chief complaint on file.    Pt examined at bedside. Chart & results reviewed.   - Overnight: no acute events, slightly tachycardic, 103-112.  - Patient is doing well, laying in bed at time of exam, no acute complaints this AM.  - Denied fevers, chills, nausea, vomiting, constipation, diarrhea, abdominal pain, chest pain, or SOB.  - Afebrile, hemodynamically stable, SPO2 at goal on RA at time of exam.        BRIEF HISTORY   Patient is a 74-year-old female with past medical history significant for hypertension, type 2 diabetes mellitus, ischemic stroke, renal calculus initially presented to [facility] for altered mental status, found to have UTI with urinary obstruction.  Patient had stents placed for hydronephrosis in the recent past.  Initial evaluation in the treatment ED on 5/12/2024 showed that patient was not sepsis with shock lactate elevated at 6, started on empiric antibiotics, started on vasopressors for hypotension.  Patient also found to have elevated troponin which initially trended up.  Patient was also found to have acute hypoxic respiratory failure and started on BiPAP, eventually transferred to [facility] ICU.  Cardiology evaluated the patient and echo was done which was unremarkable.  Urology was consulted for hematuria and hydronephrosis.     Patient was initially weaned off of vasopressor support, was maintaining saturation on nasal cannula.  Patient was empirically continued on meropenem found to have positive blood culture for which infectious disease were  ATN due to sepsis but shock.  Baseline creatinine less than 1.  Monitor urine output.  Daily weights.  Monitor BMP.  Avoid nephrotoxic agents.       #Acute anemia:  #Hematuria:  Improving.  Anemia likely secondary to blood loss.  Monitor hemoglobin.  Avoid anticoagulation and antiplatelets.  Urology evaluated patient, no further intervention, f/u Dr. Lees (primary urologist) after discharge  Tolerated TOV yesterday  Continue external urinary catheter.  Iron supplements added.        #Transaminitis:  Likely secondary to shock liver from sepsis  CT abdomen shows unremarkable liver  Trend LFTs.  Avoid hepatotoxins.  Infective hepatitis panel negative     #NSTEMI:  Likely type II secondary to sepsis with shock.  Cardiology consulted.  TTE from 5/20/2024 showed preserved EF.  No active intervention planned.  Plan is to follow-up outpatient with patient's established cardiologist in [place].     #Acute hypoxic respiratory failure, resolved:  Likely secondary to pulmonary edema from sepsis  Currently maintaining saturation on room air.     #Essential hypertension:  Patient apparently is on amlodipine 2.5, Lopressor 25 Mg at home.  Currently patient normotensive.  Recently out of shock on vasopressor support.  Monitor blood pressure.  Will resume antihypertensives if needed.     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  Resume aspirin.    Follow up MRI head     #Type 2 diabetes mellitus:  Patient on metformin at home.  POCT glucose monitoring.  Low-dose sliding scale.  Hypoglycemia protocol in place.        DVT prophylaxis: Lovenox.   Diabetic diet.  PT OT on board.  Case management on board.  Disposition: Monitor inpatient.    Alvarez Yen MD  PGY-1, Internal Medicine Resident  [facility] [place]         5/28/2024, 8:57 AM

## 2024-05-29 ENCOUNTER — APPOINTMENT (OUTPATIENT)
Dept: MRI IMAGING | Age: 74
DRG: 871 | End: 2024-05-29
Attending: INTERNAL MEDICINE
Payer: MEDICARE

## 2024-05-29 LAB
ANION GAP SERPL CALCULATED.3IONS-SCNC: 9 MMOL/L (ref 9–16)
BASOPHILS # BLD: 0 K/UL (ref 0–0.2)
BASOPHILS NFR BLD: 0 % (ref 0–2)
BUN SERPL-MCNC: 8 MG/DL (ref 8–23)
CALCIUM SERPL-MCNC: 8.2 MG/DL (ref 8.6–10.4)
CHLORIDE SERPL-SCNC: 110 MMOL/L (ref 98–107)
CO2 SERPL-SCNC: 23 MMOL/L (ref 20–31)
CREAT SERPL-MCNC: 0.5 MG/DL (ref 0.5–0.9)
EOSINOPHIL # BLD: 0.38 K/UL (ref 0–0.4)
EOSINOPHILS RELATIVE PERCENT: 3 % (ref 1–4)
ERYTHROCYTE [DISTWIDTH] IN BLOOD BY AUTOMATED COUNT: 18.3 % (ref 11.8–14.4)
GFR, ESTIMATED: >90 ML/MIN/1.73M2
GLUCOSE BLD-MCNC: 127 MG/DL (ref 65–105)
GLUCOSE BLD-MCNC: 174 MG/DL (ref 65–105)
GLUCOSE BLD-MCNC: 176 MG/DL (ref 65–105)
GLUCOSE BLD-MCNC: 193 MG/DL (ref 65–105)
GLUCOSE SERPL-MCNC: 136 MG/DL (ref 74–99)
HCT VFR BLD AUTO: 24.2 % (ref 36.3–47.1)
HGB BLD-MCNC: 7.4 G/DL (ref 11.9–15.1)
IMM GRANULOCYTES # BLD AUTO: 0.51 K/UL (ref 0–0.3)
IMM GRANULOCYTES NFR BLD: 4 %
LYMPHOCYTES NFR BLD: 1.91 K/UL (ref 1–4.8)
LYMPHOCYTES RELATIVE PERCENT: 15 % (ref 24–44)
MCH RBC QN AUTO: 26 PG (ref 25.2–33.5)
MCHC RBC AUTO-ENTMCNC: 30.6 G/DL (ref 28.4–34.8)
MCV RBC AUTO: 84.9 FL (ref 82.6–102.9)
MONOCYTES NFR BLD: 1.02 K/UL (ref 0.1–0.8)
MONOCYTES NFR BLD: 8 % (ref 1–7)
MORPHOLOGY: ABNORMAL
NEUTROPHILS NFR BLD: 70 % (ref 36–66)
NEUTS SEG NFR BLD: 8.88 K/UL (ref 1.8–7.7)
NRBC BLD-RTO: 0.2 PER 100 WBC
PLATELET # BLD AUTO: 272 K/UL (ref 138–453)
PMV BLD AUTO: 11.8 FL (ref 8.1–13.5)
POTASSIUM SERPL-SCNC: 3.9 MMOL/L (ref 3.7–5.3)
RBC # BLD AUTO: 2.85 M/UL (ref 3.95–5.11)
SODIUM SERPL-SCNC: 142 MMOL/L (ref 136–145)
WBC OTHER # BLD: 12.7 K/UL (ref 3.5–11.3)

## 2024-05-29 PROCEDURE — 85025 COMPLETE CBC W/AUTO DIFF WBC: CPT

## 2024-05-29 PROCEDURE — 6360000002 HC RX W HCPCS: Performed by: INTERNAL MEDICINE

## 2024-05-29 PROCEDURE — 6360000002 HC RX W HCPCS: Performed by: STUDENT IN AN ORGANIZED HEALTH CARE EDUCATION/TRAINING PROGRAM

## 2024-05-29 PROCEDURE — 6370000000 HC RX 637 (ALT 250 FOR IP)

## 2024-05-29 PROCEDURE — 82947 ASSAY GLUCOSE BLOOD QUANT: CPT

## 2024-05-29 PROCEDURE — 36415 COLL VENOUS BLD VENIPUNCTURE: CPT

## 2024-05-29 PROCEDURE — 99233 SBSQ HOSP IP/OBS HIGH 50: CPT | Performed by: HOSPITALIST

## 2024-05-29 PROCEDURE — 2580000003 HC RX 258: Performed by: INTERNAL MEDICINE

## 2024-05-29 PROCEDURE — 80048 BASIC METABOLIC PNL TOTAL CA: CPT

## 2024-05-29 PROCEDURE — 2060000000 HC ICU INTERMEDIATE R&B

## 2024-05-29 PROCEDURE — 99232 SBSQ HOSP IP/OBS MODERATE 35: CPT | Performed by: INTERNAL MEDICINE

## 2024-05-29 PROCEDURE — 70551 MRI BRAIN STEM W/O DYE: CPT

## 2024-05-29 PROCEDURE — 2580000003 HC RX 258: Performed by: STUDENT IN AN ORGANIZED HEALTH CARE EDUCATION/TRAINING PROGRAM

## 2024-05-29 RX ORDER — PANTOPRAZOLE SODIUM 40 MG/1
40 TABLET, DELAYED RELEASE ORAL
Qty: 30 TABLET | Refills: 3 | Status: SHIPPED | OUTPATIENT
Start: 2024-05-30

## 2024-05-29 RX ORDER — UREA 10 %
325 LOTION (ML) TOPICAL
Qty: 90 TABLET | Refills: 3 | Status: SHIPPED | OUTPATIENT
Start: 2024-05-30

## 2024-05-29 RX ADMIN — METOPROLOL TARTRATE 25 MG: 25 TABLET, FILM COATED ORAL at 21:00

## 2024-05-29 RX ADMIN — ATORVASTATIN CALCIUM 80 MG: 80 TABLET, FILM COATED ORAL at 20:59

## 2024-05-29 RX ADMIN — MEROPENEM 1000 MG: 1 INJECTION, POWDER, FOR SOLUTION INTRAVENOUS at 23:07

## 2024-05-29 RX ADMIN — FERROUS SULFATE TAB EC 325 MG (65 MG FE EQUIVALENT) 325 MG: 325 (65 FE) TABLET DELAYED RESPONSE at 08:50

## 2024-05-29 RX ADMIN — MEROPENEM 1000 MG: 1 INJECTION, POWDER, FOR SOLUTION INTRAVENOUS at 15:41

## 2024-05-29 RX ADMIN — SODIUM CHLORIDE, PRESERVATIVE FREE 10 ML: 5 INJECTION INTRAVENOUS at 08:12

## 2024-05-29 RX ADMIN — METOPROLOL TARTRATE 25 MG: 25 TABLET, FILM COATED ORAL at 08:50

## 2024-05-29 RX ADMIN — ENOXAPARIN SODIUM 40 MG: 100 INJECTION SUBCUTANEOUS at 08:50

## 2024-05-29 RX ADMIN — PANTOPRAZOLE SODIUM 40 MG: 40 TABLET, DELAYED RELEASE ORAL at 08:50

## 2024-05-29 RX ADMIN — SODIUM CHLORIDE, PRESERVATIVE FREE 10 ML: 5 INJECTION INTRAVENOUS at 21:00

## 2024-05-29 RX ADMIN — ASPIRIN 81 MG: 81 TABLET, COATED ORAL at 08:50

## 2024-05-29 RX ADMIN — MEROPENEM 1000 MG: 1 INJECTION, POWDER, FOR SOLUTION INTRAVENOUS at 08:11

## 2024-05-29 ASSESSMENT — ENCOUNTER SYMPTOMS
VOMITING: 0
VOICE CHANGE: 0
COUGH: 1
SHORTNESS OF BREATH: 1
ABDOMINAL PAIN: 0
DIARRHEA: 0
CHEST TIGHTNESS: 0

## 2024-05-29 NOTE — PROGRESS NOTES
[facility]  Internal Medicine Teaching Residency Program  Inpatient Daily Progress Note  ______________________________________________________________________________    Patient: Macey Zazueta  YOB: 1950   MRN:[id number]    Acct: [id number]     Room: 0427/0427-02  Admit date: 5/23/2024  Today's date: 05/29/24  Number of days in the hospital: 6    SUBJECTIVE   CC: No chief complaint on file.    Pt examined at bedside. Chart & results reviewed.   - Overnight: no acute events  - Patient is doing well, laying in bed at time of exam, no acute complaints this AM.  - Denied fevers, chills, nausea, vomiting, constipation, diarrhea, abdominal pain, chest pain, or SOB.  - Afebrile, hemodynamically stable, SPO2 at goal on RA at time of exam.        BRIEF HISTORY   Patient is a 74-year-old female with past medical history significant for hypertension, type 2 diabetes mellitus, ischemic stroke, renal calculus initially presented to [facility] for altered mental status, found to have UTI with urinary obstruction.  Patient had stents placed for hydronephrosis in the recent past.  Initial evaluation in the treatment ED on 5/12/2024 showed that patient was not sepsis with shock lactate elevated at 6, started on empiric antibiotics, started on vasopressors for hypotension.  Patient also found to have elevated troponin which initially trended up.  Patient was also found to have acute hypoxic respiratory failure and started on BiPAP, eventually transferred to [facility] ICU.  Cardiology evaluated the patient and echo was done which was unremarkable.  Urology was consulted for hematuria and hydronephrosis.     Patient was initially weaned off of vasopressor support, was maintaining saturation on nasal cannula.  Patient was empirically continued on meropenem found to have positive blood culture for which infectious disease were consulted.    OBJECTIVE  loss. Moderate ventriculomegaly, disproportionate to the degree of volume loss, likely with superimposed mild communicating hydrocephalus, stable. Moderate chronic microvascular disease.       ASSESSMENT & PLAN     Principal Problem:    Sepsis ([facility])  Active Problems:    NSTEMI (non-ST elevated myocardial infarction) ([facility])    Cerebrovascular accident (CVA) due to embolism of posterior cerebral artery ([facility])    Type 2 diabetes mellitus without complication, with long-term current use of insulin ([facility]) /  Metformin ER    Sepsis due to urinary tract infection ([facility])    Renal calculus    Complicated UTI (urinary tract infection)    SANDER (acute kidney injury) ([facility])    Acidosis    Gram negative septicemia (HCC)    Acute hypoxic respiratory failure (HCC)    Klebsiella sepsis ([facility])    Enterococcus faecalis infection    Lactic acidosis  Resolved Problems:    * No resolved hospital problems. *      #Septicemia:  #Complicated UTI.  Sepsis with shock off pressors:  Patient was initially in sepsis with shock requiring vasopressor support.  Empirically on meropenem.  Blood culture growing  Enterococcus faecalis and Klebsiella oxytoca   Currently hemodynamically stable off vasopressor support.  Prior Left side hydronephrosis in Feb 2024  Prior Left renal pelvis calculus in Feb 2024  S/P left ureteral stent on 2-14-24  S/P lithotripsy on 3-5-24  S/P cystoscopy, ureteroscopy, LASER lithotripsy with Lt stent exchange 5-21-24  CT abdomen pelvis from 5/23/2024 shows mild to moderate left hydronephrosis with left-sided renal stone.  ID on board.  Appreciate recommendations.  Continue meropenem.  Klebsiella resistant only to ampicillin, Enterococcus pan sensitive  5/25 repeat blood Cx NGTD  Meropenem 1 gm IV q 8 hr to treat Enterococcus and Klebsiella-end date 6/4/24  per ID recs  Urology evaluated patient, no further intervention, f/u Dr. Lees (primary urologist) after discharge     #Nonoliguric SANDER:  Resolved.  Creatinine back to

## 2024-05-29 NOTE — DISCHARGE INSTRUCTIONS
You were found to have an infection and need to be on IV antibiotics until 6/4/2024  You were evaluated by urology and they recommend you follow up with your urologist, Dr. Lees, please call and make an appointment with him in 2 weeks.  Please call and make an appointment with your cardiologist to discuss possible stress test once your infection as resolved.  Please take all medications as prescribed  If your symptoms worsen, please return to the ED for further evaluation and management

## 2024-05-29 NOTE — PLAN OF CARE
Problem: Safety - Adult  Goal: Free from fall injury  Outcome: Progressing     Problem: Chronic Conditions and Co-morbidities  Goal: Patient's chronic conditions and co-morbidity symptoms are monitored and maintained or improved  Outcome: Progressing  Flowsheets (Taken 5/29/2024 0740)  Care Plan - Patient's Chronic Conditions and Co-Morbidity Symptoms are Monitored and Maintained or Improved: Monitor and assess patient's chronic conditions and comorbid symptoms for stability, deterioration, or improvement     Problem: Discharge Planning  Goal: Discharge to home or other facility with appropriate resources  Outcome: Progressing  Flowsheets (Taken 5/29/2024 0740)  Discharge to home or other facility with appropriate resources: Identify barriers to discharge with patient and caregiver     Problem: Skin/Tissue Integrity  Goal: Absence of new skin breakdown  Description: 1.  Monitor for areas of redness and/or skin breakdown  2.  Assess vascular access sites hourly  3.  Every 4-6 hours minimum:  Change oxygen saturation probe site  4.  Every 4-6 hours:  If on nasal continuous positive airway pressure, respiratory therapy assess nares and determine need for appliance change or resting period.  Outcome: Progressing     Problem: ABCDS Injury Assessment  Goal: Absence of physical injury  Outcome: Progressing     Problem: Respiratory - Adult  Goal: Achieves optimal ventilation and oxygenation  Outcome: Progressing  Flowsheets (Taken 5/29/2024 0740)  Achieves optimal ventilation and oxygenation: Assess for changes in respiratory status     Problem: Nutrition Deficit:  Goal: Optimize nutritional status  Outcome: Progressing

## 2024-05-29 NOTE — PROGRESS NOTES
PULMONARY & CRITICAL CARE MEDICINE PROGRESS NOTE     Patient:  Macey Zazueta  MRN: [id number]  Admit date: 5/23/2024  Primary Care Physician: Byron Castaneda MD  Consulting Physician: Nacho Gordon*  CODE Status: Full Code  LOS: 6     SUBJECTIVE     CHIEF COMPLAINT/REASON FOR INITIAL CONSULT:No chief complaint on file.    BRIEF HOSPITAL COURSE:  The patient is a 74 y.o. female with past medical history significant for hypertension, type 2 diabetes mellitus, ischemic stroke, renal calculus initially presented to [facility] for altered mental status, found to have UTI with urinary obstruction.  Patient had stents placed for hydronephrosis in the recent past.  Initial evaluation in the treatment ED on 5/12/2024 showed that patient was not sepsis with shock lactate elevated at 6, started on empiric antibiotics, started on vasopressors for hypotension.  Patient also found to have elevated troponin which initially trended up.  Patient was also found to have acute hypoxic respiratory failure and started on BiPAP, eventually transferred to [facility] ICU.  Cardiology evaluated the patient and echo was done which was unremarkable.  Urology was consulted for hematuria and hydronephrosis.     Patient was initially weaned off of vasopressor support, was maintaining saturation on nasal cannula.  Patient was empirically continued on meropenem found to have positive blood culture for which infectious disease were consulted.    INTERVAL HISTORY:  05/29/24  Patient is currently on room air  Continues to be hemodynamically stable  Afebrile  He remains on meropenem      REVIEW OF SYSTEMS:  Review of Systems   Constitutional:  Positive for fatigue. Negative for fever.   HENT:  Negative for voice change.    Eyes:  Negative for visual disturbance.   Respiratory:  Positive for cough and shortness of breath. Negative for chest tightness.    Gastrointestinal:  Negative for abdominal pain, diarrhea and  Oral Daily with breakfast    enoxaparin  40 mg SubCUTAneous Daily    aspirin  81 mg Oral Daily    metoprolol tartrate  25 mg Oral BID    pantoprazole  40 mg Oral QAM AC    insulin lispro  0-4 Units SubCUTAneous 4x Daily AC & HS    meropenem  1,000 mg IntraVENous Q8H    atorvastatin  80 mg Oral Nightly    sodium chloride flush  5-40 mL IntraVENous 2 times per day     Continuous Infusions:   sodium chloride      dextrose         INPUT/OUTPUT:  In: -   Out: 2100 [Urine:2100]  Date 05/29/24 0000 - 05/29/24 2359   Shift 3331-2246 6658-6677 6290-2770 24 Hour Total   INTAKE   Shift Total(mL/kg)       OUTPUT   Urine(mL/kg/hr) 1050(2.6) 500  1550   Shift Total(mL/kg) 1050(20.6) 500(9.8)  1550(30.4)   Weight (kg) 51 51 51 51          LABORATORY RESULTS:  BLOOD GASES:   No results for input(s): \"POCPH\", \"POCPCO2\", \"POCPO2\", \"POCHCO3\", \"UHKP2DFG\" in the last 72 hours.  COMPLETE BLOOD COUNTS:   Recent Labs     05/27/24  0301 05/27/24  0928 05/27/24  1732 05/28/24  0541 05/29/24  0351   WBC 10.3  --   --  12.7* 12.7*   HGB 7.4* 8.2* 7.7* 7.3* 7.4*   HCT 24.1* 27.0* 24.7* 24.3* 24.2*   MCV 84.6  --   --  85.9 84.9     --   --  208 272   LYMPHOPCT 17*  --   --  13* 15*   RBC 2.85*  --   --  2.83* 2.85*   MCH 26.0  --   --  25.8 26.0   MCHC 30.7  --   --  30.0 30.6   RDW 17.7*  --   --  18.1* 18.3*       C-REACTIVE PROTEIN:   No results for input(s): \"CRP\" in the last 72 hours.  LACTATE DEHYDROGENASE:   No results for input(s): \"LDH\" in the last 72 hours.  BASIC METABOLIC PROFILE:   Recent Labs     05/27/24  0301 05/28/24  0541 05/29/24  0351    142 142   K 3.4* 4.0 3.9   * 110* 110*   CO2 23 25 23   BUN 10 10 8   CREATININE 0.6 0.7 0.5   GLUCOSE 137* 162* 136*   MG 1.5*  --   --        LIVER FUNCTION TESTS:   No results for input(s): \"PROT\", \"LABALBU\", \"ALT\", \"AST\", \"GGT\", \"ALKPHOS\", \"BILITOT\" in the last 72 hours.    COAGULATION PROFILE:   No results for input(s): \"INR\", \"PROTIME\", \"APTT\" in the last 72  nocturnal BiPAP  Encourage incentive spirometry/Acapella  Maintain bronchopulmonary hygiene  Aspiration precautions  Continue bronchodilators  Antimicrobials reviewed; continue meropenem as per ID recommendations  Monitor I/O, electrolytes with a goal of even/negative fluid balance  DVT and stress ulcer prophylaxis  Physical/occupational therapy  Awaiting placement to inpatient rehab    It was my pleasure to evaluate Macey Zazueta today. I would like to thank you for allowing me to participate in the care of this patient.  Please feel free to call with any further questions or concerns. We will continue to follow.     Harinder Humphries MD  Pulmonary and Critical Care Medicine  5/29/2024, 12:05 PM         This note is created with the assistance of a speech recognition program.  While intending to generate a document that actually reflects the content of the visit, the document can still have some errors including those of syntax and sound-alike substitutions which may escape proof reading.  It such instances, actual meaning can be extrapolated by contextual diversion.

## 2024-05-29 NOTE — CARE COORDINATION
Transitional planning note: plan is [facility]. Per medicine resident note, likely discharge today and infectious disease has reconciled IV ATB's. Call placed to fany in admissions at [facility] to notify that patient is likely ready for discharge today. Fany states  they are ready for patient any time and no HENS is needed for their facility. Faxed request to [facility] for \"will call\" stretcher transport. Perfect serve message to medicine resident to inquire if patient is ready for discharge today      1615: call placed to [facility], no transport times available until after midnight. Call placed to [facility], they request early am pickup for patient. Additionally, message to medicine resident to request binh be signed as it is not yet complete    1655: Dr Gordon on unit and binh now completed. Faxed binh to [facility]. Spoke with keely at [facility] who states she place patient for an 8 or 9am  for tomorrow and will call around to confirm transport. Patient and spouse agreeable to plan.

## 2024-05-29 NOTE — DISCHARGE INSTR - COC
Continuity of Care Form    Patient Name: Macey Zazueta   :  1950  MRN:  [id number]    Admit date:  2024  Discharge date:  2024    Code Status Order: Full Code   Advance Directives:     Admitting Physician:  Harinder Humphries MD  PCP: Byron Castaneda MD    Discharging Nurse: felicia  Discharging Hospital Unit/Room#: 0427/0427-02  Discharging Unit Phone Number: [phone]    Emergency Contact:   Extended Emergency Contact Information  Primary Emergency Contact: Yan Zazueta  Address: [address] [place] [address]  Home Phone: [phone]  Relation: Spouse  Hearing or visual needs: None  Other needs: None  Preferred language: English   needed? No  Secondary Emergency Contact: ZazuetaKali  Address: [address]  Home Phone: [phone]  Mobile Phone: [phone]  Relation: Child    Past Surgical History:  Past Surgical History:   Procedure Laterality Date    CATARACT REMOVAL Right     CYSTOSCOPY Left 2024    CYSTOSCOPY URETERAL STENT INSERTION performed by Kieran Lees MD at [facility] OR    LITHOTRIPSY Left 3/5/2024    EXTRACORPOREAL SHOCK WAVE LITHOTRIPSY performed by Kieran Lees MD at [facility] OR       Immunization History:   Immunization History   Administered Date(s) Administered    Influenza Vaccine, unspecified formulation 10/14/2016    Influenza Virus Vaccine 10/14/2016    Influenza, FLUZONE (age 65 y+), High Dose, 0.7mL 10/02/2020, 10/06/2021, 10/12/2022, 10/16/2023    Influenza, Quadv, 6 mo and older, IM (Fluzone, Flulaval) 10/13/2017    Pneumococcal, PCV-13, PREVNAR 13, (age 6w+), IM, 0.5mL 2017    Pneumococcal, PPSV23, PNEUMOVAX 23, (age 2y+), SC/IM, 0.5mL 2019       Active Problems:  Patient Active Problem List   Diagnosis Code    Cerebrovascular accident (CVA) due to embolism of posterior cerebral artery (HCC) I63.439    Type 2 diabetes mellitus without complication, with  SECTION    Prognosis: Fair    Condition at Discharge: Stable    Rehab Potential (if transferring to Rehab): Fair    Recommended Labs or Other Treatments After Discharge:   Continue merrem  Continue to work with PT/OT    Physician Certification: I certify the above information and transfer of Macey Zazueta  is necessary for the continuing treatment of the diagnosis listed and that she requires Skilled Nursing Facility for less 30 days.     Update Admission H&P: No change in H&P    PHYSICIAN SIGNATURE:  Electronically signed by ALTAGRACIA TIWARI MD on 5/29/24 at 4:27 PM EDT

## 2024-05-29 NOTE — PROGRESS NOTES
Physical Therapy        Physical Therapy Cancel Note      DATE: 2024    NAME: Macey Zazueta  MRN: [id number]   : 1950      Patient not seen this date for Physical Therapy due to:    Testing: Pt currently off the floor for MRI.       Electronically signed by Tabitha Rodney PTA on 2024 at 1:31 PM

## 2024-05-29 NOTE — PROGRESS NOTES
Occupational Therapy    [facility]  Occupational Therapy Not Seen Note    DATE: 2024    NAME: Macey Zazueta  MRN: [id number]   : 1950      Patient not seen this date for Occupational Therapy due to:    Pt currently off floor for MRI. Will check back as able.     Electronically signed by MELANIE Blue on 2024 at 1:28 PM

## 2024-05-29 NOTE — PROGRESS NOTES
[facility] - Progress Note    Today's Date and Time: 5/29/2024, 8:05 AM    Impression :   Septicemia with Gram positive cocci in chains and pairs and Gram negative bacilli x 2 on 5-21-24  Cultures: Enterococcus faecalis and Klebsiella oxytoca  Septic shock  Resolved  Lactic acidosis  Thrombocytopenia  Prior Left side hydronephrosis in Feb 2024  Prior Left renal pelvis calculus in Feb 2024  S/P left ureteral stent on 2-14-24  S/P lithotripsy on 3-5-24  S/P cystoscopy, ureteroscopy, LASER lithotripsy with Lt stent exchange 5-21-24  Essential HTN  DM type 2  Lt pontine CVA in 2017  NSTEMI  Hepatopathy    Recommendations:   5/21/24: Meropenem 1 gm IV q 8 hr to treat Enterococcus and Klebsiella-end date 6/4/24-reconciled  Ok for midline vs PICC when ready for discharge  Echo with no evidence of vegetation    Medical Decision Making/Summary/Discussion:5/29/2024     Daily Elements of Decision Making provided by Consulting Physician:    Note: I have independently performed the steps listed below as part of the medical decision making and evaluation.    Review of current Problems:  Today I am seeing the patient for the following problems:  Septicemia with Gram positive cocci in chains and pairs and Gram negative bacilli x 2 on 5-21-24  Septic shock  Lactic acidosis  Thrombocytopenia  Prior Left side hydronephrosis in Feb 2024  Prior Left renal pelvis calculus in Feb 2024  S/P left ureteral stent on 2-14-24  S/P lithotripsy on 3-5-24  S/P cystoscopy, ureteroscopy, LASER lithotripsy with Lt stent exchange 5-21-24  Essential HTN  DM type 2  Lt pontine CVA in 2017  Evaluation of Patient:  Patient evaluated and examined in the ICU.   Previously evaluated by Telemedicine at [facility]  Transferred to [facility]  Patient improved with control of septic shock  Will transfer to step down unit  Please see daily details in Interval Changes Section  Changes in physical exam:  Hypotension resolved,    ENTEROCOCCUS FAECALIS      (NOTE) Direct Gram Stain from bottle result called to and read back by:WAGNER DELA CRUZ RN [facility] ICU 05/22/24 0745 KB    Susceptibility        Klebsiella oxytoca      BACTERIAL SUSCEPTIBILITY PANEL DAVID      ampicillin >=32  Resistant      ceFAZolin <=4  Sensitive      cefTRIAXone <=0.25  Sensitive      Confirmatory Extended Spectrum Beta-Lactamase NEGATIVE  Sensitive      gentamicin <=1  Sensitive      levofloxacin <=0.12  Sensitive      piperacillin-tazobactam <=4  Sensitive      tobramycin <=1  Sensitive      trimethoprim-sulfamethoxazole <=20  Sensitive                       Susceptibility        Enterococcus faecalis      BACTERIAL SUSCEPTIBILITY PANEL DAVID      ampicillin <=2  Sensitive      Gentamicin, High Level  Sensitive      Streptomycin, Hi Level  Sensitive      vancomycin 2  Sensitive                           Blood Culture 1 [[id number]]  (Abnormal) Collected: 05/21/24 1602    Order Status: Completed Specimen: Blood Updated: 05/24/24 1441     Specimen Description .BLOOD     Special Requests          Culture POSITIVE Blood Culture      DIRECT GRAM STAIN FROM BOTTLE: GRAM POSITIVE COCCI IN CHAINS AND GRAM NEGATIVE RODS      ENTEROCOCCUS FAECALIS Identification by MALDI-TOF For susceptibility, refer to previous culture.      KLEBSIELLA OXYTOCA Identification by MALDI-TOF For susceptibility, refer to previous culture.      (NOTE) Direct Gram Stain from bottle result called to and read back by:ULYSSES DELA CRUZ RN, [facility] ICU 5/22/24 0745 KB              Medical Decision Making-Other:     Note:    Thank you for allowing us to participate in the care of this patient. Please call with questions.    MD Myron Díaz MD Heather Helweg, APRN    ATTESTATION:    I have discussed the case, including pertinent history and exam findings with the APRN. I have evaluated the  History, physical findings and pictures of the patient and the key elements of the encounter have  been performed by me. I have reviewed the laboratory data, other diagnostic studies and discussed them with the APRN. I have updated the medical record where necessary.    I agree with the assessment, plan and orders as documented by the APRN.    In addition diagnostic and decision making elements, performed by the Attending Physician, are included in the Diagnostic and Decision Making Section of the text.    Myron Nicholson MD                   Office: [phone]

## 2024-05-30 VITALS
BODY MASS INDEX: 23.6 KG/M2 | HEIGHT: 58 IN | WEIGHT: 112.43 LBS | DIASTOLIC BLOOD PRESSURE: 80 MMHG | TEMPERATURE: 99.5 F | HEART RATE: 99 BPM | RESPIRATION RATE: 25 BRPM | SYSTOLIC BLOOD PRESSURE: 142 MMHG | OXYGEN SATURATION: 95 %

## 2024-05-30 LAB
ANION GAP SERPL CALCULATED.3IONS-SCNC: 9 MMOL/L (ref 9–16)
BASOPHILS # BLD: 0.04 K/UL (ref 0–0.2)
BASOPHILS NFR BLD: 0 % (ref 0–2)
BUN SERPL-MCNC: 10 MG/DL (ref 8–23)
CALCIUM SERPL-MCNC: 8.7 MG/DL (ref 8.6–10.4)
CHLORIDE SERPL-SCNC: 109 MMOL/L (ref 98–107)
CO2 SERPL-SCNC: 24 MMOL/L (ref 20–31)
CREAT SERPL-MCNC: 0.6 MG/DL (ref 0.5–0.9)
EOSINOPHIL # BLD: 0.26 K/UL (ref 0–0.44)
EOSINOPHILS RELATIVE PERCENT: 2 % (ref 1–4)
ERYTHROCYTE [DISTWIDTH] IN BLOOD BY AUTOMATED COUNT: 18.4 % (ref 11.8–14.4)
GFR, ESTIMATED: >90 ML/MIN/1.73M2
GLUCOSE BLD-MCNC: 215 MG/DL (ref 65–105)
GLUCOSE SERPL-MCNC: 160 MG/DL (ref 74–99)
HCT VFR BLD AUTO: 24.3 % (ref 36.3–47.1)
HGB BLD-MCNC: 7.2 G/DL (ref 11.9–15.1)
IMM GRANULOCYTES # BLD AUTO: 0.45 K/UL (ref 0–0.3)
IMM GRANULOCYTES NFR BLD: 4 %
LYMPHOCYTES NFR BLD: 1.89 K/UL (ref 1.1–3.7)
LYMPHOCYTES RELATIVE PERCENT: 18 % (ref 24–43)
MCH RBC QN AUTO: 25.4 PG (ref 25.2–33.5)
MCHC RBC AUTO-ENTMCNC: 29.6 G/DL (ref 28.4–34.8)
MCV RBC AUTO: 85.9 FL (ref 82.6–102.9)
MICROORGANISM SPEC CULT: NORMAL
MICROORGANISM SPEC CULT: NORMAL
MONOCYTES NFR BLD: 0.71 K/UL (ref 0.1–1.2)
MONOCYTES NFR BLD: 7 % (ref 3–12)
NEUTROPHILS NFR BLD: 69 % (ref 36–65)
NEUTS SEG NFR BLD: 7.47 K/UL (ref 1.5–8.1)
NRBC BLD-RTO: 0.2 PER 100 WBC
PLATELET # BLD AUTO: 340 K/UL (ref 138–453)
PMV BLD AUTO: 11.3 FL (ref 8.1–13.5)
POTASSIUM SERPL-SCNC: 3.9 MMOL/L (ref 3.7–5.3)
RBC # BLD AUTO: 2.83 M/UL (ref 3.95–5.11)
RBC # BLD: ABNORMAL 10*6/UL
SERVICE CMNT-IMP: NORMAL
SERVICE CMNT-IMP: NORMAL
SODIUM SERPL-SCNC: 142 MMOL/L (ref 136–145)
SPECIMEN DESCRIPTION: NORMAL
SPECIMEN DESCRIPTION: NORMAL
WBC OTHER # BLD: 10.8 K/UL (ref 3.5–11.3)

## 2024-05-30 PROCEDURE — 6370000000 HC RX 637 (ALT 250 FOR IP)

## 2024-05-30 PROCEDURE — 36415 COLL VENOUS BLD VENIPUNCTURE: CPT

## 2024-05-30 PROCEDURE — 2580000003 HC RX 258: Performed by: STUDENT IN AN ORGANIZED HEALTH CARE EDUCATION/TRAINING PROGRAM

## 2024-05-30 PROCEDURE — 2580000003 HC RX 258: Performed by: INTERNAL MEDICINE

## 2024-05-30 PROCEDURE — 85025 COMPLETE CBC W/AUTO DIFF WBC: CPT

## 2024-05-30 PROCEDURE — 80048 BASIC METABOLIC PNL TOTAL CA: CPT

## 2024-05-30 PROCEDURE — 6360000002 HC RX W HCPCS: Performed by: INTERNAL MEDICINE

## 2024-05-30 PROCEDURE — 82947 ASSAY GLUCOSE BLOOD QUANT: CPT

## 2024-05-30 PROCEDURE — 6360000002 HC RX W HCPCS: Performed by: STUDENT IN AN ORGANIZED HEALTH CARE EDUCATION/TRAINING PROGRAM

## 2024-05-30 PROCEDURE — 99232 SBSQ HOSP IP/OBS MODERATE 35: CPT | Performed by: INTERNAL MEDICINE

## 2024-05-30 RX ADMIN — PANTOPRAZOLE SODIUM 40 MG: 40 TABLET, DELAYED RELEASE ORAL at 06:16

## 2024-05-30 RX ADMIN — FERROUS SULFATE TAB EC 325 MG (65 MG FE EQUIVALENT) 325 MG: 325 (65 FE) TABLET DELAYED RESPONSE at 07:51

## 2024-05-30 RX ADMIN — SODIUM CHLORIDE, PRESERVATIVE FREE 10 ML: 5 INJECTION INTRAVENOUS at 10:14

## 2024-05-30 RX ADMIN — METOPROLOL TARTRATE 25 MG: 25 TABLET, FILM COATED ORAL at 07:52

## 2024-05-30 RX ADMIN — ASPIRIN 81 MG: 81 TABLET, COATED ORAL at 07:51

## 2024-05-30 RX ADMIN — ENOXAPARIN SODIUM 40 MG: 100 INJECTION SUBCUTANEOUS at 07:51

## 2024-05-30 RX ADMIN — INSULIN LISPRO 1 UNITS: 100 INJECTION, SOLUTION INTRAVENOUS; SUBCUTANEOUS at 10:14

## 2024-05-30 RX ADMIN — MEROPENEM 1000 MG: 1 INJECTION, POWDER, FOR SOLUTION INTRAVENOUS at 08:17

## 2024-05-30 NOTE — PROGRESS NOTES
PATIENT REFUSES TO WEAR BIPAP     [x] Risks and benefits explained to patient   [x] Patient refuses to wear Bipap   [x] Patient verbalizes understanding of information presented.

## 2024-05-30 NOTE — DISCHARGE SUMMARY
Patient left via stretcher to [facility]. Left with a PICC in the Rt arm, for long term antibiotic use. Report called to Tabatha at [facility], no further questions at this time.

## 2024-05-30 NOTE — DISCHARGE SUMMARY
[facility]     Department of Internal Medicine - Staff Internal Medicine Teaching Service    INPATIENT DISCHARGE SUMMARY      Patient Identification:  Macey Zazueta is a 74 y.o. female.  :  1950  MRN: [id number]     Acct: [id number]   PCP: Byron Castaneda MD  Admit Date:  2024  Discharge date and time: 2024 11:36 AM   Attending Provider: No att. providers found                                     ACTIVE DISCHARGE DIAGNOSES     Hospital Problem Lists:  Principal Problem:    Sepsis ([facility])  Active Problems:    NSTEMI (non-ST elevated myocardial infarction) ([facility])    Cerebrovascular accident (CVA) due to embolism of posterior cerebral artery ([facility])    Type 2 diabetes mellitus without complication, with long-term current use of insulin (HCC) /  Metformin ER    Sepsis due to urinary tract infection (HCC)    Renal calculus    Complicated UTI (urinary tract infection)    SANDER (acute kidney injury) ([facility])    Acidosis    Gram negative septicemia (HCC)    Acute hypoxic respiratory failure (HCC)    Klebsiella sepsis ([facility])    Enterococcus faecalis infection    Lactic acidosis  Resolved Problems:    * No resolved hospital problems. *      HOSPITAL STAY     Brief Inpatient course:   Patient is a 74-year-old female with past medical history significant for hypertension, type 2 diabetes mellitus, ischemic stroke, renal calculus initially presented to [facility] for altered mental status, found to have UTI with urinary obstruction.  Patient had stents placed for hydronephrosis in the recent past.  Initial evaluation in the treatment ED on 2024 showed that patient was not sepsis with shock lactate elevated at 6, started on empiric antibiotics, started on vasopressors for hypotension.  Patient also found to have elevated troponin which initially trended up.  Patient was also found to have acute hypoxic respiratory failure and started on BiPAP, eventually transferred to [facility]  2 times daily, Disp-60 tablet, R-3Normal      melatonin 3 MG TABS tablet Take 1 tablet by mouth nightly as neededHistorical Med      atorvastatin (LIPITOR) 80 MG tablet Take 1 tablet by mouth daily, Disp-90 tablet, R-3Normal      metFORMIN (GLUCOPHAGE-XR) 500 MG extended release tablet Take 2 tablets by mouth daily (with breakfast), Disp-180 tablet, R-3Normal      Handicap Placard MISC Starting Mon 2/28/2022, Disp-1 each, R-0, PrintDx: stroke  Duration: 5 years      UNABLE TO FIND Manual wheelchair    Diagnosis: Stroke, Disp-1 Device, R-0Print      glucose blood VI test strips (ASCENSIA AUTODISC VI;ONE TOUCH ULTRA TEST VI) strip DAILY Starting u 3/9/2017, Disp-100 each, R-3, NormalAs needed.      aspirin 81 MG EC tablet Take 1 tablet by mouth daily, Disp-30 tablet, R-3Normal      SOFT TOUCH LANCETS MISC 3 TIMES DAILY WITH MEALS Starting Wed 3/8/2017, Disp-100 each, R-3, Normal      Alcohol Swabs 70 % PADS 3 TIMES DAILY WITH MEALS Starting Wed 3/8/2017, Disp-100 each, R-3, Normal      glucose monitoring kit (FREESTYLE) monitoring kit 3 TIMES DAILY Starting Wed 3/8/2017, Disp-1 kit, R-0, Normal      Multiple Vitamins-Minerals (THERAPEUTIC MULTIVITAMIN-MINERALS) tablet Take 1 tablet by mouth daily Twice weeklyHistorical Med           STOP taking these medications       amLODIPine (NORVASC) 2.5 MG tablet Comments:   Reason for Stopping:               Activity: activity as tolerated    Diet: diabetic diet    Follow-up:    Reji Sanchez MD  [address] [address]  [phone]    Follow up in 2 week(s)      Kieran Lees MD  [address], [address]  [place] [address]  [phone]    Schedule an appointment as soon as possible for a visit in 2 week(s)  for urology follow up    Byron Castaneda MD  [address], [address]  [place] [address]  [phone]    Follow up in 1 week(s)  hospital follow up      Patient Instructions:   You were found to have an infection and need to be on IV

## 2024-05-30 NOTE — CARE COORDINATION
Discharge Report    [facility]  Clinical Case Management Department  Written by: Chante Camilo RN    Patient Name: Macey Zazueta  Attending Provider: Rakel Oneil MD  Admit Date: 2024  3:04 AM  MRN: [id number]  Account: [id number]                     : 1950  Discharge Date: 24      Disposition: SNF    Plan is [facility]. Transportation confirmed for 11am stretcher  with Lauren from [facility]. Notified Elaine in admissions at [facility] of 11am  time. KATHY has already been faxed.     Chante Camilo RN

## 2024-05-30 NOTE — PROGRESS NOTES
resolved, Off vasopressors  Tachycardic  Improved overall  Please see daily details in Physical Exam section and in Interval Changes Section  Changes in ROS:  Unchanged  Please see daily details in Review of Symptoms section and in Interval Changes Section  Discussion with Referring Physician or Service  Dr. Humphries  Laboratory and Radiologic Studies with personal review of radiologic studies  Laboratory  Radiology  Microbiology  Blood: Gram positive cocci in chains and pairs and Gram negative bacilli   Enterococcus faecalis and Klebsiella oxytoca  Please see Details in daily Interval Changes Section devoted to Lab, Micro and Radiology and in Medical Decision Laboratory, Imaging and Cultures sections.  Review of Infection Control and Prevention measures:  Universal precautions   Please see daily details in Infection Control Recommendations section  Administer medications as ordered:  Meropenem  Review of Antimicrobial Stewardship Measures:  Targeted therapy  PK dosing  Please see daily details in Antimicrobial Stewardship Recommendations section   Prognosis:  Guarded  Review of Discharge Planning:  Please see daily details in Coordination of Outpatient Care section  Review of need for outpatient follow up.    Myron Nicholson MD 5/30/2024       Infection Control Recommendations   [place] Precautions    Antimicrobial Stewardship Recommendations     Simplification of therapy  Targeted therapy  PK dosing    Coordination of Outpatient Care:   Estimated Length of IV antimicrobials:6/4/24  Patient will need Midline Catheter Insertion: yes  Patient will need PICC line Insertion:No  Patient will need: Home IV , Infusion Center,  SNF,  LTAC: yes  Patient will need outpatient wound care: No    Chief complaint/reason for consultation:   Septicemia  Septic shock    History of Present Illness:   Macey Zazueta is a 74 y.o.-year-old female who was initially admitted on 5/23/2024. Patient seen at the request of .    INITIAL  Organizations: No     Attends Club or Organization Meetings: Never     Marital Status:    Intimate Partner Violence: Not At Risk (10/12/2022)    Humiliation, Afraid, Rape, and Kick questionnaire     Fear of Current or Ex-Partner: No     Emotionally Abused: No     Physically Abused: No     Sexually Abused: No   Housing Stability: Patient Unable To Answer (5/23/2024)    Housing Stability Vital Sign     Unable to Pay for Housing in the Last Year: Patient unable to answer     Number of Places Lived in the Last Year: 1     Unstable Housing in the Last Year: Patient unable to answer       Family History:     Family History   Problem Relation Age of Onset    Heart Attack Mother     Heart Disease Mother     Diabetes Mother     Heart Attack Father     High Blood Pressure Father         Allergies:   Patient has no known allergies.     Review of Systems:         Constitutional: No fevers or chills. Fatigue  Head: No headaches  Eyes: No double vision or blurry vision. No conjunctival inflammation.  ENT: No sore throat or runny nose.. No hearing loss, tinnitus or vertigo.  Cardiovascular: No chest pain or palpitations.No shortness of breath. No ANDERS  Lung: No shortness of breath or cough. No sputum production  Abdomen: No nausea, vomiting, diarrhea, or abdominal pain.. No cramps.  Genitourinary: No increased urinary frequency, or dysuria. Minimal hematuria. No suprapubic or CVA pain  Musculoskeletal: No muscle aches or pains. No joint effusions, swelling or deformities  Hematologic: No bleeding or bruising.  Neurologic: No headache, weakness, numbness, or tingling.  Integument: No rash, no ulcers.  Psychiatric: No depression.   Endocrine: No polyuria, no polydipsia, no polyphagia.    Physical Examination :   Patient Vitals for the past 8 hrs:   BP Temp Temp src Pulse Resp SpO2   05/30/24 0751 (!) 142/80 -- -- 99 -- --   05/30/24 0730 -- -- -- 99 -- --   05/30/24 0700 (!) 142/80 99.5 °F (37.5 °C) Temporal 100 25 95 %  noted.  Chronic changes of the right femoral head, with moderate to severe bilateral hip osteoarthritis.  Unremarkable abdominal soft tissues.     1. Mild left hydronephrosis, with diffuse left periureteral inflammatory stranding and scattered small foci of intraureteral air.  The renal collecting system is opacified with contrast, without definite evidence of obstruction.  Overall, these findings are concerning for left-sided urinary tract infection.  Correlation with urinalysis is recommended. 2. Bilateral adrenal thickening with 2.0 cm left adrenal nodule, stable from previous exam dated 02/12/2024.  1 year follow-up adrenal CT is recommended. 3. Enlarged fibroid uterus. 4. Cholelithiasis. 5. Additional chronic findings, as above.     XR CHEST (2 VW)    Result Date: 5/21/2024  EXAMINATION: TWO XRAY VIEWS OF THE CHEST 5/21/2024 4:20 pm COMPARISON: None. HISTORY: ORDERING SYSTEM PROVIDED HISTORY: Shortness of Breath TECHNOLOGIST PROVIDED HISTORY: Shortness of Breath altered mental status FINDINGS: The lungs are without acute focal process.  There is no effusion or pneumothorax. The cardiomediastinal silhouette is without acute process. The osseous structures are without acute process.  There is osteoarthritis of the right shoulder.  There is a thoracolumbar scoliosis, convex to the right.     No acute process.     CTA HEAD NECK W CONTRAST    Result Date: 5/21/2024  EXAMINATION: CTA OF THE HEAD AND NECK WITH CONTRAST 5/21/2024 1:50 pm: TECHNIQUE: CTA of the head and neck was performed with the administration of intravenous contrast. Multiplanar reformatted images are provided for review.  MIP images are provided for review. Stenosis of the internal carotid arteries measured using NASCET criteria. Automated exposure control, iterative reconstruction, and/or weight based adjustment of the mA/kV was utilized to reduce the radiation dose to as low as reasonably achievable. COMPARISON: CT brain performed the same day,

## 2024-05-30 NOTE — PROGRESS NOTES
[facility]  Internal Medicine Teaching Residency Program  Inpatient Daily Progress Note  ______________________________________________________________________________    Patient: Macey Zazueta  YOB: 1950   MRN:[id number]    Acct: [id number]     Room: 0427/0427-02  Admit date: 5/23/2024  Today's date: 05/30/24  Number of days in the hospital: 7    SUBJECTIVE   CC: No chief complaint on file.    Pt examined at bedside. Chart & results reviewed.   - Overnight: no acute events  - Patient is doing well, laying in bed at time of exam, no acute complaints this AM.  - Denied fevers, chills, nausea, vomiting, constipation, diarrhea, abdominal pain, chest pain, or SOB.  - Afebrile, hemodynamically stable, SPO2 at goal on RA at time of exam.        BRIEF HISTORY   Patient is a 74-year-old female with past medical history significant for hypertension, type 2 diabetes mellitus, ischemic stroke, renal calculus initially presented to [facility] for altered mental status, found to have UTI with urinary obstruction.  Patient had stents placed for hydronephrosis in the recent past.  Initial evaluation in the treatment ED on 5/12/2024 showed that patient was not sepsis with shock lactate elevated at 6, started on empiric antibiotics, started on vasopressors for hypotension.  Patient also found to have elevated troponin which initially trended up.  Patient was also found to have acute hypoxic respiratory failure and started on BiPAP, eventually transferred to [facility] ICU.  Cardiology evaluated the patient and echo was done which was unremarkable.  Urology was consulted for hematuria and hydronephrosis.     Patient was initially weaned off of vasopressor support, was maintaining saturation on nasal cannula.  Patient was empirically continued on meropenem found to have positive blood culture for which infectious disease were consulted.    OBJECTIVE  PORTABLE    Result Date: 5/22/2024  Stable chest when compared to the prior exam     US RENAL COMPLETE    Result Date: 5/22/2024  1. Moderate left-sided hydronephrosis. 2. Midpole right renal cyst measures up to 2.3 cm with thin septation likely a Bosniak type 2 renal cyst. 3. Bilateral nonobstructing renal calculi.  No right-sided hydronephrosis. The findings were sent to the Radiology Results Communication Center at 11:46 am on 5/22/2024 to be communicated to a licensed caregiver.     XR CHEST (SINGLE VIEW FRONTAL)    Result Date: 5/21/2024  Right-sided PICC in satisfactory position.     CT ABDOMEN PELVIS WO CONTRAST Additional Contrast? None    Result Date: 5/21/2024  1. Mild left hydronephrosis, with diffuse left periureteral inflammatory stranding and scattered small foci of intraureteral air.  The renal collecting system is opacified with contrast, without definite evidence of obstruction.  Overall, these findings are concerning for left-sided urinary tract infection.  Correlation with urinalysis is recommended. 2. Bilateral adrenal thickening with 2.0 cm left adrenal nodule, stable from previous exam dated 02/12/2024.  1 year follow-up adrenal CT is recommended. 3. Enlarged fibroid uterus. 4. Cholelithiasis. 5. Additional chronic findings, as above.     XR CHEST (2 VW)    Result Date: 5/21/2024  No acute process.     CTA HEAD NECK W CONTRAST    Result Date: 5/21/2024  1. No large vessel occlusion of the head or neck. 2. Moderate to severe stenosis of the intradural left vertebral artery and moderate stenosis of the intradural right vertebral artery, similar to prior. 3. Mild narrowing of the bilateral supraclinoid ICAs.     CT HEAD WO CONTRAST    Result Date: 5/21/2024  No acute intracranial abnormality. Old lacunar infarct in the right thalamus. Mild parenchymal volume loss. Moderate ventriculomegaly, disproportionate to the degree of volume loss, likely with superimposed mild communicating hydrocephalus,

## 2024-05-31 ENCOUNTER — CARE COORDINATION (OUTPATIENT)
Dept: CARE COORDINATION | Age: 74
End: 2024-05-31

## 2024-06-03 ENCOUNTER — HOSPITAL ENCOUNTER (OUTPATIENT)
Age: 74
Setting detail: SPECIMEN
Discharge: HOME | End: 2024-06-03
Payer: MEDICARE

## 2024-06-03 LAB
ALBUMIN SERPL-MCNC: 3.1 G/DL (ref 3.5–5.2)
ALBUMIN/GLOB SERPL: 1.2 {RATIO} (ref 1–2.5)
ALP SERPL-CCNC: 93 U/L (ref 35–104)
ALT SERPL-CCNC: 39 U/L (ref 5–33)
ANION GAP SERPL CALCULATED.3IONS-SCNC: 5 MMOL/L (ref 9–17)
AST SERPL-CCNC: 35 U/L
BASOPHILS # BLD: 0.04 K/UL (ref 0–0.2)
BASOPHILS NFR BLD: 1 % (ref 0–2)
BILIRUB SERPL-MCNC: 0.9 MG/DL (ref 0.3–1.2)
BUN SERPL-MCNC: 16 MG/DL (ref 8–23)
BUN/CREAT SERPL: 32 (ref 9–20)
CALCIUM SERPL-MCNC: 9.1 MG/DL (ref 8.6–10.4)
CHLORIDE SERPL-SCNC: 107 MMOL/L (ref 98–107)
CO2 SERPL-SCNC: 28 MMOL/L (ref 20–31)
CREAT SERPL-MCNC: 0.5 MG/DL (ref 0.5–0.9)
EOSINOPHIL # BLD: 0.36 K/UL (ref 0–0.44)
EOSINOPHILS RELATIVE PERCENT: 4 % (ref 1–4)
ERYTHROCYTE [DISTWIDTH] IN BLOOD BY AUTOMATED COUNT: 20 % (ref 11.8–14.4)
GFR, ESTIMATED: >90 ML/MIN/1.73M2
GLUCOSE SERPL-MCNC: 152 MG/DL (ref 70–99)
HCT VFR BLD AUTO: 25.2 % (ref 36.3–47.1)
HGB BLD-MCNC: 7.8 G/DL (ref 11.9–15.1)
IMM GRANULOCYTES # BLD AUTO: 0.07 K/UL (ref 0–0.3)
IMM GRANULOCYTES NFR BLD: 1 %
LYMPHOCYTES NFR BLD: 1.66 K/UL (ref 1.1–3.7)
LYMPHOCYTES RELATIVE PERCENT: 20 % (ref 24–43)
MCH RBC QN AUTO: 26.8 PG (ref 25.2–33.5)
MCHC RBC AUTO-ENTMCNC: 31 G/DL (ref 28.4–34.8)
MCV RBC AUTO: 86.6 FL (ref 82.6–102.9)
MONOCYTES NFR BLD: 0.46 K/UL (ref 0.1–1.2)
MONOCYTES NFR BLD: 6 % (ref 3–12)
NEUTROPHILS NFR BLD: 69 % (ref 36–65)
NEUTS SEG NFR BLD: 5.66 K/UL (ref 1.5–8.1)
NRBC BLD-RTO: 0 PER 100 WBC
PLATELET # BLD AUTO: 504 K/UL (ref 138–453)
PMV BLD AUTO: 10.8 FL (ref 8.1–13.5)
POTASSIUM SERPL-SCNC: 4.5 MMOL/L (ref 3.7–5.3)
PROT SERPL-MCNC: 5.6 G/DL (ref 6.4–8.3)
RBC # BLD AUTO: 2.91 M/UL (ref 3.95–5.11)
SODIUM SERPL-SCNC: 140 MMOL/L (ref 135–144)
WBC OTHER # BLD: 8.3 K/UL (ref 3.5–11.3)

## 2024-06-03 PROCEDURE — 80053 COMPREHEN METABOLIC PANEL: CPT

## 2024-06-03 PROCEDURE — 36415 COLL VENOUS BLD VENIPUNCTURE: CPT

## 2024-06-03 PROCEDURE — 85025 COMPLETE CBC W/AUTO DIFF WBC: CPT

## 2024-06-03 NOTE — RESULT ENCOUNTER NOTE
Please call pt and inform them their labwork results are noted  Need to repeat cbc with diff and cmp in 1 week  Needs f/u appt in 2 weeks for post hospital visit

## 2024-06-04 ENCOUNTER — CARE COORDINATION (OUTPATIENT)
Dept: CARE COORDINATION | Age: 74
End: 2024-06-04

## 2024-06-04 NOTE — CARE COORDINATION
Care Transitions Post-Acute Facility Update Call    2024    Patient: Macey Zazueta Patient : 1950   MRN: <[id number]>  Reason for Admission:  Urosepsis  Discharge Date: 24 RARS: Readmission Risk Score: 19.1         Care Transitions Post Acute Facility Update    Care Transitions Interventions      Post Acute Facility Update   Post Acute Facility: [facility]          Nursing   Nutrition intake assessment: thin liquids   Skilled Medication therapies: PT / OT / ST   Disease specific clinical considerations: PICC Line   Reported Nursing Updates: VS /81 P 92 T 97.4 R 17  SP02 97% PAIN 0        Rehab/Functional   ADLs: Moderate Assistance   Bed Mobility: Maximum Assistance   Transfer Assistance: Moderate Assistance   Ambulation Assistance: Moderate Assistance   How far (in feet) is the patient ambulating?: 0   Rehab Notes: -PT, OT and/or ST collaboration of treatment plan 1 assist for transfers to wheelchair1 assist for transfers to wheelchair, 1 assist for wheelchair ambulation, 1-2 assist with bed mobility, 1 assist with toileting/brief change       SW/Discharge Planning   Caregiver support:    Discharge Planning / Barriers: Cardiology follow up scheduled for 7/3 @1pm    Urosepsis ; kidney stone surgery iv atb,   Thickened liquid diet / refused  / back to thin liquids  Pressure on bottom padded and protected        Next IDT Planned Review: 24       Marie Campbell RN Post Acute Care Manager [phone]

## 2024-06-05 ENCOUNTER — OFFICE VISIT (OUTPATIENT)
Dept: UROLOGY | Age: 74
End: 2024-06-05
Payer: COMMERCIAL

## 2024-06-05 VITALS — HEART RATE: 81 BPM | SYSTOLIC BLOOD PRESSURE: 95 MMHG | DIASTOLIC BLOOD PRESSURE: 63 MMHG | TEMPERATURE: 97.7 F

## 2024-06-05 DIAGNOSIS — N20.0 RENAL STONE: Primary | ICD-10-CM

## 2024-06-05 PROCEDURE — 3017F COLORECTAL CA SCREEN DOC REV: CPT | Performed by: PHYSICIAN ASSISTANT

## 2024-06-05 PROCEDURE — 99213 OFFICE O/P EST LOW 20 MIN: CPT | Performed by: PHYSICIAN ASSISTANT

## 2024-06-05 PROCEDURE — 3078F DIAST BP <80 MM HG: CPT | Performed by: PHYSICIAN ASSISTANT

## 2024-06-05 PROCEDURE — G8400 PT W/DXA NO RESULTS DOC: HCPCS | Performed by: PHYSICIAN ASSISTANT

## 2024-06-05 PROCEDURE — 1036F TOBACCO NON-USER: CPT | Performed by: PHYSICIAN ASSISTANT

## 2024-06-05 PROCEDURE — 1111F DSCHRG MED/CURRENT MED MERGE: CPT | Performed by: PHYSICIAN ASSISTANT

## 2024-06-05 PROCEDURE — G8427 DOCREV CUR MEDS BY ELIG CLIN: HCPCS | Performed by: PHYSICIAN ASSISTANT

## 2024-06-05 PROCEDURE — 3074F SYST BP LT 130 MM HG: CPT | Performed by: PHYSICIAN ASSISTANT

## 2024-06-05 PROCEDURE — 1090F PRES/ABSN URINE INCON ASSESS: CPT | Performed by: PHYSICIAN ASSISTANT

## 2024-06-05 PROCEDURE — G8420 CALC BMI NORM PARAMETERS: HCPCS | Performed by: PHYSICIAN ASSISTANT

## 2024-06-05 PROCEDURE — 1123F ACP DISCUSS/DSCN MKR DOCD: CPT | Performed by: PHYSICIAN ASSISTANT

## 2024-06-05 RX ORDER — MAGNESIUM HYDROXIDE/ALUMINUM HYDROXICE/SIMETHICONE 120; 1200; 1200 MG/30ML; MG/30ML; MG/30ML
5 SUSPENSION ORAL EVERY 6 HOURS PRN
COMMUNITY

## 2024-06-05 RX ORDER — ENEMA 19; 7 G/133ML; G/133ML
1 ENEMA RECTAL
COMMUNITY

## 2024-06-05 RX ORDER — OMEPRAZOLE 20 MG/1
20 CAPSULE, DELAYED RELEASE ORAL DAILY
COMMUNITY

## 2024-06-05 RX ORDER — BISACODYL 10 MG
10 SUPPOSITORY, RECTAL RECTAL DAILY PRN
COMMUNITY

## 2024-06-05 RX ORDER — ACETAMINOPHEN 650 MG/1
650 SUPPOSITORY RECTAL EVERY 4 HOURS PRN
COMMUNITY

## 2024-06-05 RX ORDER — NYSTATIN 100000 [USP'U]/G
POWDER TOPICAL 4 TIMES DAILY PRN
COMMUNITY

## 2024-06-05 RX ORDER — ACETAMINOPHEN 325 MG/1
650 TABLET ORAL EVERY 6 HOURS PRN
COMMUNITY

## 2024-06-05 ASSESSMENT — ENCOUNTER SYMPTOMS
COUGH: 0
BACK PAIN: 0
NAUSEA: 0
SHORTNESS OF BREATH: 0
EYE REDNESS: 0
VOMITING: 0
APNEA: 0
CONSTIPATION: 0
ABDOMINAL PAIN: 0
WHEEZING: 0
COLOR CHANGE: 0

## 2024-06-05 NOTE — PROGRESS NOTES
each 3     No current facility-administered medications on file prior to visit.     Patient has no known allergies.  Family History   Problem Relation Age of Onset    Heart Attack Mother     Heart Disease Mother     Diabetes Mother     Heart Attack Father     High Blood Pressure Father      Social History     Tobacco Use   Smoking Status Never   Smokeless Tobacco Never       Social History     Substance and Sexual Activity   Alcohol Use No       Review of Systems   Constitutional:  Negative for appetite change, chills and fever.   Eyes:  Negative for redness and visual disturbance.   Respiratory:  Negative for apnea, cough, shortness of breath and wheezing.    Cardiovascular:  Negative for chest pain and leg swelling.   Gastrointestinal:  Negative for abdominal pain, constipation, nausea and vomiting.   Genitourinary:  Negative for difficulty urinating, dyspareunia, dysuria, enuresis, flank pain, frequency, hematuria, pelvic pain, urgency, vaginal bleeding and vaginal discharge.   Musculoskeletal:  Negative for back pain, joint swelling and myalgias.   Skin:  Negative for color change, rash and wound.   Neurological:  Positive for weakness. Negative for dizziness, tremors and numbness.   Hematological:  Negative for adenopathy. Does not bruise/bleed easily.   Psychiatric/Behavioral:  Negative for sleep disturbance.        BP 95/63 (Site: Left Upper Arm, Position: Sitting, Cuff Size: Medium Adult)   Pulse 81   Temp 97.7 °F (36.5 °C)       PHYSICAL EXAM:  Constitutional: Patient resting comfortably, in no acute distress.   Neuro: Minimally conversive, wheelchair-bound  Lungs: Respiratory effort normal, unlabored  Abdomen: Soft, non-tender, non-distended  : No CVA tenderness.   Pelvic: deferred     Lab Results   Component Value Date    BUN 16 06/03/2024     Lab Results   Component Value Date    CREATININE 0.5 06/03/2024       ASSESSMENT:   Diagnosis Orders   1. Renal stone  CT ABDOMEN PELVIS WO CONTRAST Additional

## 2024-06-06 ENCOUNTER — TELEPHONE (OUTPATIENT)
Dept: UROLOGY | Age: 74
End: 2024-06-06

## 2024-06-06 NOTE — TELEPHONE ENCOUNTER
Bobbi, nurse from [facility], callled to report the patient has completed her IV antibiotics.  The patient had a follow up appointment yesterday with MARTHA Peterson.  Bobbi would like to know if the PICC line can be removed.    Please advise.

## 2024-06-06 NOTE — TELEPHONE ENCOUNTER
Phone call to Bobbi at [facility] to inform her of the provider's response.  Bobbi verbalizes understanding.

## 2024-06-06 NOTE — TELEPHONE ENCOUNTER
Please let them know that we did not order the antibiotics or PICC line to be placed therefore we are unable to give the order to remove it

## 2024-06-10 ENCOUNTER — HOSPITAL ENCOUNTER (OUTPATIENT)
Age: 74
Setting detail: SPECIMEN
Discharge: HOME | End: 2024-06-10

## 2024-06-10 LAB
ALBUMIN SERPL-MCNC: 3.4 G/DL (ref 3.5–5.2)
ALBUMIN/GLOB SERPL: 1.4 {RATIO} (ref 1–2.5)
ALP SERPL-CCNC: 88 U/L (ref 35–104)
ALT SERPL-CCNC: 23 U/L (ref 5–33)
ANION GAP SERPL CALCULATED.3IONS-SCNC: 7 MMOL/L (ref 9–17)
AST SERPL-CCNC: 14 U/L
BASOPHILS # BLD: 0.06 K/UL (ref 0–0.2)
BASOPHILS NFR BLD: 1 % (ref 0–2)
BILIRUB SERPL-MCNC: 0.9 MG/DL (ref 0.3–1.2)
BUN SERPL-MCNC: 19 MG/DL (ref 8–23)
BUN/CREAT SERPL: 38 (ref 9–20)
CALCIUM SERPL-MCNC: 9.6 MG/DL (ref 8.6–10.4)
CHLORIDE SERPL-SCNC: 109 MMOL/L (ref 98–107)
CO2 SERPL-SCNC: 27 MMOL/L (ref 20–31)
CREAT SERPL-MCNC: 0.5 MG/DL (ref 0.5–0.9)
EOSINOPHIL # BLD: 0.18 K/UL (ref 0–0.44)
EOSINOPHILS RELATIVE PERCENT: 3 % (ref 1–4)
ERYTHROCYTE [DISTWIDTH] IN BLOOD BY AUTOMATED COUNT: 18.7 % (ref 11.8–14.4)
GFR, ESTIMATED: >90 ML/MIN/1.73M2
GLUCOSE SERPL-MCNC: 133 MG/DL (ref 70–99)
HCT VFR BLD AUTO: 27.7 % (ref 36.3–47.1)
HGB BLD-MCNC: 8.8 G/DL (ref 11.9–15.1)
IMM GRANULOCYTES # BLD AUTO: <0.03 K/UL (ref 0–0.3)
IMM GRANULOCYTES NFR BLD: 0 %
LYMPHOCYTES NFR BLD: 1.89 K/UL (ref 1.1–3.7)
LYMPHOCYTES RELATIVE PERCENT: 31 % (ref 24–43)
MCH RBC QN AUTO: 26.9 PG (ref 25.2–33.5)
MCHC RBC AUTO-ENTMCNC: 31.8 G/DL (ref 28.4–34.8)
MCV RBC AUTO: 84.7 FL (ref 82.6–102.9)
MONOCYTES NFR BLD: 0.46 K/UL (ref 0.1–1.2)
MONOCYTES NFR BLD: 8 % (ref 3–12)
NEUTROPHILS NFR BLD: 57 % (ref 36–65)
NEUTS SEG NFR BLD: 3.53 K/UL (ref 1.5–8.1)
NRBC BLD-RTO: 0 PER 100 WBC
PLATELET # BLD AUTO: 402 K/UL (ref 138–453)
PMV BLD AUTO: 10.6 FL (ref 8.1–13.5)
POTASSIUM SERPL-SCNC: 4.1 MMOL/L (ref 3.7–5.3)
PROT SERPL-MCNC: 5.9 G/DL (ref 6.4–8.3)
RBC # BLD AUTO: 3.27 M/UL (ref 3.95–5.11)
SODIUM SERPL-SCNC: 143 MMOL/L (ref 135–144)
WBC OTHER # BLD: 6.1 K/UL (ref 3.5–11.3)

## 2024-06-10 PROCEDURE — 36415 COLL VENOUS BLD VENIPUNCTURE: CPT

## 2024-06-10 PROCEDURE — P9604 ONE-WAY ALLOW PRORATED TRIP: HCPCS

## 2024-06-10 PROCEDURE — 80053 COMPREHEN METABOLIC PANEL: CPT

## 2024-06-10 PROCEDURE — 85025 COMPLETE CBC W/AUTO DIFF WBC: CPT

## 2024-06-11 ENCOUNTER — CARE COORDINATION (OUTPATIENT)
Dept: CARE COORDINATION | Age: 74
End: 2024-06-11

## 2024-06-11 NOTE — CARE COORDINATION
Care Transitions Post-Acute Facility Update Call    2024    Patient: Macey Zazueta Patient : 1950   MRN: <[id number]>  Reason for Admission:  Urosepsis  Discharge Date: 24 RARS: Readmission Risk Score: 19.1         Care Transitions Post Acute Facility Update    Care Transitions Interventions      Post Acute Facility Update   Post Acute Facility: [facility]          Nursing   Nutrition intake assessment: thin liquids   Skilled Medication therapies: PT / OT / ST   Disease specific clinical considerations: PICC Line   Reported Nursing Updates: VS /78 P 75 T 98.7 R 17  SP02 97% PAIN 0 WT 97.8       Rehab/Functional   ADLs: Moderate Assistance   Bed Mobility: Maximum Assistance   Transfer Assistance: Moderate Assistance   Ambulation Assistance: Moderate Assistance   How far (in feet) is the patient ambulating?: 0   Rehab Notes: -PT, OT and/or ST collaboration of treatment plan 1 assist for transfers to wheelchair1 assist for transfers to wheelchair, 1 assist for wheelchair ambulation, 1-2 assist with bed mobility, 1 assist with toileting/brief change       SW/Discharge Planning   Caregiver support:    Discharge Planning / Barriers: Cardiology follow up scheduled for 7/3 @1pm    Urosepsis ; kidney stone surgery iv atb,   Thickened liquid diet / refused  / back to thin liquids  Pressure on bottom padded and protected        Next IDT Planned Review: 24       Marie Campbell RN Post Acute Care Manager [phone]

## 2024-06-14 NOTE — PROGRESS NOTES
Physician Progress Note      PATIENT:               CHRISTINA GAYLE  [facility] #:                  [id number]  :                       1950  ADMIT DATE:       2024 2:43 PM  DISCH DATE:        2024 1:45 AM  RESPONDING  PROVIDER #:        Byron Puckett MD          QUERY TEXT:      Pt admitted 24 (discharged 24) with septic shock and complicated   UTI.   Pt had undergone laser kidney stone removal with stent placement   earlier in day.  Urine culture returned no growth.  Blood cultures x 2 returned Enterococcus   faecalis and Klebsiella oxytoca.    If possible, please document in progress notes and discharge summary the   relationship if any between sepsis and the surgery, also please clarify if UTI   given negative culture:    The medical record reflects the following:  Risk Factors: DM, history of CVA, left ureteral calculus, 24 left   ureteroscopy, left holmium laser lithotripsy, left ureteral stent exchange  Clinical Indicators:  septic shock, altered mental, gross hematuria, Blood   cultures x2 Enterococcus faecalis, Klebsiella oxytoca, urine culture no growth  Treatment: ICU, Norepinephrine, Meropenem, IVF, transfer to [facility] Osbaldo, MSN, RN, CCDS, CRCR  Clinical   .  Options provided:  -- Sepsis and septic shock due to the procedure, due to UTI, UTI as evidenced   by  -- Sepsis and septic shock not due to the procedure, due to UTI, UTI as   evidenced by  -- Sepsis and septic shock due to the procedure, due to bacteremia, UTI ruled   out  -- Sepsis and septic shock not due to the procedure, due to bacteremia, UTI   ruled out  -- Other - I will add my own diagnosis  -- Disagree - Not applicable / Not valid  -- Disagree - Clinically unable to determine / Unknown  -- Refer to Clinical Documentation Reviewer    PROVIDER RESPONSE TEXT:    Sepsis and septic shock due to the procedure due to UTI, UTI as evidenced by    Query created by: Osbaldo

## 2024-06-17 ENCOUNTER — CARE COORDINATION (OUTPATIENT)
Dept: CARE COORDINATION | Age: 74
End: 2024-06-17

## 2024-06-17 ENCOUNTER — HOSPITAL ENCOUNTER (OUTPATIENT)
Age: 74
Setting detail: SPECIMEN
Discharge: HOME | End: 2024-06-17

## 2024-06-17 LAB
ALBUMIN SERPL-MCNC: 3.6 G/DL (ref 3.5–5.2)
ALBUMIN/GLOB SERPL: 1.4 {RATIO} (ref 1–2.5)
ALP SERPL-CCNC: 84 U/L (ref 35–104)
ALT SERPL-CCNC: 18 U/L (ref 5–33)
ANION GAP SERPL CALCULATED.3IONS-SCNC: 8 MMOL/L (ref 9–17)
AST SERPL-CCNC: 16 U/L
BASOPHILS # BLD: 0.04 K/UL (ref 0–0.2)
BASOPHILS NFR BLD: 0 % (ref 0–2)
BILIRUB SERPL-MCNC: 0.7 MG/DL (ref 0.3–1.2)
BUN SERPL-MCNC: 24 MG/DL (ref 8–23)
BUN/CREAT SERPL: 40 (ref 9–20)
CALCIUM SERPL-MCNC: 9.3 MG/DL (ref 8.6–10.4)
CHLORIDE SERPL-SCNC: 105 MMOL/L (ref 98–107)
CO2 SERPL-SCNC: 27 MMOL/L (ref 20–31)
CREAT SERPL-MCNC: 0.6 MG/DL (ref 0.5–0.9)
EOSINOPHIL # BLD: 0.29 K/UL (ref 0–0.44)
EOSINOPHILS RELATIVE PERCENT: 2 % (ref 1–4)
ERYTHROCYTE [DISTWIDTH] IN BLOOD BY AUTOMATED COUNT: 17.7 % (ref 11.8–14.4)
GFR, ESTIMATED: >90 ML/MIN/1.73M2
GLUCOSE SERPL-MCNC: 135 MG/DL (ref 70–99)
HCT VFR BLD AUTO: 30.7 % (ref 36.3–47.1)
HGB BLD-MCNC: 9.5 G/DL (ref 11.9–15.1)
IMM GRANULOCYTES # BLD AUTO: 0.06 K/UL (ref 0–0.3)
IMM GRANULOCYTES NFR BLD: 0 %
LYMPHOCYTES NFR BLD: 1.62 K/UL (ref 1.1–3.7)
LYMPHOCYTES RELATIVE PERCENT: 12 % (ref 24–43)
MCH RBC QN AUTO: 26.7 PG (ref 25.2–33.5)
MCHC RBC AUTO-ENTMCNC: 30.9 G/DL (ref 28.4–34.8)
MCV RBC AUTO: 86.2 FL (ref 82.6–102.9)
MONOCYTES NFR BLD: 0.86 K/UL (ref 0.1–1.2)
MONOCYTES NFR BLD: 6 % (ref 3–12)
NEUTROPHILS NFR BLD: 80 % (ref 36–65)
NEUTS SEG NFR BLD: 10.84 K/UL (ref 1.5–8.1)
NRBC BLD-RTO: 0 PER 100 WBC
PLATELET # BLD AUTO: 308 K/UL (ref 138–453)
PMV BLD AUTO: 11 FL (ref 8.1–13.5)
POTASSIUM SERPL-SCNC: 4.2 MMOL/L (ref 3.7–5.3)
PROT SERPL-MCNC: 6.1 G/DL (ref 6.4–8.3)
RBC # BLD AUTO: 3.56 M/UL (ref 3.95–5.11)
SODIUM SERPL-SCNC: 140 MMOL/L (ref 135–144)
WBC OTHER # BLD: 13.7 K/UL (ref 3.5–11.3)

## 2024-06-17 PROCEDURE — 36415 COLL VENOUS BLD VENIPUNCTURE: CPT

## 2024-06-17 PROCEDURE — 85025 COMPLETE CBC W/AUTO DIFF WBC: CPT

## 2024-06-17 PROCEDURE — 80053 COMPREHEN METABOLIC PANEL: CPT

## 2024-06-17 PROCEDURE — P9604 ONE-WAY ALLOW PRORATED TRIP: HCPCS

## 2024-06-17 NOTE — CARE COORDINATION
Care Transitions Post-Acute Facility Update Call    2024    Patient: Macey Zazueta Patient : 1950   MRN: <[id number]>  Reason for Admission:  Urosepsis  Discharge Date: 24 RARS: Readmission Risk Score: 19.1         Care Transitions Post Acute Facility Update    Care Transitions Interventions      Post Acute Facility Update   Post Acute Facility: [facility]          Nursing   Nutrition intake assessment: thin liquids   Skilled Medication therapies: PT / OT / ST   Disease specific clinical considerations: PICC Line   Reported Nursing Updates: VS /70 P 87 T 98.7 R 17  SP02 94% PAIN 0 WT 98.1       Rehab/Functional   ADLs: Moderate Assistance   Bed Mobility: Maximum Assistance   Transfer Assistance: Moderate Assistance   Ambulation Assistance: Moderate Assistance   How far (in feet) is the patient ambulating?: 0   Rehab Notes: -PT, OT and/or ST collaboration of treatment plan  x1 assist needed for all activitiesno ambulation. x1 assist stand-pivot only        SW/Discharge Planning   Caregiver support:    Discharge Planning / Barriers: Cardiology follow up scheduled for 7/3 @1pm   would like patient to regain strength ; be able to hold onto his arm for ambulation as she did before, previous stroke left her weak.   Urosepsis ; kidney stone surgery iv atb,   Thickened liquid diet / refused  / back to thin liquids  Pressure on bottom padded and protected        Next IDT Planned Review: 24       Marie Campbell RN Post Acute Care Manager [phone]

## 2024-06-21 PROBLEM — R79.89 ELEVATED TROPONIN: Status: RESOLVED | Noted: 2024-05-22 | Resolved: 2024-06-21

## 2024-06-25 ENCOUNTER — CARE COORDINATION (OUTPATIENT)
Dept: CARE COORDINATION | Age: 74
End: 2024-06-25

## 2024-06-25 NOTE — CARE COORDINATION
Care Transitions Note    Initial Call - Call within 2 business days of discharge: Yes    Patient Current Location:  Home: [address] [place], [place] [address]    Post Acute Care Manager contacted the spouse/partner  by telephone to perform post hospital discharge assessment, verified name and  as identifiers. Provided introduction to self, and explanation of the Post Acute Care Manager role.     Patient: Macey Zazueta    Patient : 1950   MRN: <[id number]>    Reason for Admission:  Sepsis  Discharge Date: 24  RURS: Readmission Risk Score: 19.1      Last Discharge Facility       Date Complaint Diagnosis Description Type Department Provider    24   Admission (Discharged) [facility] Rakel Oneil MD     Macey Zazueta is a 74 y.o. female admitted with septic shock.  She presented with altered mental status.  Patient underwent cystoscopy with HLL with Dr. Lees 2024.  She was discharged home without issues.  After a few hours of being home her  stated she suddenly became weak and unable to speak clearly or follow instructions.  She had a large amount of diarrhea following this episode with new left-sided weakness.  She does have history of previous stroke and her  is her primary caregiver.        Was this an external facility discharge? Yes. Discharge Date: 2024. Facility Name: [facility]    Additional needs identified to be addressed with provider   No needs identified             Method of communication with provider: none.    Patients top risk factors for readmission: functional physical ability, medical condition-previous CVA, and polypharmacy    Interventions to address risk factors:   Education: COMPLETE  Review of patient management of conditions/medications: complete    Care Summary Note:  states things are going well at home. Macey starts PT outpatient tomorrow. They did not go with home care because she did not want to be homebound, she wanted to see her grandson

## 2024-06-26 ENCOUNTER — HOSPITAL ENCOUNTER (OUTPATIENT)
Dept: PHYSICAL THERAPY | Age: 74
Setting detail: THERAPIES SERIES
Discharge: HOME | End: 2024-06-26
Payer: MEDICARE

## 2024-06-26 PROCEDURE — 97163 PT EVAL HIGH COMPLEX 45 MIN: CPT

## 2024-06-26 PROCEDURE — 97110 THERAPEUTIC EXERCISES: CPT

## 2024-06-27 NOTE — PLAN OF CARE
[facility]           Phone: [phone]             Outpatient Physical Therapy  Fax: [phone]                                           Date: 2024  Patient: Macey Zazueta : 1950 [facility] #: [id number]   Referring Physician: Byron Castaneda MD      [x] Plan of Care   [] Updated Plan of Care    Dates of Service to Include: 2024 to 24    Diagnosis:  Muscle weakness (generalized) M62.81     Rehab (Treatment) Diagnosis:  general weakness         Onset Date:  24    Attendance  Total # of Visits to Date: 1 No Show: 0 Canceled Appointment: 0    Assessment  Body Structures, Functions, Activity Limitations Requiring Skilled Therapeutic Intervention: Decreased functional mobility , Decreased endurance, Decreased posture, Decreased strength, Decreased safe awareness  Assessment: Pt was referred for general debility after prolonged hospitalization and rehab stay. Pt is now generally weak and is requiring more care at home. She is able to follow directions and respond appropriately but is easily distracted. Pr should be progressed as tolerated. If patient requires to be decreased to 2x/week then a UPOC can be updated if limited in ex. Progress as able.      Goals  Short Term Goals  Time Frame for Short Term Goals: 2 weeks  Short Term Goal 1: Initiate HEP and progress as tolerated for strength and mobility to instruct her .  Short Term Goal 2: Pt will be able to perform 5 consecutive sit to stands with use of UEs for functional erin LE strength.  Short Term Goal 3: Pt will have improved core strength to be able to sit edge of bed unsupported to assist with personal care  Long Term Goals  Time Frame for Long Term Goals : 4  Long Term Goal 1: Pt and  will be independent with her HEP for strength and mobility with ADLs.  Long Term Goal 2: Pt will be able to ambulate up to 10 feet with +1 min

## 2024-06-27 NOTE — THERAPY EVALUATION
Phone: [phone]                       [facility]          Fax: [phone]                      Outpatient Physical Therapy                                                                      Evaluation  Date: 2024  Patient: Macey Zazueta  : 1950  [facility] #: [id number]    Referring Physician: Byron Castaneda MD    Medical Diagnosis: Muscle weakness (generalized) M62.81    Treatment Diagnosis: general weakness  Onset Date: 24  PT Insurance Information: Medicare Part A and B  Total # of Visits Approved: 12   Total # of Visits to Date: 1  No Show: 0  Canceled Appointment: 0    [x] This writer acknowledges review of patient history form     Subjective  Subjective: Pt  gave report of issues leading to now. Pt had a laser procedure for a kidney stone and following she ended up being transferred to [place] where she was hospitalized from May 23 to  with sepsis. Pt went to rehab for about 3 weeks then for therapy so her  could care for her at home. He now notes she is weak and would like her to be stronger to assist in her care while now at home. Pt does not verbalize pain and  states she does not complain of pain.  Additional Pertinent Hx: HTN, Stroke, DM, prolonged hospitalization with sepsis    Observations:   Pt is easily distracted    Ambulation/Gait (if applicable):   +1 mod assist with face to face assist    Balance Screen:   Sitting static fair-  Dynamic sitting poor    Objective    AROM    Antwan LEs WFL         Strength       Strength LLE  L Hip Flexion: 3-/5, 3/5  L Hip Extension: 3-/5, 3/5  L Hip ABduction: 3-/5, 3/5  L Hip Internal Rotation: 3/5, 3+/5  L Knee Extension: 3+/5, 3/5         Trunk Strength     Fair- strength, does not maintain sitting edge of bed       Exercises:  Exercise 1: HEP  Exercise 2: Sitting LAQ, hip flex - x10 each  Exercise 3: supine - heel slide 2#, SAQ 2#, supine bridging, adductor ball squeeze - all ex x10 each      Functional

## 2024-07-02 ENCOUNTER — CARE COORDINATION (OUTPATIENT)
Dept: CARE COORDINATION | Age: 74
End: 2024-07-02

## 2024-07-02 NOTE — CARE COORDINATION
Ambulatory Care Coordination Note     2024 2:19 PM     Patient Current Location:  Home: [address] [place], [place] [address]     This patient was received as a referral from Care Transition Nurse.    ACM contacted the spouse/partner  by telephone. Verified name and  with spouse/partner as identifiers. Provided introduction to self, and explanation of the ACM role.   Spouse/Partner accepted care management services at this time.          ACM: Gretchen Clark RN     Challenges to be reviewed by the provider   Additional needs identified to be addressed with provider No  none               Method of communication with provider: none.    Care Summary Note: CC outreach call placed to patient per handoff from [facility]. Spoke with spouse Yan who states patient is doing pretty well since bring home from [facility]. She has attended 1 PT session at [facility] and that went well. She uses husbands arm for balance since she lost majority of her balance from stroke 7 years ago. States she can walk short distances and utilizes wheelchair too. Appetite better than prior to hospital stay per Yan. Has cardiology appointment tomorrow, 7/3. Denied any new needs today. States he has not called PCP office yet to make appointment but plans to- a lot to do since they have been home. AC offered to call and make appointment and Yan states he will call.     Offered patient enrollment in the Remote Patient Monitoring (RPM) program for in-home monitoring: Yes, but did not enroll at this time: declined to enroll in the program becauseusing insurance RPM equipment per CTN handoff note .       Medications Reviewed:   Patient denies any changes with medications and reports taking all medications as prescribed.    Advance Care Planning:   Reviewed and current       PCP/Specialist follow up:   Future Appointments         Provider Specialty Dept Phone    7/3/2024 1:00 PM Morales Sosa MD Cardiology [phone]    2024 2:30 PM

## 2024-07-03 ENCOUNTER — OFFICE VISIT (OUTPATIENT)
Dept: CARDIOLOGY | Age: 74
End: 2024-07-03
Payer: MEDICARE

## 2024-07-03 VITALS
DIASTOLIC BLOOD PRESSURE: 74 MMHG | RESPIRATION RATE: 16 BRPM | SYSTOLIC BLOOD PRESSURE: 108 MMHG | WEIGHT: 103.6 LBS | OXYGEN SATURATION: 96 % | HEART RATE: 104 BPM | BODY MASS INDEX: 21.66 KG/M2

## 2024-07-03 DIAGNOSIS — I21.4 NSTEMI (NON-ST ELEVATED MYOCARDIAL INFARCTION) (HCC): Primary | ICD-10-CM

## 2024-07-03 DIAGNOSIS — E78.2 MIXED HYPERLIPIDEMIA: ICD-10-CM

## 2024-07-03 DIAGNOSIS — E11.9 TYPE 2 DIABETES MELLITUS WITHOUT COMPLICATION, WITH LONG-TERM CURRENT USE OF INSULIN (HCC): ICD-10-CM

## 2024-07-03 DIAGNOSIS — Z09 HOSPITAL DISCHARGE FOLLOW-UP: ICD-10-CM

## 2024-07-03 DIAGNOSIS — Z79.4 TYPE 2 DIABETES MELLITUS WITHOUT COMPLICATION, WITH LONG-TERM CURRENT USE OF INSULIN (HCC): ICD-10-CM

## 2024-07-03 DIAGNOSIS — R06.02 SOB (SHORTNESS OF BREATH): ICD-10-CM

## 2024-07-03 DIAGNOSIS — I10 ESSENTIAL HYPERTENSION: ICD-10-CM

## 2024-07-03 PROCEDURE — G8427 DOCREV CUR MEDS BY ELIG CLIN: HCPCS | Performed by: INTERNAL MEDICINE

## 2024-07-03 PROCEDURE — 3078F DIAST BP <80 MM HG: CPT | Performed by: INTERNAL MEDICINE

## 2024-07-03 PROCEDURE — 2022F DILAT RTA XM EVC RTNOPTHY: CPT | Performed by: INTERNAL MEDICINE

## 2024-07-03 PROCEDURE — 99214 OFFICE O/P EST MOD 30 MIN: CPT | Performed by: INTERNAL MEDICINE

## 2024-07-03 PROCEDURE — 93010 ELECTROCARDIOGRAM REPORT: CPT | Performed by: INTERNAL MEDICINE

## 2024-07-03 PROCEDURE — 1123F ACP DISCUSS/DSCN MKR DOCD: CPT | Performed by: INTERNAL MEDICINE

## 2024-07-03 PROCEDURE — 1090F PRES/ABSN URINE INCON ASSESS: CPT | Performed by: INTERNAL MEDICINE

## 2024-07-03 PROCEDURE — G8420 CALC BMI NORM PARAMETERS: HCPCS | Performed by: INTERNAL MEDICINE

## 2024-07-03 PROCEDURE — 3044F HG A1C LEVEL LT 7.0%: CPT | Performed by: INTERNAL MEDICINE

## 2024-07-03 PROCEDURE — 99211 OFF/OP EST MAY X REQ PHY/QHP: CPT | Performed by: INTERNAL MEDICINE

## 2024-07-03 PROCEDURE — 93005 ELECTROCARDIOGRAM TRACING: CPT | Performed by: INTERNAL MEDICINE

## 2024-07-03 PROCEDURE — 3074F SYST BP LT 130 MM HG: CPT | Performed by: INTERNAL MEDICINE

## 2024-07-03 PROCEDURE — 1036F TOBACCO NON-USER: CPT | Performed by: INTERNAL MEDICINE

## 2024-07-03 PROCEDURE — 1111F DSCHRG MED/CURRENT MED MERGE: CPT | Performed by: INTERNAL MEDICINE

## 2024-07-03 PROCEDURE — 3017F COLORECTAL CA SCREEN DOC REV: CPT | Performed by: INTERNAL MEDICINE

## 2024-07-03 PROCEDURE — G8400 PT W/DXA NO RESULTS DOC: HCPCS | Performed by: INTERNAL MEDICINE

## 2024-07-03 RX ORDER — AMLODIPINE BESYLATE 2.5 MG/1
2.5 TABLET ORAL DAILY
COMMUNITY
End: 2024-07-03 | Stop reason: SDUPTHER

## 2024-07-03 RX ORDER — AMLODIPINE BESYLATE 2.5 MG/1
2.5 TABLET ORAL DAILY
Qty: 90 TABLET | Refills: 3 | Status: SHIPPED | OUTPATIENT
Start: 2024-07-03

## 2024-07-03 NOTE — PROGRESS NOTES
6 weeks (around 8/14/2024). However, I would be happy to see her sooner should the need arise.    Sincerely,  Morales Sosa MD, F.A.C.C.  [facility] Cardiology Specialist    [address] [place], [place] [address]  Phone: [phone], Fax: [phone]     I believe that the risk of significant morbidity and mortality related to the patient's current medical conditions are: intermediate-high. At least 30 minutes were spent during prep work, discussion and exam of the patient, and follow up documentation and all of their questions were answered.     The documentation recorded by the scribe, accurately and completely reflects the services I personally performed and the decisions made by me. Morales Sosa MD, F.A.C.C. May 22, 2024

## 2024-07-05 ENCOUNTER — HOSPITAL ENCOUNTER (OUTPATIENT)
Dept: PHYSICAL THERAPY | Age: 74
Setting detail: THERAPIES SERIES
Discharge: HOME | End: 2024-07-05
Payer: MEDICARE

## 2024-07-05 PROCEDURE — 97110 THERAPEUTIC EXERCISES: CPT

## 2024-07-10 ENCOUNTER — CARE COORDINATION (OUTPATIENT)
Dept: CARE COORDINATION | Age: 74
End: 2024-07-10

## 2024-07-10 ENCOUNTER — HOSPITAL ENCOUNTER (OUTPATIENT)
Dept: PHYSICAL THERAPY | Age: 74
Setting detail: THERAPIES SERIES
Discharge: HOME | End: 2024-07-10
Payer: MEDICARE

## 2024-07-10 PROCEDURE — 97110 THERAPEUTIC EXERCISES: CPT

## 2024-07-10 PROCEDURE — 97530 THERAPEUTIC ACTIVITIES: CPT

## 2024-07-10 NOTE — CARE COORDINATION
Ambulatory Care Coordination Note     7/10/2024 2:50 PM       Per Follow Up Plan at last outreach, patient chart reviewed today. Do not note where PCP appointment was made. Noted she did have Cardiology follow up on 7/3/2024.        -Alen seen patient last on 6/12/24 at [facility]   -Patient was discharged home on 6/20/2024    Future Appointments   Date Time Provider Department Center   7/10/2024  4:45 PM Hoorman, Kimi, PTA [facility] [place]   7/11/2024  3:45 PM Hoorman, Kimi, PTA [facility] [place]   7/12/2024  2:00 PM Pat Cummings, PTA [facility] [place]   7/15/2024  2:15 PM Hoorman, Kimi, PTA [facility] [place]   7/17/2024  1:45 PM Hoorman, Kimi, PTA [facility] [place]   7/19/2024  1:30 PM Hoorman, Kimi, PTA [facility] [place]   7/22/2024  3:00 PM Hoorman, Kimi, PTA [facility] [place]   7/24/2024  1:45 PM Hoorman, Kimi, PTA [facility] [place]   7/26/2024  1:00 PM Deborah Steinberg, PT [facility] [place]   8/14/2024  2:00 PM Morales Sosa MD TIFF CARD [facility]   9/5/2024  1:45 PM [facility] CAT SCAN ROOM [facility] CT [facility]   9/18/2024  1:30 PM [facility]   10/21/2024  1:00 PM Byron Castaneda MD [facility] Byron KRAFT       Care Coordination Plan of Care:   This nurse Care Coordinator will  - route message to PCP office to see if earlier appointment is needed       Future Appointments   Date Time Provider Department Center   7/10/2024  4:45 PM Hoorman, Kimi, PTA [facility] [place]   7/11/2024  3:45 PM Hoorman, Kimi, PTA [facility] [place]   7/12/2024  2:00 PM Pat Cummings, PTA [facility] [place]   7/15/2024  2:15 PM Camila Pinedae, PTA [facility] [place]   7/17/2024  1:45 PM HoCamila agueroe, PTA [facility] [place]   7/18/2024  1:00 PM Mikayla Gates, APRN - CNP [facility] Byron Castaneda    7/19/2024  1:30 PM Camila Pinedae, PTA [facility] [place]   7/22/2024  3:00 PM Camila Pinedae, PTA [facility] [place]   7/24/2024  1:45 PM Camila Pinedae, PTA [facility] [place]   7/26/2024  1:00 PM Deborah Steinberg, PT [facility]

## 2024-07-10 NOTE — PROGRESS NOTES
to Date: 3)      Short Term Goals  Time Frame for Short Term Goals: 2 weeks  Short Term Goal 1: Initiate HEP and progress as tolerated for strength and mobility to instruct her .  Short Term Goal 2: Pt will be able to perform 5 consecutive sit to stands with use of UEs for functional erin LE strength.  Short Term Goal 3: Pt will have improved core strength to be able to sit edge of bed unsupported to assist with personal care    Long Term Goals  Time Frame for Long Term Goals : 4  Long Term Goal 1: Pt and  will be independent with her HEP for strength and mobility with ADLs.  Long Term Goal 2: Pt will be able to ambulate up to 10 feet with +1 min assist with RW to assist in progressive walking.  Long Term Goal 3: Pt strength in erin LEs will be at least 4-/5 to improve stability with mobility related ADLs.    Minutes Tracking:  Time In: 1643  Time Out: 1730  Minutes: 47  Timed Code Treatment Minutes: 46 Minutes    Kimi Pineda, MORA     Date: 7/10/2024

## 2024-07-11 ENCOUNTER — HOSPITAL ENCOUNTER (OUTPATIENT)
Dept: PHYSICAL THERAPY | Age: 74
Setting detail: THERAPIES SERIES
Discharge: HOME | End: 2024-07-11
Payer: MEDICARE

## 2024-07-11 PROCEDURE — 97530 THERAPEUTIC ACTIVITIES: CPT

## 2024-07-11 PROCEDURE — 97112 NEUROMUSCULAR REEDUCATION: CPT

## 2024-07-11 PROCEDURE — 97110 THERAPEUTIC EXERCISES: CPT

## 2024-07-11 NOTE — PROGRESS NOTES
Phone: [phone]                 [facility]           Fax: [phone]                           Outpatient Physical Therapy                                                                            Daily Note    Patient: Macey Zazueta : 1950  [facility] #: [id number]   Referring Physician: Byron Castaneda MD  Date: 2024    Diagnosis: Muscle weakness (generalized) M62.81  Treatment Diagnosis: general weakness    Onset Date: 24  PT Insurance Information: Medicare Part A and B  Total # of Visits Approved: 12 Per Physician Order  Total # of Visits to Date: 4  No Show: 0  Canceled Appointment: 0    24 Plan of Care/Recert Due    Pre-Treatment Pain:  0/10  Subjective: Pt denies pain or soreness today and after yesterday's session. Pt  present    Exercises:  Exercise 1: HEP  Exercise 5: sit<>stand x10 -from mat table, initially modA, otherwise Alessio with hands on table  Exercise 6: amb (Pt holding onto 's forearm with therapist modA with gait belt, verbal cues for sequencing) x~10'  Exercise 8: Standing weight shifting (pt holding therapist shoulders), have pt \"kick\" therapist to get her to weight shift  Exercise 9: Seated EOB: cone stacking activity reaching outside CLIFF and across midline, BLE ther ex: LAQ and heel/toe raises x10ea    Assessment  Assessment: Pt completed all sitting activities this date sitting EOB unsupported, CGA for safety with max verbal cues provided for postural awareness and core activation with good carry over noted. Pt completed cone activity seated EOB, reaching outside CLIFF and across midline to improve righting reactions and challenging pt core stability, with good pt tolerance noted. Pt able to complete sit <>stand transfers with Alessio and pushing off of mat table, verbal and tactile cues provided for proper technique. Continued to perform weight shifting activities to improve pt confidence in RLE and for improved gait sequencing. Will continue

## 2024-07-12 ENCOUNTER — HOSPITAL ENCOUNTER (OUTPATIENT)
Dept: PHYSICAL THERAPY | Age: 74
Setting detail: THERAPIES SERIES
Discharge: HOME | End: 2024-07-12
Payer: MEDICARE

## 2024-07-12 PROCEDURE — 97110 THERAPEUTIC EXERCISES: CPT

## 2024-07-12 NOTE — PROGRESS NOTES
tolerance.    Activity Tolerance  Activity Tolerance: Patient tolerated treatment well    Patient Education  Patient Education: Exercise rationale.  Pt verbalized/demonstrated good understanding:     [x] Yes         [] No, pt required further clarification.       Post Treatment Pain:  0/10      Plan  Plan Frequency: 1x/week for 1 week then increase to 3x/week  Plan weeks: 4 weeks total       Goals  (Total # of Visits to Date: 5)      Short Term Goals  Time Frame for Short Term Goals: 2 weeks  Short Term Goal 1: Initiate HEP and progress as tolerated for strength and mobility to instruct her .  Short Term Goal 2: Pt will be able to perform 5 consecutive sit to stands with use of UEs for functional erin LE strength.  Short Term Goal 3: Pt will have improved core strength to be able to sit edge of bed unsupported to assist with personal care    Long Term Goals  Time Frame for Long Term Goals : 4  Long Term Goal 1: Pt and  will be independent with her HEP for strength and mobility with ADLs.  Long Term Goal 2: Pt will be able to ambulate up to 10 feet with +1 min assist with RW to assist in progressive walking.  Long Term Goal 3: Pt strength in erin LEs will be at least 4-/5 to improve stability with mobility related ADLs.    Minutes Tracking:  Time In: 1357  Time Out: 1439  Minutes: 42  Timed Code Treatment Minutes: 40 Minutes        Pat Cummings PTA     Date: 7/12/2024

## 2024-07-15 ENCOUNTER — HOSPITAL ENCOUNTER (OUTPATIENT)
Dept: PHYSICAL THERAPY | Age: 74
Setting detail: THERAPIES SERIES
Discharge: HOME | End: 2024-07-15
Payer: MEDICARE

## 2024-07-15 PROCEDURE — 97110 THERAPEUTIC EXERCISES: CPT

## 2024-07-15 PROCEDURE — 97530 THERAPEUTIC ACTIVITIES: CPT

## 2024-07-15 NOTE — PROGRESS NOTES
Phone: [phone]                 [facility]           Fax: [phone]                           Outpatient Physical Therapy                                                                            Daily Note    Patient: Macey Zazueta : 1950  [facility] #: [id number]   Referring Physician: Byron Castaneda MD  Date: 7/15/2024    Diagnosis: Muscle weakness (generalized) M62.81  Treatment Diagnosis: general weakness    Onset Date: 24  PT Insurance Information: Medicare Part A and B  Total # of Visits Approved: 12 Per Physician Order  Total # of Visits to Date: 6  No Show: 0  Canceled Appointment: 0    24 Plan of Care/Recert Due    Pre-Treatment Pain:  0/10  Subjective: Pt denies pain, states she is feeling \"ok\". RTD on Thursday    Exercises:  Exercise 1: HEP  Exercise 3: supine - heel slide 2#, SAQ 2#, supine bridging, adductor ball squeeze, marches- all ex x10 each  Exercise 4: 6\" step alternating taps at stairs BUE support x10 ea  Exercise 5: sit<>stand 2x10 -Alessio/CGA from mat table  Exercise 9: Seated EOB: cone stacking activity reaching outside CLIFF and across midline, BLE ther ex: LAQ and heel/toe raises x10ea  (only ther ex today)    Assessment  Assessment: Pt CGA/Alessio t/o session for all seated EOB activities and transfers. Bed mobility: Alessio/ModA to sit up for supine to sit t/f, otherwise CGA/Alessio. Progressed supine ther ex this date with good pt tolerance, verbal cues for pt to complete ther ex in full ROM with fair pt carry over noted. Pt continues to improve  with sit<>stand t/fs requiring fewer verbal cues this date for techinuque. Will plan to work on more ambulation next visit and continue to progress per pt tolerance    Activity Tolerance  Activity Tolerance: Patient tolerated treatment well    Patient Education  Patient Education: Exercise rationale.  Pt verbalized/demonstrated good understanding:     [x] Yes         [] No, pt required further clarification.    Post

## 2024-07-17 ENCOUNTER — HOSPITAL ENCOUNTER (OUTPATIENT)
Dept: PHYSICAL THERAPY | Age: 74
Setting detail: THERAPIES SERIES
Discharge: HOME | End: 2024-07-17
Payer: MEDICARE

## 2024-07-17 PROCEDURE — 97116 GAIT TRAINING THERAPY: CPT

## 2024-07-17 PROCEDURE — 97530 THERAPEUTIC ACTIVITIES: CPT

## 2024-07-17 PROCEDURE — 97110 THERAPEUTIC EXERCISES: CPT

## 2024-07-17 NOTE — PROGRESS NOTES
Phone: [phone]                 [facility]           Fax: [phone]                           Outpatient Physical Therapy                                                                            Daily Note    Patient: Macey Zazueta : 1950  [facility] #: [id number]   Referring Physician: Byron Castaneda MD  Date: 2024    Diagnosis: Muscle weakness (generalized) M62.81  Treatment Diagnosis: general weakness    Onset Date: 24  PT Insurance Information: Medicare Part A and B  Total # of Visits Approved: 12 Per Physician Order  Total # of Visits to Date: 7  No Show: 0  Canceled Appointment: 0    24 Plan of Care/Recert Due    Pre-Treatment Pain:  0/10  Subjective: Pt denies pain or discomfort.    Exercises:  Exercise 1: HEP  Exercise 4: 6\" step alternating taps at stairs BUE support x10 ea  Exercise 5: sit<>stand 2x10 -Alessio/CGA from mat table  Exercise 9: Seated EOB: cone stacking activity reaching outside CLIFF and across midline, BLE ther ex: LAQ and heel/toe raises x10ea  (only ther ex today)  Exercise 10: Pt fwd walking at counter, UE support on counter and HHA on other side x2 laps  Exercise 11: side stepping at counter x2 laps    Assessment  Assessment: Progressed pt with gait training at counter using countertop for support as well as HHA x1 with verbal cues for pt to increase step length/height especially RLE, fair pt carry over noted and pt with good tolerance to activity. Pt's sit<>stand t/fs coninue to improve with pt requiring less assistance this date. Verbal cues provided for technique with good pt carry over. Will continue to progress per pt tolerance    Activity Tolerance  Activity Tolerance: Patient tolerated treatment well    Patient Education  Patient Education: Exercise rationale.  Pt verbalized/demonstrated good understanding:     [x] Yes         [] No, pt required further clarification.    Post Treatment Pain:  0/10      Plan  Plan Frequency: 1x/week for 1 week

## 2024-07-19 ENCOUNTER — HOSPITAL ENCOUNTER (OUTPATIENT)
Dept: PHYSICAL THERAPY | Age: 74
Setting detail: THERAPIES SERIES
Discharge: HOME | End: 2024-07-19
Payer: MEDICARE

## 2024-07-19 PROCEDURE — 97112 NEUROMUSCULAR REEDUCATION: CPT

## 2024-07-19 PROCEDURE — 97530 THERAPEUTIC ACTIVITIES: CPT

## 2024-07-19 PROCEDURE — 97116 GAIT TRAINING THERAPY: CPT

## 2024-07-19 NOTE — PROGRESS NOTES
Phone: [phone]                 [facility]           Fax: [phone]                           Outpatient Physical Therapy                                                                            Daily Note    Patient: Macey Zazueta : 1950  [facility] #: [id number]   Referring Physician: Byron Castaneda MD  Date: 2024    Diagnosis: Muscle weakness (generalized) M62.81  Treatment Diagnosis: general weakness    Onset Date: 24  PT Insurance Information: Medicare Part A and B  Total # of Visits Approved: 12 Per Physician Order  Total # of Visits to Date: 8  No Show: 0  Canceled Appointment: 0    24 Plan of Care/Recert Due    Pre-Treatment Pain:  0/10  Subjective: Pt states she is doing \"good\"    Exercises:  Exercise 1: HEP  Exercise 4: 6\" step alternating taps at stairs BUE support 2x10 ea  Exercise 5: sit<>stand x10 -Alessio/CGA from mat table  Exercise 6: amb (B HHA from  and therapist with gait belt, verbal cues for sequencing) 2x10'  Exercise 10: Pt fwd walking at counter, UE support on counter and HHA on other side x3 laps  Exercise 11: side stepping at counter x2 laps  Exercise 12: cone tap sequencing at counter (pt tapping different colored cones with foot, B, weight shifting activity)    Assessment  Assessment: Continued to progress weight shifting dynamic balance activities with good pt tolerance. Improved gait this date with pt requiring Alessio x2 amb 2x10'  with B HHA. Verbal cues provided t/o session for postural awareness with good carry over noted. Pt CGA for sit<>stand t/fs from w/c and occassional Alessio when sitting for control. Will continue    Activity Tolerance  Activity Tolerance: Patient tolerated treatment well    Patient Education  Patient Education: Exercise rationale.  Pt verbalized/demonstrated good understanding:     [x] Yes         [] No, pt required further clarification.    Post Treatment Pain:  0/10      Plan  Plan Frequency: 1x/week for 1 week then

## 2024-07-22 ENCOUNTER — HOSPITAL ENCOUNTER (OUTPATIENT)
Dept: PHYSICAL THERAPY | Age: 74
Setting detail: THERAPIES SERIES
Discharge: HOME | End: 2024-07-22
Payer: MEDICARE

## 2024-07-22 ENCOUNTER — CARE COORDINATION (OUTPATIENT)
Dept: CARE COORDINATION | Age: 74
End: 2024-07-22

## 2024-07-22 PROCEDURE — 97110 THERAPEUTIC EXERCISES: CPT

## 2024-07-22 PROCEDURE — 97530 THERAPEUTIC ACTIVITIES: CPT

## 2024-07-22 PROCEDURE — 97116 GAIT TRAINING THERAPY: CPT

## 2024-07-22 NOTE — PROGRESS NOTES
Phone: [phone]                 [facility]           Fax: [phone]                           Outpatient Physical Therapy                                                                            Daily Note    Patient: Macey Zazueta : 1950  [facility] #: [id number]   Referring Physician: Byron Castaneda MD  Date: 2024    Diagnosis: Muscle weakness (generalized) M62.81  Treatment Diagnosis: general weakness    Onset Date: 24  PT Insurance Information: Medicare Part A and B  Total # of Visits Approved: 12 Per Physician Order  Total # of Visits to Date: 9  No Show: 0  Canceled Appointment: 0    24 Plan of Care/Recert Due    Pre-Treatment Pain:  0/10  Subjective: Pt denies pain or discomfort. Pt's  reports good compliance with HEP and no concerns at this time.    Exercises:  Exercise 4: 6\" step alternating taps at stairs BUE support 2x10 ea  (only 1 set today)  Exercise 5: sit<>stand x10 -Alessio/CGA from mat table  Exercise 7: standing sinks (pt holding therapist shoulders) 10x --attempted marches, weight shifting  Exercise 8: Standing weight shifting (pt holding therapist shoulders), have pt \"kick\" therapist to get her to weight shift  Exercise 9: Seated EOB: cone stacking activity reaching outside CLIFF and across midline, BLE ther ex: LAQ and heel/toe raises x10ea  (only ther ex today)  Exercise 10: Pt fwd walking at counter, UE support on counter and HHA on other side x3 laps (only 2 laps today with break in between due to fatigue)  Exercise 13: sit to stands +2 steps fwd and 2 steps bwd    Assessment  Assessment: Pt with fair tolerance to session this date, continues to demo improvement with t/fs requiring less assist than previous visits. Pt demonstrated increased fatigue compared to past visits, however pt's  states this may be due to staying up late last night. Progressed pt with sit to stands and taking 2 steps fwd/bwd with max verbal cues for sequencing, fair pt

## 2024-07-23 NOTE — CARE COORDINATION
Ambulatory Care Coordination Note     7/22/2024     Spouse/Partner outreach attempt by this ACM today to perform care management follow up . ACM was unable to reach the spouse/partner  by telephone today; left voice message requesting a return phone call to this ACM.     ACM: Gretchen Clark RN       PCP/Specialist follow up:   Future Appointments         Provider Specialty Dept Phone    7/24/2024 1:45 PM Kimi Pineda, PTA Physical Therapy [phone]    7/26/2024 1:00 PM Deborah Steinberg, PT Physical Therapy [phone]    8/14/2024 2:00 PM Morales Sosa MD Cardiology [phone]    9/5/2024 1:45 PM (Arrive by 12:15 PM) [facility] SCAN ROOM Radiology [phone]    9/18/2024 1:30 PM Jose Luis Goldsmith, PA-C Urology [phone]    10/21/2024 1:00 PM Byron Castaneda MD Internal Medicine [phone]            Follow Up:   Plan for next AC outreach in approximately 1 week to complete:  - disease specific assessments  - SDOH assessments  - goal progression.            Previously Declined (within the last year)

## 2024-07-24 ENCOUNTER — HOSPITAL ENCOUNTER (OUTPATIENT)
Dept: PHYSICAL THERAPY | Age: 74
Setting detail: THERAPIES SERIES
Discharge: HOME | End: 2024-07-24
Payer: MEDICARE

## 2024-07-24 PROCEDURE — 97116 GAIT TRAINING THERAPY: CPT

## 2024-07-24 PROCEDURE — 97110 THERAPEUTIC EXERCISES: CPT

## 2024-07-24 PROCEDURE — 97530 THERAPEUTIC ACTIVITIES: CPT

## 2024-07-24 NOTE — PROGRESS NOTES
Phone: [phone]                 [facility]           Fax: [phone]                           Outpatient Physical Therapy                                                                            Daily Note    Patient: Macey Zazueta : 1950  [facility] #: [id number]   Referring Physician: Byron Castaneda MD  Date: 2024    Diagnosis: Muscle weakness (generalized) M62.81  Treatment Diagnosis: general weakness    Onset Date: 24  PT Insurance Information: Medicare Part A and B  Total # of Visits Approved: 12 Per Physician Order  Total # of Visits to Date: 10  No Show: 0  Canceled Appointment: 0    24 Plan of Care/Recert Due    Pre-Treatment Pain:  0/10  Subjective: Pt states she is doing ok. Pt's  reports satisfaction with therapy progress so far and would like to continue with therapy after next appointment    Exercises:  Exercise 1: HEP  Exercise 4: 6\" step alternating taps at stairs BUE support 2x10 ea  Exercise 5: sit<>stand x10 -CGA, BUE on mat table  Exercise 6: amb with RW +Alessio x1   2x20'  Exercise 11: side stepping at counter x2 laps  Exercise 12: cone tap sequencing at  RW (pt tapping different colored cones with foot, B, weight shifting activity)  Exercise 14: lateral flat earnestine step over x6 ea    Assessment  Assessment: Pt has met STGs and is making good progress towards LTGs. Continued to progress gait activities this date with pt able to amb 2x20' with RW and Alessio x1 with max verbal/tactile cues for sequencing, posture, and keeping AD in close proximity to body with fair carry over noted. Pt able to perform 10 consecutive sit to stand t/fs on mat table with BUE support and CGA, min verbal cues provided for sequencing. Will continue to progress    Activity Tolerance  Activity Tolerance: Patient tolerated treatment well    Patient Education  Patient Education: Exercise rationale.  Pt verbalized/demonstrated good understanding:     [x] Yes         [] No, pt

## 2024-08-01 ENCOUNTER — CARE COORDINATION (OUTPATIENT)
Dept: CARE COORDINATION | Age: 74
End: 2024-08-01

## 2024-08-01 ENCOUNTER — HOSPITAL ENCOUNTER (OUTPATIENT)
Dept: PHYSICAL THERAPY | Age: 74
Setting detail: THERAPIES SERIES
Discharge: HOME | End: 2024-08-01
Payer: MEDICARE

## 2024-08-01 PROCEDURE — 97110 THERAPEUTIC EXERCISES: CPT

## 2024-08-01 PROCEDURE — 97116 GAIT TRAINING THERAPY: CPT

## 2024-08-01 NOTE — CARE COORDINATION
Ambulatory Care Coordination Note     8/1/2024 11:55 AM     Spouse/Partner outreach attempt by this ACM today to perform care management follow up . ACM was unable to reach the spouse/partner  by telephone today; left voice message requesting a return phone call to this ACM.     ACM: Gretchen Clark, RN     Care Summary Note: 2nd unsuccessful outreach call     PCP/Specialist follow up:   Future Appointments         Provider Specialty Dept Phone    8/1/2024  3:30 PM Pat Cumminsg PTA Physical Therapy [phone]    8/14/2024 2:00 PM Morales Sosa MD Cardiology [phone]    9/5/2024 1:45 PM (Arrive by 12:15 PM) [facility] SCAN ROOM Radiology [phone]    9/18/2024 1:30 PM Jose Luis Goldsmith, PA-C Urology [phone]    10/21/2024 1:00 PM Byron Castaneda MD Internal Medicine [phone]            Follow Up:   Plan for next AC outreach in approximately 2 weeks to complete:  - disease specific assessments  - SDOH assessments  - goal progression.

## 2024-08-02 NOTE — PROGRESS NOTES
Phone: [phone]                 [facility]           Fax: [phone]                           Outpatient Physical Therapy                                                                            Daily Note    Patient: Macey Zazueta : 1950  [facility] #: [id number]   Referring Physician: Byron Castaneda MD  Date: 2024       Treatment Diagnosis: general weakness    Onset Date: 24  PT Insurance Information: Medicare Part A and B  Total # of Visits Approved: 12 Per Physician Order  Total # of Visits to Date: 11  No Show: 0  Canceled Appointment: 0    24 Plan of Care/Recert Due    Pre-Treatment Pain:  0/10  Subjective: Patient reports doing \"ok\" today, denies pain or discomfort.    Exercises:  Exercise 2: Sitting LAQ, hip flex - 2x10 each 2# weights, OTB hip abd/ hamstring curls - no weight  Exercise 4: 6\" step alternating taps at stairs BUE support 2x10 ea  Exercise 5: sit<>stand x10 -CGA, BUE on mat table  Exercise 6: amb with RW +Alessio x1   20ft x 1, 33ft x 1  Exercise 7: standing sink exercises 10x --attempted heel raises, marches, weight shifting B UE support with min A from PTA  Exercise 8: Standing weight shifting (pt holding therapist shoulders), have pt \"kick\" therapist to get her to weight shift    Assessment  Body Structures, Functions, Activity Limitations Requiring Skilled Therapeutic Intervention: Decreased functional mobility , Decreased endurance, Decreased posture, Decreased strength, Decreased safe awareness  Assessment: Patient able to progress ambulation with RW + min A from PTA, pt's  followed with w/c. Mod verbal/tactile cues for upright posture, hand placement, RW and gait sequencing, and to stay within the walker with fair carryover. Pt displayed L lateral lean this date and decreased safety awareness. Continue per tolerance.    Activity Tolerance  Activity Tolerance: Patient tolerated treatment well, Patient limited by fatigue    Patient

## 2024-08-05 LAB — B-TYPE NATRIURETIC PEPTIDE: 20 PG/ML

## 2024-08-06 ENCOUNTER — TELEPHONE (OUTPATIENT)
Dept: CARDIOLOGY | Age: 74
End: 2024-08-06

## 2024-08-06 NOTE — TELEPHONE ENCOUNTER
----- Message from Morales Sosa MD sent at 8/6/2024  8:41 AM EDT -----  Blood work is good.  Will discuss further on follow-up.  Please call with questions or essential concerns.  Thank you

## 2024-08-08 ENCOUNTER — HOSPITAL ENCOUNTER (OUTPATIENT)
Dept: PHYSICAL THERAPY | Age: 74
Setting detail: THERAPIES SERIES
Discharge: HOME | End: 2024-08-08
Payer: MEDICARE

## 2024-08-08 PROCEDURE — 97530 THERAPEUTIC ACTIVITIES: CPT

## 2024-08-08 PROCEDURE — 97110 THERAPEUTIC EXERCISES: CPT

## 2024-08-08 NOTE — PROGRESS NOTES
Phone: [phone]                 [facility]           Fax: [phone]                           Outpatient Physical Therapy                                                                            Daily Note    Patient: Macey Zazueta : 1950  CSN #: [id number]   Referring Physician: Byron Castaneda MD  Date: 2024    Diagnosis: Muscle weakness (generalized) M62.81  Treatment Diagnosis: general weakness    Onset Date: 24  PT Insurance Information: Medicare Part A and B  Total # of Visits Approved: 12 Per Physician Order  Total # of Visits to Date: 12  No Show: 0  Canceled Appointment: 0    24 Plan of Care/Recert Due    Pre-Treatment Pain:  0/10  Subjective: Patient reports doing \"ok\" today, denies pain or discomfort.    Exercises:  Exercise 1: HEP  Exercise 3: supine - heel slide 2#, SAQ 2#, supine bridging, adductor ball squeeze, marches- all ex x10 each (only heel slide 2#, SLR, abduction x10 ea this date)  Exercise 4: 6\" step alternating taps at stairs BUE support 2x10 ea  Exercise 5: sit<>stand x10 -CGA, BUE on mat table  Exercise 10: Pt fwd walking at counter, UE support on counter and HHA on other side x3 laps (only 2 laps today)  Exercise 11: side stepping at counter x2 laps (only 1 lap today)  Exercise 15: cone taps at counter fwd/lat with LLE, forcing pt to weight shift into RLE    Assessment  Assessment: Continued to focus on BLE strengthening ther ex and weight shifting especially into RLE for improved transfers and gait. Verbal and tactile cues provided t/o tx for proper muscle activation and sequencing with fair carry over noted. Will continue to progress per pt tolerance    Activity Tolerance  Activity Tolerance: Patient tolerated treatment well, Patient limited by fatigue    Patient Education  Patient Education: Exercise rationale.  Pt verbalized/demonstrated good understanding:     [x] Yes         [] No, pt required further clarification.    Post Treatment

## 2024-08-14 ENCOUNTER — OFFICE VISIT (OUTPATIENT)
Dept: CARDIOLOGY | Age: 74
End: 2024-08-14
Payer: MEDICARE

## 2024-08-14 VITALS
HEART RATE: 85 BPM | HEIGHT: 58 IN | SYSTOLIC BLOOD PRESSURE: 101 MMHG | DIASTOLIC BLOOD PRESSURE: 65 MMHG | RESPIRATION RATE: 18 BRPM | OXYGEN SATURATION: 96 % | BODY MASS INDEX: 20.87 KG/M2 | WEIGHT: 99.4 LBS

## 2024-08-14 DIAGNOSIS — Z09 HOSPITAL DISCHARGE FOLLOW-UP: ICD-10-CM

## 2024-08-14 DIAGNOSIS — I21.4 NSTEMI (NON-ST ELEVATED MYOCARDIAL INFARCTION) (HCC): Primary | ICD-10-CM

## 2024-08-14 DIAGNOSIS — I10 ESSENTIAL HYPERTENSION: ICD-10-CM

## 2024-08-14 DIAGNOSIS — I63.439 CEREBROVASCULAR ACCIDENT (CVA) DUE TO EMBOLISM OF POSTERIOR CEREBRAL ARTERY, UNSPECIFIED BLOOD VESSEL LATERALITY (HCC): ICD-10-CM

## 2024-08-14 DIAGNOSIS — E78.2 MIXED HYPERLIPIDEMIA: ICD-10-CM

## 2024-08-14 PROCEDURE — 1090F PRES/ABSN URINE INCON ASSESS: CPT | Performed by: INTERNAL MEDICINE

## 2024-08-14 PROCEDURE — 1036F TOBACCO NON-USER: CPT | Performed by: INTERNAL MEDICINE

## 2024-08-14 PROCEDURE — 3074F SYST BP LT 130 MM HG: CPT | Performed by: INTERNAL MEDICINE

## 2024-08-14 PROCEDURE — 3017F COLORECTAL CA SCREEN DOC REV: CPT | Performed by: INTERNAL MEDICINE

## 2024-08-14 PROCEDURE — 99214 OFFICE O/P EST MOD 30 MIN: CPT | Performed by: INTERNAL MEDICINE

## 2024-08-14 PROCEDURE — 3078F DIAST BP <80 MM HG: CPT | Performed by: INTERNAL MEDICINE

## 2024-08-14 PROCEDURE — G8420 CALC BMI NORM PARAMETERS: HCPCS | Performed by: INTERNAL MEDICINE

## 2024-08-14 PROCEDURE — G8400 PT W/DXA NO RESULTS DOC: HCPCS | Performed by: INTERNAL MEDICINE

## 2024-08-14 PROCEDURE — G8427 DOCREV CUR MEDS BY ELIG CLIN: HCPCS | Performed by: INTERNAL MEDICINE

## 2024-08-14 PROCEDURE — 1123F ACP DISCUSS/DSCN MKR DOCD: CPT | Performed by: INTERNAL MEDICINE

## 2024-08-14 NOTE — PROGRESS NOTES
I, Patricia Loredo am scribing for and in the presence of Morales Sosa MD, F.A.C.C..    Patient: Macey Zazueta  : 1950  Primary Care Physician: Byron Castaneda  Today's Date: 2024    REASON FOR CONSULTATION: Hypertension (Hx: Sepsis, Diabetes,HTN,HLD, CVA. Pt is here for a 6 week follow up, she has been doing great. Denies CP, light headed/dizziness, palps, SOB. )      HPI:  Ms. Zazueta is a 74 y.o. female who presented today for follow-up.    She was admitted to the hospital on 2024 because of septic shock.  Cardiology consulted because of elevated troponin.      Ms. Zazueta has history of TIA's or strokes 7 years ago.  As per her caregiver, patient is not mobile.  She presented with hydronephrosis and complicated UTI.  Underwent cystoscopy which went uneventful but later presented to the emergency room with altered mental status with ureteral stent visibly seen.  The stent was removed by the emergency room team.  Patient was hypotensive and with elevated lactate level and tachycardia consistent with septic shock.  Started on IV fluid hydration and Levophed.  Finally we were able to wean her off of the Levophed.  Troponin is elevated but it is trending down consistent with type II myocardial infarction \" myocardial infarction secondary to demand ischemia\"    She has no history of coronary artery disease but I did review her CT scan of the chest from 2024 and patient does have extensive coronary artery calcification consistent with a stable CAD.  She is on aspirin, Lipitor and amlodipine at home.    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  Mild narrowing of the bilateral supraclinoid ICAs.    Echocardiogram completed on 2024: EF of 60-65%  Left ventricle size is normal. Normal wall thickness. Normal wall motion. Grade I diastolic

## 2024-08-15 ENCOUNTER — CARE COORDINATION (OUTPATIENT)
Dept: CARE COORDINATION | Age: 74
End: 2024-08-15

## 2024-08-15 ENCOUNTER — HOSPITAL ENCOUNTER (OUTPATIENT)
Dept: PHYSICAL THERAPY | Age: 74
Setting detail: THERAPIES SERIES
Discharge: HOME | End: 2024-08-15
Payer: MEDICARE

## 2024-08-15 PROCEDURE — 97110 THERAPEUTIC EXERCISES: CPT

## 2024-08-15 PROCEDURE — 97530 THERAPEUTIC ACTIVITIES: CPT

## 2024-08-15 PROCEDURE — 97116 GAIT TRAINING THERAPY: CPT

## 2024-08-15 NOTE — CARE COORDINATION
Ambulatory Care Coordination Note     8/15/2024 4:07 PM     Patient outreach attempt by this ACM today to perform care management follow up . ACM was unable to reach the patient, spouse/partner  by telephone today.     PCP/Specialist follow up:   Future Appointments         Provider Specialty Dept Phone    8/22/2024 3:00 PM Kimi Pineda, PTA Physical Therapy [phone]    8/29/2024 1:00 PM Deborah Steinberg, PT Physical Therapy [phone]    9/5/2024 1:45 PM (Arrive by 12:15 PM) [facility] SCAN ROOM Radiology [phone]    9/18/2024 1:30 PM Jose Luis Goldsmith, PA-C Urology [phone]    10/21/2024 1:00 PM Byron Castaneda MD Internal Medicine [phone]    2/19/2025 2:40 PM Morales Sosa MD Cardiology [phone]            Follow Up:   Plan for next ACM outreach in approximately 1 week to complete:  - goal progression  - education   Graduation if no new needs .

## 2024-08-15 NOTE — PROGRESS NOTES
0/10      Plan  Plan Frequency: 1x/week for 1 week then increase to 3x/week  Plan weeks: 4 weeks total       Goals  (Total # of Visits to Date: 13)      Short Term Goals  Time Frame for Short Term Goals: 2 weeks  Short Term Goal 1: Initiate HEP and progress as tolerated for strength and mobility to instruct her . -met  Short Term Goal 2: Pt will be able to perform 5 consecutive sit to stands with use of UEs for functional erin LE strength. -met(7/24/2024 Pt able to perform 10 consecutive sit to stands with BUEs)  Short Term Goal 3: Pt will have improved core strength to be able to sit edge of bed unsupported to assist with personal care -met    Long Term Goals  Time Frame for Long Term Goals : 4  Long Term Goal 1: Pt and  will be independent with her HEP for strength and mobility with ADLs. -met  Long Term Goal 2: Pt will be able to ambulate up to 10 feet with +1 min assist with RW to assist in progressive walking. -met(7/24/2024 Pt able to ambulate 2x20ft with RW Alessio x1)  Long Term Goal 3: Pt strength in erin LEs will be at least 4-/5 to improve stability with mobility related ADLs. -progressing    Minutes Tracking:  Time In: 1501  Time Out: 1548  Minutes: 47  Timed Code Treatment Minutes: 46 Minutes    Kimi Pineda PTA     Date: 8/15/2024

## 2024-08-22 ENCOUNTER — HOSPITAL ENCOUNTER (OUTPATIENT)
Dept: PHYSICAL THERAPY | Age: 74
Setting detail: THERAPIES SERIES
Discharge: HOME | End: 2024-08-22
Payer: MEDICARE

## 2024-08-22 PROCEDURE — 97116 GAIT TRAINING THERAPY: CPT

## 2024-08-22 PROCEDURE — 97530 THERAPEUTIC ACTIVITIES: CPT

## 2024-08-22 PROCEDURE — 97110 THERAPEUTIC EXERCISES: CPT

## 2024-08-22 NOTE — PROGRESS NOTES
Phone: [phone]                 [facility]           Fax: [phone]                           Outpatient Physical Therapy                                                                            Daily Note    Patient: Macey Zazueta : 1950  CSN #: [id number]   Referring Physician: Byron Castaneda MD  Date: 2024    Diagnosis: Muscle weakness (generalized) M62.81  Treatment Diagnosis: general weakness    Onset Date: 24  PT Insurance Information: Medicare Part A and B  Total # of Visits Approved: 12 Per Physician Order  Total # of Visits to Date: 14  No Show: 0  Canceled Appointment: 0    24 Plan of Care/Recert Due    Pre-Treatment Pain:  0/10  Subjective: Pt reports she is doing good today.    Exercises:  Exercise 1: HEP  Exercise 4: 6\" step alternating taps at stairs BUE support x15 ea  Exercise 5: sit<>stand x10 -CGA, BUE on mat table  Exercise 6: amb with RW +Alessio x1   10ft x1  Exercise 10: Pt fwd walking at counter, UE support on counter and HHA on other side x3 laps (only 2 laps today)  Exercise 11: side stepping at counter x2 laps (only 1 lap today)  Exercise 14: fwd and lateral flat earnestine step over x10 ea  Exercise 16: Standing at counter: cone stacking activity reaching outside CLIFF and across midline    Assessment  Assessment: Progressed pt this date with dynamic standing balance activity at counter reaching outside CLIFF and across midline stacking cones. Verbal cues for posture and core activation with pt using moderate UUE support on counter. Pt able to ambulate ~10ft with FWW, Alessio with AD and max verbal cues for sequencing. Pt demonstrated and reported fatigue in BLEs post tx. Will continue to progress per pt tolerance    Activity Tolerance  Activity Tolerance: Patient tolerated treatment well    Patient Education  Patient Education: Exercise rationale.  Pt verbalized/demonstrated good understanding:     [x] Yes         [] No, pt required further

## 2024-08-23 ENCOUNTER — CARE COORDINATION (OUTPATIENT)
Dept: CARE COORDINATION | Age: 74
End: 2024-08-23

## 2024-08-23 NOTE — CARE COORDINATION
Ambulatory Care Coordination Note     8/23/2024 11:30 AM     Patient closed (unable to reach patient) from the High Risk Care Management program on 8/23/2024. Contact information left on previous voicemail's left if future needs arise.     No further Ambulatory Care Manager follow up scheduled    Future Appointments   Date Time Provider Department Center   8/29/2024  1:00 PM Deborah Steinberg, PT [facility] PT [place]   9/5/2024  1:45 PM [facility] CAT SCAN ROOM [facility] CT [facility] Rad   9/18/2024  1:30 PM Jose Luis Goldsmith PA-C [facility] UROLOGY [facility]   10/21/2024  1:00 PM Byron Castaneda MD [facility] Byron Castaneda    2/19/2025  2:40 PM [facility]     .

## 2024-08-29 ENCOUNTER — HOSPITAL ENCOUNTER (OUTPATIENT)
Dept: PHYSICAL THERAPY | Age: 74
Setting detail: THERAPIES SERIES
Discharge: HOME | End: 2024-08-29
Payer: MEDICARE

## 2024-08-29 PROCEDURE — 97530 THERAPEUTIC ACTIVITIES: CPT

## 2024-08-29 PROCEDURE — 97116 GAIT TRAINING THERAPY: CPT

## 2024-08-29 PROCEDURE — 97110 THERAPEUTIC EXERCISES: CPT

## 2024-08-29 NOTE — PROGRESS NOTES
Phone: [phone]                 [facility]           Fax: [phone]                           Outpatient Physical Therapy                                                                            Daily Note    Patient: Macey Zazueta : 1950  CSN #: [id number]   Referring Physician: Byron Castaneda MD  Date: 2024    Diagnosis: Muscle weakness (generalized) M62.81  Treatment Diagnosis: general weakness    Onset Date: 24  PT Insurance Information: Medicare Part A and B  Total # of Visits Approved: 24 Per Physician Order  Total # of Visits to Date: 15  No Show: 0  Canceled Appointment: 0    24 Plan of Care/Recert Due    Pre-Treatment Pain:  0/10  Subjective: Patient would like to continue coming to therapy to keep making progress on walking.    Exercises:  Exercise 5: sit<>stand x10 -CGA, BUE on mat table  Exercise 6: amb with RW +Alessio x1   20ft x1  Exercise 9: Seated EOB: cone stacking activity reaching outside CLIFF and across midline, BLE ther ex: LAQ and heel/toe raises x10ea  (only ther ex today)  Exercise 11: side stepping at counter x2 laps (only 1 lap today)    Assessment  Assessment: Patient has attended 15 PT visits for general weakness and met goals for independence with HEP and for improved functional strength with transfers and would benefit from continued PT for up to 4 more weeks to continue progressing gait and balance and maximize functional mobility.    Activity Tolerance  Activity Tolerance: Patient tolerated treatment well    Patient Education  Exercise technique, UPOC   Pt verbalized/demonstrated good understanding:     [x] Yes         [] No, pt required further clarification.       Post Treatment Pain:  0/10      Plan  Plan Frequency: 1x/week  Plan weeks: 4 weeks total       Goals  (Total # of Visits to Date: 15)      Short Term Goals  Time Frame for Short Term Goals: 2 weeks  Short Term Goal 1: Initiate HEP and progress as tolerated for strength and

## 2024-08-29 NOTE — PLAN OF CARE
[facility]           Phone: [phone]             Outpatient Physical Therapy  Fax: [phone]                                           Date: 2024  Patient: Macey Zazueta : 1950 CSN #: [id number]   Referring Physician: Byron Castaneda MD      [] Plan of Care   [x] Updated Plan of Care    Dates of Service to Include: 2024 to 24    Diagnosis:  Muscle weakness (generalized) M62.81     Rehab (Treatment) Diagnosis:  general weakness         Onset Date:  24    Attendance  Total # of Visits to Date: 15 No Show: 0 Canceled Appointment: 0    Assessment  Assessment: Patient has attended 15 PT visits for general weakness and met goals for independence with HEP and for improved functional strength with transfers and would benefit from continued PT for up to 4 more weeks to continue progressing gait and balance and maximize functional mobility.      Goals  Short Term Goals  Time Frame for Short Term Goals: 2 weeks  Short Term Goal 1: Initiate HEP and progress as tolerated for strength and mobility to instruct her . -met  Short Term Goal 2: Pt will be able to perform 5 consecutive sit to stands with use of UEs for functional erin LE strength. -met(2024 Pt able to perform 10 consecutive sit to stands with BUEs)  Short Term Goal 3: Pt will have improved core strength to be able to sit edge of bed unsupported to assist with personal care -met  Long Term Goals  Time Frame for Long Term Goals : 4  Long Term Goal 1: Pt and  will be independent with her HEP for strength and mobility with ADLs. -met  Long Term Goal 2: Updated : Pt will be able to ambulate up to 50 feet with +1 min assist with RW to assist in progressive walking. -progressing  Long Term Goal 3: Pt strength in erin LEs will be at least 4-/5 to improve stability with mobility related ADLs. -progressing      Prognosis  Therapy Prognosis: Good    Treatment Plan   Plan Frequency: 1x/week  Plan weeks: 4 weeks total  [] HP/CP      [] Electrical Stim   [x] Therapeutic Exercise      [x] Gait Training  [] Aquatics   [] Ultrasound         [x] Patient Education/HEP   [x] Manual Therapy  [] Traction    [x] Neuro-kimberly        [] Soft Tissue Mobs            [x] Therapeutic Activity  [] Iontophoresis    [] Orthotic casting/fitting      [] Dry Needling  [] Blood Flow Restriction [] Vasopneumatic Compression             Electronically signed by: Deborah Steinberg PT, DPT    Date: 8/29/2024      ______________________________________ Date: 8/29/2024   Physician Signature

## 2024-09-04 ENCOUNTER — HOSPITAL ENCOUNTER (OUTPATIENT)
Dept: PHYSICAL THERAPY | Age: 74
Setting detail: THERAPIES SERIES
Discharge: HOME | End: 2024-09-04
Payer: MEDICARE

## 2024-09-04 PROCEDURE — 97530 THERAPEUTIC ACTIVITIES: CPT

## 2024-09-04 PROCEDURE — 97116 GAIT TRAINING THERAPY: CPT

## 2024-09-04 PROCEDURE — 97110 THERAPEUTIC EXERCISES: CPT

## 2024-09-04 NOTE — PROGRESS NOTES
understanding:     [x] Yes         [] No, pt required further clarification.    Post Treatment Pain:  0/10      Plan  Plan Frequency: 1x/week  Plan weeks: 4 weeks total       Goals  (Total # of Visits to Date: 16)      Short Term Goals  Time Frame for Short Term Goals: 2 weeks  Short Term Goal 1: Initiate HEP and progress as tolerated for strength and mobility to instruct her . -met  Short Term Goal 2: Pt will be able to perform 5 consecutive sit to stands with use of UEs for functional erin LE strength. -met(7/24/2024 Pt able to perform 10 consecutive sit to stands with BUEs)  Short Term Goal 3: Pt will have improved core strength to be able to sit edge of bed unsupported to assist with personal care -met    Long Term Goals  Time Frame for Long Term Goals : 4  Long Term Goal 1: Pt and  will be independent with her HEP for strength and mobility with ADLs. -met  Long Term Goal 2: Updated 8/29: Pt will be able to ambulate up to 50 feet with +1 min assist with RW to assist in progressive walking. -progressing  Long Term Goal 3: Pt strength in erin LEs will be at least 4-/5 to improve stability with mobility related ADLs. -progressing    Minutes Tracking:  Time In: 1029  Time Out: 1112  Minutes: 43  Timed Code Treatment Minutes: 42 Minutes    Kimi Pineda PTA     Date: 9/4/2024

## 2024-09-05 ENCOUNTER — HOSPITAL ENCOUNTER (OUTPATIENT)
Dept: CT IMAGING | Age: 74
Discharge: HOME | End: 2024-09-07
Payer: MEDICARE

## 2024-09-05 DIAGNOSIS — N20.0 RENAL STONE: ICD-10-CM

## 2024-09-05 PROCEDURE — 74176 CT ABD & PELVIS W/O CONTRAST: CPT

## 2024-09-11 ENCOUNTER — HOSPITAL ENCOUNTER (OUTPATIENT)
Dept: PHYSICAL THERAPY | Age: 74
Setting detail: THERAPIES SERIES
Discharge: HOME | End: 2024-09-11
Payer: MEDICARE

## 2024-09-11 PROCEDURE — 97530 THERAPEUTIC ACTIVITIES: CPT

## 2024-09-11 PROCEDURE — 97116 GAIT TRAINING THERAPY: CPT

## 2024-09-11 PROCEDURE — 97110 THERAPEUTIC EXERCISES: CPT

## 2024-09-18 ENCOUNTER — HOSPITAL ENCOUNTER (OUTPATIENT)
Dept: PHYSICAL THERAPY | Age: 74
Setting detail: THERAPIES SERIES
Discharge: HOME | End: 2024-09-18
Payer: MEDICARE

## 2024-09-18 ENCOUNTER — OFFICE VISIT (OUTPATIENT)
Dept: UROLOGY | Age: 74
End: 2024-09-18

## 2024-09-18 VITALS
WEIGHT: 100 LBS | DIASTOLIC BLOOD PRESSURE: 64 MMHG | TEMPERATURE: 97.7 F | SYSTOLIC BLOOD PRESSURE: 112 MMHG | BODY MASS INDEX: 20.9 KG/M2

## 2024-09-18 DIAGNOSIS — K59.09 OTHER CONSTIPATION: ICD-10-CM

## 2024-09-18 DIAGNOSIS — N20.0 RENAL STONE: Primary | ICD-10-CM

## 2024-09-18 DIAGNOSIS — R91.1 LUNG NODULE SEEN ON IMAGING STUDY: ICD-10-CM

## 2024-09-18 PROCEDURE — 97110 THERAPEUTIC EXERCISES: CPT

## 2024-09-18 PROCEDURE — 97530 THERAPEUTIC ACTIVITIES: CPT

## 2024-09-18 PROCEDURE — 97116 GAIT TRAINING THERAPY: CPT

## 2024-09-24 PROBLEM — R91.8 MULTIPLE PULMONARY NODULES DETERMINED BY COMPUTED TOMOGRAPHY OF LUNG: Status: ACTIVE | Noted: 2024-09-24

## 2024-09-25 ENCOUNTER — HOSPITAL ENCOUNTER (OUTPATIENT)
Dept: PHYSICAL THERAPY | Age: 74
Setting detail: THERAPIES SERIES
Discharge: HOME | End: 2024-09-25
Payer: MEDICARE

## 2024-09-25 PROCEDURE — 97110 THERAPEUTIC EXERCISES: CPT

## 2024-09-25 PROCEDURE — 97116 GAIT TRAINING THERAPY: CPT

## 2024-10-02 ENCOUNTER — HOSPITAL ENCOUNTER (OUTPATIENT)
Dept: PHYSICAL THERAPY | Age: 74
Setting detail: THERAPIES SERIES
Discharge: HOME | End: 2024-10-02
Payer: MEDICARE

## 2024-10-02 PROCEDURE — 97530 THERAPEUTIC ACTIVITIES: CPT

## 2024-10-02 PROCEDURE — 97116 GAIT TRAINING THERAPY: CPT

## 2024-10-02 PROCEDURE — 97112 NEUROMUSCULAR REEDUCATION: CPT

## 2024-10-02 NOTE — PROGRESS NOTES
understanding:     [x] Yes         [] No, pt required further clarification.    Post Treatment Pain:  0/10      Plan  Plan Frequency: 1x/week  Plan weeks: 4 weeks total       Goals  (Total # of Visits to Date: 20)      Short Term Goals  Time Frame for Short Term Goals: 2 weeks  Short Term Goal 1: Initiate HEP and progress as tolerated for strength and mobility to instruct her . -met  Short Term Goal 2: Pt will be able to perform 5 consecutive sit to stands with use of UEs for functional erin LE strength. -met(7/24/2024 Pt able to perform 10 consecutive sit to stands with BUEs)  Short Term Goal 3: Pt will have improved core strength to be able to sit edge of bed unsupported to assist with personal care -met    Long Term Goals  Time Frame for Long Term Goals : 4  Long Term Goal 1: Pt and  will be independent with her HEP for strength and mobility with ADLs. -met  Long Term Goal 2: Updated 9/25: Pt will be able to ambulate up to 300 feet with CG/minAx1 with RW to assist in progressive walking. -progressing (9/25/2024 Pt able to ambulate 55' with RW and Garett x1 before requiring seated rest break)  Long Term Goal 3: Pt strength in erin LEs will be at least 4-/5 to improve stability with mobility related ADLs. -progressing    Minutes Tracking:  Time In: 1500  Time Out: 1548  Minutes: 48  Timed Code Treatment Minutes: 47 Minutes    Kimi Pineda PTA     Date: 10/2/2024

## 2024-10-09 ENCOUNTER — HOSPITAL ENCOUNTER (OUTPATIENT)
Dept: PHYSICAL THERAPY | Age: 74
Setting detail: THERAPIES SERIES
Discharge: HOME | End: 2024-10-09
Payer: COMMERCIAL

## 2024-10-09 PROCEDURE — 97112 NEUROMUSCULAR REEDUCATION: CPT

## 2024-10-09 PROCEDURE — 97116 GAIT TRAINING THERAPY: CPT

## 2024-10-09 PROCEDURE — 97530 THERAPEUTIC ACTIVITIES: CPT

## 2024-10-09 NOTE — PROGRESS NOTES
Phone: [phone]                 [facility]           Fax: [phone]                           Outpatient Physical Therapy                                                                            Daily Note    Patient: Macey Zazueta : 1950  CSN #: [id number]   Referring Physician: Byron Castaneda MD  Date: 10/9/2024    Diagnosis: Muscle weakness (generalized) M62.81  Treatment Diagnosis: general weakness    Onset Date: 24  PT Insurance Information: Medicare Part A and B  Total # of Visits Approved: 26 Per Physician Order  Total # of Visits to Date: 21  No Show: 0  Canceled Appointment: 0    10/25/24 Plan of Care/Recert Due    Pre-Treatment Pain:  0/10  Subjective: Pt states she is doing ok. Her  reports they have been working on using her RLE more.    Exercises:  Exercise 1: HEP  Exercise 4: 4\" FSU x10 BLE-focusing more on RLE this date  Exercise 5: sit<>stand x10 -CGA/SBA, BUE on standard chair  Exercise 6: amb with RW +Alessio x1  15ft x2, 5ft x3  Exercise 14: tape on ground: fwd step over and tap airex x8 ea, BUE support on FWW  Exercise 16: Standing at counter: cone stacking activity reaching outside CLIFF and across midline -focus on posture, core strength, and reducing UE support    Assessment  Assessment: Pt demonstrating improved strength and confidence in RLE during FSUs onto 4\" step this date with good carry over of verbal and tactile cues. Pt demonstrated fwd lean LOB x2 during ambulation with FWW requiring ModA x1 to correct. Max verbal and tactile cues required t/o tx for proper sequencing, fair pt carry over however pt demonstrated difficulty staying on task this date. Improved tolerance to cone stacking activity with improved posture and core activation noted this date. Rest breaks provided t/o secondary to fatigue. Will continue    Activity Tolerance  Activity Tolerance: Patient tolerated treatment well    Patient Education  Patient Education: Gait sequencing with

## 2024-10-16 ENCOUNTER — HOSPITAL ENCOUNTER (OUTPATIENT)
Dept: PHYSICAL THERAPY | Age: 74
Setting detail: THERAPIES SERIES
Discharge: HOME | End: 2024-10-16
Payer: COMMERCIAL

## 2024-10-16 PROCEDURE — 97116 GAIT TRAINING THERAPY: CPT

## 2024-10-16 PROCEDURE — 97530 THERAPEUTIC ACTIVITIES: CPT

## 2024-10-16 PROCEDURE — 97110 THERAPEUTIC EXERCISES: CPT

## 2024-10-16 NOTE — PROGRESS NOTES
Phone: [phone]                 [facility]           Fax: [phone]                           Outpatient Physical Therapy                                                                            Daily Note    Patient: Macey Zazueta : 1950  CSN #: [id number]   Referring Physician: Byron Castaneda MD  Date: 10/16/2024    Diagnosis: Muscle weakness (generalized) M62.81  Treatment Diagnosis: general weakness    Onset Date: 24  PT Insurance Information: Medicare Part A and B  Total # of Visits Approved: 26 Per Physician Order  Total # of Visits to Date:   No Show: 0  Canceled Appointment: 0    10/25/24 Plan of Care/Recert Due    Pre-Treatment Pain:  0/10  Subjective: Pt doing well this date with no complaints.    Exercises:  Exercise 5: sit<>stand x10 -CGA/SBA, BUE on standard chair  Exercise 6: amb with RW +Alessio x1  20ftx1, 30ftx1  Exercise 14: tape on ground: fwd step over x5ea BUE support on FWW  Exercise 17: ascend/descend 6\"steps x1, B HR support    Assessment  Assessment: Pt able to ascend/descend (4) 6\" steps this visit with use of B HRs and Alessio with close guarding for safety. Pt heavily reliant on LLE, however demonstrates good carry over of verbal and tactile cues for weight shifting. Pt able to ambulate most of session with FWW, Alessio x1 with max verbal cues for sequencing. Improved transfers this session with pt requiring fewer verbal cues for hand placement and demonstrating improved eccentric quad control. Pt demonstrated and reported fatigue post tx. Will continue.    Activity Tolerance  Activity Tolerance: Patient tolerated treatment well    Patient Education  Patient Education: Gait sequencing with AD, hand placement for transfers, sequencing for steps  Pt verbalized/demonstrated good understanding:     [x] Yes         [] No, pt required further clarification.    Post Treatment Pain:  0/10      Plan  Plan Frequency: 1x/week  Plan weeks: 4 weeks total       Goals

## 2024-10-21 PROBLEM — R41.82 ALTERED MENTAL STATUS: Status: RESOLVED | Noted: 2024-05-22 | Resolved: 2024-10-21

## 2024-10-21 PROBLEM — E87.20 LACTIC ACIDOSIS: Status: RESOLVED | Noted: 2024-05-25 | Resolved: 2024-10-21

## 2024-10-21 PROBLEM — E87.20 ACIDOSIS: Status: RESOLVED | Noted: 2024-05-23 | Resolved: 2024-10-21

## 2024-10-21 PROBLEM — A41.59 KLEBSIELLA SEPSIS (HCC): Status: RESOLVED | Noted: 2024-05-25 | Resolved: 2024-10-21

## 2024-10-21 PROBLEM — N39.0 COMPLICATED UTI (URINARY TRACT INFECTION): Status: RESOLVED | Noted: 2024-02-19 | Resolved: 2024-10-21

## 2024-10-21 PROBLEM — J96.01 ACUTE HYPOXIC RESPIRATORY FAILURE: Status: RESOLVED | Noted: 2024-05-25 | Resolved: 2024-10-21

## 2024-10-21 PROBLEM — N17.9 AKI (ACUTE KIDNEY INJURY) (HCC): Status: RESOLVED | Noted: 2024-05-23 | Resolved: 2024-10-21

## 2024-10-21 PROBLEM — A49.8 ENTEROCOCCUS FAECALIS INFECTION: Status: RESOLVED | Noted: 2024-05-25 | Resolved: 2024-10-21

## 2024-10-21 PROBLEM — A41.50 GRAM NEGATIVE SEPTICEMIA (HCC): Status: RESOLVED | Noted: 2024-05-25 | Resolved: 2024-10-21

## 2024-10-21 PROBLEM — N20.0 RENAL CALCULUS: Status: RESOLVED | Noted: 2024-02-15 | Resolved: 2024-10-21

## 2024-10-23 ENCOUNTER — HOSPITAL ENCOUNTER (OUTPATIENT)
Dept: PHYSICAL THERAPY | Age: 74
Setting detail: THERAPIES SERIES
Discharge: HOME | End: 2024-10-23
Payer: COMMERCIAL

## 2024-10-23 PROCEDURE — 97116 GAIT TRAINING THERAPY: CPT

## 2024-10-23 PROCEDURE — 97110 THERAPEUTIC EXERCISES: CPT

## 2024-10-23 NOTE — PROGRESS NOTES
Phone: [phone]                 [facility]           Fax: [phone]                           Outpatient Physical Therapy                                                                            Daily Note    Patient: Macey Zazueta : 1950  CSN #: [id number]   Referring Physician: Byron Castaneda MD  Date: 10/23/2024    Diagnosis: Muscle weakness (generalized) M62.81  Treatment Diagnosis: general weakness    Onset Date: 24  PT Insurance Information: Medicare Part A and B  Total # of Visits Approved: 30 Per Physician Order  Total # of Visits to Date:   No Show: 0  Canceled Appointment: 0    24 Plan of Care/Recert Due    Pre-Treatment Pain:  0/10  Subjective: Pt reports she would like to continue PT and she and her  still feel she is making progress.    Exercises:  Exercise 5: sit<>stand x10 -CGA/SBA, BUE on standard chair  Exercise 6: amb with RW +Alessio x1  20ftx1, 30ftx1      Assessment  Assessment: Patient has attended 23 PT visits for general weakness and has met goals for tolerance to functional strengthening and for improved B LE strength and is making progress toward balance and gait goals. Pt currently able to walk 55ft with RW and minAx1 but requires a lot of vc's for walker management and direction. Patient to benefit from continued PT for up to 4 more weeks to maximize mobility.    Activity Tolerance  Activity Tolerance: Patient tolerated treatment well    Patient Education  Exercise technique, UPOC   Pt verbalized/demonstrated good understanding:     [x] Yes         [] No, pt required further clarification.       Post Treatment Pain:  0/10      Plan  Plan Frequency: 1x/week  Plan weeks: 4 weeks total       Goals  (Total # of Visits to Date: 23)      Short Term Goals  Time Frame for Short Term Goals: 2 weeks  Short Term Goal 1: Initiate HEP and progress as tolerated for strength and mobility to instruct her . -met  Short Term Goal 2: Pt will be able to

## 2024-10-23 NOTE — PLAN OF CARE
[facility]           Phone: [phone]             Outpatient Physical Therapy  Fax: [phone]                                           Date: 10/23/2024  Patient: Macey Zazueta : 1950 CSN #: [id number]   Referring Physician: Byron Castaneda MD      [] Plan of Care   [x] Updated Plan of Care    Dates of Service to Include: 10/23/2024 to 24    Diagnosis:  Muscle weakness (generalized) M62.81     Rehab (Treatment) Diagnosis:  general weakness         Onset Date:  24    Attendance  Total # of Visits to Date: 23 No Show: 0 Canceled Appointment: 0    Assessment  Assessment: Patient has attended 23 PT visits for general weakness and has met goals for tolerance to functional strengthening and for improved B LE strength and is making progress toward balance and gait goals. Pt currently able to walk 55ft with RW and minAx1 but requires a lot of vc's for walker management and direction. Patient to benefit from continued PT for up to 4 more weeks to maximize mobility.      Goals  Short Term Goals  Time Frame for Short Term Goals: 2 weeks  Short Term Goal 1: Initiate HEP and progress as tolerated for strength and mobility to instruct her . -met  Short Term Goal 2: Pt will be able to perform 5 consecutive sit to stands with use of UEs for functional erin LE strength. -met(2024 Pt able to perform 10 consecutive sit to stands with BUEs)  Short Term Goal 3: Pt will have improved core strength to be able to sit edge of bed unsupported to assist with personal care -met  Long Term Goals  Time Frame for Long Term Goals : 4  Long Term Goal 1: Pt and  will be independent with her HEP for strength and mobility with ADLs. -met  Long Term Goal 2: Updated : Pt will be able to ambulate up to 300 feet with CG/minAx1 with RW to assist in progressive walking. -progressing (2024 Pt able to ambulate

## 2024-10-30 ENCOUNTER — HOSPITAL ENCOUNTER (OUTPATIENT)
Dept: PHYSICAL THERAPY | Age: 74
Setting detail: THERAPIES SERIES
Discharge: HOME | End: 2024-10-30
Payer: COMMERCIAL

## 2024-10-30 PROCEDURE — 97110 THERAPEUTIC EXERCISES: CPT

## 2024-10-30 PROCEDURE — 97116 GAIT TRAINING THERAPY: CPT

## 2024-10-30 NOTE — PROGRESS NOTES
Phone: [phone]                 [facility]           Fax: [phone]                           Outpatient Physical Therapy                                                                            Daily Note    Patient: Macey Zazueta : 1950  [facility] #: [id number]   Referring Physician: Byron Castaneda MD  Date: 10/30/2024    Diagnosis: Muscle weakness (generalized) M62.81  Treatment Diagnosis: general weakness    Onset Date: 24  PT Insurance Information: Medicare Part A and B  Total # of Visits Approved: 30 Per Physician Order  Total # of Visits to Date: 24  No Show: 0  Canceled Appointment: 0    24 Plan of Care/Recert Due    Pre-Treatment Pain:  0/10  Subjective: Pt reports she is doing well today, no concerns or complaints from pt or     Exercises:  Exercise 4: 6\" FSU x8 ea BLE  Exercise 5: sit<>stand x10 -CGA/SBA, BUE on standard chair  Exercise 6: amb with RW +Alessio x1  20ftx1, 30ftx1  Exercise 16: Standing at counter: cone stacking activity reaching outside CLIFF and across midline -focus on posture, core strength, and reducing UE support    Assessment  Assessment: Focused session on transfers, gait, and decreasing UE support during standing activities to improve balance. Pt continues to require Alessio with walker navigation and max verbal cues for sequencing/direction, however requiring decreased assistance with AD this date. Pt instructed in and performed cone stacking activity at counter with focus on decrease UE support with poor to fair pt carry over noted. Will continue    Activity Tolerance  Activity Tolerance: Patient tolerated treatment well    Patient Education  Patient Education: Gait sequencing with AD, hand placement for transfers, sequencing for steps  Pt verbalized/demonstrated good understanding:     [x] Yes         [] No, pt required further clarification.    Post Treatment Pain:  0/10      Plan  Plan Frequency: 1x/week  Plan weeks: 4 weeks

## 2024-11-06 ENCOUNTER — HOSPITAL ENCOUNTER (OUTPATIENT)
Dept: PHYSICAL THERAPY | Age: 74
Setting detail: THERAPIES SERIES
Discharge: HOME | End: 2024-11-06

## 2024-11-06 NOTE — PROGRESS NOTES
[facility]  Inpatient/Observation/Outpatient Rehabilitation    Date: 2024  Patient Name: Macey Zazueta          [x]  Outpatient  : 24 Plan of Care/Recert ends       [x] Pt cancelled due to:     [x] Sick/ill       Therapist/Assistant will attempt to see this patient, at our earliest opportunity.       Kimi Pineda, PTA Date: 2024

## 2024-11-13 ENCOUNTER — HOSPITAL ENCOUNTER (OUTPATIENT)
Dept: PHYSICAL THERAPY | Age: 74
Setting detail: THERAPIES SERIES
Discharge: HOME | End: 2024-11-13

## 2024-11-13 NOTE — PROGRESS NOTES
[facility]  Inpatient/Observation/Outpatient Rehabilitation    Date: 2024  Patient Name: Macey Zazueta        [x]  Outpatient  : 24 Plan of Care/Recert ends       [x] Pt cancelled due to:   [x] Sick/ill   Pt recovering from flu-like illness. She is starting to regain strength and feel better. Hopes to be back next week.    Therapist/Assistant will attempt to see this patient, at our earliest opportunity.       Kimi Pineda, PTA Date: 2024

## 2024-11-20 ENCOUNTER — HOSPITAL ENCOUNTER (OUTPATIENT)
Dept: PHYSICAL THERAPY | Age: 74
Setting detail: THERAPIES SERIES
Discharge: HOME | End: 2024-11-20
Payer: MEDICARE

## 2024-11-20 PROCEDURE — 97116 GAIT TRAINING THERAPY: CPT

## 2024-11-20 PROCEDURE — 97110 THERAPEUTIC EXERCISES: CPT

## 2024-11-20 NOTE — PROGRESS NOTES
Phone: [phone]                 [facility]           Fax: [phone]                           Outpatient Physical Therapy                                                                            Daily Note    Patient: Macey Zazueta : 1950  CSN #: [id number]   Referring Physician: Byron Castaneda MD  Date: 2024    Diagnosis: Muscle weakness (generalized) M62.81  Treatment Diagnosis: general weakness    Onset Date: 24  PT Insurance Information: Medicare Part A and B  Total # of Visits Approved: 30 Per Physician Order  Total # of Visits to Date: 25  No Show: 0  Canceled Appointment: 2    24 Plan of Care/Recert Due    Pre-Treatment Pain:  0/10  Subjective: Patient feeling better today but per  still struggling with balance after recovering from the flu, which is normal for her.    Exercises:  Exercise 5: sit<>stand x10 -CGA/SBA, BUE on standard chair  Exercise 6: amb with RW +Alessio x1  20ftx1, 30ftx1      Assessment  Assessment: Patient has attended 25 PT visits for general weakness and has met goals for initiating HEP and functional strengthening and demonstrates good B LE strength but has plateaued in gains with balance and ambulation. Pt and  agreeable to be placed on hold from formal therapy at this time to continue with HEP.    Activity Tolerance  Activity Tolerance: Patient tolerated treatment well    Patient Education  Continue with HEP   Pt verbalized/demonstrated good understanding:     [x] Yes         [] No, pt required further clarification.       Post Treatment Pain:  0/10      Plan  Plan Frequency: 1x/week  Plan weeks: 4 weeks total       Goals  (Total # of Visits to Date: 25)      Short Term Goals  Time Frame for Short Term Goals: 2 weeks  Short Term Goal 1: Initiate HEP and progress as tolerated for strength and mobility to instruct her . -met  Short Term Goal 2: Pt will be able to perform 5 consecutive sit to stands with use of UEs for

## 2025-03-19 ENCOUNTER — OFFICE VISIT (OUTPATIENT)
Dept: UROLOGY | Age: 75
End: 2025-03-19
Payer: MEDICARE

## 2025-03-19 ENCOUNTER — HOSPITAL ENCOUNTER (OUTPATIENT)
Dept: GENERAL RADIOLOGY | Age: 75
Discharge: HOME | End: 2025-03-21
Payer: MEDICARE

## 2025-03-19 ENCOUNTER — HOSPITAL ENCOUNTER (OUTPATIENT)
Age: 75
Discharge: HOME | End: 2025-03-21
Payer: MEDICARE

## 2025-03-19 VITALS
SYSTOLIC BLOOD PRESSURE: 124 MMHG | WEIGHT: 106 LBS | HEIGHT: 58 IN | BODY MASS INDEX: 22.25 KG/M2 | DIASTOLIC BLOOD PRESSURE: 72 MMHG

## 2025-03-19 DIAGNOSIS — N20.0 RENAL STONE: ICD-10-CM

## 2025-03-19 DIAGNOSIS — N20.0 RENAL STONE: Primary | ICD-10-CM

## 2025-03-19 PROCEDURE — 74018 RADEX ABDOMEN 1 VIEW: CPT

## 2025-03-19 PROCEDURE — 1159F MED LIST DOCD IN RCRD: CPT | Performed by: PHYSICIAN ASSISTANT

## 2025-03-19 PROCEDURE — 3017F COLORECTAL CA SCREEN DOC REV: CPT | Performed by: PHYSICIAN ASSISTANT

## 2025-03-19 PROCEDURE — G8420 CALC BMI NORM PARAMETERS: HCPCS | Performed by: PHYSICIAN ASSISTANT

## 2025-03-19 PROCEDURE — 3074F SYST BP LT 130 MM HG: CPT | Performed by: PHYSICIAN ASSISTANT

## 2025-03-19 PROCEDURE — 99214 OFFICE O/P EST MOD 30 MIN: CPT | Performed by: PHYSICIAN ASSISTANT

## 2025-03-19 PROCEDURE — 3078F DIAST BP <80 MM HG: CPT | Performed by: PHYSICIAN ASSISTANT

## 2025-03-19 PROCEDURE — 1036F TOBACCO NON-USER: CPT | Performed by: PHYSICIAN ASSISTANT

## 2025-03-19 PROCEDURE — G8427 DOCREV CUR MEDS BY ELIG CLIN: HCPCS | Performed by: PHYSICIAN ASSISTANT

## 2025-03-19 PROCEDURE — G8400 PT W/DXA NO RESULTS DOC: HCPCS | Performed by: PHYSICIAN ASSISTANT

## 2025-03-19 PROCEDURE — 1123F ACP DISCUSS/DSCN MKR DOCD: CPT | Performed by: PHYSICIAN ASSISTANT

## 2025-03-19 PROCEDURE — 1090F PRES/ABSN URINE INCON ASSESS: CPT | Performed by: PHYSICIAN ASSISTANT

## 2025-03-19 NOTE — PROGRESS NOTES
normocephalic, atraumatic  Lungs: Respiratory effort normal, unlabored      Lab Results   Component Value Date    BUN 19 10/15/2024     Lab Results   Component Value Date    CREATININE 0.62 10/15/2024       ASSESSMENT:   Diagnosis Orders   1. Renal stone  XR ABDOMEN (KUB) (SINGLE AP VIEW)    XR ABDOMEN (KUB) (SINGLE AP VIEW)              PLAN:  KUB, will call with results if there is significant stone burden we will need to discuss further with her in the office, if there is no significant stone burden we will see her yearly.    Patient instructed to drink at least 80 ounces of \"good fluids\" daily (water, juice, Gatoraide, milk) and to avoid/minimize intake of \"bad fluids\" (soda pop, coffee, tea, alcohol, energy drinks). Also advised to avoid excessive consumption of sodium and animal protein to decrease risk of recurrent kidney stones.    Follow-up in 1 year with KUB

## 2025-03-19 NOTE — PATIENT INSTRUCTIONS
FOR CONSTIPATION    It is very important to have regular, soft, daily bowel movements, because it WILL improve your urinary symptoms.    Take Miralax (or generic equivalent) 17g once daily (one capful). Take every day, DO NOT skip days, as it must be taken daily in order to be effective. DO NOT take just \"as needed\". It is safe to take long term and is recommended for your symptoms.  If one dose daily causes loose stools/diarrhea, decrease to 1/2 capful or 1/4 capful. If you cannot tolerate this medication, please notify our office.     If one dose daily of Miralax is not sufficient to produce a soft, easy to pass daily stool, you may also add an over-the-counter stool softener capsule. For example: colace (docusate).     For Miralax to have maximal effectiveness, be sure to increase your water intake - aim for 80 oz daily unless you are on a fluid-restriction from another provider.     STONE PREVENTION    To prevent future stone formation:    1) DO drink ~65-80 oz (2-2.5L) of water per day to stay adequately hydrated     2) AVOID/LIMIT intake of \"bad fluids\": soda/pop, coffee, tea, alcohol, energy drinks, any beverage with caffeine can act to dehydrate you       \"BAD FLUIDS\" DO NOT COUNT TOWARD THE 65-80oz of water    3) REDUCE consumption of sodium/salt - DO NOT salt your food, read labels (lunch meats, canned soups, prepared meals are high in salt), choose low salt options    4) DO NOT EAT animal protein/meat more than 2 meals a day - this includes red meat, pork, poultry and fish    SURVEY:    You may be receiving a survey from [employer] regarding your visit today.    Please complete the survey to enable us to provide the highest quality of care to you and your family.    If you cannot score us a very good on any question, please call the office to discuss how we could have made your experience a very good one.    Thank you.

## 2025-03-24 ENCOUNTER — RESULTS FOLLOW-UP (OUTPATIENT)
Dept: GENERAL RADIOLOGY | Age: 75
End: 2025-03-24

## 2025-06-13 NOTE — DISCHARGE SUMMARY
Discharge Summary    Macey Zazueta  :  1950  MRN:  [id number]    Admit date:  2024      Discharge date: 2024     Admitting Physician:  Byron Castaneda MD    Discharge Diagnoses:    Principal Problem:    Septic shock (HCC)  Active Problems:    Left pontine cerebrovascular accident (HCC) /  2017    Type 2 diabetes mellitus without complication, with long-term current use of insulin (HCC) /  Metformin ER    Complicated UTI (urinary tract infection)  Resolved Problems:    * No resolved hospital problems. *      Hospital Course:   Macey Zazueta is a 74 y.o. female admitted with septic shock.  She presented with altered mental status.  Patient underwent cystoscopy with HLL with Dr. Lees earlier in the day.  She was discharged home without issues.  After a few hours of being home her  stated she suddenly became weak and unable to speak clearly or follow instructions.  She had a large amount of diarrhea following this episode with new left-sided weakness.  She does have history of previous stroke and her  is her primary caregiver.  He stated that he knew something just was not right with her.  Upon arrival she was having bleeding from the urethra and her stent was partially visible.  The stent was removed by ED staff.  Patient's blood pressure dropped while in the emergency room with systolic blood pressure of 78.  She was given fluid resuscitation followed by Levophed.  Blood pressure increased to 109 systolically by admission.  Patient is alert at her baseline per her  and answers questions appropriately.  She did complain of pain to her lower abdomen with palpation.  Initial lactic acid was 6.0 and decreased initially to 5.2 with fluid resuscitation and has returned to normal.  Initial hemoglobin was 12.0.  Hemoglobin decreased to 9.0 within a few hours.  Urethral bleeding was reported to be had slowed down by that point.  CT abdomen and pelvis showed mild left hydronephrosis with  Encounter opened in error   right femoral head, with moderate to severe bilateral hip osteoarthritis.  Unremarkable abdominal soft tissues.     1. Mild left hydronephrosis, with diffuse left periureteral inflammatory stranding and scattered small foci of intraureteral air.  The renal collecting system is opacified with contrast, without definite evidence of obstruction.  Overall, these findings are concerning for left-sided urinary tract infection.  Correlation with urinalysis is recommended. 2. Bilateral adrenal thickening with 2.0 cm left adrenal nodule, stable from previous exam dated 02/12/2024.  1 year follow-up adrenal CT is recommended. 3. Enlarged fibroid uterus. 4. Cholelithiasis. 5. Additional chronic findings, as above.     XR CHEST (2 VW)    Result Date: 5/21/2024  EXAMINATION: TWO XRAY VIEWS OF THE CHEST 5/21/2024 4:20 pm COMPARISON: None. HISTORY: ORDERING SYSTEM PROVIDED HISTORY: Shortness of Breath TECHNOLOGIST PROVIDED HISTORY: Shortness of Breath altered mental status FINDINGS: The lungs are without acute focal process.  There is no effusion or pneumothorax. The cardiomediastinal silhouette is without acute process. The osseous structures are without acute process.  There is osteoarthritis of the right shoulder.  There is a thoracolumbar scoliosis, convex to the right.     No acute process.     CTA HEAD NECK W CONTRAST    Result Date: 5/21/2024  EXAMINATION: CTA OF THE HEAD AND NECK WITH CONTRAST 5/21/2024 1:50 pm: TECHNIQUE: CTA of the head and neck was performed with the administration of intravenous contrast. Multiplanar reformatted images are provided for review.  MIP images are provided for review. Stenosis of the internal carotid arteries measured using NASCET criteria. Automated exposure control, iterative reconstruction, and/or weight based adjustment of the mA/kV was utilized to reduce the radiation dose to as low as reasonably achievable. COMPARISON: CT brain performed the same day, CTA head and neck 02/12/2024  02/19/2024    Renal calculus 02/15/2024    Sepsis due to urinary tract infection (HCC) 02/12/2024    Type 2 diabetes mellitus without complication, with long-term current use of insulin (HCC) /  Metformin ER 05/16/2017    Left pontine cerebrovascular accident (HCC) /  2017         Discharge Medications:         Medication List        CHANGE how you take these medications      atorvastatin 80 MG tablet  Commonly known as: LIPITOR  Take 1 tablet by mouth daily  What changed: when to take this            CONTINUE taking these medications      Alcohol Swabs 70 % Pads  1 Units by Does not apply route 3 times daily (with meals)     amLODIPine 2.5 MG tablet  Commonly known as: NORVASC     aspirin 81 MG EC tablet  Take 1 tablet by mouth daily     blood glucose test strips strip  Commonly known as: ASCENSIA AUTODISC VI;ONE TOUCH ULTRA TEST VI  1 each by In Vitro route daily As needed.     glucose monitoring kit  1 kit by Does not apply route 3 times daily     Handicap Placard Misc  by Does not apply route Dx: stroke    Duration: 5 years     melatonin 3 MG Tabs tablet     metFORMIN 500 MG extended release tablet  Commonly known as: GLUCOPHAGE-XR  Take 2 tablets by mouth daily (with breakfast)     metoprolol tartrate 25 MG tablet  Commonly known as: LOPRESSOR  Take 1 tablet by mouth 2 times daily     SOFT TOUCH LANCETS Misc  1 Units by Does not apply route 3 times daily (with meals)     therapeutic multivitamin-minerals tablet     UNABLE TO FIND  Manual wheelchair    Diagnosis: Stroke              Patient Instructions:   Activity: activity as tolerated  Diet: encourage fluids  Wound Care: none needed  Other: None    Disposition:   Transfer to [facility] for tertiary care    Follow up:  Patient will be followed by Byron Castaneda MD in 1-2 weeks    CORE MEASURES on Discharge (if applicable)  ACE/ARB in CHF: NA  Statin in MI: Yes  ASA in MI: Yes  Statin in CVA: NA  Antiplatelet in CVA: NA    Total time spent on discharge  services: 45 minutes    Including the following activities:  Evaluation and Management of patient  Discussion with patient and/or surrogate about current care plan  Coordination with Case Management and/or   Coordination of care with Consultants (if applicable)   Coordination of care with Receiving Facility Physician (if applicable)  Completion of DME forms (if applicable)  Preparation of Discharge Summary  Preparation of Medication Reconciliation  Preparation of Discharge Prescriptions    Signed:  LUARA Cortez - CNP, LAURA, NP-C  5/22/2024, 7:06 PM

## 2025-06-23 RX ORDER — METOPROLOL TARTRATE 25 MG/1
25 TABLET, FILM COATED ORAL 2 TIMES DAILY
Qty: 180 TABLET | Refills: 3 | Status: SHIPPED | OUTPATIENT
Start: 2025-06-23

## 2025-06-23 RX ORDER — AMLODIPINE BESYLATE 2.5 MG/1
2.5 TABLET ORAL DAILY
Qty: 90 TABLET | Refills: 3 | Status: SHIPPED | OUTPATIENT
Start: 2025-06-23

## 2025-07-01 ENCOUNTER — OFFICE VISIT (OUTPATIENT)
Dept: CARDIOLOGY | Age: 75
End: 2025-07-01
Payer: MEDICARE

## 2025-07-01 VITALS
OXYGEN SATURATION: 95 % | DIASTOLIC BLOOD PRESSURE: 71 MMHG | BODY MASS INDEX: 21.32 KG/M2 | WEIGHT: 101.6 LBS | SYSTOLIC BLOOD PRESSURE: 127 MMHG | HEIGHT: 58 IN | HEART RATE: 73 BPM | RESPIRATION RATE: 18 BRPM

## 2025-07-01 DIAGNOSIS — I10 ESSENTIAL HYPERTENSION: ICD-10-CM

## 2025-07-01 DIAGNOSIS — E11.9 TYPE 2 DIABETES MELLITUS WITHOUT COMPLICATION, WITH LONG-TERM CURRENT USE OF INSULIN (HCC): ICD-10-CM

## 2025-07-01 DIAGNOSIS — I63.439 CEREBROVASCULAR ACCIDENT (CVA) DUE TO EMBOLISM OF POSTERIOR CEREBRAL ARTERY, UNSPECIFIED BLOOD VESSEL LATERALITY (HCC): ICD-10-CM

## 2025-07-01 DIAGNOSIS — Z79.4 TYPE 2 DIABETES MELLITUS WITHOUT COMPLICATION, WITH LONG-TERM CURRENT USE OF INSULIN (HCC): ICD-10-CM

## 2025-07-01 DIAGNOSIS — I21.4 NSTEMI (NON-ST ELEVATED MYOCARDIAL INFARCTION) (HCC): Primary | ICD-10-CM

## 2025-07-01 DIAGNOSIS — E78.2 MIXED HYPERLIPIDEMIA: ICD-10-CM

## 2025-07-01 PROCEDURE — 2022F DILAT RTA XM EVC RTNOPTHY: CPT | Performed by: NURSE PRACTITIONER

## 2025-07-01 PROCEDURE — 1036F TOBACCO NON-USER: CPT | Performed by: NURSE PRACTITIONER

## 2025-07-01 PROCEDURE — 1090F PRES/ABSN URINE INCON ASSESS: CPT | Performed by: NURSE PRACTITIONER

## 2025-07-01 PROCEDURE — G8420 CALC BMI NORM PARAMETERS: HCPCS | Performed by: NURSE PRACTITIONER

## 2025-07-01 PROCEDURE — 93010 ELECTROCARDIOGRAM REPORT: CPT | Performed by: NURSE PRACTITIONER

## 2025-07-01 PROCEDURE — G8427 DOCREV CUR MEDS BY ELIG CLIN: HCPCS | Performed by: NURSE PRACTITIONER

## 2025-07-01 PROCEDURE — 3017F COLORECTAL CA SCREEN DOC REV: CPT | Performed by: NURSE PRACTITIONER

## 2025-07-01 PROCEDURE — 93005 ELECTROCARDIOGRAM TRACING: CPT | Performed by: NURSE PRACTITIONER

## 2025-07-01 PROCEDURE — 3078F DIAST BP <80 MM HG: CPT | Performed by: NURSE PRACTITIONER

## 2025-07-01 PROCEDURE — 3044F HG A1C LEVEL LT 7.0%: CPT | Performed by: NURSE PRACTITIONER

## 2025-07-01 PROCEDURE — 1160F RVW MEDS BY RX/DR IN RCRD: CPT | Performed by: NURSE PRACTITIONER

## 2025-07-01 PROCEDURE — G8400 PT W/DXA NO RESULTS DOC: HCPCS | Performed by: NURSE PRACTITIONER

## 2025-07-01 PROCEDURE — 99214 OFFICE O/P EST MOD 30 MIN: CPT | Performed by: NURSE PRACTITIONER

## 2025-07-01 PROCEDURE — 1159F MED LIST DOCD IN RCRD: CPT | Performed by: NURSE PRACTITIONER

## 2025-07-01 PROCEDURE — 1123F ACP DISCUSS/DSCN MKR DOCD: CPT | Performed by: NURSE PRACTITIONER

## 2025-07-01 PROCEDURE — 3074F SYST BP LT 130 MM HG: CPT | Performed by: NURSE PRACTITIONER

## 2025-07-01 NOTE — PROGRESS NOTES
Patient: Macey Zazueta  : 1950  Primary Care Physician: Byron Castaneda  Today's Date: 2025    REASON FOR CONSULTATION: Hypertension (Hx: Sepsis, Diabetes,HTN,HLD, CVA. Pt is here for a follow up. Pt is doing okay. She denies any CP, SOB, light headed/dizziness, palps. )      HPI:  Ms. Zazueta is a 75 y.o. female who presented today for follow-up.    She was admitted to the hospital on 2024 because of septic shock.  Cardiology consulted because of elevated troponin.      Ms. Zazueta has history of TIA's or strokes 7 years ago.  As per her caregiver, patient is not mobile.  She presented with hydronephrosis and complicated UTI.  Underwent cystoscopy which went uneventful but later presented to the emergency room with altered mental status with ureteral stent visibly seen.  The stent was removed by the emergency room team.  Patient was hypotensive and with elevated lactate level and tachycardia consistent with septic shock.  Started on IV fluid hydration and Levophed.  Finally we were able to wean her off of the Levophed.  Troponin is elevated but it is trending down consistent with type II myocardial infarction \" myocardial infarction secondary to demand ischemia\"    She has no history of coronary artery disease but I did review her CT scan of the chest from 2024 and patient does have extensive coronary artery calcification consistent with a stable CAD.  She is on aspirin, Lipitor and amlodipine at home.    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  Mild narrowing of the bilateral supraclinoid ICAs.    Echocardiogram completed on 2024: EF of 60-65%  Left ventricle size is normal. Normal wall thickness. Normal wall motion. Grade I diastolic dysfunction with normal LAP. Mitral Valve: Mild annular calcification of the mitral valve.

## (undated) DEVICE — Device

## (undated) DEVICE — GUIDEWIRE VASC NITINOL HYDROPHIL STR 3 CM 0.035 INX150 CM STD NIT ZIPWIRE

## (undated) DEVICE — MERCY TIFFIN CYSTO-LF: Brand: MEDLINE INDUSTRIES, INC.

## (undated) DEVICE — URETEROSCOPE (SEE ITEM COMMENTS) DIGITAL URS FLX SU MODEL E NORM DEFL AXIS II

## (undated) DEVICE — SOLUTION IRRIG 1000ML 0.9% SOD CHL USP POUR PLAS BTL

## (undated) DEVICE — SOLUTION IRRIG 1000ML STRL H2O USP PLAS POUR BTL

## (undated) DEVICE — SINGLE ACTION PUMPING SYSTEM

## (undated) DEVICE — SOLUTION IRRIG 3000ML 0.9% SOD CHL USP UROMATIC PLAS CONT

## (undated) DEVICE — ADAPTER URO SCP UROLOK LL